# Patient Record
Sex: MALE | Race: WHITE | NOT HISPANIC OR LATINO | Employment: FULL TIME | URBAN - METROPOLITAN AREA
[De-identification: names, ages, dates, MRNs, and addresses within clinical notes are randomized per-mention and may not be internally consistent; named-entity substitution may affect disease eponyms.]

---

## 2017-01-04 ENCOUNTER — APPOINTMENT (OUTPATIENT)
Dept: CARDIAC REHAB | Facility: HOSPITAL | Age: 52
End: 2017-01-04

## 2017-01-06 ENCOUNTER — LAB (OUTPATIENT)
Dept: LAB | Facility: HOSPITAL | Age: 52
End: 2017-01-06

## 2017-01-06 ENCOUNTER — APPOINTMENT (OUTPATIENT)
Dept: CARDIAC REHAB | Facility: HOSPITAL | Age: 52
End: 2017-01-06

## 2017-01-06 DIAGNOSIS — Z95.2 MITRAL VALVE REPLACED: ICD-10-CM

## 2017-01-06 DIAGNOSIS — Z79.01 ANTICOAGULATED BY ANTICOAGULATION TREATMENT: ICD-10-CM

## 2017-01-06 LAB
INR PPP: 3.06 (ref 0.9–1.1)
PROTHROMBIN TIME: 30.7 SECONDS (ref 11.7–14.2)

## 2017-01-06 PROCEDURE — 36415 COLL VENOUS BLD VENIPUNCTURE: CPT

## 2017-01-06 PROCEDURE — 85610 PROTHROMBIN TIME: CPT

## 2017-01-09 ENCOUNTER — APPOINTMENT (OUTPATIENT)
Dept: CARDIAC REHAB | Facility: HOSPITAL | Age: 52
End: 2017-01-09

## 2017-01-11 ENCOUNTER — APPOINTMENT (OUTPATIENT)
Dept: CARDIAC REHAB | Facility: HOSPITAL | Age: 52
End: 2017-01-11

## 2017-01-13 ENCOUNTER — APPOINTMENT (OUTPATIENT)
Dept: CARDIAC REHAB | Facility: HOSPITAL | Age: 52
End: 2017-01-13

## 2017-01-16 ENCOUNTER — APPOINTMENT (OUTPATIENT)
Dept: CARDIAC REHAB | Facility: HOSPITAL | Age: 52
End: 2017-01-16

## 2017-01-18 ENCOUNTER — APPOINTMENT (OUTPATIENT)
Dept: CARDIAC REHAB | Facility: HOSPITAL | Age: 52
End: 2017-01-18

## 2017-01-20 ENCOUNTER — LAB (OUTPATIENT)
Dept: LAB | Facility: HOSPITAL | Age: 52
End: 2017-01-20

## 2017-01-20 ENCOUNTER — APPOINTMENT (OUTPATIENT)
Dept: CARDIAC REHAB | Facility: HOSPITAL | Age: 52
End: 2017-01-20

## 2017-01-20 DIAGNOSIS — Z79.01 ANTICOAGULATED BY ANTICOAGULATION TREATMENT: ICD-10-CM

## 2017-01-20 DIAGNOSIS — Z95.2 MITRAL VALVE REPLACED: ICD-10-CM

## 2017-01-20 LAB
INR PPP: 2.82 (ref 0.9–1.1)
PROTHROMBIN TIME: 28.8 SECONDS (ref 11.7–14.2)

## 2017-01-20 PROCEDURE — 36415 COLL VENOUS BLD VENIPUNCTURE: CPT

## 2017-01-20 PROCEDURE — 85610 PROTHROMBIN TIME: CPT

## 2017-01-23 ENCOUNTER — APPOINTMENT (OUTPATIENT)
Dept: CARDIAC REHAB | Facility: HOSPITAL | Age: 52
End: 2017-01-23

## 2017-01-25 ENCOUNTER — APPOINTMENT (OUTPATIENT)
Dept: CARDIAC REHAB | Facility: HOSPITAL | Age: 52
End: 2017-01-25

## 2017-01-27 ENCOUNTER — APPOINTMENT (OUTPATIENT)
Dept: CARDIAC REHAB | Facility: HOSPITAL | Age: 52
End: 2017-01-27

## 2017-01-30 ENCOUNTER — APPOINTMENT (OUTPATIENT)
Dept: CARDIAC REHAB | Facility: HOSPITAL | Age: 52
End: 2017-01-30

## 2017-02-03 ENCOUNTER — LAB (OUTPATIENT)
Dept: LAB | Facility: HOSPITAL | Age: 52
End: 2017-02-03
Attending: INTERNAL MEDICINE

## 2017-02-03 DIAGNOSIS — Z95.2 MITRAL VALVE REPLACED: ICD-10-CM

## 2017-02-03 DIAGNOSIS — Z79.01 ANTICOAGULATED BY ANTICOAGULATION TREATMENT: ICD-10-CM

## 2017-02-03 LAB
INR PPP: 3.66 (ref 0.9–1.1)
PROTHROMBIN TIME: 35.3 SECONDS (ref 11.7–14.2)

## 2017-02-03 PROCEDURE — 36415 COLL VENOUS BLD VENIPUNCTURE: CPT

## 2017-02-03 PROCEDURE — 85610 PROTHROMBIN TIME: CPT

## 2017-02-10 ENCOUNTER — LAB (OUTPATIENT)
Dept: LAB | Facility: HOSPITAL | Age: 52
End: 2017-02-10
Attending: INTERNAL MEDICINE

## 2017-02-10 DIAGNOSIS — Z95.2 MITRAL VALVE REPLACED: ICD-10-CM

## 2017-02-10 DIAGNOSIS — Z79.01 ANTICOAGULATED BY ANTICOAGULATION TREATMENT: ICD-10-CM

## 2017-02-10 LAB
INR PPP: 2.6 (ref 0.9–1.1)
PROTHROMBIN TIME: 27 SECONDS (ref 11.7–14.2)

## 2017-02-10 PROCEDURE — 85610 PROTHROMBIN TIME: CPT

## 2017-02-10 PROCEDURE — 36415 COLL VENOUS BLD VENIPUNCTURE: CPT

## 2017-02-17 ENCOUNTER — LAB (OUTPATIENT)
Dept: LAB | Facility: HOSPITAL | Age: 52
End: 2017-02-17
Attending: INTERNAL MEDICINE

## 2017-02-17 DIAGNOSIS — Z95.2 MITRAL VALVE REPLACED: ICD-10-CM

## 2017-02-17 DIAGNOSIS — Z79.01 ANTICOAGULATED BY ANTICOAGULATION TREATMENT: ICD-10-CM

## 2017-02-17 LAB
INR PPP: 2.73 (ref 0.9–1.1)
PROTHROMBIN TIME: 28 SECONDS (ref 11.7–14.2)

## 2017-02-17 PROCEDURE — 85610 PROTHROMBIN TIME: CPT

## 2017-02-17 PROCEDURE — 36415 COLL VENOUS BLD VENIPUNCTURE: CPT

## 2017-02-20 ENCOUNTER — OFFICE VISIT (OUTPATIENT)
Dept: INTERNAL MEDICINE | Facility: CLINIC | Age: 52
End: 2017-02-20

## 2017-02-20 VITALS
TEMPERATURE: 98.5 F | HEIGHT: 60 IN | HEART RATE: 84 BPM | WEIGHT: 218 LBS | SYSTOLIC BLOOD PRESSURE: 130 MMHG | DIASTOLIC BLOOD PRESSURE: 80 MMHG | BODY MASS INDEX: 42.8 KG/M2 | OXYGEN SATURATION: 98 %

## 2017-02-20 DIAGNOSIS — I77.810 AORTIC ROOT DILATATION (HCC): ICD-10-CM

## 2017-02-20 DIAGNOSIS — J30.1 NON-SEASONAL ALLERGIC RHINITIS DUE TO POLLEN: ICD-10-CM

## 2017-02-20 DIAGNOSIS — E78.49 OTHER HYPERLIPIDEMIA: Primary | ICD-10-CM

## 2017-02-20 DIAGNOSIS — Z95.2 S/P AVR (AORTIC VALVE REPLACEMENT): ICD-10-CM

## 2017-02-20 DIAGNOSIS — K21.9 GASTROESOPHAGEAL REFLUX DISEASE WITHOUT ESOPHAGITIS: ICD-10-CM

## 2017-02-20 DIAGNOSIS — K58.2 IRRITABLE BOWEL SYNDROME WITH BOTH CONSTIPATION AND DIARRHEA: ICD-10-CM

## 2017-02-20 DIAGNOSIS — Z95.2 MECHANICAL HEART VALVE PRESENT: ICD-10-CM

## 2017-02-20 PROCEDURE — 99214 OFFICE O/P EST MOD 30 MIN: CPT | Performed by: INTERNAL MEDICINE

## 2017-02-21 LAB
ALBUMIN SERPL-MCNC: 4.7 G/DL (ref 3.5–5.2)
ALBUMIN/GLOB SERPL: 1.8 G/DL
ALP SERPL-CCNC: 51 U/L (ref 39–117)
ALT SERPL-CCNC: 16 U/L (ref 1–41)
APPEARANCE UR: CLEAR
AST SERPL-CCNC: 24 U/L (ref 1–40)
BACTERIA #/AREA URNS HPF: ABNORMAL /HPF
BASOPHILS # BLD AUTO: 0.02 10*3/MM3 (ref 0–0.2)
BASOPHILS NFR BLD AUTO: 0.3 % (ref 0–1.5)
BILIRUB SERPL-MCNC: 0.3 MG/DL (ref 0.1–1.2)
BILIRUB UR QL STRIP: NEGATIVE
BUN SERPL-MCNC: 17 MG/DL (ref 6–20)
BUN/CREAT SERPL: 12.4 (ref 7–25)
CALCIUM SERPL-MCNC: 9.6 MG/DL (ref 8.6–10.5)
CASTS URNS MICRO: ABNORMAL
CHLORIDE SERPL-SCNC: 105 MMOL/L (ref 98–107)
CHOLEST SERPL-MCNC: 171 MG/DL (ref 0–200)
CO2 SERPL-SCNC: 26.3 MMOL/L (ref 22–29)
COLOR UR: YELLOW
CREAT SERPL-MCNC: 1.37 MG/DL (ref 0.76–1.27)
EOSINOPHIL # BLD AUTO: 0.26 10*3/MM3 (ref 0–0.7)
EOSINOPHIL NFR BLD AUTO: 4.1 % (ref 0.3–6.2)
EPI CELLS #/AREA URNS HPF: ABNORMAL /HPF
ERYTHROCYTE [DISTWIDTH] IN BLOOD BY AUTOMATED COUNT: 14.6 % (ref 11.5–14.5)
GLOBULIN SER CALC-MCNC: 2.6 GM/DL
GLUCOSE SERPL-MCNC: 104 MG/DL (ref 65–99)
GLUCOSE UR QL: NEGATIVE
HCT VFR BLD AUTO: 39.5 % (ref 40.4–52.2)
HDLC SERPL-MCNC: 24 MG/DL (ref 40–60)
HGB BLD-MCNC: 13.2 G/DL (ref 13.7–17.6)
HGB UR QL STRIP: NEGATIVE
IMM GRANULOCYTES # BLD: 0.03 10*3/MM3 (ref 0–0.03)
IMM GRANULOCYTES NFR BLD: 0.5 % (ref 0–0.5)
KETONES UR QL STRIP: NEGATIVE
LDLC SERPL CALC-MCNC: 86 MG/DL (ref 0–100)
LDLC/HDLC SERPL: 3.57 {RATIO}
LEUKOCYTE ESTERASE UR QL STRIP: NEGATIVE
LYMPHOCYTES # BLD AUTO: 1.32 10*3/MM3 (ref 0.9–4.8)
LYMPHOCYTES NFR BLD AUTO: 21.1 % (ref 19.6–45.3)
MCH RBC QN AUTO: 27.2 PG (ref 27–32.7)
MCHC RBC AUTO-ENTMCNC: 33.4 G/DL (ref 32.6–36.4)
MCV RBC AUTO: 81.3 FL (ref 79.8–96.2)
MONOCYTES # BLD AUTO: 0.29 10*3/MM3 (ref 0.2–1.2)
MONOCYTES NFR BLD AUTO: 4.6 % (ref 5–12)
NEUTROPHILS # BLD AUTO: 4.35 10*3/MM3 (ref 1.9–8.1)
NEUTROPHILS NFR BLD AUTO: 69.4 % (ref 42.7–76)
NITRITE UR QL STRIP: NEGATIVE
PH UR STRIP: 6 [PH] (ref 5–8)
PLATELET # BLD AUTO: 179 10*3/MM3 (ref 140–500)
POTASSIUM SERPL-SCNC: 4.5 MMOL/L (ref 3.5–5.2)
PROT SERPL-MCNC: 7.3 G/DL (ref 6–8.5)
PROT UR QL STRIP: NEGATIVE
RBC # BLD AUTO: 4.86 10*6/MM3 (ref 4.6–6)
RBC #/AREA URNS HPF: ABNORMAL /HPF
SODIUM SERPL-SCNC: 145 MMOL/L (ref 136–145)
SP GR UR: 1.02 (ref 1–1.03)
T4 FREE SERPL-MCNC: 1.01 NG/DL (ref 0.93–1.7)
TRIGL SERPL-MCNC: 307 MG/DL (ref 0–150)
TSH SERPL DL<=0.005 MIU/L-ACNC: 2.02 MIU/ML (ref 0.27–4.2)
UROBILINOGEN UR STRIP-MCNC: (no result) MG/DL
VLDLC SERPL CALC-MCNC: 61.4 MG/DL (ref 5–40)
WBC # BLD AUTO: 6.27 10*3/MM3 (ref 4.5–10.7)
WBC #/AREA URNS HPF: ABNORMAL /HPF

## 2017-02-23 ENCOUNTER — TELEPHONE (OUTPATIENT)
Dept: INTERNAL MEDICINE | Facility: CLINIC | Age: 52
End: 2017-02-23

## 2017-02-23 RX ORDER — DOXYCYCLINE HYCLATE 100 MG/1
100 CAPSULE ORAL 2 TIMES DAILY
Qty: 20 CAPSULE | Refills: 0 | Status: SHIPPED | OUTPATIENT
Start: 2017-02-23 | End: 2017-09-20

## 2017-02-23 NOTE — TELEPHONE ENCOUNTER
PT HAS A SEVERE COUGH AND THE DULERA IS THAT WAS GIVEN TO HIM IS NOT HELPING. ANY THING ELSE YOU SUGGEST

## 2017-02-28 NOTE — PROGRESS NOTES
Subjective   Hussein Nino is a 51 y.o. male.   He is here today for hyperlipidemia aortic root dilatation status post aortic valve replacement along with mechanical heart valve present now along with allergic rhinitis GERD irritable bowel syndrome and he has no new complaints  History of Present Illness   He is here today for hyperlipidemia aortic root dilatation status post aortic valve replacement with mechanical valve along with allergic rhinitis GERD irritable bowel syndrome and he has no new complaints  The following portions of the patient's history were reviewed and updated as appropriate: allergies, current medications, past family history, past medical history, past social history, past surgical history and problem list.    Review of Systems   All other systems reviewed and are negative.      Objective   Physical Exam   Constitutional: He is oriented to person, place, and time. He appears well-developed and well-nourished. He is cooperative.   HENT:   Head: Normocephalic and atraumatic.   Right Ear: Hearing, tympanic membrane, external ear and ear canal normal.   Left Ear: Hearing, tympanic membrane, external ear and ear canal normal.   Nose: Nose normal.   Mouth/Throat: Uvula is midline, oropharynx is clear and moist and mucous membranes are normal.   Eyes: Conjunctivae, EOM and lids are normal. Pupils are equal, round, and reactive to light.   Neck: Phonation normal. Neck supple. Carotid bruit is not present.   Cardiovascular: Normal rate and regular rhythm.  Exam reveals no gallop and no friction rub.    Murmur (ball valve sounds) heard.  Pulmonary/Chest: Effort normal and breath sounds normal. No respiratory distress.   Abdominal: Soft. Bowel sounds are normal. He exhibits no distension and no mass. There is no hepatosplenomegaly. There is no tenderness. There is no rebound and no guarding. No hernia.   Musculoskeletal: He exhibits no edema.   Neurological: He is alert and oriented to person, place,  and time. Coordination and gait normal.   Skin: Skin is warm and dry.   Psychiatric: He has a normal mood and affect. His speech is normal and behavior is normal. Judgment and thought content normal.   Nursing note and vitals reviewed.      Assessment/Plan   Diagnoses and all orders for this visit:    Other hyperlipidemia  -     Lipid Panel With LDL / HDL Ratio  -     CBC & Differential  -     Comprehensive Metabolic Panel  -     TSH  -     T4, Free  -     Urinalysis With Microscopic    Aortic root dilatation  -     Lipid Panel With LDL / HDL Ratio  -     CBC & Differential  -     Comprehensive Metabolic Panel  -     TSH  -     T4, Free  -     Urinalysis With Microscopic    S/P AVR (aortic valve replacement)  -     Lipid Panel With LDL / HDL Ratio  -     CBC & Differential  -     Comprehensive Metabolic Panel  -     TSH  -     T4, Free  -     Urinalysis With Microscopic    Mechanical heart valve present  -     Lipid Panel With LDL / HDL Ratio  -     CBC & Differential  -     Comprehensive Metabolic Panel  -     TSH  -     T4, Free  -     Urinalysis With Microscopic    Non-seasonal allergic rhinitis due to pollen  -     Lipid Panel With LDL / HDL Ratio  -     CBC & Differential  -     Comprehensive Metabolic Panel  -     TSH  -     T4, Free  -     Urinalysis With Microscopic    Gastroesophageal reflux disease without esophagitis  -     Lipid Panel With LDL / HDL Ratio  -     CBC & Differential  -     Comprehensive Metabolic Panel  -     TSH  -     T4, Free  -     Urinalysis With Microscopic    Irritable bowel syndrome with both constipation and diarrhea  -     Lipid Panel With LDL / HDL Ratio  -     CBC & Differential  -     Comprehensive Metabolic Panel  -     TSH  -     T4, Free  -     Urinalysis With Microscopic    Other orders  -     Microscopic Examination      Hyperlipidemia keep LDL less than 70 with proper diet exercise medication  Allergic rhinitis supportive meds but no decongestions  Irritable bowel  syndrome stable  GERD stable on current medication  Status post valve replacement with mechanical valve doing well  Aortic root dilatation as per cardiology  Hyperlipidemia C above

## 2017-03-03 ENCOUNTER — LAB (OUTPATIENT)
Dept: LAB | Facility: HOSPITAL | Age: 52
End: 2017-03-03

## 2017-03-03 DIAGNOSIS — Z95.2 MITRAL VALVE REPLACED: ICD-10-CM

## 2017-03-03 DIAGNOSIS — Z79.01 ANTICOAGULATED BY ANTICOAGULATION TREATMENT: ICD-10-CM

## 2017-03-03 LAB
INR PPP: 3.21 (ref 0.9–1.1)
PROTHROMBIN TIME: 31.8 SECONDS (ref 11.7–14.2)

## 2017-03-03 PROCEDURE — 85610 PROTHROMBIN TIME: CPT

## 2017-03-03 PROCEDURE — 36415 COLL VENOUS BLD VENIPUNCTURE: CPT

## 2017-03-17 ENCOUNTER — LAB (OUTPATIENT)
Dept: LAB | Facility: HOSPITAL | Age: 52
End: 2017-03-17
Attending: INTERNAL MEDICINE

## 2017-03-17 DIAGNOSIS — Z79.01 ANTICOAGULATED BY ANTICOAGULATION TREATMENT: ICD-10-CM

## 2017-03-17 DIAGNOSIS — Z95.2 MITRAL VALVE REPLACED: ICD-10-CM

## 2017-03-17 LAB
INR PPP: 3.77 (ref 0.9–1.1)
PROTHROMBIN TIME: 36.1 SECONDS (ref 11.7–14.2)

## 2017-03-17 PROCEDURE — 85610 PROTHROMBIN TIME: CPT

## 2017-03-17 PROCEDURE — 36415 COLL VENOUS BLD VENIPUNCTURE: CPT

## 2017-03-31 ENCOUNTER — LAB (OUTPATIENT)
Dept: LAB | Facility: HOSPITAL | Age: 52
End: 2017-03-31

## 2017-03-31 DIAGNOSIS — Z79.01 ANTICOAGULATED BY ANTICOAGULATION TREATMENT: ICD-10-CM

## 2017-03-31 DIAGNOSIS — Z95.2 MITRAL VALVE REPLACED: ICD-10-CM

## 2017-03-31 LAB
INR PPP: 2.87 (ref 0.9–1.1)
PROTHROMBIN TIME: 29.2 SECONDS (ref 11.7–14.2)

## 2017-03-31 PROCEDURE — 36415 COLL VENOUS BLD VENIPUNCTURE: CPT

## 2017-03-31 PROCEDURE — 85610 PROTHROMBIN TIME: CPT

## 2017-04-10 ENCOUNTER — OFFICE VISIT (OUTPATIENT)
Dept: INTERNAL MEDICINE | Facility: CLINIC | Age: 52
End: 2017-04-10

## 2017-04-10 VITALS
TEMPERATURE: 97.8 F | HEART RATE: 81 BPM | DIASTOLIC BLOOD PRESSURE: 88 MMHG | RESPIRATION RATE: 16 BRPM | BODY MASS INDEX: 43.98 KG/M2 | WEIGHT: 224 LBS | HEIGHT: 60 IN | OXYGEN SATURATION: 96 % | SYSTOLIC BLOOD PRESSURE: 140 MMHG

## 2017-04-10 DIAGNOSIS — J45.20 MILD INTERMITTENT ASTHMA WITHOUT COMPLICATION: ICD-10-CM

## 2017-04-10 DIAGNOSIS — R09.89 THROAT FULLNESS: Primary | ICD-10-CM

## 2017-04-10 DIAGNOSIS — R13.10 DYSPHAGIA, UNSPECIFIED TYPE: ICD-10-CM

## 2017-04-10 DIAGNOSIS — J30.1 NON-SEASONAL ALLERGIC RHINITIS DUE TO POLLEN: ICD-10-CM

## 2017-04-10 PROCEDURE — 99213 OFFICE O/P EST LOW 20 MIN: CPT | Performed by: NURSE PRACTITIONER

## 2017-04-10 RX ORDER — MONTELUKAST SODIUM 10 MG/1
10 TABLET ORAL DAILY PRN
Qty: 30 TABLET | Refills: 3 | Status: SHIPPED | OUTPATIENT
Start: 2017-04-10 | End: 2017-11-01 | Stop reason: ALTCHOICE

## 2017-04-10 RX ORDER — FLUTICASONE PROPIONATE 50 MCG
2 SPRAY, SUSPENSION (ML) NASAL DAILY
Qty: 1 EACH | Refills: 3 | Status: SHIPPED | OUTPATIENT
Start: 2017-04-10

## 2017-04-10 RX ORDER — CETIRIZINE HYDROCHLORIDE 10 MG/1
10 TABLET ORAL DAILY
Qty: 30 TABLET | Refills: 3 | Status: SHIPPED | OUTPATIENT
Start: 2017-04-10 | End: 2020-12-18

## 2017-04-10 NOTE — PROGRESS NOTES
Vitals:    04/10/17 1553   BP: 140/88   Pulse: 81   Resp: 16   Temp: 97.8 °F (36.6 °C)   SpO2: 96%     Last 2 weights    04/10/17  1553   Weight: 224 lb (102 kg)     Social History   Substance Use Topics   • Smoking status: Former Smoker     Packs/day: 0.50     Years: 14.00     Types: Cigarettes   • Smokeless tobacco: Never Used      Comment: QUIT AGE 23   • Alcohol use No       Subjective     HPI  Pt presents to office today with a coughing that has been going on for the past two months. He states it was more productive around super bowl time however it has now progressive to just a dry cough that cause some SOA, chest tightness at time especially when laughing or talking for prolonged periods of time. He doesn't notice any wheezing, and sometime describes it as a fullness in throat or feeling something is stuck in throat. Most recent lab work past February showed normal thyroid function    Also pt right ear feel full, sometime numb, and past couple days slightly painful/tender. Pt has hx of allergies, and asthma. Has not been taking zyrtec, singulair, or flonase. Is no on maintenance steroid for asthma. Denies fever, discharge, dizziness, vision changes, cp, palpitations.    The following portions of the patient's history were reviewed and updated as appropriate: allergies, current medications, past medical history, past social history and problem list.    Review of Systems   Constitutional: Negative.    HENT: Positive for ear pain (right).    Respiratory: Positive for cough, chest tightness and shortness of breath.         Throat fullness/feeling of something stuck in there   Cardiovascular: Negative.        Objective     Physical Exam   Constitutional: He is oriented to person, place, and time. He appears well-developed and well-nourished.   HENT:   Head: Normocephalic and atraumatic.   Right Ear: Tympanic membrane is erythematous.   Left Ear: Tympanic membrane is erythematous.   Mouth/Throat: Posterior  oropharyngeal erythema present.   Neck: Normal range of motion.   Cardiovascular: Normal rate, regular rhythm and normal heart sounds.    Pulmonary/Chest: Effort normal and breath sounds normal.   CTA   Musculoskeletal: Normal range of motion.   Neurological: He is alert and oriented to person, place, and time.   Nursing note and vitals reviewed.      Assessment/Plan   Hussein was seen today for cough, ear problem, tinnitus and earache.    Diagnoses and all orders for this visit:    Throat fullness  -     FL Video Swallow With Speech    Dysphagia, unspecified type  -     FL Video Swallow With Speech    Non-seasonal allergic rhinitis due to pollen    Mild intermittent asthma without complication    Other orders  -     fluticasone (FLONASE) 50 MCG/ACT nasal spray; 2 sprays into each nostril Daily.  -     montelukast (SINGULAIR) 10 MG tablet; Take 1 tablet by mouth Daily As Needed (allergies).  -     cetirizine (ZYRTEC ALLERGY) 10 MG tablet; Take 1 tablet by mouth Daily.           -swallow study  -no need for antibiotics at this time for ears. Look seasonal allergy related. Advised pt to do zytrec, singulair, and dulera  -cont home meds  -FU prn or if symptoms persist/worsen

## 2017-04-17 ENCOUNTER — HOSPITAL ENCOUNTER (OUTPATIENT)
Dept: GENERAL RADIOLOGY | Facility: HOSPITAL | Age: 52
Discharge: HOME | End: 2017-04-17
Admitting: NURSE PRACTITIONER

## 2017-04-17 ENCOUNTER — LAB (OUTPATIENT)
Dept: LAB | Facility: HOSPITAL | Age: 52
End: 2017-04-17
Attending: INTERNAL MEDICINE

## 2017-04-17 DIAGNOSIS — Z95.2 MITRAL VALVE REPLACED: ICD-10-CM

## 2017-04-17 DIAGNOSIS — R13.19 OTHER DYSPHAGIA: Primary | ICD-10-CM

## 2017-04-17 DIAGNOSIS — Z79.01 ANTICOAGULATED BY ANTICOAGULATION TREATMENT: ICD-10-CM

## 2017-04-17 LAB
INR PPP: 2.58 (ref 0.9–1.1)
PROTHROMBIN TIME: 26.9 SECONDS (ref 11.7–14.2)

## 2017-04-17 PROCEDURE — G8996 SWALLOW CURRENT STATUS: HCPCS

## 2017-04-17 PROCEDURE — G8998 SWALLOW D/C STATUS: HCPCS

## 2017-04-17 PROCEDURE — 36415 COLL VENOUS BLD VENIPUNCTURE: CPT

## 2017-04-17 PROCEDURE — 74230 X-RAY XM SWLNG FUNCJ C+: CPT

## 2017-04-17 PROCEDURE — 85610 PROTHROMBIN TIME: CPT

## 2017-04-17 PROCEDURE — G8997 SWALLOW GOAL STATUS: HCPCS

## 2017-04-17 PROCEDURE — 92611 MOTION FLUOROSCOPY/SWALLOW: CPT

## 2017-04-17 NOTE — PROGRESS NOTES
Outpatient Speech Language Pathology   Adult Swallow Initial Evaluation  [facility]     Patient Name: Hussein Nino  : 1965  MRN: [id number]  Today's Date: 2017         Visit Date: 2017   Patient Active Problem List   Diagnosis   • Acute gouty arthritis   • Allergic rhinitis   • Aortic root dilatation   • Asthma   • Radiating chest pain   • Chest tightness   • Gout   • Cough   • Gastroesophageal reflux disease   • Dyspnea on exertion   • Heart murmur   • Hyperlipidemia   • Irritable bowel syndrome   • Pericardial effusion   • Raynaud's phenomenon   • Pain of toe   • Heart valve disease   • Essential hypertriglyceridemia   • Facial flushing   • Epigastric abdominal pain   • Health care maintenance   • Aortic stenosis due to bicuspid aortic valve   • S/P AVR (aortic valve replacement)   • Tachycardia   • Mechanical heart valve present   • Pleural effusion, left        Past Medical History:   Diagnosis Date   • Aortic stenosis     SEVERE   • Asthma    • Dyspnea on exertion    • GERD (gastroesophageal reflux disease)    • Gout    • Heart murmur    • Hyperlipidemia    • Hypertension    • Irritable bowel syndrome         Past Surgical History:   Procedure Laterality Date   • ADENOIDECTOMY     • ASCENDING ARCH/HEMIARCH REPLACEMENT N/A 10/5/2016    Procedure: MIDLINE STERNOTOMY, AORTIC VALVE REPLACEMENT, ASCENDING AND HEMIARCH REPLACEMENT, REPAIR OF PERIVALVULAR LEAK, WITH NEURO MONITOIRING, INTRAOPERATIVE WARREN;  Surgeon: Sukhi Wilkinson MD;  Location: [facility] OR;  Service:    • CARDIAC CATHETERIZATION N/A 2016    Procedure: Coronary angiography;  Surgeon: Lio Roman MD;  Location: [facility] CATH INVASIVE LOCATION;  Service:    • CARDIAC CATHETERIZATION N/A 2016    Procedure: Left Heart Cath;  Surgeon: Lio Roman MD;  Location: [facility] INVASIVE LOCATION;  Service:    • CHOLECYSTECTOMY     • CORONARY ARTERY BYPASS GRAFT           Visit Dx:     ICD-10-CM ICD-9-CM    1. Other dysphagia R13.19 787.29        SPEECH-LANGUAGE PATHOLOGY EVALUTION - VFSS  Subjective: The patient was seen on this date for a VFSS(Videofluoroscopic Swallowing Study).  Patient was alert and cooperative.    Objective: Risks/benefits were reviewed with the patient, and consent was obtained. The study was completed with SLP present and Radiologist review. The patient was seen in lateral view(s). Textures given included thin liquid, puree consistency, mechanical soft consistency and regular consistency.  Assessment: Difficulties were noted with none of the above consistencies, esophageal scan WFL. Trace transient penetration thin X1.   SLP Findings: Patient presents with functional swallow.   Comments: Pt with complaint of coughing/Short of breath while talking or laughing.   Recommendations: Diet Textures: thin liquid, regular consistency food. Medications should be taken whole with thin liquids.   Recommended Strategies: Upright for PO. Oral care before breakfast, after all meals and PRN.  Other Recommended Evaluations: Referral to Speech Therapy for Vocal cord dysfunction evaluation.     Dysphagia therapy is not recommended. Rationale: Not warranted at this time.                Adult Dysphagia - 04/17/17 1015     Adult Dysphagia Background    Patient Description of Complaint Coughing  -OC    Current Diet (Solids) Regular  -OC    Diet Level (Liquids) Thin Liquid  -OC    SLP Communication to Radiology    Severity Level of Dysphagia other (see comments)   Functional Swallow  -OC    Summary Statement Pt presents with functional swallow, no aspiration observed. Trace transient penetration of thin X1.   -OC      User Key  (r) = Recorded By, (t) = Taken By, (c) = Cosigned By    Initials Name Provider Type    OC Jana Lincoln MA,[facility]-SLP Speech and Language Pathologist                            OP SLP Education       04/17/17 1015    Education    Barriers to Learning No barriers identified  -OC    Education  Provided Described results of evaluation;Patient expressed understanding of evaluation  -OC    Assessed Learning needs;Learning motivation;Learning preferences;Learning readiness  -OC    Learning Motivation Strong  -OC    Learning Method Explanation  -OC    Teaching Response Verbalized understanding  -OC      User Key  (r) = Recorded By, (t) = Taken By, (c) = Cosigned By    Initials Name Effective Dates    OC Jana Lincoln MA, CCC-SLP 04/05/17 -                     OP SLP Assessment/Plan - 04/17/17 1137     SLP Assessment    Functional Problems Swallowing  -OC    Clinical Impression: Swallowing WNL  -OC    Please refer to paper survey for additional self-reported information No  -OC    Please refer to items scanned into chart for additional diagnostic informaiton and handouts as provided by clinician No  -OC    Prognosis Excellent (comment)  -OC    Patient/caregiver participated in establishment of treatment plan and goals Yes  -OC    Patient would benefit from skilled therapy intervention No  -OC      User Key  (r) = Recorded By, (t) = Taken By, (c) = Cosigned By    Initials Name Provider Type    OC Jana Lincoln MA, CCC-SLP Speech and Language Pathologist              SLP Outcome Measures (last 72 hours)      SLP Outcome Measures       04/17/17 1015          SLP Outcome Measures    Outcome Measure Used? Adult NOMS  -OC      FCM Scores    FCM Chosen Swallowing  -OC      Swallowing FCM Score 7  -OC        User Key  (r) = Recorded By, (t) = Taken By, (c) = Cosigned By    Initials Name Effective Dates    CATHIE Lincoln MA, CCC-SLP 04/05/17 -                Time Calculation:   SLP Start Time: 0930  SLP Stop Time: 1000  SLP Time Calculation (min): 30 min    Therapy Charges for Today     Code Description Service Date Service Provider Modifiers Qty    06951426942 HC ST SWALLOWING CURRENT STATUS 4/17/2017 Jana Lincoln MA,CCC-SLP GN, CH 1    17416652814 HC ST SWALLOWING PROJECTED 4/17/2017 Jana Lincoln MA,BOY-SLP GN, CH 1     28584365636 HC ST SWALLOWING DISCHARGE 4/17/2017 Jana Lincoln MA,CCC-SLP GN,  1    75857418887 HC ST MOTION FLUORO EVAL SWALLOW 2 4/17/2017 Jana Lincoln MA,CCC-SLP GN 1          SLP G-Codes  SLP NOMS Used?: Yes  Functional Limitations: Swallowing  Swallow Current Status (): 0 percent impaired, limited or restricted  Swallow Goal Status (): 0 percent impaired, limited or restricted  Swallow Discharge Status (): 0 percent impaired, limited or restricted        Jana Lincoln MA,BOY-SLP  4/17/2017

## 2017-04-21 ENCOUNTER — TRANSCRIBE ORDERS (OUTPATIENT)
Dept: SPEECH THERAPY | Facility: HOSPITAL | Age: 52
End: 2017-04-21

## 2017-04-21 DIAGNOSIS — R13.10 DYSPHAGIA, UNSPECIFIED: Primary | ICD-10-CM

## 2017-04-27 ENCOUNTER — HOSPITAL ENCOUNTER (OUTPATIENT)
Dept: SPEECH THERAPY | Facility: HOSPITAL | Age: 52
Setting detail: THERAPIES SERIES
Discharge: HOME | End: 2017-04-27

## 2017-04-27 DIAGNOSIS — R49.0 DYSPHONIA: Primary | ICD-10-CM

## 2017-04-27 PROCEDURE — 31579 LARYNGOSCOPY TELESCOPIC: CPT | Performed by: SPEECH-LANGUAGE PATHOLOGIST

## 2017-04-27 NOTE — THERAPY DISCHARGE NOTE
Outpatient Speech Language Pathology   Adult Voice Eval/Discharge  [facility]     Patient Name: Hussein Nino  : 1965  MRN: [id number]  Today's Date: 2017        Visit Date: 2017   Patient Active Problem List   Diagnosis   • Acute gouty arthritis   • Allergic rhinitis   • Aortic root dilatation   • Asthma   • Radiating chest pain   • Chest tightness   • Gout   • Cough   • Gastroesophageal reflux disease   • Dyspnea on exertion   • Heart murmur   • Hyperlipidemia   • Irritable bowel syndrome   • Pericardial effusion   • Raynaud's phenomenon   • Pain of toe   • Heart valve disease   • Essential hypertriglyceridemia   • Facial flushing   • Epigastric abdominal pain   • Health care maintenance   • Aortic stenosis due to bicuspid aortic valve   • S/P AVR (aortic valve replacement)   • Tachycardia   • Mechanical heart valve present   • Pleural effusion, left        Past Medical History:   Diagnosis Date   • Aortic stenosis     SEVERE   • Asthma    • Dyspnea on exertion    • GERD (gastroesophageal reflux disease)    • Gout    • Heart murmur    • Hyperlipidemia    • Hypertension    • Irritable bowel syndrome         Past Surgical History:   Procedure Laterality Date   • ADENOIDECTOMY     • ASCENDING ARCH/HEMIARCH REPLACEMENT N/A 10/5/2016    Procedure: MIDLINE STERNOTOMY, AORTIC VALVE REPLACEMENT, ASCENDING AND HEMIARCH REPLACEMENT, REPAIR OF PERIVALVULAR LEAK, WITH NEURO MONITOIRING, INTRAOPERATIVE WARREN;  Surgeon: Sukhi Wilkinson MD;  Location: [facility] OR;  Service:    • CARDIAC CATHETERIZATION N/A 2016    Procedure: Coronary angiography;  Surgeon: Lio Roman MD;  Location: [facility] CATH INVASIVE LOCATION;  Service:    • CARDIAC CATHETERIZATION N/A 2016    Procedure: Left Heart Cath;  Surgeon: Lio Roman MD;  Location: [facility] INVASIVE LOCATION;  Service:    • CHOLECYSTECTOMY     • CORONARY ARTERY BYPASS GRAFT           Visit Dx:    ICD-10-CM ICD-9-CM   1.  "Dysphonia R49.0 784.42                 Voice - 04/27/17 0900     General Voice History    Reflux History Patient reports occasional reflux;Other (comment);Reflux is reported to be not fully managed   reports takes reflux medication when experiencing symptoms  -KA    Daily Water Intake Drinks 1-2 glasses a day  -KA    Daily Caffeine Intake soda and Ice tea   -KA    Tobacco History Reported previous smoker but quit 30 years ago   -KA    Pulmonary Status Other (comment)   reports no pulmonary history  -KA    Environmental Factors None reported  -KA    Typical Voice Use Uses voice for ADL's  -KA    Symptoms Improved/Worsened By Patient referred for videostroboscopy to r/o possible VCD. Patient has history of recent cough starting on super bowl. He stated his cough has progressed to just a dry cough. Mr Nino also reports recent symptom is when he is talking or laughing sometimes he \"cant breath, my airway shuts off.\" He reports this episode results in coughing sometimes and only lasts a few seconds. He denies stridor or wheezing and reports these episodes occur about once a day. He denies any recent changes with his vocal quality, and reported sometimes he wakes up with a more hoarse voice however attributed this to allergies.    -KA    Vocal Abuse/Misuse Coughing  -KA    Allergies Reports history of seasonal allergies   -KA    Voice Assessment    S/Z Ratio and Interpretation 12/20=.6, suggest possible vibratory dysfunction  -KA    Maximum Phonation Time and Interpretation 25 seconds WNL  -KA    Voice Features Observed Hoarseness   mild hoarseness   -KA    PVFM History    Medical History Related to Referral Allergies;Other (comment)   cough   -KA    Reported Symptoms Characteristc of PVFM Cannot get enough air;Other (comment)   reports relief after several seconds  -KA    Other Reported Symptoms Chronic coughing;Sudden onset of attacks  -KA    Description of Coughing Episodes see history above  -KA    Swallowing Issues " During Episodes VFSS 4/17 WNL  -KA    Triggers Other (comment)   denies any triggers  -KA    Attacks are helped by: Denies any triggers and reports suddent onset  -KA    PVFM Assessment    Breathing Episode Was not observed  -KA    Coughing Episode Was not observed  -KA    Measures and Scales for Voice    Measures and Scales for Voice RSI (score 0-5)  -    RSI (Within the last month, how did the following problems affect you?)    Hoarsness or problem with patient's voice 0: No Problem  -KA    Clearing throat 0: No Problem  -KA    Excess throat mucous or post-nasal drip 0: No Problem  -KA    Difficulty swallowing food, liquid or pills 0: No Problem  -KA    Coughing after eating or lying down 0: No Problem  -KA    Breathing difficulties or choking episodes 4  -KA    Troublesome or annoying cough 4  -KA    Sensations of something sticking in patient's throat/lump in throat 5: Severe Problem  -KA    Heart burn, chest pain, indigestion or stomach acid coming back up 0: No Problem  -KA    Total 13  -KA    Videostroboscopic Assessment    Risks/Benefits Reviewed risks/benefits explained;agreed to eval   passed rigid and flexible endoscope   -KA    Symmetry Right normal  -KA    Symmetry Left normal  -KA    Amplitude of Lateral Excursion small lateral excursion  -KA    Periodicity intermittent aperiodicity  -KA    Mucosal Wave- Right adynamic segment  -KA    Mucosal Wave- Left adynamic segment  -KA    Glottic Closure incomplete   secondary to lesions  -KA    Phase Symmetry irregular  -KA    Irregular Phase Symmetry during beginning or end of tasks  -KA    Supraglottic Compression- Ant/Post significant  -KA    Supraglottic Compression- Medial significant  -KA    Impressions right TVF;left TVF  -KA    Right TVF lesion location: posterior 1/3;edema;erythema   unilateral lesion on the vocal process  -KA    Left TVF lesion location: medial 1/3;edema;erythema   edematous and pliable lesion   -    Recommendations --   Refer to  ENT  -KA    Findings/Education/Recommendations    Clinical Impression- Voice Mild voice disorder  -KA    Clinical Impression- PVFM Does not appear to present with PVFM  -KA    Activity Limits and Participation Restrictions Daily activities  -KA    Impact on Function- Voice Difficulty communicating  -KA    Education Described results of evaluation;Patient/Family expressed understanding or results  -KA    Barriers to Learning No barriers identified  -KA    Learning Motivation Strong  -KA    Learning Assessment Learning needs;Learning motivation  -KA    Recommendations Otolaryngology referral;Discuss reflux management with MD  PATRICK      User Key  (r) = Recorded By, (t) = Taken By, (c) = Cosigned By    Initials Name Provider Type    JOHN Barth MA,CCC-SLP Speech and Language Pathologist             Lesion right posterior 1/3 of vocal process and left medial 1/3 of vocal process        adduction with muscle tension               OP SLP Education       04/27/17 1132    Education    Barriers to Learning No barriers identified  -KA    Education Provided Described results of evaluation;Patient expressed understanding of evaluation  -KA    Assessed Learning needs;Learning motivation  -KA    Learning Motivation Strong  -KA    Learning Method Explanation  -KA    Teaching Response Verbalized understanding  -KA      User Key  (r) = Recorded By, (t) = Taken By, (c) = Cosigned By    Initials Name Effective Dates    JOHN Barth MA,CCC-SLP 04/13/15 -                     OP SLP Assessment/Plan - 04/27/17 1131     SLP Assessment    Functional Problems Voice  -KA    Impact on Function- Voice Restrictions in personal and social life  -KA    Clinical Impression- Voice Mild voice disorder  -KA    Clinical Impression- PVFM Does not appear to present with PVFM  -KA    Please refer to paper survey for additional self-reported information Yes  -KA    Please refer to items scanned into chart for additional diagnostic informaiton and  handouts as provided by clinician Yes  -KA    Prognosis Good (comment)  -JOHN    Patient/caregiver participated in establishment of treatment plan and goals Yes  -JOHN    Patient would benefit from skilled therapy intervention Yes  -JOHN    SLP Plan    Plan Comments --   no therapy recommended at this time  -JOHN      User Key  (r) = Recorded By, (t) = Taken By, (c) = Cosigned By    Initials Name Provider Type    JOHN Barth MA,CCC-SLP Speech and Language Pathologist             SLP Outcome Measures (last 72 hours)      SLP Outcome Measures       04/27/17 1100          SLP Outcome Measures    Outcome Measure Used? Adult NOMS  -JOHN      FCM Scores    FCM Chosen Voice  -JOHN      Voice FCM Score 6  -KA        User Key  (r) = Recorded By, (t) = Taken By, (c) = Cosigned By    Initials Name Effective Dates    JOHN Barth MA,CCC-SLP 04/13/15 -              Time Calculation:   SLP Start Time: 0820  SLP Stop Time: 0920  SLP Time Calculation (min): 60 min    Therapy Charges for Today     Code Description Service Date Service Provider Modifiers Qty    46076109353 HC ST LARYNGSCPY FLEX RIGID W STROBSCPY 4/27/2017 Jori Barth MA,CCC-SLP  1                        Jori Barth MA,CCC-SLP  4/27/2017

## 2017-05-01 ENCOUNTER — LAB (OUTPATIENT)
Dept: LAB | Facility: HOSPITAL | Age: 52
End: 2017-05-01
Attending: INTERNAL MEDICINE

## 2017-05-01 DIAGNOSIS — Z95.2 MITRAL VALVE REPLACED: ICD-10-CM

## 2017-05-01 DIAGNOSIS — Z79.01 ANTICOAGULATED BY ANTICOAGULATION TREATMENT: ICD-10-CM

## 2017-05-01 LAB
INR PPP: 3.1 (ref 0.9–1.1)
PROTHROMBIN TIME: 31 SECONDS (ref 11.7–14.2)

## 2017-05-01 PROCEDURE — 36415 COLL VENOUS BLD VENIPUNCTURE: CPT

## 2017-05-01 PROCEDURE — 85610 PROTHROMBIN TIME: CPT

## 2017-05-04 ENCOUNTER — APPOINTMENT (OUTPATIENT)
Dept: SPEECH THERAPY | Facility: HOSPITAL | Age: 52
End: 2017-05-04

## 2017-05-09 DIAGNOSIS — R09.89 THROAT FULLNESS: Primary | ICD-10-CM

## 2017-05-11 ENCOUNTER — APPOINTMENT (OUTPATIENT)
Dept: SPEECH THERAPY | Facility: HOSPITAL | Age: 52
End: 2017-05-11

## 2017-05-18 ENCOUNTER — APPOINTMENT (OUTPATIENT)
Dept: SPEECH THERAPY | Facility: HOSPITAL | Age: 52
End: 2017-05-18

## 2017-05-19 ENCOUNTER — LAB (OUTPATIENT)
Dept: LAB | Facility: HOSPITAL | Age: 52
End: 2017-05-19
Attending: INTERNAL MEDICINE

## 2017-05-19 DIAGNOSIS — Z79.01 ANTICOAGULATED BY ANTICOAGULATION TREATMENT: ICD-10-CM

## 2017-05-19 DIAGNOSIS — Z95.2 MITRAL VALVE REPLACED: ICD-10-CM

## 2017-05-19 LAB
INR PPP: 3.51 (ref 0.9–1.1)
PROTHROMBIN TIME: 34.1 SECONDS (ref 11.7–14.2)

## 2017-05-19 PROCEDURE — 85610 PROTHROMBIN TIME: CPT

## 2017-05-19 PROCEDURE — 36415 COLL VENOUS BLD VENIPUNCTURE: CPT

## 2017-05-25 ENCOUNTER — APPOINTMENT (OUTPATIENT)
Dept: SPEECH THERAPY | Facility: HOSPITAL | Age: 52
End: 2017-05-25

## 2017-06-02 ENCOUNTER — LAB (OUTPATIENT)
Dept: LAB | Facility: HOSPITAL | Age: 52
End: 2017-06-02

## 2017-06-02 DIAGNOSIS — Z95.2 MITRAL VALVE REPLACED: ICD-10-CM

## 2017-06-02 DIAGNOSIS — Z79.01 ANTICOAGULATED BY ANTICOAGULATION TREATMENT: ICD-10-CM

## 2017-06-02 LAB
INR PPP: 2.14 (ref 0.9–1.1)
PROTHROMBIN TIME: 23.2 SECONDS (ref 11.7–14.2)

## 2017-06-02 PROCEDURE — 36415 COLL VENOUS BLD VENIPUNCTURE: CPT

## 2017-06-02 PROCEDURE — 85610 PROTHROMBIN TIME: CPT

## 2017-06-09 ENCOUNTER — LAB (OUTPATIENT)
Dept: LAB | Facility: HOSPITAL | Age: 52
End: 2017-06-09

## 2017-06-09 DIAGNOSIS — Z95.2 MITRAL VALVE REPLACED: ICD-10-CM

## 2017-06-09 DIAGNOSIS — Z79.01 ANTICOAGULATED BY ANTICOAGULATION TREATMENT: ICD-10-CM

## 2017-06-09 LAB
INR PPP: 2.41 (ref 0.9–1.1)
PROTHROMBIN TIME: 25.4 SECONDS (ref 11.7–14.2)

## 2017-06-09 PROCEDURE — 85610 PROTHROMBIN TIME: CPT

## 2017-06-09 PROCEDURE — 36415 COLL VENOUS BLD VENIPUNCTURE: CPT

## 2017-06-16 ENCOUNTER — LAB (OUTPATIENT)
Dept: LAB | Facility: HOSPITAL | Age: 52
End: 2017-06-16

## 2017-06-16 DIAGNOSIS — Z79.01 ANTICOAGULATED BY ANTICOAGULATION TREATMENT: ICD-10-CM

## 2017-06-16 DIAGNOSIS — Z95.2 MITRAL VALVE REPLACED: ICD-10-CM

## 2017-06-16 LAB
INR PPP: 2.38 (ref 0.9–1.1)
PROTHROMBIN TIME: 25.2 SECONDS (ref 11.7–14.2)

## 2017-06-16 PROCEDURE — 85610 PROTHROMBIN TIME: CPT

## 2017-06-16 PROCEDURE — 36415 COLL VENOUS BLD VENIPUNCTURE: CPT

## 2017-06-23 ENCOUNTER — LAB (OUTPATIENT)
Dept: LAB | Facility: HOSPITAL | Age: 52
End: 2017-06-23

## 2017-06-23 DIAGNOSIS — Z95.2 MITRAL VALVE REPLACED: ICD-10-CM

## 2017-06-23 DIAGNOSIS — Z79.01 ANTICOAGULATED BY ANTICOAGULATION TREATMENT: ICD-10-CM

## 2017-06-23 LAB
INR PPP: 2.4 (ref 0.9–1.1)
PROTHROMBIN TIME: 25.3 SECONDS (ref 11.7–14.2)

## 2017-06-23 PROCEDURE — 85610 PROTHROMBIN TIME: CPT

## 2017-06-23 PROCEDURE — 36415 COLL VENOUS BLD VENIPUNCTURE: CPT

## 2017-07-05 ENCOUNTER — LAB (OUTPATIENT)
Dept: LAB | Facility: HOSPITAL | Age: 52
End: 2017-07-05
Attending: INTERNAL MEDICINE

## 2017-07-05 DIAGNOSIS — Z79.01 ANTICOAGULATED BY ANTICOAGULATION TREATMENT: ICD-10-CM

## 2017-07-05 DIAGNOSIS — Z95.2 MITRAL VALVE REPLACED: ICD-10-CM

## 2017-07-05 LAB
INR PPP: 3.6 (ref 0.9–1.1)
PROTHROMBIN TIME: 34.8 SECONDS (ref 11.7–14.2)

## 2017-07-05 PROCEDURE — 85610 PROTHROMBIN TIME: CPT

## 2017-07-05 PROCEDURE — 36415 COLL VENOUS BLD VENIPUNCTURE: CPT

## 2017-07-12 ENCOUNTER — LAB (OUTPATIENT)
Dept: LAB | Facility: HOSPITAL | Age: 52
End: 2017-07-12

## 2017-07-12 DIAGNOSIS — Z79.01 ANTICOAGULATED BY ANTICOAGULATION TREATMENT: ICD-10-CM

## 2017-07-12 DIAGNOSIS — Z95.2 MITRAL VALVE REPLACED: ICD-10-CM

## 2017-07-12 LAB
INR PPP: 4.14 (ref 0.9–1.1)
PROTHROMBIN TIME: 38.9 SECONDS (ref 11.7–14.2)

## 2017-07-12 PROCEDURE — 85610 PROTHROMBIN TIME: CPT

## 2017-07-12 PROCEDURE — 36415 COLL VENOUS BLD VENIPUNCTURE: CPT

## 2017-07-19 ENCOUNTER — LAB (OUTPATIENT)
Dept: LAB | Facility: HOSPITAL | Age: 52
End: 2017-07-19
Attending: INTERNAL MEDICINE

## 2017-07-19 DIAGNOSIS — Z79.01 ANTICOAGULATED BY ANTICOAGULATION TREATMENT: ICD-10-CM

## 2017-07-19 DIAGNOSIS — Z95.2 MITRAL VALVE REPLACED: ICD-10-CM

## 2017-07-19 LAB
INR PPP: 3.75 (ref 0.9–1.1)
PROTHROMBIN TIME: 35.9 SECONDS (ref 11.7–14.2)

## 2017-07-19 PROCEDURE — 36415 COLL VENOUS BLD VENIPUNCTURE: CPT

## 2017-07-19 PROCEDURE — 85610 PROTHROMBIN TIME: CPT

## 2017-07-20 ENCOUNTER — TELEPHONE (OUTPATIENT)
Dept: CARDIOLOGY | Facility: CLINIC | Age: 52
End: 2017-07-20

## 2017-07-31 ENCOUNTER — LAB (OUTPATIENT)
Dept: LAB | Facility: HOSPITAL | Age: 52
End: 2017-07-31
Attending: INTERNAL MEDICINE

## 2017-07-31 DIAGNOSIS — Z95.2 MITRAL VALVE REPLACED: ICD-10-CM

## 2017-07-31 DIAGNOSIS — Z79.01 ANTICOAGULATED BY ANTICOAGULATION TREATMENT: ICD-10-CM

## 2017-07-31 LAB
INR PPP: 2.38 (ref 0.9–1.1)
PROTHROMBIN TIME: 25.2 SECONDS (ref 11.7–14.2)

## 2017-07-31 PROCEDURE — 85610 PROTHROMBIN TIME: CPT

## 2017-07-31 PROCEDURE — 36415 COLL VENOUS BLD VENIPUNCTURE: CPT

## 2017-08-04 RX ORDER — OMEPRAZOLE 40 MG/1
CAPSULE, DELAYED RELEASE ORAL
Qty: 90 CAPSULE | Refills: 3 | Status: SHIPPED | OUTPATIENT
Start: 2017-08-04 | End: 2018-07-30 | Stop reason: SDUPTHER

## 2017-08-08 ENCOUNTER — LAB (OUTPATIENT)
Dept: LAB | Facility: HOSPITAL | Age: 52
End: 2017-08-08

## 2017-08-08 DIAGNOSIS — Z79.01 ANTICOAGULATED BY ANTICOAGULATION TREATMENT: ICD-10-CM

## 2017-08-08 DIAGNOSIS — Z95.2 MITRAL VALVE REPLACED: ICD-10-CM

## 2017-08-08 LAB
INR PPP: 3.36 (ref 0.9–1.1)
PROTHROMBIN TIME: 33 SECONDS (ref 11.7–14.2)

## 2017-08-08 PROCEDURE — 36415 COLL VENOUS BLD VENIPUNCTURE: CPT

## 2017-08-08 PROCEDURE — 85610 PROTHROMBIN TIME: CPT

## 2017-08-15 ENCOUNTER — LAB (OUTPATIENT)
Dept: LAB | Facility: HOSPITAL | Age: 52
End: 2017-08-15

## 2017-08-15 DIAGNOSIS — Z79.01 ANTICOAGULATED BY ANTICOAGULATION TREATMENT: ICD-10-CM

## 2017-08-15 DIAGNOSIS — Z95.2 MITRAL VALVE REPLACED: ICD-10-CM

## 2017-08-15 LAB
INR PPP: 3.31 (ref 0.9–1.1)
PROTHROMBIN TIME: 32.6 SECONDS (ref 11.7–14.2)

## 2017-08-15 PROCEDURE — 36415 COLL VENOUS BLD VENIPUNCTURE: CPT

## 2017-08-15 PROCEDURE — 85610 PROTHROMBIN TIME: CPT

## 2017-08-21 ENCOUNTER — LAB (OUTPATIENT)
Dept: LAB | Facility: HOSPITAL | Age: 52
End: 2017-08-21

## 2017-08-21 DIAGNOSIS — Z79.01 ANTICOAGULATED BY ANTICOAGULATION TREATMENT: ICD-10-CM

## 2017-08-21 DIAGNOSIS — Z95.2 MITRAL VALVE REPLACED: ICD-10-CM

## 2017-08-21 LAB
INR PPP: 2.07 (ref 0.9–1.1)
PROTHROMBIN TIME: 22.6 SECONDS (ref 11.7–14.2)

## 2017-08-21 PROCEDURE — 85610 PROTHROMBIN TIME: CPT

## 2017-08-21 PROCEDURE — 36415 COLL VENOUS BLD VENIPUNCTURE: CPT

## 2017-08-22 DIAGNOSIS — Z79.01 LONG TERM (CURRENT) USE OF ANTICOAGULANTS: Primary | ICD-10-CM

## 2017-08-29 ENCOUNTER — LAB (OUTPATIENT)
Dept: LAB | Facility: HOSPITAL | Age: 52
End: 2017-08-29
Attending: INTERNAL MEDICINE

## 2017-08-29 DIAGNOSIS — Z79.01 LONG TERM (CURRENT) USE OF ANTICOAGULANTS: ICD-10-CM

## 2017-08-29 LAB
INR PPP: 1.92 (ref 0.9–1.1)
PROTHROMBIN TIME: 21.3 SECONDS (ref 11.7–14.2)

## 2017-08-29 PROCEDURE — 85610 PROTHROMBIN TIME: CPT

## 2017-08-29 PROCEDURE — 36415 COLL VENOUS BLD VENIPUNCTURE: CPT

## 2017-08-31 ENCOUNTER — TELEPHONE (OUTPATIENT)
Dept: CARDIOLOGY | Facility: CLINIC | Age: 52
End: 2017-08-31

## 2017-09-05 ENCOUNTER — ANTICOAGULATION VISIT (OUTPATIENT)
Dept: CARDIOLOGY | Facility: CLINIC | Age: 52
End: 2017-09-05

## 2017-09-05 ENCOUNTER — LAB (OUTPATIENT)
Dept: LAB | Facility: HOSPITAL | Age: 52
End: 2017-09-05
Attending: INTERNAL MEDICINE

## 2017-09-05 DIAGNOSIS — Z79.01 LONG TERM (CURRENT) USE OF ANTICOAGULANTS: ICD-10-CM

## 2017-09-05 LAB
INR PPP: 4.29 (ref 0.9–1.1)
PROTHROMBIN TIME: 40 SECONDS (ref 11.7–14.2)

## 2017-09-05 PROCEDURE — 36415 COLL VENOUS BLD VENIPUNCTURE: CPT

## 2017-09-05 PROCEDURE — 85610 PROTHROMBIN TIME: CPT

## 2017-09-12 ENCOUNTER — ANTICOAGULATION VISIT (OUTPATIENT)
Dept: CARDIOLOGY | Facility: CLINIC | Age: 52
End: 2017-09-12

## 2017-09-12 ENCOUNTER — LAB (OUTPATIENT)
Dept: LAB | Facility: HOSPITAL | Age: 52
End: 2017-09-12
Attending: INTERNAL MEDICINE

## 2017-09-12 DIAGNOSIS — Z79.01 LONG TERM (CURRENT) USE OF ANTICOAGULANTS: ICD-10-CM

## 2017-09-12 LAB
INR PPP: 3.93 (ref 0.9–1.1)
PROTHROMBIN TIME: 37.3 SECONDS (ref 11.7–14.2)

## 2017-09-12 PROCEDURE — 85610 PROTHROMBIN TIME: CPT

## 2017-09-12 PROCEDURE — 36415 COLL VENOUS BLD VENIPUNCTURE: CPT

## 2017-09-20 ENCOUNTER — ANTICOAGULATION VISIT (OUTPATIENT)
Dept: CARDIOLOGY | Facility: CLINIC | Age: 52
End: 2017-09-20

## 2017-09-20 ENCOUNTER — LAB (OUTPATIENT)
Dept: LAB | Facility: HOSPITAL | Age: 52
End: 2017-09-20
Attending: INTERNAL MEDICINE

## 2017-09-20 ENCOUNTER — OFFICE VISIT (OUTPATIENT)
Dept: CARDIOLOGY | Age: 52
End: 2017-09-20

## 2017-09-20 VITALS
BODY MASS INDEX: 43.98 KG/M2 | HEART RATE: 74 BPM | HEIGHT: 60 IN | DIASTOLIC BLOOD PRESSURE: 86 MMHG | SYSTOLIC BLOOD PRESSURE: 132 MMHG | WEIGHT: 224 LBS

## 2017-09-20 DIAGNOSIS — Z79.01 ANTICOAGULATED: ICD-10-CM

## 2017-09-20 DIAGNOSIS — I77.810 AORTIC ROOT DILATATION (HCC): ICD-10-CM

## 2017-09-20 DIAGNOSIS — E78.5 HYPERLIPIDEMIA, UNSPECIFIED HYPERLIPIDEMIA TYPE: ICD-10-CM

## 2017-09-20 DIAGNOSIS — Z79.01 LONG TERM (CURRENT) USE OF ANTICOAGULANTS: ICD-10-CM

## 2017-09-20 DIAGNOSIS — Q23.1 AORTIC STENOSIS DUE TO BICUSPID AORTIC VALVE: ICD-10-CM

## 2017-09-20 DIAGNOSIS — Z95.2 S/P AVR (AORTIC VALVE REPLACEMENT): Primary | ICD-10-CM

## 2017-09-20 DIAGNOSIS — Q23.0 AORTIC STENOSIS DUE TO BICUSPID AORTIC VALVE: ICD-10-CM

## 2017-09-20 LAB
INR PPP: 2.71 (ref 0.9–1.1)
PROTHROMBIN TIME: 27.9 SECONDS (ref 11.7–14.2)

## 2017-09-20 PROCEDURE — 99213 OFFICE O/P EST LOW 20 MIN: CPT | Performed by: INTERNAL MEDICINE

## 2017-09-20 PROCEDURE — 85610 PROTHROMBIN TIME: CPT

## 2017-09-20 PROCEDURE — 36415 COLL VENOUS BLD VENIPUNCTURE: CPT

## 2017-09-20 NOTE — PATIENT INSTRUCTIONS
Mr valenzuela has dropped down low historically on 22-25mg/wk. Will adjust dosage and have him recheck in one week  He verbalized understanding

## 2017-09-20 NOTE — PROGRESS NOTES
Subjective:       Hussein Nino is a 52 y.o. male who here for follow up    CC  Follow up for AVR  HPI  52 yr old w/m here for follow up after AVR, on anticoagulation denies cp    Hussein Nino  here for follow up with no complaints of chest pain, sob, palpitation, syncope, near syncope  No side effects with current meds  No pnd, orthopnea       Problem List Items Addressed This Visit        Cardiovascular and Mediastinum    Aortic root dilatation    Relevant Orders    Stress Test With Myocardial Perfusion    Adult Transthoracic Echo Complete W/ Cont if Necessary Per Protocol    Hyperlipidemia    Relevant Orders    Stress Test With Myocardial Perfusion    Adult Transthoracic Echo Complete W/ Cont if Necessary Per Protocol    Aortic stenosis due to bicuspid aortic valve    Relevant Orders    Stress Test With Myocardial Perfusion    Adult Transthoracic Echo Complete W/ Cont if Necessary Per Protocol    S/P AVR (aortic valve replacement) - Primary    Relevant Orders    Stress Test With Myocardial Perfusion    Adult Transthoracic Echo Complete W/ Cont if Necessary Per Protocol       Other    Anticoagulated    Relevant Orders    Stress Test With Myocardial Perfusion    Adult Transthoracic Echo Complete W/ Cont if Necessary Per Protocol        .    The following portions of the patient's history were reviewed and updated as appropriate: allergies, current medications, past family history, past medical history, past social history, past surgical history and problem list.    Past Medical History:   Diagnosis Date   • Aortic stenosis     SEVERE   • Asthma    • Dyspnea on exertion    • GERD (gastroesophageal reflux disease)    • Gout    • Heart murmur    • Hyperlipidemia    • Hypertension    • Irritable bowel syndrome     reports that he has quit smoking. His smoking use included Cigarettes. He has a 7.00 pack-year smoking history. He has never used smokeless tobacco. He reports that he does not drink alcohol or use  "illicit drugs.  Family History   Problem Relation Age of Onset   • Heart failure Mother    • Kidney disease Mother    • Hypertension Mother    • Alzheimer's disease Father    • Thyroid disease Sister        Review of Systems  Constitutional: No wt loss, fever, fatigue  Gastrointestinal: No nausea, abdominal pain  Behavioral/Psych: No insomnia or anxiety   Cardiovascular no cp  Objective:       Physical Exam             Physical Exam  /86 (BP Location: Left arm, Patient Position: Sitting)  Pulse 74  Ht 60\" (152.4 cm)  Wt 224 lb (102 kg)  BMI 43.75 kg/m2    General appearance: NAD, conversant   Eyes: anicteric sclerae, moist conjunctivae; no lid-lag; PERRLA   HENT: Atraumatic; oropharynx clear with moist mucous membranes and no mucosal ulcerations;  normal hard and soft palate   Neck: Trachea midline; FROM, supple, no thyromegaly or lymphadenopathy   Lungs: CTA, with normal respiratory effort and no intercostal retractions   CV: S1-S2 regular, no murmurs, no rub, no gallop   Abdomen: Soft, non-tender; no masses or HSM   Extremities: No peripheral edema or extremity lymphadenopathy  Skin: Normal temperature, turgor and texture; no rash, ulcers or subcutaneous nodules   Psych: Appropriate affect, alert and oriented to person, place and time           Cardiographics  @Procedures    Echocardiogram:        Current Outpatient Prescriptions:   •  albuterol (PROVENTIL HFA;VENTOLIN HFA) 108 (90 BASE) MCG/ACT inhaler, Inhale 2 puffs Every 4 (Four) Hours As Needed for wheezing., Disp: , Rfl:   •  cetirizine (ZYRTEC ALLERGY) 10 MG tablet, Take 1 tablet by mouth Daily., Disp: 30 tablet, Rfl: 3  •  cholestyramine (QUESTRAN) 4 G packet, Take 1 packet by mouth 3 (Three) Times a Day With Meals., Disp: 90 packet, Rfl: 3  •  fenofibrate 160 MG tablet, Take 1 tablet by mouth Daily., Disp: 30 tablet, Rfl: 1  •  fluticasone (FLONASE) 50 MCG/ACT nasal spray, 2 sprays into each nostril Daily., Disp: 1 each, Rfl: 3  •  montelukast " (SINGULAIR) 10 MG tablet, Take 1 tablet by mouth Daily As Needed (allergies)., Disp: 30 tablet, Rfl: 3  •  Multiple Vitamins-Minerals (MULTIVITAMIN ADULT PO), Take  by mouth., Disp: , Rfl:   •  omeprazole (priLOSEC) 40 MG capsule, TAKE 1 CAPSULE DAILY, Disp: 90 capsule, Rfl: 3  •  pravastatin (PRAVACHOL) 40 MG tablet, Take 1 tablet by mouth Daily., Disp: 30 tablet, Rfl: 1  •  Probiotic Product (PROBIOTIC DAILY PO), Take  by mouth., Disp: , Rfl:   •  promethazine (PHENERGAN) 25 MG suppository, Insert 1 suppository into the rectum Every 6 (Six) Hours As Needed for nausea or vomiting., Disp: 15 suppository, Rfl: 0  •  promethazine (PHENERGAN) 50 MG tablet, Take 0.5 tablets by mouth Every 6 (Six) Hours As Needed for nausea or vomiting., Disp: 15 tablet, Rfl: 0  •  warfarin (COUMADIN) 5 MG tablet, Take daily or as directed (Patient taking differently: 5 mg. Take daily or as directed), Disp: 60 tablet, Rfl: 1   Assessment:        Patient Active Problem List   Diagnosis   • Acute gouty arthritis   • Allergic rhinitis   • Aortic root dilatation   • Asthma   • Radiating chest pain   • Chest tightness   • Gout   • Cough   • Gastroesophageal reflux disease   • Dyspnea on exertion   • Heart murmur   • Hyperlipidemia   • Irritable bowel syndrome   • Pericardial effusion   • Raynaud's phenomenon   • Pain of toe   • Heart valve disease   • Essential hypertriglyceridemia   • Facial flushing   • Epigastric abdominal pain   • Health care maintenance   • Aortic stenosis due to bicuspid aortic valve   • S/P AVR (aortic valve replacement)   • Tachycardia   • Mechanical heart valve present   • Pleural effusion, left               Plan:            ICD-10-CM ICD-9-CM   1. S/P AVR (aortic valve replacement) Z95.2 V43.3   2. Aortic stenosis due to bicuspid aortic valve Q23.1 746.4    I35.0 424.1   3. Aortic root dilatation I77.810 447.71   4. Anticoagulated Z79.01 V58.61   5. Hyperlipidemia, unspecified hyperlipidemia type E78.5 272.4     1.  S/P AVR (aortic valve replacement)  Considering patient's medical condition as well as the risk factors, patient will require echocardiogram for further evaluation for the LV function, four-chamber evaluation, including the pressures, valvular function and  pericardial disease and pericardial effusion    - Stress Test With Myocardial Perfusion; Future  - Adult Transthoracic Echo Complete W/ Cont if Necessary Per Protocol; Future    2. Aortic stenosis due to bicuspid aortic valve    - Stress Test With Myocardial Perfusion; Future  - Adult Transthoracic Echo Complete W/ Cont if Necessary Per Protocol; Future    3. Aortic root dilatation    - Stress Test With Myocardial Perfusion; Future  - Adult Transthoracic Echo Complete W/ Cont if Necessary Per Protocol; Future    4. Anticoagulated  Pros and cons as well as indication of the anticoagulation has been explained to the patient in detail    There are no obvious complications at this stage    Risk of  the bleedings has been explained    Need for the regular blood workup and adjust the dose has been explained    Need for proper follow-up on anticoagulation also has been explained    - Stress Test With Myocardial Perfusion; Future  - Adult Transthoracic Echo Complete W/ Cont if Necessary Per Protocol; Future    5. Hyperlipidemia, unspecified hyperlipidemia type  Risk of the hyperlipidemia, importance of the treatment has been explained    Pros and cons of the statins has been explained    Regular blood workup as well as side effects including the liver failure, myelopathy death has been explained        - Stress Test With Myocardial Perfusion; Future  - Adult Transthoracic Echo Complete W/ Cont if Necessary Per Protocol; Future     ETT/ ECHO    SEE 2 WKS  COUNSELING:    Hussein Millerounseling was given to patient for the following topics: diagnostic results, risk factor reductions, impressions, risks and benefits of treatment options and importance of treatment  compliance .       SMOKING COUNSELING:    Counseling given: Not Answered      EMR Dragon/Transcription disclaimer:   Much of this encounter note is an electronic transcription/translation of spoken language to printed text. The electronic translation of spoken language may permit erroneous, or at times, nonsensical words or phrases to be inadvertently transcribed; Although I have reviewed the note for such errors, some may still exist.

## 2017-09-25 RX ORDER — FENOFIBRATE 160 MG/1
TABLET ORAL
Qty: 90 TABLET | Refills: 3 | Status: SHIPPED | OUTPATIENT
Start: 2017-09-25 | End: 2019-09-26

## 2017-09-27 ENCOUNTER — ANTICOAGULATION VISIT (OUTPATIENT)
Dept: CARDIOLOGY | Facility: CLINIC | Age: 52
End: 2017-09-27

## 2017-09-27 ENCOUNTER — LAB (OUTPATIENT)
Dept: LAB | Facility: HOSPITAL | Age: 52
End: 2017-09-27
Attending: INTERNAL MEDICINE

## 2017-09-27 DIAGNOSIS — Z79.01 LONG TERM (CURRENT) USE OF ANTICOAGULANTS: ICD-10-CM

## 2017-09-27 LAB
INR PPP: 2.48 (ref 0.9–1.1)
PROTHROMBIN TIME: 26 SECONDS (ref 11.7–14.2)

## 2017-09-27 PROCEDURE — 36415 COLL VENOUS BLD VENIPUNCTURE: CPT

## 2017-09-27 PROCEDURE — 85610 PROTHROMBIN TIME: CPT

## 2017-10-03 RX ORDER — PRAVASTATIN SODIUM 40 MG
40 TABLET ORAL DAILY
Qty: 90 TABLET | Refills: 1 | Status: SHIPPED | OUTPATIENT
Start: 2017-10-03 | End: 2018-03-29 | Stop reason: SDUPTHER

## 2017-10-04 ENCOUNTER — ANTICOAGULATION VISIT (OUTPATIENT)
Dept: CARDIOLOGY | Facility: CLINIC | Age: 52
End: 2017-10-04

## 2017-10-04 ENCOUNTER — LAB (OUTPATIENT)
Dept: LAB | Facility: HOSPITAL | Age: 52
End: 2017-10-04

## 2017-10-04 DIAGNOSIS — Z79.01 LONG TERM CURRENT USE OF ANTICOAGULANT THERAPY: ICD-10-CM

## 2017-10-04 LAB
INR PPP: 2.53 (ref 0.9–1.1)
PROTHROMBIN TIME: 26.4 SECONDS (ref 11.7–14.2)

## 2017-10-04 PROCEDURE — 85610 PROTHROMBIN TIME: CPT

## 2017-10-04 PROCEDURE — 36415 COLL VENOUS BLD VENIPUNCTURE: CPT

## 2017-10-19 ENCOUNTER — HOSPITAL ENCOUNTER (OUTPATIENT)
Dept: CARDIOLOGY | Facility: HOSPITAL | Age: 52
Discharge: HOME | End: 2017-10-19

## 2017-10-19 ENCOUNTER — HOSPITAL ENCOUNTER (OUTPATIENT)
Dept: CARDIOLOGY | Facility: HOSPITAL | Age: 52
Discharge: HOME | End: 2017-10-19
Attending: INTERNAL MEDICINE | Admitting: INTERNAL MEDICINE

## 2017-10-19 ENCOUNTER — HOSPITAL ENCOUNTER (OUTPATIENT)
Dept: CARDIOLOGY | Facility: HOSPITAL | Age: 52
Discharge: HOME | End: 2017-10-19
Attending: INTERNAL MEDICINE

## 2017-10-19 ENCOUNTER — ANTICOAGULATION VISIT (OUTPATIENT)
Dept: CARDIOLOGY | Facility: CLINIC | Age: 52
End: 2017-10-19

## 2017-10-19 VITALS
HEIGHT: 72 IN | WEIGHT: 224 LBS | BODY MASS INDEX: 30.34 KG/M2 | DIASTOLIC BLOOD PRESSURE: 84 MMHG | SYSTOLIC BLOOD PRESSURE: 139 MMHG | HEART RATE: 73 BPM

## 2017-10-19 VITALS
OXYGEN SATURATION: 97 % | RESPIRATION RATE: 18 BRPM | SYSTOLIC BLOOD PRESSURE: 124 MMHG | DIASTOLIC BLOOD PRESSURE: 80 MMHG | HEART RATE: 76 BPM

## 2017-10-19 DIAGNOSIS — Z95.2 S/P AVR (AORTIC VALVE REPLACEMENT): ICD-10-CM

## 2017-10-19 DIAGNOSIS — I77.810 AORTIC ROOT DILATATION (HCC): ICD-10-CM

## 2017-10-19 DIAGNOSIS — Q23.1 AORTIC STENOSIS DUE TO BICUSPID AORTIC VALVE: ICD-10-CM

## 2017-10-19 DIAGNOSIS — Q23.0 AORTIC STENOSIS DUE TO BICUSPID AORTIC VALVE: ICD-10-CM

## 2017-10-19 DIAGNOSIS — E78.5 HYPERLIPIDEMIA, UNSPECIFIED HYPERLIPIDEMIA TYPE: ICD-10-CM

## 2017-10-19 DIAGNOSIS — Z79.01 ANTICOAGULATED: ICD-10-CM

## 2017-10-19 LAB
BH CV ECHO MEAS - AO MAX PG (FULL): 9.9 MMHG
BH CV ECHO MEAS - AO MAX PG: 12.4 MMHG
BH CV ECHO MEAS - AO MEAN PG (FULL): 6 MMHG
BH CV ECHO MEAS - AO MEAN PG: 7 MMHG
BH CV ECHO MEAS - AO ROOT AREA (BSA CORRECTED): 1.3
BH CV ECHO MEAS - AO ROOT AREA: 7.1 CM^2
BH CV ECHO MEAS - AO ROOT DIAM: 3 CM
BH CV ECHO MEAS - AO V2 MAX: 176 CM/SEC
BH CV ECHO MEAS - AO V2 MEAN: 120 CM/SEC
BH CV ECHO MEAS - AO V2 VTI: 33.3 CM
BH CV ECHO MEAS - AVA(I,A): 2.2 CM^2
BH CV ECHO MEAS - AVA(I,D): 2.2 CM^2
BH CV ECHO MEAS - AVA(V,A): 1.7 CM^2
BH CV ECHO MEAS - AVA(V,D): 1.7 CM^2
BH CV ECHO MEAS - BSA(HAYCOCK): 2.3 M^2
BH CV ECHO MEAS - BSA: 2.2 M^2
BH CV ECHO MEAS - BZI_BMI: 30.4 KILOGRAMS/M^2
BH CV ECHO MEAS - BZI_METRIC_HEIGHT: 182.9 CM
BH CV ECHO MEAS - BZI_METRIC_WEIGHT: 101.6 KG
BH CV ECHO MEAS - CONTRAST EF (2CH): 59.8 ML/M^2
BH CV ECHO MEAS - CONTRAST EF 4CH: 62.6 ML/M^2
BH CV ECHO MEAS - EDV(CUBED): 117.6 ML
BH CV ECHO MEAS - EDV(MOD-SP2): 92 ML
BH CV ECHO MEAS - EDV(MOD-SP4): 139 ML
BH CV ECHO MEAS - EDV(TEICH): 112.8 ML
BH CV ECHO MEAS - EF(CUBED): 74.7 %
BH CV ECHO MEAS - EF(MOD-SP2): 59.8 %
BH CV ECHO MEAS - EF(MOD-SP4): 45 %
BH CV ECHO MEAS - EF(TEICH): 66.4 %
BH CV ECHO MEAS - ESV(CUBED): 29.8 ML
BH CV ECHO MEAS - ESV(MOD-SP2): 37 ML
BH CV ECHO MEAS - ESV(MOD-SP4): 52 ML
BH CV ECHO MEAS - ESV(TEICH): 37.9 ML
BH CV ECHO MEAS - FS: 36.7 %
BH CV ECHO MEAS - IVS/LVPW: 0.85
BH CV ECHO MEAS - IVSD: 1.1 CM
BH CV ECHO MEAS - LAT PEAK E' VEL: 8.9 CM/SEC
BH CV ECHO MEAS - LV DIASTOLIC VOL/BSA (35-75): 62.2 ML/M^2
BH CV ECHO MEAS - LV MASS(C)D: 226.4 GRAMS
BH CV ECHO MEAS - LV MASS(C)DI: 101.3 GRAMS/M^2
BH CV ECHO MEAS - LV MAX PG: 2.5 MMHG
BH CV ECHO MEAS - LV MEAN PG: 1 MMHG
BH CV ECHO MEAS - LV SYSTOLIC VOL/BSA (12-30): 23.3 ML/M^2
BH CV ECHO MEAS - LV V1 MAX: 78.3 CM/SEC
BH CV ECHO MEAS - LV V1 MEAN: 57.4 CM/SEC
BH CV ECHO MEAS - LV V1 VTI: 19.2 CM
BH CV ECHO MEAS - LVIDD: 4.9 CM
BH CV ECHO MEAS - LVIDS: 3.1 CM
BH CV ECHO MEAS - LVLD AP2: 7.5 CM
BH CV ECHO MEAS - LVLD AP4: 7.9 CM
BH CV ECHO MEAS - LVLS AP2: 6 CM
BH CV ECHO MEAS - LVLS AP4: 7 CM
BH CV ECHO MEAS - LVOT AREA (M): 3.8 CM^2
BH CV ECHO MEAS - LVOT AREA: 3.8 CM^2
BH CV ECHO MEAS - LVOT DIAM: 2.2 CM
BH CV ECHO MEAS - LVPWD: 1.3 CM
BH CV ECHO MEAS - MED PEAK E' VEL: 5.2 CM/SEC
BH CV ECHO MEAS - MV A DUR: 0.17 SEC
BH CV ECHO MEAS - MV A MAX VEL: 74.7 CM/SEC
BH CV ECHO MEAS - MV DEC SLOPE: 337 CM/SEC^2
BH CV ECHO MEAS - MV DEC TIME: 0.23 SEC
BH CV ECHO MEAS - MV E MAX VEL: 71.8 CM/SEC
BH CV ECHO MEAS - MV E/A: 0.96
BH CV ECHO MEAS - MV MAX PG: 2.6 MMHG
BH CV ECHO MEAS - MV MEAN PG: 1 MMHG
BH CV ECHO MEAS - MV P1/2T MAX VEL: 84.2 CM/SEC
BH CV ECHO MEAS - MV P1/2T: 73.2 MSEC
BH CV ECHO MEAS - MV V2 MAX: 80.3 CM/SEC
BH CV ECHO MEAS - MV V2 MEAN: 53.6 CM/SEC
BH CV ECHO MEAS - MV V2 VTI: 21.8 CM
BH CV ECHO MEAS - MVA P1/2T LCG: 2.6 CM^2
BH CV ECHO MEAS - MVA(P1/2T): 3 CM^2
BH CV ECHO MEAS - MVA(VTI): 3.3 CM^2
BH CV ECHO MEAS - PA ACC TIME: 0.08 SEC
BH CV ECHO MEAS - PA MAX PG (FULL): 2.7 MMHG
BH CV ECHO MEAS - PA MAX PG: 3.6 MMHG
BH CV ECHO MEAS - PA PR(ACCEL): 42.1 MMHG
BH CV ECHO MEAS - PA V2 MAX: 95 CM/SEC
BH CV ECHO MEAS - PI END-D VEL: 99.1 CM/SEC
BH CV ECHO MEAS - PULM A REVS DUR: 0.14 SEC
BH CV ECHO MEAS - PULM A REVS VEL: 19.1 CM/SEC
BH CV ECHO MEAS - PULM DIAS VEL: 30.6 CM/SEC
BH CV ECHO MEAS - PULM S/D: 1.5
BH CV ECHO MEAS - PULM SYS VEL: 45.2 CM/SEC
BH CV ECHO MEAS - PVA(V,A): 3.6 CM^2
BH CV ECHO MEAS - PVA(V,D): 3.6 CM^2
BH CV ECHO MEAS - QP/QS: 1.1
BH CV ECHO MEAS - RAP SYSTOLE: 3 MMHG
BH CV ECHO MEAS - RV MAX PG: 0.95 MMHG
BH CV ECHO MEAS - RV MEAN PG: 0 MMHG
BH CV ECHO MEAS - RV V1 MAX: 48.8 CM/SEC
BH CV ECHO MEAS - RV V1 MEAN: 32.1 CM/SEC
BH CV ECHO MEAS - RV V1 VTI: 11.6 CM
BH CV ECHO MEAS - RVDD: 3.2 CM
BH CV ECHO MEAS - RVOT AREA: 7.1 CM^2
BH CV ECHO MEAS - RVOT DIAM: 3 CM
BH CV ECHO MEAS - RVSP: 22 MMHG
BH CV ECHO MEAS - SI(AO): 105.3 ML/M^2
BH CV ECHO MEAS - SI(CUBED): 39.3 ML/M^2
BH CV ECHO MEAS - SI(LVOT): 32.6 ML/M^2
BH CV ECHO MEAS - SI(MOD-SP2): 24.6 ML/M^2
BH CV ECHO MEAS - SI(MOD-SP4): 38.9 ML/M^2
BH CV ECHO MEAS - SI(TEICH): 33.5 ML/M^2
BH CV ECHO MEAS - SV(AO): 235.4 ML
BH CV ECHO MEAS - SV(CUBED): 87.9 ML
BH CV ECHO MEAS - SV(LVOT): 73 ML
BH CV ECHO MEAS - SV(MOD-SP2): 55 ML
BH CV ECHO MEAS - SV(MOD-SP4): 87 ML
BH CV ECHO MEAS - SV(RVOT): 82 ML
BH CV ECHO MEAS - SV(TEICH): 74.9 ML
BH CV ECHO MEAS - TAPSE (>1.6): 1.8 CM2
BH CV ECHO MEAS - TR MAX VEL: 217 CM/SEC
BH CV STRESS BP STAGE 1: NORMAL
BH CV STRESS BP STAGE 2: NORMAL
BH CV STRESS BP STAGE 3: NORMAL
BH CV STRESS DURATION MIN STAGE 1: 3
BH CV STRESS DURATION MIN STAGE 2: 3
BH CV STRESS DURATION MIN STAGE 3: 1
BH CV STRESS DURATION SEC STAGE 1: 0
BH CV STRESS DURATION SEC STAGE 2: 0
BH CV STRESS DURATION SEC STAGE 3: 57
BH CV STRESS GRADE STAGE 1: 10
BH CV STRESS GRADE STAGE 2: 12
BH CV STRESS GRADE STAGE 3: 14
BH CV STRESS HR STAGE 1: 105
BH CV STRESS HR STAGE 2: 129
BH CV STRESS HR STAGE 3: 157
BH CV STRESS METS STAGE 1: 4.6
BH CV STRESS METS STAGE 2: 7
BH CV STRESS METS STAGE 3: 9.9
BH CV STRESS PROTOCOL 1: NORMAL
BH CV STRESS RECOVERY BP: NORMAL MMHG
BH CV STRESS RECOVERY HR: 84 BPM
BH CV STRESS RECOVERY O2: 100 %
BH CV STRESS SPEED STAGE 1: 1.7
BH CV STRESS SPEED STAGE 2: 2.5
BH CV STRESS SPEED STAGE 3: 3.4
BH CV STRESS STAGE 1: 1
BH CV STRESS STAGE 2: 2
BH CV STRESS STAGE 3: 3
BH CV XLRA - RV BASE: 4.5 CM
BH CV XLRA - RV LENGTH: 7.2 CM
BH CV XLRA - RV MID: 3.5 CM
BH CV XLRA - TDI S': 8.6 CM/SEC
E/E' RATIO: 11
INR PPP: 4.9
LEFT ATRIUM VOLUME INDEX: 49 ML/M2
LV EF NUC BP: 50 %
MAXIMAL PREDICTED HEART RATE: 167 BPM
PERCENT MAX PREDICTED HR: 94.01 %
STRESS BASELINE BP: NORMAL MMHG
STRESS BASELINE HR: 78 BPM
STRESS O2 SAT REST: 99 %
STRESS PERCENT HR: 111 %
STRESS POST ESTIMATED WORKLOAD: 9.9 METS
STRESS POST EXERCISE DUR MIN: 7 MIN
STRESS POST EXERCISE DUR SEC: 57 SEC
STRESS POST O2 SAT PEAK: 100 %
STRESS POST PEAK BP: NORMAL MMHG
STRESS POST PEAK HR: 157 BPM
STRESS TARGET HR: 142 BPM

## 2017-10-19 PROCEDURE — 0 TECHNETIUM SESTAMIBI: Performed by: INTERNAL MEDICINE

## 2017-10-19 PROCEDURE — A9500 TC99M SESTAMIBI: HCPCS | Performed by: INTERNAL MEDICINE

## 2017-10-19 PROCEDURE — 93018 CV STRESS TEST I&R ONLY: CPT | Performed by: INTERNAL MEDICINE

## 2017-10-19 PROCEDURE — 93017 CV STRESS TEST TRACING ONLY: CPT

## 2017-10-19 PROCEDURE — 85610 PROTHROMBIN TIME: CPT | Performed by: INTERNAL MEDICINE

## 2017-10-19 PROCEDURE — 93306 TTE W/DOPPLER COMPLETE: CPT | Performed by: INTERNAL MEDICINE

## 2017-10-19 PROCEDURE — 78452 HT MUSCLE IMAGE SPECT MULT: CPT

## 2017-10-19 PROCEDURE — 93306 TTE W/DOPPLER COMPLETE: CPT

## 2017-10-19 PROCEDURE — 78452 HT MUSCLE IMAGE SPECT MULT: CPT | Performed by: INTERNAL MEDICINE

## 2017-10-19 RX ADMIN — TECHNETIUM TC-99M SESTAMIBI 1 DOSE: 1 INJECTION INTRAVENOUS at 07:10

## 2017-10-19 RX ADMIN — TECHNETIUM TC-99M SESTAMIBI 1 DOSE: 1 INJECTION INTRAVENOUS at 09:44

## 2017-10-25 ENCOUNTER — LAB (OUTPATIENT)
Dept: LAB | Facility: HOSPITAL | Age: 52
End: 2017-10-25
Attending: INTERNAL MEDICINE

## 2017-10-25 ENCOUNTER — ANTICOAGULATION VISIT (OUTPATIENT)
Dept: CARDIOLOGY | Facility: CLINIC | Age: 52
End: 2017-10-25

## 2017-10-25 DIAGNOSIS — Z79.01 LONG TERM CURRENT USE OF ANTICOAGULANT THERAPY: ICD-10-CM

## 2017-10-25 LAB
INR PPP: 3 (ref 0.9–1.1)
PROTHROMBIN TIME: 30.2 SECONDS (ref 11.7–14.2)

## 2017-10-25 PROCEDURE — 85610 PROTHROMBIN TIME: CPT

## 2017-10-25 PROCEDURE — 36415 COLL VENOUS BLD VENIPUNCTURE: CPT

## 2017-11-01 ENCOUNTER — OFFICE VISIT (OUTPATIENT)
Dept: CARDIOLOGY | Age: 52
End: 2017-11-01

## 2017-11-01 ENCOUNTER — LAB (OUTPATIENT)
Dept: LAB | Facility: HOSPITAL | Age: 52
End: 2017-11-01
Attending: INTERNAL MEDICINE

## 2017-11-01 ENCOUNTER — ANTICOAGULATION VISIT (OUTPATIENT)
Dept: CARDIOLOGY | Facility: CLINIC | Age: 52
End: 2017-11-01

## 2017-11-01 VITALS
HEART RATE: 86 BPM | BODY MASS INDEX: 31.33 KG/M2 | SYSTOLIC BLOOD PRESSURE: 146 MMHG | WEIGHT: 231 LBS | DIASTOLIC BLOOD PRESSURE: 87 MMHG

## 2017-11-01 DIAGNOSIS — Z79.01 LONG TERM CURRENT USE OF ANTICOAGULANT THERAPY: ICD-10-CM

## 2017-11-01 DIAGNOSIS — E78.49 OTHER HYPERLIPIDEMIA: Primary | ICD-10-CM

## 2017-11-01 DIAGNOSIS — Z95.2 S/P AVR (AORTIC VALVE REPLACEMENT): ICD-10-CM

## 2017-11-01 LAB
INR PPP: 3.29 (ref 0.9–1.1)
PROTHROMBIN TIME: 32.4 SECONDS (ref 11.7–14.2)

## 2017-11-01 PROCEDURE — 36415 COLL VENOUS BLD VENIPUNCTURE: CPT

## 2017-11-01 PROCEDURE — 85610 PROTHROMBIN TIME: CPT

## 2017-11-01 PROCEDURE — 99213 OFFICE O/P EST LOW 20 MIN: CPT | Performed by: INTERNAL MEDICINE

## 2017-11-01 NOTE — PROGRESS NOTES
Subjective:       Hussein Nino is a 52 y.o. male who here for follow up    CC  Follow-up after the stress test and echocardiogram  HPI  52-year-old male with a known history of the hyperlipidemia and has been underwent stress test and echocardiogram here for the follow-up denies any chest pains or tightness in chest no heaviness with a pressure sensation     Problem List Items Addressed This Visit        Cardiovascular and Mediastinum    Hyperlipidemia - Primary    S/P AVR (aortic valve replacement)        .    The following portions of the patient's history were reviewed and updated as appropriate: allergies, current medications, past family history, past medical history, past social history, past surgical history and problem list.    Past Medical History:   Diagnosis Date   • Aortic stenosis     SEVERE   • Asthma    • Dyspnea on exertion    • GERD (gastroesophageal reflux disease)    • Gout    • Heart murmur    • Hyperlipidemia    • Hypertension    • Irritable bowel syndrome     reports that he has quit smoking. His smoking use included Cigarettes. He has a 7.00 pack-year smoking history. He has never used smokeless tobacco. He reports that he does not drink alcohol or use illicit drugs.  Family History   Problem Relation Age of Onset   • Heart failure Mother    • Kidney disease Mother    • Hypertension Mother    • Heart disease Mother    • Alzheimer's disease Father    • Thyroid disease Sister    • Hypertension Sister    • Hypertension Brother        Review of Systems  Constitutional: No wt loss, fever, fatigue  Gastrointestinal: No nausea, abdominal pain  Behavioral/Psych: No insomnia or anxiety   Cardiovascular No chest pains and tightness in chest  Objective:       Physical Exam           Physical Exam  /87  Pulse 86  Wt 231 lb (105 kg)  BMI 31.33 kg/m2    General appearance: NAD, conversant   Eyes: anicteric sclerae, moist conjunctivae; no lid-lag; PERRLA   HENT: Atraumatic; oropharynx clear with  moist mucous membranes and no mucosal ulcerations;  normal hard and soft palate   Neck: Trachea midline; FROM, supple, no thyromegaly or lymphadenopathy   Lungs: CTA, with normal respiratory effort and no intercostal retractions   CV: S1-S2 regular, sys murmurs, no rub, no gallop   Abdomen: Soft, non-tender; no masses or HSM   Extremities: No peripheral edema or extremity lymphadenopathy  Skin: Normal temperature, turgor and texture; no rash, ulcers or subcutaneous nodules   Psych: Appropriate affect, alert and oriented to person, place and time           Cardiographics  @Procedures    Echocardiogram:        Current Outpatient Prescriptions:   •  albuterol (PROVENTIL HFA;VENTOLIN HFA) 108 (90 BASE) MCG/ACT inhaler, Inhale 2 puffs Every 4 (Four) Hours As Needed for wheezing., Disp: , Rfl:   •  cetirizine (ZYRTEC ALLERGY) 10 MG tablet, Take 1 tablet by mouth Daily., Disp: 30 tablet, Rfl: 3  •  cholestyramine (QUESTRAN) 4 G packet, Take 1 packet by mouth 3 (Three) Times a Day With Meals., Disp: 90 packet, Rfl: 3  •  fenofibrate 160 MG tablet, TAKE 1 TABLET DAILY, Disp: 90 tablet, Rfl: 3  •  fluticasone (FLONASE) 50 MCG/ACT nasal spray, 2 sprays into each nostril Daily., Disp: 1 each, Rfl: 3  •  Multiple Vitamins-Minerals (MULTIVITAMIN ADULT PO), Take  by mouth., Disp: , Rfl:   •  omeprazole (priLOSEC) 40 MG capsule, TAKE 1 CAPSULE DAILY, Disp: 90 capsule, Rfl: 3  •  pravastatin (PRAVACHOL) 40 MG tablet, Take 1 tablet by mouth Daily., Disp: 90 tablet, Rfl: 1  •  Probiotic Product (PROBIOTIC DAILY PO), Take  by mouth., Disp: , Rfl:   •  promethazine (PHENERGAN) 50 MG tablet, Take 0.5 tablets by mouth Every 6 (Six) Hours As Needed for nausea or vomiting., Disp: 15 tablet, Rfl: 0  •  warfarin (COUMADIN) 5 MG tablet, Take daily or as directed (Patient taking differently: 5 mg. Take daily or as directed), Disp: 60 tablet, Rfl: 1   Assessment:        Patient Active Problem List   Diagnosis   • Acute gouty arthritis   •  Allergic rhinitis   • Aortic root dilatation   • Asthma   • Radiating chest pain   • Chest tightness   • Gout   • Cough   • Gastroesophageal reflux disease   • Dyspnea on exertion   • Heart murmur   • Hyperlipidemia   • Irritable bowel syndrome   • Pericardial effusion   • Raynaud's phenomenon   • Pain of toe   • Heart valve disease   • Essential hypertriglyceridemia   • Facial flushing   • Epigastric abdominal pain   • Health care maintenance   • Aortic stenosis due to bicuspid aortic valve   • S/P AVR (aortic valve replacement)   • Tachycardia   • Mechanical heart valve present   • Pleural effusion, left   • Anticoagulated     Interpretation Summary      · Left ventricular ejection fraction is normal (Calculated EF = 50%).  · Myocardial perfusion imaging indicates a normal myocardial perfusion study with no evidence of ischemia.  · Impressions are consistent with a low risk study.   Interpretation Summary   · Calculated right ventricular systolic pressure from tricuspid regurgitation is 22 mmHg.  · The left ventricular cavity is borderline dilated.  · Left atrial cavity size is mild-to-moderately dilated.  · Right ventricular cavity is mild-to-moderately dilated.  · Left Ventricle: Calculated EF = 45%  · There is no evidence of pericardial effusion.       Specificity and sensitivity of the stress test has been explained. Pt has been explained if  Symptoms continue please go to ER, and further w/p will be required.            Plan:            ICD-10-CM ICD-9-CM   1. Other hyperlipidemia E78.4 272.4   2. S/P AVR (aortic valve replacement) Z95.2 V43.3     1. Other hyperlipidemia  Risk of the hyperlipidemia, importance of the treatment has been explained    Pros and cons of the statins has been explained    Regular blood workup as well as side effects including the liver failure, myelopathy death has been explained          2. S/P AVR (aortic valve replacement)  Aortic valve normally  COUNSELING:    Hussein DE JESUS  TeagueCounseling was given to patient for the following topics: diagnostic results, risk factor reductions, impressions, risks and benefits of treatment options and importance of treatment compliance .       SMOKING COUNSELING:    Counseling given: Not Answered      EMR Dragon/Transcription disclaimer:   Much of this encounter note is an electronic transcription/translation of spoken language to printed text. The electronic translation of spoken language may permit erroneous, or at times, nonsensical words or phrases to be inadvertently transcribed; Although I have reviewed the note for such errors, some may still exist.

## 2017-11-15 ENCOUNTER — LAB (OUTPATIENT)
Dept: LAB | Facility: HOSPITAL | Age: 52
End: 2017-11-15

## 2017-11-15 ENCOUNTER — ANTICOAGULATION VISIT (OUTPATIENT)
Dept: CARDIOLOGY | Facility: CLINIC | Age: 52
End: 2017-11-15

## 2017-11-15 DIAGNOSIS — Z79.01 LONG TERM CURRENT USE OF ANTICOAGULANT THERAPY: ICD-10-CM

## 2017-11-15 LAB
INR PPP: 3.37 (ref 0.9–1.1)
PROTHROMBIN TIME: 33.1 SECONDS (ref 11.7–14.2)

## 2017-11-15 PROCEDURE — 36415 COLL VENOUS BLD VENIPUNCTURE: CPT

## 2017-11-15 PROCEDURE — 85610 PROTHROMBIN TIME: CPT

## 2017-11-29 ENCOUNTER — ANTICOAGULATION VISIT (OUTPATIENT)
Dept: CARDIOLOGY | Facility: CLINIC | Age: 52
End: 2017-11-29

## 2017-11-29 ENCOUNTER — LAB (OUTPATIENT)
Dept: LAB | Facility: HOSPITAL | Age: 52
End: 2017-11-29

## 2017-11-29 DIAGNOSIS — Z79.01 LONG TERM CURRENT USE OF ANTICOAGULANT THERAPY: ICD-10-CM

## 2017-11-29 LAB
INR PPP: 3.61 (ref 0.9–1.1)
PROTHROMBIN TIME: 34.9 SECONDS (ref 11.7–14.2)

## 2017-11-29 PROCEDURE — 85610 PROTHROMBIN TIME: CPT

## 2017-11-29 PROCEDURE — 36415 COLL VENOUS BLD VENIPUNCTURE: CPT

## 2017-12-13 ENCOUNTER — LAB (OUTPATIENT)
Dept: LAB | Facility: HOSPITAL | Age: 52
End: 2017-12-13
Attending: INTERNAL MEDICINE

## 2017-12-13 ENCOUNTER — ANTICOAGULATION VISIT (OUTPATIENT)
Dept: CARDIOLOGY | Facility: CLINIC | Age: 52
End: 2017-12-13

## 2017-12-13 DIAGNOSIS — Z79.01 LONG TERM CURRENT USE OF ANTICOAGULANT THERAPY: ICD-10-CM

## 2017-12-13 LAB
INR PPP: 3.3 (ref 0.9–1.1)
PROTHROMBIN TIME: 32.5 SECONDS (ref 11.7–14.2)

## 2017-12-13 PROCEDURE — 85610 PROTHROMBIN TIME: CPT

## 2017-12-13 PROCEDURE — 36415 COLL VENOUS BLD VENIPUNCTURE: CPT

## 2017-12-15 ENCOUNTER — HOSPITAL ENCOUNTER (OUTPATIENT)
Dept: FAMILY MEDICINE CLINIC | Facility: CLINIC | Age: 52
Discharge: HOME | End: 2017-12-15
Attending: NURSE PRACTITIONER | Admitting: NURSE PRACTITIONER

## 2017-12-18 ENCOUNTER — TELEPHONE (OUTPATIENT)
Dept: CARDIOLOGY | Facility: CLINIC | Age: 52
End: 2017-12-18

## 2017-12-18 NOTE — TELEPHONE ENCOUNTER
Kwaku was placed on tramadol which can increase INR  Instructed to have INR check on Wednesday 12/20/17-ap

## 2017-12-18 NOTE — TELEPHONE ENCOUNTER
----- Message from Edith Farrell MA sent at 12/18/2017 10:11 AM EST -----  WENT TO [facility] -RECEIVED 2 MEDS FOR PAIN AND MUSCLE RELAXER -+CONCERNED ABOUT EFFECTING WARFARIN . I TOLD HIM TO CHECK WITH HIS PHARMACY.    CALL [phone]

## 2017-12-20 ENCOUNTER — ANTICOAGULATION VISIT (OUTPATIENT)
Dept: CARDIOLOGY | Facility: CLINIC | Age: 52
End: 2017-12-20

## 2017-12-20 ENCOUNTER — LAB (OUTPATIENT)
Dept: LAB | Facility: HOSPITAL | Age: 52
End: 2017-12-20

## 2017-12-20 DIAGNOSIS — Z79.01 LONG TERM CURRENT USE OF ANTICOAGULANT THERAPY: ICD-10-CM

## 2017-12-20 LAB
INR PPP: 3.21 (ref 0.9–1.1)
PROTHROMBIN TIME: 31.9 SECONDS (ref 11.7–14.2)

## 2017-12-20 PROCEDURE — 36415 COLL VENOUS BLD VENIPUNCTURE: CPT

## 2017-12-20 PROCEDURE — 85610 PROTHROMBIN TIME: CPT

## 2018-01-04 ENCOUNTER — ANTICOAGULATION VISIT (OUTPATIENT)
Dept: CARDIOLOGY | Facility: CLINIC | Age: 53
End: 2018-01-04

## 2018-01-04 ENCOUNTER — LAB (OUTPATIENT)
Dept: LAB | Facility: HOSPITAL | Age: 53
End: 2018-01-04

## 2018-01-04 DIAGNOSIS — Z79.01 LONG TERM CURRENT USE OF ANTICOAGULANT THERAPY: ICD-10-CM

## 2018-01-04 LAB
INR PPP: 3.06 (ref 0.9–1.1)
PROTHROMBIN TIME: 30.7 SECONDS (ref 11.7–14.2)

## 2018-01-04 PROCEDURE — 85610 PROTHROMBIN TIME: CPT

## 2018-01-04 PROCEDURE — 36415 COLL VENOUS BLD VENIPUNCTURE: CPT

## 2018-01-17 ENCOUNTER — ANTICOAGULATION VISIT (OUTPATIENT)
Dept: CARDIOLOGY | Facility: CLINIC | Age: 53
End: 2018-01-17

## 2018-01-17 ENCOUNTER — LAB (OUTPATIENT)
Dept: LAB | Facility: HOSPITAL | Age: 53
End: 2018-01-17
Attending: INTERNAL MEDICINE

## 2018-01-17 DIAGNOSIS — Z79.01 LONG TERM CURRENT USE OF ANTICOAGULANT THERAPY: ICD-10-CM

## 2018-01-17 LAB
INR PPP: 2.74 (ref 0.9–1.1)
PROTHROMBIN TIME: 28.2 SECONDS (ref 11.7–14.2)

## 2018-01-17 PROCEDURE — 85610 PROTHROMBIN TIME: CPT

## 2018-01-17 PROCEDURE — 36415 COLL VENOUS BLD VENIPUNCTURE: CPT

## 2018-01-31 ENCOUNTER — LAB (OUTPATIENT)
Dept: LAB | Facility: HOSPITAL | Age: 53
End: 2018-01-31

## 2018-01-31 ENCOUNTER — ANTICOAGULATION VISIT (OUTPATIENT)
Dept: CARDIOLOGY | Facility: CLINIC | Age: 53
End: 2018-01-31

## 2018-01-31 DIAGNOSIS — Z79.01 LONG TERM CURRENT USE OF ANTICOAGULANT THERAPY: ICD-10-CM

## 2018-01-31 LAB
INR PPP: 1.91 (ref 0.9–1.1)
PROTHROMBIN TIME: 21.2 SECONDS (ref 11.7–14.2)

## 2018-01-31 PROCEDURE — 85610 PROTHROMBIN TIME: CPT

## 2018-01-31 PROCEDURE — 36415 COLL VENOUS BLD VENIPUNCTURE: CPT

## 2018-01-31 NOTE — PROGRESS NOTES
States he forgot to take his medication one or two nights recently. Instructed on increasing dosage and rechecking-ap

## 2018-02-14 ENCOUNTER — LAB (OUTPATIENT)
Dept: LAB | Facility: HOSPITAL | Age: 53
End: 2018-02-14

## 2018-02-14 ENCOUNTER — ANTICOAGULATION VISIT (OUTPATIENT)
Dept: CARDIOLOGY | Facility: CLINIC | Age: 53
End: 2018-02-14

## 2018-02-14 DIAGNOSIS — Z79.01 LONG TERM CURRENT USE OF ANTICOAGULANT THERAPY: ICD-10-CM

## 2018-02-14 LAB
INR PPP: 3.82 (ref 0.9–1.1)
PROTHROMBIN TIME: 37.1 SECONDS (ref 11.7–14.2)

## 2018-02-14 PROCEDURE — 85610 PROTHROMBIN TIME: CPT

## 2018-02-14 PROCEDURE — 36415 COLL VENOUS BLD VENIPUNCTURE: CPT

## 2018-02-22 ENCOUNTER — ANTICOAGULATION VISIT (OUTPATIENT)
Dept: CARDIOLOGY | Facility: CLINIC | Age: 53
End: 2018-02-22

## 2018-02-22 ENCOUNTER — LAB (OUTPATIENT)
Dept: LAB | Facility: HOSPITAL | Age: 53
End: 2018-02-22

## 2018-02-22 DIAGNOSIS — Z79.01 LONG TERM CURRENT USE OF ANTICOAGULANT THERAPY: ICD-10-CM

## 2018-02-22 LAB
INR PPP: 3.63 (ref 0.9–1.1)
PROTHROMBIN TIME: 35.6 SECONDS (ref 11.7–14.2)

## 2018-02-22 PROCEDURE — 36415 COLL VENOUS BLD VENIPUNCTURE: CPT

## 2018-02-22 PROCEDURE — 85610 PROTHROMBIN TIME: CPT

## 2018-02-28 ENCOUNTER — LAB (OUTPATIENT)
Dept: LAB | Facility: HOSPITAL | Age: 53
End: 2018-02-28

## 2018-02-28 ENCOUNTER — ANTICOAGULATION VISIT (OUTPATIENT)
Dept: CARDIOLOGY | Facility: CLINIC | Age: 53
End: 2018-02-28

## 2018-02-28 DIAGNOSIS — Z79.01 LONG TERM CURRENT USE OF ANTICOAGULANT THERAPY: ICD-10-CM

## 2018-02-28 LAB
INR PPP: 3.83 (ref 0.9–1.1)
PROTHROMBIN TIME: 37.1 SECONDS (ref 11.7–14.2)

## 2018-02-28 PROCEDURE — 85610 PROTHROMBIN TIME: CPT

## 2018-02-28 PROCEDURE — 36415 COLL VENOUS BLD VENIPUNCTURE: CPT

## 2018-02-28 RX ORDER — WARFARIN SODIUM 5 MG/1
TABLET ORAL
Qty: 90 TABLET | Refills: 3 | Status: SHIPPED | OUTPATIENT
Start: 2018-02-28 | End: 2019-02-12 | Stop reason: SDUPTHER

## 2018-03-08 ENCOUNTER — ANTICOAGULATION VISIT (OUTPATIENT)
Dept: CARDIOLOGY | Facility: CLINIC | Age: 53
End: 2018-03-08

## 2018-03-08 ENCOUNTER — LAB (OUTPATIENT)
Dept: LAB | Facility: HOSPITAL | Age: 53
End: 2018-03-08

## 2018-03-08 DIAGNOSIS — Z79.01 LONG TERM CURRENT USE OF ANTICOAGULANT THERAPY: ICD-10-CM

## 2018-03-08 LAB
INR PPP: 2.44 (ref 0.9–1.1)
PROTHROMBIN TIME: 26.1 SECONDS (ref 11.7–14.2)

## 2018-03-08 PROCEDURE — 36415 COLL VENOUS BLD VENIPUNCTURE: CPT

## 2018-03-08 PROCEDURE — 85610 PROTHROMBIN TIME: CPT

## 2018-03-08 NOTE — PROGRESS NOTES
Mr Nino states that he missed a dose last week. Instructed to continue usual dosage and recheck in 2 weeks

## 2018-03-19 RX ORDER — PRAVASTATIN SODIUM 40 MG
TABLET ORAL
Qty: 90 TABLET | Refills: 1 | OUTPATIENT
Start: 2018-03-19

## 2018-03-22 ENCOUNTER — LAB (OUTPATIENT)
Dept: LAB | Facility: HOSPITAL | Age: 53
End: 2018-03-22
Attending: INTERNAL MEDICINE

## 2018-03-22 ENCOUNTER — ANTICOAGULATION VISIT (OUTPATIENT)
Dept: CARDIOLOGY | Facility: CLINIC | Age: 53
End: 2018-03-22

## 2018-03-22 DIAGNOSIS — Z79.01 LONG TERM CURRENT USE OF ANTICOAGULANT THERAPY: ICD-10-CM

## 2018-03-22 LAB
INR PPP: 2.68 (ref 0.9–1.1)
PROTHROMBIN TIME: 28.1 SECONDS (ref 11.7–14.2)

## 2018-03-22 PROCEDURE — 85610 PROTHROMBIN TIME: CPT

## 2018-03-22 PROCEDURE — 36415 COLL VENOUS BLD VENIPUNCTURE: CPT

## 2018-03-29 RX ORDER — PRAVASTATIN SODIUM 40 MG
40 TABLET ORAL DAILY
Qty: 60 TABLET | Refills: 0 | Status: SHIPPED | OUTPATIENT
Start: 2018-03-29 | End: 2018-05-31 | Stop reason: SDUPTHER

## 2018-04-04 ENCOUNTER — ANTICOAGULATION VISIT (OUTPATIENT)
Dept: CARDIOLOGY | Facility: CLINIC | Age: 53
End: 2018-04-04

## 2018-04-04 ENCOUNTER — LAB (OUTPATIENT)
Dept: LAB | Facility: HOSPITAL | Age: 53
End: 2018-04-04

## 2018-04-04 DIAGNOSIS — Z79.01 LONG TERM CURRENT USE OF ANTICOAGULANT THERAPY: ICD-10-CM

## 2018-04-04 LAB
INR PPP: 2.4 (ref 0.9–1.1)
PROTHROMBIN TIME: 25.8 SECONDS (ref 11.7–14.2)

## 2018-04-04 PROCEDURE — 85610 PROTHROMBIN TIME: CPT

## 2018-04-04 PROCEDURE — 36415 COLL VENOUS BLD VENIPUNCTURE: CPT

## 2018-04-17 ENCOUNTER — LAB (OUTPATIENT)
Dept: LAB | Facility: HOSPITAL | Age: 53
End: 2018-04-17
Attending: INTERNAL MEDICINE

## 2018-04-17 DIAGNOSIS — Z79.01 LONG TERM CURRENT USE OF ANTICOAGULANT THERAPY: ICD-10-CM

## 2018-04-17 LAB
INR PPP: 3.23 (ref 0.9–1.1)
PROTHROMBIN TIME: 32.5 SECONDS (ref 11.7–14.2)

## 2018-04-17 PROCEDURE — 36415 COLL VENOUS BLD VENIPUNCTURE: CPT

## 2018-04-17 PROCEDURE — 85610 PROTHROMBIN TIME: CPT

## 2018-04-18 ENCOUNTER — ANTICOAGULATION VISIT (OUTPATIENT)
Dept: CARDIOLOGY | Facility: CLINIC | Age: 53
End: 2018-04-18

## 2018-05-02 ENCOUNTER — ANTICOAGULATION VISIT (OUTPATIENT)
Dept: CARDIOLOGY | Facility: CLINIC | Age: 53
End: 2018-05-02

## 2018-05-02 ENCOUNTER — LAB (OUTPATIENT)
Dept: LAB | Facility: HOSPITAL | Age: 53
End: 2018-05-02

## 2018-05-02 DIAGNOSIS — Z79.01 LONG TERM CURRENT USE OF ANTICOAGULANT THERAPY: ICD-10-CM

## 2018-05-02 LAB
INR PPP: 3.13 (ref 0.9–1.1)
PROTHROMBIN TIME: 31.7 SECONDS (ref 11.7–14.2)

## 2018-05-02 PROCEDURE — 85610 PROTHROMBIN TIME: CPT

## 2018-05-02 PROCEDURE — 36415 COLL VENOUS BLD VENIPUNCTURE: CPT

## 2018-05-15 ENCOUNTER — ANTICOAGULATION VISIT (OUTPATIENT)
Dept: CARDIOLOGY | Facility: CLINIC | Age: 53
End: 2018-05-15

## 2018-05-15 ENCOUNTER — LAB (OUTPATIENT)
Dept: LAB | Facility: HOSPITAL | Age: 53
End: 2018-05-15
Attending: INTERNAL MEDICINE

## 2018-05-15 DIAGNOSIS — Z79.01 LONG TERM CURRENT USE OF ANTICOAGULANT THERAPY: ICD-10-CM

## 2018-05-15 LAB
INR PPP: 3.34 (ref 0.9–1.1)
PROTHROMBIN TIME: 33.4 SECONDS (ref 11.7–14.2)

## 2018-05-15 PROCEDURE — 36415 COLL VENOUS BLD VENIPUNCTURE: CPT

## 2018-05-15 PROCEDURE — 85610 PROTHROMBIN TIME: CPT

## 2018-05-25 ENCOUNTER — RESULTS ENCOUNTER (OUTPATIENT)
Dept: INTERNAL MEDICINE | Facility: CLINIC | Age: 53
End: 2018-05-25

## 2018-05-25 ENCOUNTER — OFFICE VISIT (OUTPATIENT)
Dept: INTERNAL MEDICINE | Facility: CLINIC | Age: 53
End: 2018-05-25

## 2018-05-25 VITALS
HEIGHT: 72 IN | SYSTOLIC BLOOD PRESSURE: 141 MMHG | WEIGHT: 230 LBS | RESPIRATION RATE: 16 BRPM | HEART RATE: 79 BPM | DIASTOLIC BLOOD PRESSURE: 73 MMHG | OXYGEN SATURATION: 98 % | TEMPERATURE: 98.2 F | BODY MASS INDEX: 31.15 KG/M2

## 2018-05-25 DIAGNOSIS — Z79.01 ANTICOAGULATED: ICD-10-CM

## 2018-05-25 DIAGNOSIS — Z00.00 HEALTH CARE MAINTENANCE: ICD-10-CM

## 2018-05-25 DIAGNOSIS — M1A.0720 CHRONIC IDIOPATHIC GOUT INVOLVING TOE OF LEFT FOOT WITHOUT TOPHUS: ICD-10-CM

## 2018-05-25 DIAGNOSIS — J45.20 MILD INTERMITTENT ASTHMA WITHOUT COMPLICATION: ICD-10-CM

## 2018-05-25 DIAGNOSIS — Z00.00 HEALTH CARE MAINTENANCE: Primary | ICD-10-CM

## 2018-05-25 DIAGNOSIS — E78.2 MIXED HYPERLIPIDEMIA: ICD-10-CM

## 2018-05-25 DIAGNOSIS — Z95.2 MECHANICAL HEART VALVE PRESENT: ICD-10-CM

## 2018-05-25 PROCEDURE — 99396 PREV VISIT EST AGE 40-64: CPT | Performed by: INTERNAL MEDICINE

## 2018-05-25 PROCEDURE — 99214 OFFICE O/P EST MOD 30 MIN: CPT | Performed by: INTERNAL MEDICINE

## 2018-05-30 ENCOUNTER — RESULTS ENCOUNTER (OUTPATIENT)
Dept: INTERNAL MEDICINE | Facility: CLINIC | Age: 53
End: 2018-05-30

## 2018-05-30 DIAGNOSIS — M1A.0720 CHRONIC IDIOPATHIC GOUT INVOLVING TOE OF LEFT FOOT WITHOUT TOPHUS: ICD-10-CM

## 2018-05-30 DIAGNOSIS — Z00.00 HEALTH CARE MAINTENANCE: ICD-10-CM

## 2018-05-31 ENCOUNTER — ANTICOAGULATION VISIT (OUTPATIENT)
Dept: CARDIOLOGY | Facility: CLINIC | Age: 53
End: 2018-05-31

## 2018-05-31 ENCOUNTER — LAB (OUTPATIENT)
Dept: LAB | Facility: HOSPITAL | Age: 53
End: 2018-05-31
Attending: INTERNAL MEDICINE

## 2018-05-31 DIAGNOSIS — Z79.01 LONG TERM CURRENT USE OF ANTICOAGULANT THERAPY: ICD-10-CM

## 2018-05-31 LAB
ALBUMIN SERPL-MCNC: 4.2 G/DL (ref 3.5–5.2)
ALBUMIN/GLOB SERPL: 1.4 G/DL
ALP SERPL-CCNC: 41 U/L (ref 39–117)
ALT SERPL W P-5'-P-CCNC: 25 U/L (ref 1–41)
ANION GAP SERPL CALCULATED.3IONS-SCNC: 10.8 MMOL/L
AST SERPL-CCNC: 28 U/L (ref 1–40)
BACTERIA UR QL AUTO: NORMAL /HPF
BASOPHILS # BLD AUTO: 0.03 10*3/MM3 (ref 0–0.2)
BASOPHILS NFR BLD AUTO: 0.6 % (ref 0–1.5)
BILIRUB SERPL-MCNC: 0.4 MG/DL (ref 0.1–1.2)
BILIRUB UR QL STRIP: NEGATIVE
BUN BLD-MCNC: 16 MG/DL (ref 6–20)
BUN/CREAT SERPL: 11.2 (ref 7–25)
CALCIUM SPEC-SCNC: 9.4 MG/DL (ref 8.6–10.5)
CHLORIDE SERPL-SCNC: 104 MMOL/L (ref 98–107)
CHOLEST SERPL-MCNC: 143 MG/DL (ref 0–200)
CLARITY UR: CLEAR
CO2 SERPL-SCNC: 28.2 MMOL/L (ref 22–29)
COLOR UR: YELLOW
CREAT BLD-MCNC: 1.43 MG/DL (ref 0.76–1.27)
DEPRECATED RDW RBC AUTO: 40.8 FL (ref 37–54)
EOSINOPHIL # BLD AUTO: 0.2 10*3/MM3 (ref 0–0.7)
EOSINOPHIL NFR BLD AUTO: 4 % (ref 0.3–6.2)
ERYTHROCYTE [DISTWIDTH] IN BLOOD BY AUTOMATED COUNT: 13.7 % (ref 11.5–14.5)
GFR SERPL CREATININE-BSD FRML MDRD: 52 ML/MIN/1.73
GLOBULIN UR ELPH-MCNC: 3.1 GM/DL
GLUCOSE BLD-MCNC: 105 MG/DL (ref 65–99)
GLUCOSE UR STRIP-MCNC: NEGATIVE MG/DL
HCT VFR BLD AUTO: 38.3 % (ref 40.4–52.2)
HDLC SERPL-MCNC: 26 MG/DL (ref 40–60)
HGB BLD-MCNC: 13.4 G/DL (ref 13.7–17.6)
HGB UR QL STRIP.AUTO: NEGATIVE
HYALINE CASTS UR QL AUTO: NORMAL /LPF
IMM GRANULOCYTES # BLD: 0.02 10*3/MM3 (ref 0–0.03)
IMM GRANULOCYTES NFR BLD: 0.4 % (ref 0–0.5)
INR PPP: 3.79 (ref 0.9–1.1)
KETONES UR QL STRIP: NEGATIVE
LDLC SERPL CALC-MCNC: 57 MG/DL (ref 0–100)
LDLC/HDLC SERPL: 2.18 {RATIO}
LEUKOCYTE ESTERASE UR QL STRIP.AUTO: NEGATIVE
LYMPHOCYTES # BLD AUTO: 1.2 10*3/MM3 (ref 0.9–4.8)
LYMPHOCYTES NFR BLD AUTO: 24 % (ref 19.6–45.3)
MCH RBC QN AUTO: 28.8 PG (ref 27–32.7)
MCHC RBC AUTO-ENTMCNC: 35 G/DL (ref 32.6–36.4)
MCV RBC AUTO: 82.2 FL (ref 79.8–96.2)
MONOCYTES # BLD AUTO: 0.41 10*3/MM3 (ref 0.2–1.2)
MONOCYTES NFR BLD AUTO: 8.2 % (ref 5–12)
NEUTROPHILS # BLD AUTO: 3.17 10*3/MM3 (ref 1.9–8.1)
NEUTROPHILS NFR BLD AUTO: 63.2 % (ref 42.7–76)
NITRITE UR QL STRIP: NEGATIVE
PH UR STRIP.AUTO: 6 [PH] (ref 5–8)
PLATELET # BLD AUTO: 150 10*3/MM3 (ref 140–500)
PMV BLD AUTO: 8.9 FL (ref 6–12)
POTASSIUM BLD-SCNC: 4.1 MMOL/L (ref 3.5–5.2)
PROT SERPL-MCNC: 7.3 G/DL (ref 6–8.5)
PROT UR QL STRIP: NEGATIVE
PROTHROMBIN TIME: 36.8 SECONDS (ref 11.7–14.2)
PSA SERPL-MCNC: 0.28 NG/ML (ref 0–4)
RBC # BLD AUTO: 4.66 10*6/MM3 (ref 4.6–6)
RBC # UR: NORMAL /HPF
REF LAB TEST METHOD: NORMAL
SODIUM BLD-SCNC: 143 MMOL/L (ref 136–145)
SP GR UR STRIP: 1.02 (ref 1–1.03)
SQUAMOUS #/AREA URNS HPF: NORMAL /HPF
T4 FREE SERPL-MCNC: 1.13 NG/DL (ref 0.93–1.7)
TRIGL SERPL-MCNC: 301 MG/DL (ref 0–150)
TSH SERPL DL<=0.05 MIU/L-ACNC: 4.97 MIU/ML (ref 0.27–4.2)
URATE SERPL-MCNC: 6.7 MG/DL (ref 3.4–7)
UROBILINOGEN UR QL STRIP: NORMAL
VLDLC SERPL-MCNC: 60.2 MG/DL (ref 5–40)
WBC NRBC COR # BLD: 5.01 10*3/MM3 (ref 4.5–10.7)
WBC UR QL AUTO: NORMAL /HPF

## 2018-05-31 PROCEDURE — 84550 ASSAY OF BLOOD/URIC ACID: CPT | Performed by: INTERNAL MEDICINE

## 2018-05-31 PROCEDURE — 85025 COMPLETE CBC W/AUTO DIFF WBC: CPT | Performed by: INTERNAL MEDICINE

## 2018-05-31 PROCEDURE — 36415 COLL VENOUS BLD VENIPUNCTURE: CPT | Performed by: INTERNAL MEDICINE

## 2018-05-31 PROCEDURE — 84443 ASSAY THYROID STIM HORMONE: CPT | Performed by: INTERNAL MEDICINE

## 2018-05-31 PROCEDURE — 81001 URINALYSIS AUTO W/SCOPE: CPT | Performed by: INTERNAL MEDICINE

## 2018-05-31 PROCEDURE — 80061 LIPID PANEL: CPT | Performed by: INTERNAL MEDICINE

## 2018-05-31 PROCEDURE — 84439 ASSAY OF FREE THYROXINE: CPT | Performed by: INTERNAL MEDICINE

## 2018-05-31 PROCEDURE — 84153 ASSAY OF PSA TOTAL: CPT | Performed by: INTERNAL MEDICINE

## 2018-05-31 PROCEDURE — 85610 PROTHROMBIN TIME: CPT

## 2018-05-31 PROCEDURE — 80053 COMPREHEN METABOLIC PANEL: CPT | Performed by: INTERNAL MEDICINE

## 2018-06-01 RX ORDER — PRAVASTATIN SODIUM 40 MG
40 TABLET ORAL DAILY
Qty: 60 TABLET | Refills: 5 | Status: SHIPPED | OUTPATIENT
Start: 2018-06-01 | End: 2019-07-10 | Stop reason: SDUPTHER

## 2018-06-11 NOTE — PROGRESS NOTES
Subjective   Hussein Nino is a 52 y.o. male.   He is here today for complete physical exam along with follow-up for mechanical heart valve present mixed hyperlipidemia asthma anticoagulated chronic idiopathic gout of left toe  History of Present Illness   He is here today for complete physical exam along with follow-up for mechanical heart valve present with no problems along with mixed hyperlipidemia which has been stable and asthma stable on current inhalers anticoagulated with no evidence of bruising or bleeding and chronic idiopathic gout of left toe which is more stable now  The following portions of the patient's history were reviewed and updated as appropriate: allergies, current medications, past family history, past medical history, past social history, past surgical history and problem list.    Review of Systems   Musculoskeletal:        Gout of left toe   All other systems reviewed and are negative.      Objective   Physical Exam   Constitutional: He is oriented to person, place, and time. Vital signs are normal. He appears well-developed and well-nourished. He is active.   HENT:   Head: Normocephalic and atraumatic.   Right Ear: Hearing, tympanic membrane, external ear and ear canal normal.   Left Ear: Hearing, tympanic membrane, external ear and ear canal normal.   Nose: Nose normal.   Mouth/Throat: Uvula is midline, oropharynx is clear and moist and mucous membranes are normal.   Eyes: Conjunctivae, EOM and lids are normal. Pupils are equal, round, and reactive to light. Right eye exhibits no discharge. Left eye exhibits no discharge.   Neck: Trachea normal, normal range of motion, full passive range of motion without pain and phonation normal. Neck supple. Carotid bruit is not present. No edema present. No thyroid mass and no thyromegaly present.   Cardiovascular: Normal rate, regular rhythm, normal heart sounds, intact distal pulses and normal pulses.  Exam reveals no gallop and no friction rub.     No murmur heard.  Pulmonary/Chest: Effort normal and breath sounds normal. No respiratory distress. He has no wheezes. He has no rales.   Abdominal: Soft. Normal appearance, normal aorta and bowel sounds are normal. He exhibits no distension, no abdominal bruit and no mass. There is no hepatosplenomegaly. There is no tenderness. There is no rebound, no guarding and no CVA tenderness. No hernia. Hernia confirmed negative in the right inguinal area and confirmed negative in the left inguinal area.   Musculoskeletal: Normal range of motion. He exhibits no edema or tenderness.     Vascular Status -  His right foot exhibits normal foot vasculature  and no edema. His left foot exhibits normal foot vasculature  and no edema.  Skin Integrity  -  His right foot skin is intact.His left foot skin is intact..     Lymphadenopathy:     He has no cervical adenopathy.     He has no axillary adenopathy.        Right: No inguinal and no supraclavicular adenopathy present.        Left: No inguinal and no supraclavicular adenopathy present.   Neurological: He is alert and oriented to person, place, and time. He has normal strength. No cranial nerve deficit or sensory deficit. He exhibits normal muscle tone. He displays a negative Romberg sign. Coordination normal.   Skin: Skin is warm, dry and intact. No cyanosis. Nails show no clubbing.   Psychiatric: He has a normal mood and affect. His speech is normal and behavior is normal. Judgment and thought content normal. Cognition and memory are normal.   Nursing note and vitals reviewed.      Assessment/Plan   Diagnoses and all orders for this visit:    Health care maintenance  -     Lipid Panel With LDL / HDL Ratio; Future  -     CBC & Differential; Future  -     Comprehensive Metabolic Panel; Future  -     T4, Free; Future  -     TSH; Future  -     Urinalysis With Microscopic - Urine, Clean Catch; Future  -     PSA DIAGNOSTIC; Future    Mechanical heart valve present    Mixed  hyperlipidemia    Mild intermittent asthma without complication    Anticoagulated    Chronic idiopathic gout involving toe of left foot without tophus  -     Uric Acid; Future      Healthcare maintenance fasting labs vaccines colonoscopies on a regular basis  Mechanical heart valve present stable  Anticoagulated stable with no evidence of bruising or bleeding  Mixed hyper lipidemia stable on current medication with no changes needed and we will check lipid panel  Asthma stable on current inhalers with no changes needed  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

## 2018-06-21 ENCOUNTER — ANTICOAGULATION VISIT (OUTPATIENT)
Dept: CARDIOLOGY | Facility: CLINIC | Age: 53
End: 2018-06-21

## 2018-06-21 ENCOUNTER — LAB (OUTPATIENT)
Dept: LAB | Facility: HOSPITAL | Age: 53
End: 2018-06-21

## 2018-06-21 DIAGNOSIS — Z79.01 LONG TERM CURRENT USE OF ANTICOAGULANT THERAPY: ICD-10-CM

## 2018-06-21 LAB
INR PPP: 3.83 (ref 0.9–1.1)
PROTHROMBIN TIME: 37.1 SECONDS (ref 11.7–14.2)

## 2018-06-21 PROCEDURE — 36415 COLL VENOUS BLD VENIPUNCTURE: CPT

## 2018-06-21 PROCEDURE — 85610 PROTHROMBIN TIME: CPT

## 2018-07-09 ENCOUNTER — LAB (OUTPATIENT)
Dept: LAB | Facility: HOSPITAL | Age: 53
End: 2018-07-09

## 2018-07-09 ENCOUNTER — ANTICOAGULATION VISIT (OUTPATIENT)
Dept: CARDIOLOGY | Facility: CLINIC | Age: 53
End: 2018-07-09

## 2018-07-09 DIAGNOSIS — Z79.01 LONG TERM CURRENT USE OF ANTICOAGULANT THERAPY: ICD-10-CM

## 2018-07-09 LAB
INR PPP: 2.26 (ref 0.9–1.1)
PROTHROMBIN TIME: 24.6 SECONDS (ref 11.7–14.2)

## 2018-07-09 PROCEDURE — 85610 PROTHROMBIN TIME: CPT

## 2018-07-09 PROCEDURE — 36415 COLL VENOUS BLD VENIPUNCTURE: CPT

## 2018-07-25 ENCOUNTER — ANTICOAGULATION VISIT (OUTPATIENT)
Dept: CARDIOLOGY | Facility: CLINIC | Age: 53
End: 2018-07-25

## 2018-07-25 ENCOUNTER — LAB (OUTPATIENT)
Dept: LAB | Facility: HOSPITAL | Age: 53
End: 2018-07-25

## 2018-07-25 DIAGNOSIS — Z79.01 LONG TERM CURRENT USE OF ANTICOAGULANT THERAPY: ICD-10-CM

## 2018-07-25 LAB
INR PPP: 2.68 (ref 0.9–1.1)
PROTHROMBIN TIME: 28.1 SECONDS (ref 11.7–14.2)

## 2018-07-25 PROCEDURE — 36415 COLL VENOUS BLD VENIPUNCTURE: CPT

## 2018-07-25 PROCEDURE — 85610 PROTHROMBIN TIME: CPT

## 2018-07-30 RX ORDER — OMEPRAZOLE 40 MG/1
CAPSULE, DELAYED RELEASE ORAL
Qty: 90 CAPSULE | Refills: 3 | Status: SHIPPED | OUTPATIENT
Start: 2018-07-30 | End: 2019-09-16 | Stop reason: SDUPTHER

## 2018-08-13 ENCOUNTER — ANTICOAGULATION VISIT (OUTPATIENT)
Dept: CARDIOLOGY | Facility: CLINIC | Age: 53
End: 2018-08-13

## 2018-08-13 ENCOUNTER — LAB (OUTPATIENT)
Dept: LAB | Facility: HOSPITAL | Age: 53
End: 2018-08-13

## 2018-08-13 DIAGNOSIS — Z79.01 LONG TERM CURRENT USE OF ANTICOAGULANT THERAPY: ICD-10-CM

## 2018-08-13 LAB
INR PPP: 2.52 (ref 0.9–1.1)
PROTHROMBIN TIME: 26.8 SECONDS (ref 11.7–14.2)

## 2018-08-13 PROCEDURE — 85610 PROTHROMBIN TIME: CPT

## 2018-08-13 PROCEDURE — 36415 COLL VENOUS BLD VENIPUNCTURE: CPT

## 2018-09-05 ENCOUNTER — ANTICOAGULATION VISIT (OUTPATIENT)
Dept: CARDIOLOGY | Facility: CLINIC | Age: 53
End: 2018-09-05

## 2018-09-05 ENCOUNTER — LAB (OUTPATIENT)
Dept: LAB | Facility: HOSPITAL | Age: 53
End: 2018-09-05

## 2018-09-05 DIAGNOSIS — M1A.0720 IDIOPATHIC CHRONIC GOUT, LEFT ANKLE AND FOOT, WITHOUT TOPHUS (TOPHI): Primary | ICD-10-CM

## 2018-09-05 LAB
INR PPP: 1.94 (ref 0.9–1.1)
PROTHROMBIN TIME: 21.8 SECONDS (ref 11.7–14.2)

## 2018-09-05 PROCEDURE — 85610 PROTHROMBIN TIME: CPT

## 2018-09-05 PROCEDURE — 36415 COLL VENOUS BLD VENIPUNCTURE: CPT

## 2018-09-19 ENCOUNTER — ANTICOAGULATION VISIT (OUTPATIENT)
Dept: CARDIOLOGY | Facility: CLINIC | Age: 53
End: 2018-09-19

## 2018-09-19 ENCOUNTER — LAB (OUTPATIENT)
Dept: LAB | Facility: HOSPITAL | Age: 53
End: 2018-09-19

## 2018-09-19 DIAGNOSIS — M1A.0720 IDIOPATHIC CHRONIC GOUT, LEFT ANKLE AND FOOT, WITHOUT TOPHUS (TOPHI): Primary | ICD-10-CM

## 2018-09-19 LAB
INR PPP: 1.72 (ref 0.9–1.1)
PROTHROMBIN TIME: 19.9 SECONDS (ref 11.7–14.2)

## 2018-09-19 PROCEDURE — 85610 PROTHROMBIN TIME: CPT

## 2018-09-19 PROCEDURE — 36415 COLL VENOUS BLD VENIPUNCTURE: CPT

## 2018-09-26 ENCOUNTER — ANTICOAGULATION VISIT (OUTPATIENT)
Dept: CARDIOLOGY | Facility: CLINIC | Age: 53
End: 2018-09-26

## 2018-09-26 ENCOUNTER — TRANSCRIBE ORDERS (OUTPATIENT)
Dept: ADMINISTRATIVE | Facility: HOSPITAL | Age: 53
End: 2018-09-26

## 2018-09-26 ENCOUNTER — LAB (OUTPATIENT)
Dept: LAB | Facility: HOSPITAL | Age: 53
End: 2018-09-26
Attending: INTERNAL MEDICINE

## 2018-09-26 DIAGNOSIS — M1A.0720 IDIOPATHIC CHRONIC GOUT, LEFT ANKLE AND FOOT, WITHOUT TOPHUS (TOPHI): ICD-10-CM

## 2018-09-26 DIAGNOSIS — M1A.0720 IDIOPATHIC CHRONIC GOUT, LEFT ANKLE AND FOOT, WITHOUT TOPHUS (TOPHI): Primary | ICD-10-CM

## 2018-09-26 LAB
INR PPP: 2.23 (ref 0.9–1.1)
PROTHROMBIN TIME: 24.4 SECONDS (ref 11.7–14.2)

## 2018-09-26 PROCEDURE — 36415 COLL VENOUS BLD VENIPUNCTURE: CPT

## 2018-09-26 PROCEDURE — 85610 PROTHROMBIN TIME: CPT

## 2018-10-10 ENCOUNTER — LAB (OUTPATIENT)
Dept: LAB | Facility: HOSPITAL | Age: 53
End: 2018-10-10
Attending: INTERNAL MEDICINE

## 2018-10-10 ENCOUNTER — TRANSCRIBE ORDERS (OUTPATIENT)
Dept: ADMINISTRATIVE | Facility: HOSPITAL | Age: 53
End: 2018-10-10

## 2018-10-10 DIAGNOSIS — M1A.0720 IDIOPATHIC CHRONIC GOUT, LEFT ANKLE AND FOOT, WITHOUT TOPHUS (TOPHI): Primary | ICD-10-CM

## 2018-10-10 DIAGNOSIS — M1A.0720 IDIOPATHIC CHRONIC GOUT, LEFT ANKLE AND FOOT, WITHOUT TOPHUS (TOPHI): ICD-10-CM

## 2018-10-10 LAB
INR PPP: 1.95 (ref 0.9–1.1)
PROTHROMBIN TIME: 21.9 SECONDS (ref 11.7–14.2)

## 2018-10-10 PROCEDURE — 36415 COLL VENOUS BLD VENIPUNCTURE: CPT

## 2018-10-10 PROCEDURE — 85610 PROTHROMBIN TIME: CPT

## 2018-10-12 ENCOUNTER — ANTICOAGULATION VISIT (OUTPATIENT)
Dept: CARDIOLOGY | Facility: CLINIC | Age: 53
End: 2018-10-12

## 2018-10-12 LAB — INR PPP: 1.9

## 2018-10-17 ENCOUNTER — TRANSCRIBE ORDERS (OUTPATIENT)
Dept: ADMINISTRATIVE | Facility: HOSPITAL | Age: 53
End: 2018-10-17

## 2018-10-17 ENCOUNTER — ANTICOAGULATION VISIT (OUTPATIENT)
Dept: CARDIOLOGY | Facility: CLINIC | Age: 53
End: 2018-10-17

## 2018-10-17 ENCOUNTER — LAB (OUTPATIENT)
Dept: LAB | Facility: HOSPITAL | Age: 53
End: 2018-10-17
Attending: INTERNAL MEDICINE

## 2018-10-17 DIAGNOSIS — M1A.0720 IDIOPATHIC CHRONIC GOUT, LEFT ANKLE AND FOOT, WITHOUT TOPHUS (TOPHI): ICD-10-CM

## 2018-10-17 DIAGNOSIS — M1A.0720 IDIOPATHIC CHRONIC GOUT, LEFT ANKLE AND FOOT, WITHOUT TOPHUS (TOPHI): Primary | ICD-10-CM

## 2018-10-17 LAB
INR PPP: 2.42 (ref 0.9–1.1)
PROTHROMBIN TIME: 25.9 SECONDS (ref 11.7–14.2)

## 2018-10-17 PROCEDURE — 36415 COLL VENOUS BLD VENIPUNCTURE: CPT

## 2018-10-17 PROCEDURE — 85610 PROTHROMBIN TIME: CPT

## 2018-10-30 ENCOUNTER — TRANSCRIBE ORDERS (OUTPATIENT)
Dept: ADMINISTRATIVE | Facility: HOSPITAL | Age: 53
End: 2018-10-30

## 2018-10-30 ENCOUNTER — LAB (OUTPATIENT)
Dept: LAB | Facility: HOSPITAL | Age: 53
End: 2018-10-30
Attending: INTERNAL MEDICINE

## 2018-10-30 DIAGNOSIS — M1A.0720 IDIOPATHIC CHRONIC GOUT, LEFT ANKLE AND FOOT, WITHOUT TOPHUS (TOPHI): Primary | ICD-10-CM

## 2018-10-30 DIAGNOSIS — M1A.0720 IDIOPATHIC CHRONIC GOUT, LEFT ANKLE AND FOOT, WITHOUT TOPHUS (TOPHI): ICD-10-CM

## 2018-10-30 LAB
INR PPP: 1.64 (ref 0.9–1.1)
PROTHROMBIN TIME: 19.1 SECONDS (ref 11.7–14.2)

## 2018-10-30 PROCEDURE — 85610 PROTHROMBIN TIME: CPT

## 2018-10-30 PROCEDURE — 36415 COLL VENOUS BLD VENIPUNCTURE: CPT

## 2018-10-31 ENCOUNTER — ANTICOAGULATION VISIT (OUTPATIENT)
Dept: CARDIOLOGY | Facility: CLINIC | Age: 53
End: 2018-10-31

## 2018-11-07 ENCOUNTER — LAB (OUTPATIENT)
Dept: LAB | Facility: HOSPITAL | Age: 53
End: 2018-11-07
Attending: INTERNAL MEDICINE

## 2018-11-07 ENCOUNTER — TRANSCRIBE ORDERS (OUTPATIENT)
Dept: ADMINISTRATIVE | Facility: HOSPITAL | Age: 53
End: 2018-11-07

## 2018-11-07 ENCOUNTER — ANTICOAGULATION VISIT (OUTPATIENT)
Dept: CARDIOLOGY | Facility: CLINIC | Age: 53
End: 2018-11-07

## 2018-11-07 DIAGNOSIS — M1A.0720 IDIOPATHIC CHRONIC GOUT, LEFT ANKLE AND FOOT, WITHOUT TOPHUS (TOPHI): ICD-10-CM

## 2018-11-07 DIAGNOSIS — M1A.0720 IDIOPATHIC CHRONIC GOUT, LEFT ANKLE AND FOOT, WITHOUT TOPHUS (TOPHI): Primary | ICD-10-CM

## 2018-11-07 LAB
INR PPP: 2.1 (ref 0.9–1.1)
PROTHROMBIN TIME: 23.2 SECONDS (ref 11.7–14.2)

## 2018-11-07 PROCEDURE — 36415 COLL VENOUS BLD VENIPUNCTURE: CPT

## 2018-11-07 PROCEDURE — 85610 PROTHROMBIN TIME: CPT

## 2018-11-20 ENCOUNTER — TRANSCRIBE ORDERS (OUTPATIENT)
Dept: ADMINISTRATIVE | Facility: HOSPITAL | Age: 53
End: 2018-11-20

## 2018-11-20 ENCOUNTER — LAB (OUTPATIENT)
Dept: LAB | Facility: HOSPITAL | Age: 53
End: 2018-11-20
Attending: INTERNAL MEDICINE

## 2018-11-20 ENCOUNTER — ANTICOAGULATION VISIT (OUTPATIENT)
Dept: CARDIOLOGY | Facility: CLINIC | Age: 53
End: 2018-11-20

## 2018-11-20 DIAGNOSIS — M1A.0720 IDIOPATHIC CHRONIC GOUT, LEFT ANKLE AND FOOT, WITHOUT TOPHUS (TOPHI): Primary | ICD-10-CM

## 2018-11-20 DIAGNOSIS — M1A.0720 IDIOPATHIC CHRONIC GOUT, LEFT ANKLE AND FOOT, WITHOUT TOPHUS (TOPHI): ICD-10-CM

## 2018-11-20 LAB
INR PPP: 1.95 (ref 0.9–1.1)
PROTHROMBIN TIME: 21.9 SECONDS (ref 11.7–14.2)

## 2018-11-20 PROCEDURE — 36415 COLL VENOUS BLD VENIPUNCTURE: CPT

## 2018-11-20 PROCEDURE — 85610 PROTHROMBIN TIME: CPT

## 2018-11-27 ENCOUNTER — ANTICOAGULATION VISIT (OUTPATIENT)
Dept: CARDIOLOGY | Facility: CLINIC | Age: 53
End: 2018-11-27

## 2018-11-27 ENCOUNTER — LAB (OUTPATIENT)
Dept: LAB | Facility: HOSPITAL | Age: 53
End: 2018-11-27
Attending: INTERNAL MEDICINE

## 2018-11-27 ENCOUNTER — TRANSCRIBE ORDERS (OUTPATIENT)
Dept: ADMINISTRATIVE | Facility: HOSPITAL | Age: 53
End: 2018-11-27

## 2018-11-27 DIAGNOSIS — M1A.0720 IDIOPATHIC CHRONIC GOUT, LEFT ANKLE AND FOOT, WITHOUT TOPHUS (TOPHI): ICD-10-CM

## 2018-11-27 DIAGNOSIS — M1A.0720 IDIOPATHIC CHRONIC GOUT, LEFT ANKLE AND FOOT, WITHOUT TOPHUS (TOPHI): Primary | ICD-10-CM

## 2018-11-27 LAB
INR PPP: 2.24 (ref 0.9–1.1)
PROTHROMBIN TIME: 24.4 SECONDS (ref 11.7–14.2)

## 2018-11-27 PROCEDURE — 85610 PROTHROMBIN TIME: CPT

## 2018-11-27 PROCEDURE — 36415 COLL VENOUS BLD VENIPUNCTURE: CPT

## 2018-12-04 ENCOUNTER — TRANSCRIBE ORDERS (OUTPATIENT)
Dept: ADMINISTRATIVE | Facility: HOSPITAL | Age: 53
End: 2018-12-04

## 2018-12-04 ENCOUNTER — ANTICOAGULATION VISIT (OUTPATIENT)
Dept: CARDIOLOGY | Facility: CLINIC | Age: 53
End: 2018-12-04

## 2018-12-04 ENCOUNTER — LAB (OUTPATIENT)
Dept: LAB | Facility: HOSPITAL | Age: 53
End: 2018-12-04
Attending: INTERNAL MEDICINE

## 2018-12-04 DIAGNOSIS — M1A.0720 IDIOPATHIC CHRONIC GOUT, LEFT ANKLE AND FOOT, WITHOUT TOPHUS (TOPHI): ICD-10-CM

## 2018-12-04 DIAGNOSIS — M1A.0720 IDIOPATHIC CHRONIC GOUT, LEFT ANKLE AND FOOT, WITHOUT TOPHUS (TOPHI): Primary | ICD-10-CM

## 2018-12-04 LAB
INR PPP: 2.47 (ref 0.9–1.1)
PROTHROMBIN TIME: 26.3 SECONDS (ref 11.7–14.2)

## 2018-12-04 PROCEDURE — 85610 PROTHROMBIN TIME: CPT

## 2018-12-04 PROCEDURE — 36415 COLL VENOUS BLD VENIPUNCTURE: CPT

## 2018-12-18 ENCOUNTER — ANTICOAGULATION VISIT (OUTPATIENT)
Dept: CARDIOLOGY | Facility: CLINIC | Age: 53
End: 2018-12-18

## 2018-12-18 ENCOUNTER — TRANSCRIBE ORDERS (OUTPATIENT)
Dept: ADMINISTRATIVE | Facility: HOSPITAL | Age: 53
End: 2018-12-18

## 2018-12-18 ENCOUNTER — LAB (OUTPATIENT)
Dept: LAB | Facility: HOSPITAL | Age: 53
End: 2018-12-18
Attending: INTERNAL MEDICINE

## 2018-12-18 DIAGNOSIS — M1A.0720 IDIOPATHIC CHRONIC GOUT, LEFT ANKLE AND FOOT, WITHOUT TOPHUS (TOPHI): Primary | ICD-10-CM

## 2018-12-18 DIAGNOSIS — M1A.0720 IDIOPATHIC CHRONIC GOUT, LEFT ANKLE AND FOOT, WITHOUT TOPHUS (TOPHI): ICD-10-CM

## 2018-12-18 LAB
INR PPP: 3.42 (ref 0.9–1.1)
PROTHROMBIN TIME: 34 SECONDS (ref 11.7–14.2)

## 2018-12-18 PROCEDURE — 36415 COLL VENOUS BLD VENIPUNCTURE: CPT

## 2018-12-18 PROCEDURE — 85610 PROTHROMBIN TIME: CPT

## 2018-12-31 ENCOUNTER — ANTICOAGULATION VISIT (OUTPATIENT)
Dept: CARDIOLOGY | Facility: CLINIC | Age: 53
End: 2018-12-31

## 2018-12-31 ENCOUNTER — TRANSCRIBE ORDERS (OUTPATIENT)
Dept: ADMINISTRATIVE | Facility: HOSPITAL | Age: 53
End: 2018-12-31

## 2018-12-31 ENCOUNTER — LAB (OUTPATIENT)
Dept: LAB | Facility: HOSPITAL | Age: 53
End: 2018-12-31
Attending: INTERNAL MEDICINE

## 2018-12-31 DIAGNOSIS — M1A.0720 IDIOPATHIC CHRONIC GOUT, LEFT ANKLE AND FOOT, WITHOUT TOPHUS (TOPHI): Primary | ICD-10-CM

## 2018-12-31 DIAGNOSIS — M1A.0720 IDIOPATHIC CHRONIC GOUT, LEFT ANKLE AND FOOT, WITHOUT TOPHUS (TOPHI): ICD-10-CM

## 2018-12-31 LAB
INR PPP: 3.97 (ref 0.9–1.1)
PROTHROMBIN TIME: 38.1 SECONDS (ref 11.7–14.2)

## 2018-12-31 PROCEDURE — 85610 PROTHROMBIN TIME: CPT

## 2018-12-31 PROCEDURE — 36415 COLL VENOUS BLD VENIPUNCTURE: CPT

## 2019-01-08 ENCOUNTER — ANTICOAGULATION VISIT (OUTPATIENT)
Dept: CARDIOLOGY | Facility: CLINIC | Age: 54
End: 2019-01-08

## 2019-01-08 ENCOUNTER — LAB (OUTPATIENT)
Dept: LAB | Facility: HOSPITAL | Age: 54
End: 2019-01-08
Attending: INTERNAL MEDICINE

## 2019-01-08 ENCOUNTER — TRANSCRIBE ORDERS (OUTPATIENT)
Dept: ADMINISTRATIVE | Facility: HOSPITAL | Age: 54
End: 2019-01-08

## 2019-01-08 DIAGNOSIS — M1A.0720 IDIOPATHIC CHRONIC GOUT, LEFT ANKLE AND FOOT, WITHOUT TOPHUS (TOPHI): Primary | ICD-10-CM

## 2019-01-08 DIAGNOSIS — M1A.0720 IDIOPATHIC CHRONIC GOUT, LEFT ANKLE AND FOOT, WITHOUT TOPHUS (TOPHI): ICD-10-CM

## 2019-01-08 LAB
INR PPP: 3.54 (ref 0.9–1.1)
PROTHROMBIN TIME: 34.9 SECONDS (ref 11.7–14.2)

## 2019-01-08 PROCEDURE — 36415 COLL VENOUS BLD VENIPUNCTURE: CPT

## 2019-01-08 PROCEDURE — 85610 PROTHROMBIN TIME: CPT

## 2019-01-23 ENCOUNTER — ANTICOAGULATION VISIT (OUTPATIENT)
Dept: CARDIOLOGY | Facility: CLINIC | Age: 54
End: 2019-01-23

## 2019-01-23 ENCOUNTER — LAB (OUTPATIENT)
Dept: LAB | Facility: HOSPITAL | Age: 54
End: 2019-01-23
Attending: INTERNAL MEDICINE

## 2019-01-23 ENCOUNTER — TRANSCRIBE ORDERS (OUTPATIENT)
Dept: ADMINISTRATIVE | Facility: HOSPITAL | Age: 54
End: 2019-01-23

## 2019-01-23 DIAGNOSIS — M1A.0720 CHRONIC GOUT OF LEFT ANKLE, UNSPECIFIED CAUSE: ICD-10-CM

## 2019-01-23 DIAGNOSIS — M1A.0720 CHRONIC GOUT OF LEFT ANKLE, UNSPECIFIED CAUSE: Primary | ICD-10-CM

## 2019-01-23 LAB
INR PPP: 3.63 (ref 0.9–1.1)
PROTHROMBIN TIME: 35.6 SECONDS (ref 11.7–14.2)

## 2019-01-23 PROCEDURE — 36415 COLL VENOUS BLD VENIPUNCTURE: CPT

## 2019-01-23 PROCEDURE — 85610 PROTHROMBIN TIME: CPT

## 2019-02-05 ENCOUNTER — LAB (OUTPATIENT)
Dept: LAB | Facility: HOSPITAL | Age: 54
End: 2019-02-05
Attending: INTERNAL MEDICINE

## 2019-02-05 ENCOUNTER — ANTICOAGULATION VISIT (OUTPATIENT)
Dept: CARDIOLOGY | Facility: CLINIC | Age: 54
End: 2019-02-05

## 2019-02-05 ENCOUNTER — TRANSCRIBE ORDERS (OUTPATIENT)
Dept: ADMINISTRATIVE | Facility: HOSPITAL | Age: 54
End: 2019-02-05

## 2019-02-05 DIAGNOSIS — M1A.0720 IDIOPATHIC CHRONIC GOUT, LEFT ANKLE AND FOOT, WITHOUT TOPHUS (TOPHI): ICD-10-CM

## 2019-02-05 DIAGNOSIS — M1A.0720 IDIOPATHIC CHRONIC GOUT, LEFT ANKLE AND FOOT, WITHOUT TOPHUS (TOPHI): Primary | ICD-10-CM

## 2019-02-05 LAB
INR PPP: 3 (ref 0.9–1.1)
PROTHROMBIN TIME: 30.6 SECONDS (ref 11.7–14.2)

## 2019-02-05 PROCEDURE — 36415 COLL VENOUS BLD VENIPUNCTURE: CPT

## 2019-02-05 PROCEDURE — 85610 PROTHROMBIN TIME: CPT

## 2019-02-12 RX ORDER — WARFARIN SODIUM 5 MG/1
TABLET ORAL
Qty: 90 TABLET | Refills: 3 | Status: SHIPPED | OUTPATIENT
Start: 2019-02-12 | End: 2019-09-16 | Stop reason: SDUPTHER

## 2019-02-19 ENCOUNTER — TRANSCRIBE ORDERS (OUTPATIENT)
Dept: ADMINISTRATIVE | Facility: HOSPITAL | Age: 54
End: 2019-02-19

## 2019-02-19 ENCOUNTER — LAB (OUTPATIENT)
Dept: LAB | Facility: HOSPITAL | Age: 54
End: 2019-02-19

## 2019-02-19 DIAGNOSIS — M1A.0720 IDIOPATHIC CHRONIC GOUT, LEFT ANKLE AND FOOT, WITHOUT TOPHUS (TOPHI): Primary | ICD-10-CM

## 2019-02-19 DIAGNOSIS — M1A.0720 IDIOPATHIC CHRONIC GOUT, LEFT ANKLE AND FOOT, WITHOUT TOPHUS (TOPHI): ICD-10-CM

## 2019-02-19 LAB
INR PPP: 3.38 (ref 0.9–1.1)
PROTHROMBIN TIME: 33.6 SECONDS (ref 11.7–14.2)

## 2019-02-19 PROCEDURE — 36415 COLL VENOUS BLD VENIPUNCTURE: CPT

## 2019-02-19 PROCEDURE — 85610 PROTHROMBIN TIME: CPT

## 2019-02-20 ENCOUNTER — ANTICOAGULATION VISIT (OUTPATIENT)
Dept: CARDIOLOGY | Facility: CLINIC | Age: 54
End: 2019-02-20

## 2019-03-20 ENCOUNTER — TRANSCRIBE ORDERS (OUTPATIENT)
Dept: ADMINISTRATIVE | Facility: HOSPITAL | Age: 54
End: 2019-03-20

## 2019-03-20 ENCOUNTER — LAB (OUTPATIENT)
Dept: LAB | Facility: HOSPITAL | Age: 54
End: 2019-03-20

## 2019-03-20 ENCOUNTER — ANTICOAGULATION VISIT (OUTPATIENT)
Dept: CARDIOLOGY | Facility: CLINIC | Age: 54
End: 2019-03-20

## 2019-03-20 DIAGNOSIS — M1A.0720 IDIOPATHIC CHRONIC GOUT, LEFT ANKLE AND FOOT, WITHOUT TOPHUS (TOPHI): ICD-10-CM

## 2019-03-20 DIAGNOSIS — M1A.0720 IDIOPATHIC CHRONIC GOUT, LEFT ANKLE AND FOOT, WITHOUT TOPHUS (TOPHI): Primary | ICD-10-CM

## 2019-03-20 LAB
INR PPP: 2.94 (ref 0.9–1.1)
PROTHROMBIN TIME: 30.2 SECONDS (ref 11.7–14.2)

## 2019-03-20 PROCEDURE — 36415 COLL VENOUS BLD VENIPUNCTURE: CPT

## 2019-03-20 PROCEDURE — 85610 PROTHROMBIN TIME: CPT

## 2019-04-18 ENCOUNTER — ANTICOAGULATION VISIT (OUTPATIENT)
Dept: CARDIOLOGY | Facility: CLINIC | Age: 54
End: 2019-04-18

## 2019-04-18 ENCOUNTER — TRANSCRIBE ORDERS (OUTPATIENT)
Dept: ADMINISTRATIVE | Facility: HOSPITAL | Age: 54
End: 2019-04-18

## 2019-04-18 ENCOUNTER — LAB (OUTPATIENT)
Dept: LAB | Facility: HOSPITAL | Age: 54
End: 2019-04-18

## 2019-04-18 DIAGNOSIS — M1A.0720 IDIOPATHIC CHRONIC GOUT, LEFT ANKLE AND FOOT, WITHOUT TOPHUS (TOPHI): ICD-10-CM

## 2019-04-18 DIAGNOSIS — M1A.0720 IDIOPATHIC CHRONIC GOUT, LEFT ANKLE AND FOOT, WITHOUT TOPHUS (TOPHI): Primary | ICD-10-CM

## 2019-04-18 LAB
INR PPP: 3.04 (ref 0.9–1.1)
PROTHROMBIN TIME: 31.2 SECONDS (ref 11.7–14.2)

## 2019-04-18 PROCEDURE — 36415 COLL VENOUS BLD VENIPUNCTURE: CPT

## 2019-04-18 PROCEDURE — 85610 PROTHROMBIN TIME: CPT

## 2019-05-23 ENCOUNTER — ANTICOAGULATION VISIT (OUTPATIENT)
Dept: CARDIOLOGY | Facility: CLINIC | Age: 54
End: 2019-05-23

## 2019-05-23 ENCOUNTER — TRANSCRIBE ORDERS (OUTPATIENT)
Dept: ADMINISTRATIVE | Facility: HOSPITAL | Age: 54
End: 2019-05-23

## 2019-05-23 ENCOUNTER — LAB (OUTPATIENT)
Dept: LAB | Facility: HOSPITAL | Age: 54
End: 2019-05-23

## 2019-05-23 DIAGNOSIS — M1A.0720 IDIOPATHIC CHRONIC GOUT, LEFT ANKLE AND FOOT, WITHOUT TOPHUS (TOPHI): Primary | ICD-10-CM

## 2019-05-23 DIAGNOSIS — M1A.0720 IDIOPATHIC CHRONIC GOUT, LEFT ANKLE AND FOOT, WITHOUT TOPHUS (TOPHI): ICD-10-CM

## 2019-05-23 LAB
INR PPP: 3.37 (ref 0.9–1.1)
PROTHROMBIN TIME: 33.8 SECONDS (ref 11.7–14.2)

## 2019-05-23 PROCEDURE — 85610 PROTHROMBIN TIME: CPT

## 2019-05-23 PROCEDURE — 36415 COLL VENOUS BLD VENIPUNCTURE: CPT

## 2019-06-05 ENCOUNTER — HOSPITAL ENCOUNTER (OUTPATIENT)
Dept: FAMILY MEDICINE CLINIC | Facility: CLINIC | Age: 54
Discharge: HOME | End: 2019-06-05
Attending: PHYSICIAN ASSISTANT | Admitting: PHYSICIAN ASSISTANT

## 2019-06-20 ENCOUNTER — TELEPHONE (OUTPATIENT)
Dept: CARDIOLOGY | Facility: CLINIC | Age: 54
End: 2019-06-20

## 2019-06-20 NOTE — TELEPHONE ENCOUNTER
Pt called stating his nose started bleeding around 4:30.    I have advised him to go to the ER. He verbalized understanding and said he will go.

## 2019-06-21 ENCOUNTER — TRANSCRIBE ORDERS (OUTPATIENT)
Dept: ADMINISTRATIVE | Facility: HOSPITAL | Age: 54
End: 2019-06-21

## 2019-06-21 ENCOUNTER — LAB (OUTPATIENT)
Dept: LAB | Facility: HOSPITAL | Age: 54
End: 2019-06-21

## 2019-06-21 ENCOUNTER — ANTICOAGULATION VISIT (OUTPATIENT)
Dept: CARDIOLOGY | Facility: CLINIC | Age: 54
End: 2019-06-21

## 2019-06-21 DIAGNOSIS — M1A.0720 IDIOPATHIC CHRONIC GOUT, LEFT ANKLE AND FOOT, WITHOUT TOPHUS (TOPHI): Primary | ICD-10-CM

## 2019-06-21 DIAGNOSIS — M1A.0720 IDIOPATHIC CHRONIC GOUT, LEFT ANKLE AND FOOT, WITHOUT TOPHUS (TOPHI): ICD-10-CM

## 2019-06-21 LAB
INR PPP: 3.38 (ref 0.9–1.1)
PROTHROMBIN TIME: 33.9 SECONDS (ref 11.7–14.2)

## 2019-06-21 PROCEDURE — 99283 EMERGENCY DEPT VISIT LOW MDM: CPT

## 2019-06-21 PROCEDURE — 36415 COLL VENOUS BLD VENIPUNCTURE: CPT

## 2019-06-21 PROCEDURE — 85610 PROTHROMBIN TIME: CPT

## 2019-06-22 ENCOUNTER — HOSPITAL ENCOUNTER (EMERGENCY)
Facility: HOSPITAL | Age: 54
Discharge: HOME | End: 2019-06-22
Attending: EMERGENCY MEDICINE | Admitting: EMERGENCY MEDICINE

## 2019-06-22 VITALS
HEIGHT: 72 IN | OXYGEN SATURATION: 94 % | SYSTOLIC BLOOD PRESSURE: 153 MMHG | WEIGHT: 230 LBS | BODY MASS INDEX: 31.15 KG/M2 | HEART RATE: 81 BPM | RESPIRATION RATE: 16 BRPM | DIASTOLIC BLOOD PRESSURE: 90 MMHG | TEMPERATURE: 97.8 F

## 2019-06-22 DIAGNOSIS — R04.0 EPISTAXIS: Primary | ICD-10-CM

## 2019-06-22 LAB
BASOPHILS # BLD AUTO: 0.04 10*3/MM3 (ref 0–0.2)
BASOPHILS NFR BLD AUTO: 0.6 % (ref 0–1.5)
DEPRECATED RDW RBC AUTO: 40 FL (ref 37–54)
EOSINOPHIL # BLD AUTO: 0.18 10*3/MM3 (ref 0–0.4)
EOSINOPHIL NFR BLD AUTO: 2.6 % (ref 0.3–6.2)
ERYTHROCYTE [DISTWIDTH] IN BLOOD BY AUTOMATED COUNT: 13.2 % (ref 12.3–15.4)
HCT VFR BLD AUTO: 40.2 % (ref 37.5–51)
HGB BLD-MCNC: 13.6 G/DL (ref 13–17.7)
IMM GRANULOCYTES # BLD AUTO: 0.02 10*3/MM3 (ref 0–0.05)
IMM GRANULOCYTES NFR BLD AUTO: 0.3 % (ref 0–0.5)
INR PPP: 3.15 (ref 0.9–1.1)
LYMPHOCYTES # BLD AUTO: 1.66 10*3/MM3 (ref 0.7–3.1)
LYMPHOCYTES NFR BLD AUTO: 23.7 % (ref 19.6–45.3)
MCH RBC QN AUTO: 28.3 PG (ref 26.6–33)
MCHC RBC AUTO-ENTMCNC: 33.8 G/DL (ref 31.5–35.7)
MCV RBC AUTO: 83.8 FL (ref 79–97)
MONOCYTES # BLD AUTO: 0.46 10*3/MM3 (ref 0.1–0.9)
MONOCYTES NFR BLD AUTO: 6.6 % (ref 5–12)
NEUTROPHILS # BLD AUTO: 4.65 10*3/MM3 (ref 1.7–7)
NEUTROPHILS NFR BLD AUTO: 66.2 % (ref 42.7–76)
NRBC BLD AUTO-RTO: 0 /100 WBC (ref 0–0.2)
PLATELET # BLD AUTO: 155 10*3/MM3 (ref 140–450)
PMV BLD AUTO: 9.4 FL (ref 6–12)
PROTHROMBIN TIME: 32.1 SECONDS (ref 11.7–14.2)
RBC # BLD AUTO: 4.8 10*6/MM3 (ref 4.14–5.8)
WBC NRBC COR # BLD: 7.01 10*3/MM3 (ref 3.4–10.8)

## 2019-06-22 PROCEDURE — 85025 COMPLETE CBC W/AUTO DIFF WBC: CPT | Performed by: PHYSICIAN ASSISTANT

## 2019-06-22 PROCEDURE — 85610 PROTHROMBIN TIME: CPT | Performed by: PHYSICIAN ASSISTANT

## 2019-06-22 RX ADMIN — SILVER NITRATE APPLICATORS 1 APPLICATION: 25; 75 STICK TOPICAL at 01:45

## 2019-06-22 RX ADMIN — PHENYLEPHRINE HYDROCHLORIDE 2 SPRAY: 0.5 SPRAY NASAL at 01:46

## 2019-07-10 RX ORDER — PRAVASTATIN SODIUM 40 MG
TABLET ORAL
Qty: 60 TABLET | Refills: 0 | Status: SHIPPED | OUTPATIENT
Start: 2019-07-10 | End: 2019-09-16 | Stop reason: SDUPTHER

## 2019-07-15 RX ORDER — OMEPRAZOLE 40 MG/1
CAPSULE, DELAYED RELEASE ORAL
Qty: 90 CAPSULE | Refills: 3 | OUTPATIENT
Start: 2019-07-15

## 2019-07-29 ENCOUNTER — TRANSCRIBE ORDERS (OUTPATIENT)
Dept: LAB | Facility: HOSPITAL | Age: 54
End: 2019-07-29

## 2019-07-29 ENCOUNTER — TELEPHONE (OUTPATIENT)
Dept: CARDIOLOGY | Facility: CLINIC | Age: 54
End: 2019-07-29

## 2019-07-29 ENCOUNTER — LAB (OUTPATIENT)
Dept: LAB | Facility: HOSPITAL | Age: 54
End: 2019-07-29

## 2019-07-29 DIAGNOSIS — Z79.01 LONG TERM CURRENT USE OF ANTICOAGULANT THERAPY: ICD-10-CM

## 2019-07-29 DIAGNOSIS — M1A.0720 IDIOPATHIC CHRONIC GOUT, LEFT ANKLE AND FOOT, WITHOUT TOPHUS (TOPHI): ICD-10-CM

## 2019-07-29 DIAGNOSIS — M1A.0720 IDIOPATHIC CHRONIC GOUT, LEFT ANKLE AND FOOT, WITHOUT TOPHUS (TOPHI): Primary | ICD-10-CM

## 2019-07-29 LAB
INR PPP: 3.55 (ref 0.9–1.1)
PROTHROMBIN TIME: 35.3 SECONDS (ref 11.7–14.2)

## 2019-07-29 PROCEDURE — 85610 PROTHROMBIN TIME: CPT

## 2019-07-29 PROCEDURE — 36415 COLL VENOUS BLD VENIPUNCTURE: CPT

## 2019-07-29 NOTE — TELEPHONE ENCOUNTER
----- Message from Felicia Cardenas sent at 7/25/2019 12:47 PM EDT -----  Regarding: PREMEDICATE PRIOR TO DENTAL APPT ON MONDAY  [facility] [phone]    S/w Luis [facility]IN premedication

## 2019-07-30 ENCOUNTER — ANTICOAGULATION VISIT (OUTPATIENT)
Dept: CARDIOLOGY | Facility: CLINIC | Age: 54
End: 2019-07-30

## 2019-08-28 ENCOUNTER — LAB (OUTPATIENT)
Dept: LAB | Facility: HOSPITAL | Age: 54
End: 2019-08-28

## 2019-08-28 ENCOUNTER — ANTICOAGULATION VISIT (OUTPATIENT)
Dept: CARDIOLOGY | Facility: CLINIC | Age: 54
End: 2019-08-28

## 2019-08-28 ENCOUNTER — TRANSCRIBE ORDERS (OUTPATIENT)
Dept: ADMINISTRATIVE | Facility: HOSPITAL | Age: 54
End: 2019-08-28

## 2019-08-28 DIAGNOSIS — M1A.0720 IDIOPATHIC CHRONIC GOUT, LEFT ANKLE AND FOOT, WITHOUT TOPHUS (TOPHI): Primary | ICD-10-CM

## 2019-08-28 DIAGNOSIS — M1A.0720 IDIOPATHIC CHRONIC GOUT, LEFT ANKLE AND FOOT, WITHOUT TOPHUS (TOPHI): ICD-10-CM

## 2019-08-28 LAB
INR PPP: 2.46 (ref 0.9–1.1)
PROTHROMBIN TIME: 26.3 SECONDS (ref 11.7–14.2)

## 2019-08-28 PROCEDURE — 36415 COLL VENOUS BLD VENIPUNCTURE: CPT

## 2019-08-28 PROCEDURE — 85610 PROTHROMBIN TIME: CPT

## 2019-09-16 ENCOUNTER — OFFICE VISIT (OUTPATIENT)
Dept: INTERNAL MEDICINE | Facility: CLINIC | Age: 54
End: 2019-09-16

## 2019-09-16 VITALS
BODY MASS INDEX: 30.88 KG/M2 | WEIGHT: 228 LBS | SYSTOLIC BLOOD PRESSURE: 160 MMHG | DIASTOLIC BLOOD PRESSURE: 100 MMHG | HEIGHT: 72 IN

## 2019-09-16 DIAGNOSIS — I38 HEART VALVE DISEASE: ICD-10-CM

## 2019-09-16 DIAGNOSIS — Z12.5 SCREENING PSA (PROSTATE SPECIFIC ANTIGEN): ICD-10-CM

## 2019-09-16 DIAGNOSIS — K21.9 GASTROESOPHAGEAL REFLUX DISEASE WITHOUT ESOPHAGITIS: ICD-10-CM

## 2019-09-16 DIAGNOSIS — Z11.59 ENCOUNTER FOR HEPATITIS C SCREENING TEST FOR LOW RISK PATIENT: ICD-10-CM

## 2019-09-16 DIAGNOSIS — E78.2 MIXED HYPERLIPIDEMIA: Primary | ICD-10-CM

## 2019-09-16 DIAGNOSIS — Z00.00 HEALTH CARE MAINTENANCE: Primary | ICD-10-CM

## 2019-09-16 DIAGNOSIS — I10 ESSENTIAL HYPERTENSION: ICD-10-CM

## 2019-09-16 PROCEDURE — 90471 IMMUNIZATION ADMIN: CPT | Performed by: PHYSICIAN ASSISTANT

## 2019-09-16 PROCEDURE — 99214 OFFICE O/P EST MOD 30 MIN: CPT | Performed by: PHYSICIAN ASSISTANT

## 2019-09-16 PROCEDURE — 90632 HEPA VACCINE ADULT IM: CPT | Performed by: PHYSICIAN ASSISTANT

## 2019-09-16 RX ORDER — METOPROLOL SUCCINATE 50 MG/1
50 TABLET, EXTENDED RELEASE ORAL DAILY
Qty: 30 TABLET | Refills: 1 | Status: SHIPPED | OUTPATIENT
Start: 2019-09-16 | End: 2020-12-18

## 2019-09-16 RX ORDER — WARFARIN SODIUM 5 MG/1
TABLET ORAL
Qty: 90 TABLET | Refills: 0 | Status: SHIPPED | OUTPATIENT
Start: 2019-09-16 | End: 2020-01-30

## 2019-09-16 RX ORDER — PRAVASTATIN SODIUM 40 MG
40 TABLET ORAL DAILY
Qty: 90 TABLET | Refills: 1 | Status: SHIPPED | OUTPATIENT
Start: 2019-09-16 | End: 2020-01-07 | Stop reason: SDUPTHER

## 2019-09-16 RX ORDER — OMEPRAZOLE 40 MG/1
40 CAPSULE, DELAYED RELEASE ORAL DAILY
Qty: 90 CAPSULE | Refills: 1 | Status: SHIPPED | OUTPATIENT
Start: 2019-09-16 | End: 2020-03-16 | Stop reason: SDUPTHER

## 2019-09-16 NOTE — PROGRESS NOTES
"Subjective   Chief Complaint   Patient presents with   • Establish Care     Pain in abdominal area, med refill       History of Present Illness      Pt is a 54 yr old white male, hx of asthma, mechanical heart valve, IBS-D, HLD, GERD and hypertriglyceridemia who presents today to establish care as a new patient. He is transferring care from Dr. Wallace.     Dr. Britton is his cardiologist, he is currently anticoagulated with warfarin 5 mg on M,W,F and 7.5 mg all other days. He has his INR checked monthly right now.     He was diagnosed with IBS-D and takes cholestyramine prn for his sx. He mostly controls his sx with diet.     He has a hx of HLD and takes Pravastatin 40 mg daily. He was on fenofibrate in addition at one point for hypertriglyceridemia but is unsure why it was stopped or when.      He has not had labs in about a year.     Has been havinbg intermittent epigastric pain for the 1- 2 years., not brought on with eating. No specific foods trigger it. He does not take nsaids or drink large amount of alcohol. Will be present for about an hour when it occurs, happens very randomly. Not even once a week. Does not radiate when it occurs. He has never taken anything for it, it just resolves on it's own. Describes the sensation as \"feeling like he has been punched in the gut\".       No blood in his stools. No melena.     Hx of gout, no flares in several months.     No CP or BHATIA.     Patient Active Problem List   Diagnosis   • Chronic idiopathic gout involving toe of left foot without tophus   • Allergic rhinitis   • Aortic root dilatation (CMS/HCC)   • Asthma   • Radiating chest pain   • Chest tightness   • Gout   • Cough   • Gastroesophageal reflux disease   • Dyspnea on exertion   • Heart murmur   • Hyperlipidemia   • Irritable bowel syndrome   • Pericardial effusion   • Raynaud's phenomenon   • Pain of toe   • Heart valve disease   • Essential hypertriglyceridemia   • Facial flushing   • Epigastric " abdominal pain   • Health care maintenance   • Aortic stenosis due to bicuspid aortic valve   • S/P AVR (aortic valve replacement)   • Tachycardia   • Mechanical heart valve present   • Pleural effusion, left   • Anticoagulated       Allergies   Allergen Reactions   • Fish-Derived Products Anaphylaxis   • Ondansetron Rash   • Penicillins Rash       Current Outpatient Medications on File Prior to Visit   Medication Sig Dispense Refill   • albuterol (PROVENTIL HFA;VENTOLIN HFA) 108 (90 BASE) MCG/ACT inhaler Inhale 2 puffs Every 4 (Four) Hours As Needed for wheezing.     • cetirizine (ZYRTEC ALLERGY) 10 MG tablet Take 1 tablet by mouth Daily. 30 tablet 3   • cholestyramine (QUESTRAN) 4 G packet Take 1 packet by mouth 3 (Three) Times a Day With Meals. 90 packet 3   • fluticasone (FLONASE) 50 MCG/ACT nasal spray 2 sprays into each nostril Daily. 1 each 3   • Multiple Vitamins-Minerals (MULTIVITAMIN ADULT PO) Take  by mouth.     • Probiotic Product (PROBIOTIC DAILY PO) Take  by mouth.     • [DISCONTINUED] omeprazole (priLOSEC) 40 MG capsule TAKE 1 CAPSULE DAILY 90 capsule 3   • [DISCONTINUED] pravastatin (PRAVACHOL) 40 MG tablet TAKE 1 TABLET BY MOUTH ONCE DAILY 60 tablet 0   • [DISCONTINUED] warfarin (JANTOVEN) 5 MG tablet TAKE 1 TABLET DAILY OR AS  DIRECTED TO MAINTAIN       INTERNATIONAL NORMALIZED   RATIO 2.5-3.5 90 tablet 3   • fenofibrate 160 MG tablet TAKE 1 TABLET DAILY 90 tablet 3   • [DISCONTINUED] predniSONE (DELTASONE) 10 MG (21) tablet pack      • [DISCONTINUED] promethazine (PHENERGAN) 50 MG tablet Take 0.5 tablets by mouth Every 6 (Six) Hours As Needed for nausea or vomiting. 15 tablet 0     No current facility-administered medications on file prior to visit.        Past Medical History:   Diagnosis Date   • Aortic stenosis     SEVERE   • Asthma    • Dyspnea on exertion    • Epistaxis    • GERD (gastroesophageal reflux disease)    • Gout    • Heart murmur    • Hyperlipidemia    • Hypertension    • Irritable  bowel syndrome        Family History   Problem Relation Age of Onset   • Heart failure Mother    • Kidney disease Mother    • Hypertension Mother    • Heart disease Mother    • Alzheimer's disease Father    • Thyroid disease Sister    • Hypertension Sister    • Hypertension Brother        Social History     Socioeconomic History   • Marital status:      Spouse name: Not on file   • Number of children: Not on file   • Years of education: Not on file   • Highest education level: Not on file   Tobacco Use   • Smoking status: Former Smoker     Packs/day: 0.50     Years: 14.00     Pack years: 7.00     Types: Cigarettes   • Smokeless tobacco: Never Used   • Tobacco comment: QUIT AGE 23   Substance and Sexual Activity   • Alcohol use: Yes     Frequency: 2-3 times a week   • Drug use: No   • Sexual activity: Defer       Past Surgical History:   Procedure Laterality Date   • ADENOIDECTOMY     • AORTIC VALVE REPAIR/REPLACEMENT  10/05/2016    mechanical valve   • ASCENDING ARCH/HEMIARCH REPLACEMENT N/A 10/5/2016    Procedure: MIDLINE STERNOTOMY, AORTIC VALVE REPLACEMENT, ASCENDING AND HEMIARCH REPLACEMENT, REPAIR OF PERIVALVULAR LEAK, WITH NEURO MONITOIRING, INTRAOPERATIVE WARREN;  Surgeon: Sukhi Wilkinson MD;  Location: [facility] OR;  Service:    • CARDIAC CATHETERIZATION N/A 9/27/2016    Procedure: Coronary angiography;  Surgeon: Lio Roman MD;  Location: [facility] CATH INVASIVE LOCATION;  Service:    • CARDIAC CATHETERIZATION N/A 9/27/2016    Procedure: Left Heart Cath;  Surgeon: Lio Roman MD;  Location: [facility] CATH INVASIVE LOCATION;  Service:    • CHOLECYSTECTOMY     • CORONARY ARTERY BYPASS GRAFT         The following portions of the patient's history were reviewed and updated as appropriate: problem list, allergies, current medications, past medical history, past family history, past social history and past surgical history.    Review of Systems   Constitution: Negative for weight gain.  "  Eyes: Negative for blurred vision and double vision.   Cardiovascular: Negative for chest pain.   Respiratory: Negative for cough and wheezing.    Musculoskeletal: Positive for arthritis.   Gastrointestinal: Positive for abdominal pain, diarrhea and heartburn.       Immunization History   Administered Date(s) Administered   • Hepatitis A 09/16/2019   • Tdap 09/16/2019       Objective   Vitals:    09/16/19 1338 09/16/19 1410 09/16/19 1411   BP:  160/100 160/100   BP Location:   Right arm   Weight: 103 kg (228 lb)     Height: 182.9 cm (72.01\")       Physical Exam   Constitutional: He is oriented to person, place, and time. He appears well-developed and well-nourished.   HENT:   Head: Normocephalic and atraumatic.   Eyes: EOM are normal. Pupils are equal, round, and reactive to light.   Neck: Neck supple. Carotid bruit is not present. No thyromegaly present.   Cardiovascular: Normal rate and regular rhythm.   Click from mechanical valve.    Pulmonary/Chest: Effort normal and breath sounds normal.   Abdominal: Soft. Bowel sounds are normal. There is no hepatosplenomegaly. There is no tenderness.   Neurological: He is alert and oriented to person, place, and time.   Skin: Skin is warm and dry.   Psychiatric: He has a normal mood and affect. His behavior is normal. Judgment normal.   Vitals reviewed.      Assessment/Plan   Hussein was seen today for establish care.    Diagnoses and all orders for this visit:    Mixed hyperlipidemia  -     Lipid Panel With / Chol / HDL Ratio  -     Comprehensive Metabolic Panel  -     Cancel: LDL Cholesterol, Direct  -     pravastatin (PRAVACHOL) 40 MG tablet; Take 1 tablet by mouth Daily.  -     LDL Cholesterol, Direct    Screening PSA (prostate specific antigen)  -     PSA Screen    Encounter for hepatitis C screening test for low risk patient  -     HCV Antibody Rfx To Qnt PCR    Essential hypertension  -     metoprolol succinate XL (TOPROL XL) 50 MG 24 hr tablet; Take 1 tablet by " mouth Daily.    Gastroesophageal reflux disease without esophagitis  -     omeprazole (priLOSEC) 40 MG capsule; Take 1 capsule by mouth Daily.    Heart valve disease  -     warfarin (JANTOVEN) 5 MG tablet; TAKE 1 TABLET DAILY OR AS  DIRECTED TO MAINTAIN       INTERNATIONAL NORMALIZED   RATIO 2.5-3.5      Will get labs next week when he is fasting.     HTN: New problem, start Metoprolol 50 mg daily and fup in 1 week.     Health maintenance: PSA next week, Will get flu shot at work. 1st Hep A today. Hep C screening today.     GERD: asymptomatic with omprazole, refilled today.     Epigastric pain that is intermittent over the last 1-2 years. He had CTA of the chest in 2016 no hiatal hernia was mentioned. He has not had EGD in at least 3 years which showed gastritis despite PPI. If not continue to improve will refer back to GI.     Return in about 1 week (around 9/23/2019).

## 2019-09-25 ENCOUNTER — OFFICE VISIT (OUTPATIENT)
Dept: INTERNAL MEDICINE | Facility: CLINIC | Age: 54
End: 2019-09-25

## 2019-09-25 VITALS
WEIGHT: 228 LBS | BODY MASS INDEX: 30.88 KG/M2 | SYSTOLIC BLOOD PRESSURE: 146 MMHG | DIASTOLIC BLOOD PRESSURE: 80 MMHG | HEIGHT: 72 IN

## 2019-09-25 DIAGNOSIS — E78.2 MIXED HYPERLIPIDEMIA: Primary | ICD-10-CM

## 2019-09-25 DIAGNOSIS — I10 BENIGN ESSENTIAL HTN: ICD-10-CM

## 2019-09-25 LAB — LDLC SERPL DIRECT ASSAY-MCNC: 52 MG/DL (ref 0–100)

## 2019-09-25 PROCEDURE — 99213 OFFICE O/P EST LOW 20 MIN: CPT | Performed by: PHYSICIAN ASSISTANT

## 2019-09-25 RX ORDER — AMLODIPINE BESYLATE 5 MG/1
5 TABLET ORAL DAILY
Qty: 30 TABLET | Refills: 1 | Status: SHIPPED | OUTPATIENT
Start: 2019-09-25 | End: 2019-10-25 | Stop reason: SDUPTHER

## 2019-09-25 NOTE — PROGRESS NOTES
Subjective   Chief Complaint   Patient presents with   • Hypertension     follow up on B/P medication         History of Present Illness     Pt is here for fup on his htn. Started on metoprolol 50 mg daily a week ago. Tolerating well. He has been checking his BP and it is coming down slowly. Averaging upper 140's low 150's systolic and upper 80's low 90s diastolic.      Patient Active Problem List   Diagnosis   • Chronic idiopathic gout involving toe of left foot without tophus   • Allergic rhinitis   • Aortic root dilatation (CMS/HCC)   • Asthma   • Radiating chest pain   • Chest tightness   • Gout   • Cough   • Gastroesophageal reflux disease   • Dyspnea on exertion   • Heart murmur   • Hyperlipidemia   • Irritable bowel syndrome   • Pericardial effusion   • Raynaud's phenomenon   • Pain of toe   • Heart valve disease   • Essential hypertriglyceridemia   • Facial flushing   • Epigastric abdominal pain   • Health care maintenance   • Aortic stenosis due to bicuspid aortic valve   • S/P AVR (aortic valve replacement)   • Tachycardia   • Mechanical heart valve present   • Pleural effusion, left   • Anticoagulated   • Benign essential HTN       Allergies   Allergen Reactions   • Fish-Derived Products Anaphylaxis   • Ondansetron Rash   • Penicillins Rash       Current Outpatient Medications on File Prior to Visit   Medication Sig Dispense Refill   • albuterol (PROVENTIL HFA;VENTOLIN HFA) 108 (90 BASE) MCG/ACT inhaler Inhale 2 puffs Every 4 (Four) Hours As Needed for wheezing.     • cetirizine (ZYRTEC ALLERGY) 10 MG tablet Take 1 tablet by mouth Daily. 30 tablet 3   • cholestyramine (QUESTRAN) 4 G packet Take 1 packet by mouth 3 (Three) Times a Day With Meals. 90 packet 3   • fluticasone (FLONASE) 50 MCG/ACT nasal spray 2 sprays into each nostril Daily. 1 each 3   • metoprolol succinate XL (TOPROL XL) 50 MG 24 hr tablet Take 1 tablet by mouth Daily. 30 tablet 1   • Multiple Vitamins-Minerals (MULTIVITAMIN ADULT PO) Take   by mouth.     • omeprazole (priLOSEC) 40 MG capsule Take 1 capsule by mouth Daily. 90 capsule 1   • pravastatin (PRAVACHOL) 40 MG tablet Take 1 tablet by mouth Daily. 90 tablet 1   • Probiotic Product (PROBIOTIC DAILY PO) Take  by mouth.     • warfarin (JANTOVEN) 5 MG tablet TAKE 1 TABLET DAILY OR AS  DIRECTED TO MAINTAIN       INTERNATIONAL NORMALIZED   RATIO 2.5-3.5 90 tablet 0   • fenofibrate 160 MG tablet TAKE 1 TABLET DAILY 90 tablet 3     No current facility-administered medications on file prior to visit.        Past Medical History:   Diagnosis Date   • Aortic stenosis     SEVERE   • Asthma    • Dyspnea on exertion    • Epistaxis    • GERD (gastroesophageal reflux disease)    • Gout    • Heart murmur    • Hyperlipidemia    • Hypertension    • Irritable bowel syndrome        Family History   Problem Relation Age of Onset   • Heart failure Mother    • Kidney disease Mother    • Hypertension Mother    • Heart disease Mother    • Alzheimer's disease Father    • Thyroid disease Sister    • Hypertension Sister    • Hypertension Brother        Social History     Socioeconomic History   • Marital status:      Spouse name: Not on file   • Number of children: Not on file   • Years of education: Not on file   • Highest education level: Not on file   Tobacco Use   • Smoking status: Former Smoker     Packs/day: 0.50     Years: 14.00     Pack years: 7.00     Types: Cigarettes   • Smokeless tobacco: Never Used   • Tobacco comment: QUIT AGE 23   Substance and Sexual Activity   • Alcohol use: Yes     Frequency: 2-3 times a week   • Drug use: No   • Sexual activity: Defer       Past Surgical History:   Procedure Laterality Date   • ADENOIDECTOMY     • AORTIC VALVE REPAIR/REPLACEMENT  10/05/2016    mechanical valve   • ASCENDING ARCH/HEMIARCH REPLACEMENT N/A 10/5/2016    Procedure: MIDLINE STERNOTOMY, AORTIC VALVE REPLACEMENT, ASCENDING AND HEMIARCH REPLACEMENT, REPAIR OF PERIVALVULAR LEAK, WITH NEURO MONITOIRING,  "INTRAOPERATIVE WARREN;  Surgeon: Sukhi Wilkinson MD;  Location: [facility] MAIN OR;  Service:    • CARDIAC CATHETERIZATION N/A 9/27/2016    Procedure: Coronary angiography;  Surgeon: Lio Roman MD;  Location:  [facility] CATH INVASIVE LOCATION;  Service:    • CARDIAC CATHETERIZATION N/A 9/27/2016    Procedure: Left Heart Cath;  Surgeon: Lio Roman MD;  Location:  [facility] CATH INVASIVE LOCATION;  Service:    • CHOLECYSTECTOMY     • COLONOSCOPY  12/212/2016    Dr. Palomares   • CORONARY ARTERY BYPASS GRAFT             The following portions of the patient's history were reviewed and updated as appropriate: problem list, allergies, current medications, past medical history, past family history, past social history and past surgical history.    Review of Systems   Cardiovascular: Negative for chest pain and dyspnea on exertion.   Neurological: Negative for dizziness.       Immunization History   Administered Date(s) Administered   • Hepatitis A 09/16/2019   • Tdap 09/16/2019       Objective   Vitals:    09/25/19 0802 09/25/19 0816   BP:  146/80   Weight: 103 kg (228 lb)    Height: 182.9 cm (72.01\")      Physical Exam   Constitutional: He appears well-developed and well-nourished.   Cardiovascular: Normal rate and regular rhythm.   Click from mechanical valve.    Pulmonary/Chest: Effort normal and breath sounds normal.   Vitals reviewed.        Assessment/Plan   Hussein was seen today for hypertension.    Diagnoses and all orders for this visit:    Mixed hyperlipidemia    Benign essential HTN  -     amLODIPine (NORVASC) 5 MG tablet; Take 1 tablet by mouth Daily.      HTN: Continue metoprolol 50 mg daily and will add amlodipine 5 mg. Improved from last week, still uncontrolled.     Pt is to have fasting lab work today. He is going to get flu shot at work. They are also going to give him Hep B series. I have recommended Shingrix vaccine as well at his pharmacy.     FUP 1 mo for BP check. Will address labs as they are " resulted.     Return in about 1 month (around 10/25/2019).

## 2019-09-26 ENCOUNTER — TELEPHONE (OUTPATIENT)
Dept: INTERNAL MEDICINE | Facility: CLINIC | Age: 54
End: 2019-09-26

## 2019-09-26 DIAGNOSIS — E78.2 MIXED HYPERLIPIDEMIA: Primary | ICD-10-CM

## 2019-09-26 DIAGNOSIS — R79.89 ELEVATED LFTS: ICD-10-CM

## 2019-09-26 LAB
ALBUMIN SERPL-MCNC: 4.5 G/DL (ref 3.5–5.2)
ALBUMIN/GLOB SERPL: 1.8 G/DL
ALP SERPL-CCNC: 95 U/L (ref 39–117)
ALT SERPL-CCNC: 44 U/L (ref 1–41)
AST SERPL-CCNC: 38 U/L (ref 1–40)
BILIRUB SERPL-MCNC: 0.7 MG/DL (ref 0.2–1.2)
BUN SERPL-MCNC: 15 MG/DL (ref 6–20)
BUN/CREAT SERPL: 14.4 (ref 7–25)
CALCIUM SERPL-MCNC: 9.4 MG/DL (ref 8.6–10.5)
CHLORIDE SERPL-SCNC: 100 MMOL/L (ref 98–107)
CHOLEST SERPL-MCNC: 171 MG/DL (ref 0–200)
CHOLEST/HDLC SERPL: 7.13 {RATIO}
CO2 SERPL-SCNC: 26.8 MMOL/L (ref 22–29)
CREAT SERPL-MCNC: 1.04 MG/DL (ref 0.76–1.27)
GLOBULIN SER CALC-MCNC: 2.5 GM/DL
GLUCOSE SERPL-MCNC: 90 MG/DL (ref 65–99)
HCV AB S/CO SERPL IA: 0.1 S/CO RATIO (ref 0–0.9)
HCV AB SERPL QL IA: NORMAL
HDLC SERPL-MCNC: 24 MG/DL (ref 40–60)
LDLC SERPL CALC-MCNC: ABNORMAL MG/DL
POTASSIUM SERPL-SCNC: 4.4 MMOL/L (ref 3.5–5.2)
PROT SERPL-MCNC: 7 G/DL (ref 6–8.5)
PSA SERPL-MCNC: 0.34 NG/ML (ref 0–4)
SODIUM SERPL-SCNC: 141 MMOL/L (ref 136–145)
TRIGL SERPL-MCNC: 725 MG/DL (ref 0–150)
VLDLC SERPL CALC-MCNC: ABNORMAL MG/DL

## 2019-09-26 RX ORDER — FENOFIBRATE 145 MG/1
145 TABLET, COATED ORAL DAILY
Qty: 90 TABLET | Refills: 1 | Status: SHIPPED | OUTPATIENT
Start: 2019-09-26 | End: 2019-12-31 | Stop reason: SDUPTHER

## 2019-09-26 NOTE — TELEPHONE ENCOUNTER
----- Message from Margarita Dickinson PA-C sent at 9/26/2019  9:26 AM EDT -----  Triglycerides are significantly elevated start back on fenofibrate. He cannot take fish oil due to anaphylaxis  To fish correct?  His LDL is to goal. One of his liver enzymes are mildly elevated. Will repeat in 1 month prior to his fup. He needs cut back alcohol use. His Hep C screening is negative.

## 2019-10-10 ENCOUNTER — RESULTS ENCOUNTER (OUTPATIENT)
Dept: INTERNAL MEDICINE | Facility: CLINIC | Age: 54
End: 2019-10-10

## 2019-10-10 DIAGNOSIS — R79.89 ELEVATED LFTS: ICD-10-CM

## 2019-10-15 ENCOUNTER — ANTICOAGULATION VISIT (OUTPATIENT)
Dept: CARDIOLOGY | Facility: CLINIC | Age: 54
End: 2019-10-15

## 2019-10-15 ENCOUNTER — TRANSCRIBE ORDERS (OUTPATIENT)
Dept: ADMINISTRATIVE | Facility: HOSPITAL | Age: 54
End: 2019-10-15

## 2019-10-15 ENCOUNTER — LAB (OUTPATIENT)
Dept: LAB | Facility: HOSPITAL | Age: 54
End: 2019-10-15

## 2019-10-15 DIAGNOSIS — Z79.01 ANTICOAGULATED: Primary | ICD-10-CM

## 2019-10-15 DIAGNOSIS — M10.9 GOUT, UNSPECIFIED CAUSE, UNSPECIFIED CHRONICITY, UNSPECIFIED SITE: Primary | ICD-10-CM

## 2019-10-15 DIAGNOSIS — M10.9 GOUT, UNSPECIFIED CAUSE, UNSPECIFIED CHRONICITY, UNSPECIFIED SITE: ICD-10-CM

## 2019-10-15 LAB
INR PPP: 5.7
INR PPP: 5.7 (ref 0.9–1.1)
PROTHROMBIN TIME: 51.3 SECONDS (ref 11.7–14.2)

## 2019-10-15 PROCEDURE — 36415 COLL VENOUS BLD VENIPUNCTURE: CPT

## 2019-10-15 PROCEDURE — 85610 PROTHROMBIN TIME: CPT

## 2019-10-18 ENCOUNTER — LAB (OUTPATIENT)
Dept: LAB | Facility: HOSPITAL | Age: 54
End: 2019-10-18

## 2019-10-18 ENCOUNTER — ANTICOAGULATION VISIT (OUTPATIENT)
Dept: CARDIOLOGY | Facility: CLINIC | Age: 54
End: 2019-10-18

## 2019-10-18 DIAGNOSIS — Z79.01 ANTICOAGULATED: ICD-10-CM

## 2019-10-18 LAB
INR PPP: 3.03 (ref 0.9–1.1)
PROTHROMBIN TIME: 31.1 SECONDS (ref 11.7–14.2)

## 2019-10-18 PROCEDURE — 85610 PROTHROMBIN TIME: CPT

## 2019-10-18 PROCEDURE — 36415 COLL VENOUS BLD VENIPUNCTURE: CPT

## 2019-10-25 ENCOUNTER — OFFICE VISIT (OUTPATIENT)
Dept: INTERNAL MEDICINE | Facility: CLINIC | Age: 54
End: 2019-10-25

## 2019-10-25 VITALS
SYSTOLIC BLOOD PRESSURE: 120 MMHG | DIASTOLIC BLOOD PRESSURE: 68 MMHG | HEIGHT: 72 IN | BODY MASS INDEX: 31.15 KG/M2 | WEIGHT: 230 LBS

## 2019-10-25 DIAGNOSIS — R35.1 BENIGN PROSTATIC HYPERPLASIA WITH NOCTURIA: ICD-10-CM

## 2019-10-25 DIAGNOSIS — N40.1 BENIGN PROSTATIC HYPERPLASIA WITH NOCTURIA: ICD-10-CM

## 2019-10-25 DIAGNOSIS — E78.1 ESSENTIAL HYPERTRIGLYCERIDEMIA: ICD-10-CM

## 2019-10-25 DIAGNOSIS — I10 BENIGN ESSENTIAL HTN: Primary | ICD-10-CM

## 2019-10-25 DIAGNOSIS — I10 BENIGN ESSENTIAL HTN: ICD-10-CM

## 2019-10-25 PROCEDURE — 99213 OFFICE O/P EST LOW 20 MIN: CPT | Performed by: PHYSICIAN ASSISTANT

## 2019-10-25 RX ORDER — AMLODIPINE BESYLATE 5 MG/1
5 TABLET ORAL DAILY
Qty: 90 TABLET | Refills: 3 | Status: SHIPPED | OUTPATIENT
Start: 2019-10-25 | End: 2021-06-21 | Stop reason: ALTCHOICE

## 2019-10-25 RX ORDER — TAMSULOSIN HYDROCHLORIDE 0.4 MG/1
1 CAPSULE ORAL DAILY
Qty: 90 CAPSULE
Start: 2019-10-25 | End: 2020-04-28 | Stop reason: SDUPTHER

## 2019-10-25 NOTE — PROGRESS NOTES
Subjective   Chief Complaint   Patient presents with   • Hypertension     follow up/        History of Present Illness     Here for fup on his htn. He had been checking his BP at [facility] and has been coming down nicely. No side effects from the amlodipine or metoprolol. He denies CP. His BP has been in the 120's/70s for the last 1-2 weeks.     Pt was started back on his fenofibrate 1 month ago and tolerating fine. His triglycerides were in the 700's. He denies abdominal pain. He is unable to take fish oil due to anaphylaxis to fish.     He does have nocturia 3 times per night for the last few months. Disrupting his sleep greatly.     Pt reports he had his flu shot at work.     He has had sneezing, rhinorrhea, congestion for the last 5 days. No cough, fever or chills.     Patient Active Problem List   Diagnosis   • Chronic idiopathic gout involving toe of left foot without tophus   • Allergic rhinitis   • Aortic root dilatation (CMS/HCC)   • Asthma   • Radiating chest pain   • Chest tightness   • Gout   • Cough   • Gastroesophageal reflux disease   • Dyspnea on exertion   • Heart murmur   • Hyperlipidemia   • Irritable bowel syndrome   • Pericardial effusion   • Raynaud's phenomenon   • Pain of toe   • Heart valve disease   • Essential hypertriglyceridemia   • Facial flushing   • Epigastric abdominal pain   • Health care maintenance   • Aortic stenosis due to bicuspid aortic valve   • S/P AVR (aortic valve replacement)   • Tachycardia   • Mechanical heart valve present   • Pleural effusion, left   • Anticoagulated   • Benign essential HTN       Allergies   Allergen Reactions   • Fish-Derived Products Anaphylaxis   • Ondansetron Rash   • Penicillins Rash       Current Outpatient Medications on File Prior to Visit   Medication Sig Dispense Refill   • albuterol (PROVENTIL HFA;VENTOLIN HFA) 108 (90 BASE) MCG/ACT inhaler Inhale 2 puffs Every 4 (Four) Hours As Needed for wheezing.     • cetirizine (ZYRTEC ALLERGY) 10 MG  tablet Take 1 tablet by mouth Daily. 30 tablet 3   • cholestyramine (QUESTRAN) 4 G packet Take 1 packet by mouth 3 (Three) Times a Day With Meals. 90 packet 3   • fenofibrate (TRICOR) 145 MG tablet Take 1 tablet by mouth Daily. 90 tablet 1   • fluticasone (FLONASE) 50 MCG/ACT nasal spray 2 sprays into each nostril Daily. 1 each 3   • metoprolol succinate XL (TOPROL XL) 50 MG 24 hr tablet Take 1 tablet by mouth Daily. 30 tablet 1   • Multiple Vitamins-Minerals (MULTIVITAMIN ADULT PO) Take  by mouth.     • omeprazole (priLOSEC) 40 MG capsule Take 1 capsule by mouth Daily. 90 capsule 1   • pravastatin (PRAVACHOL) 40 MG tablet Take 1 tablet by mouth Daily. 90 tablet 1   • Probiotic Product (PROBIOTIC DAILY PO) Take  by mouth.     • warfarin (JANTOVEN) 5 MG tablet TAKE 1 TABLET DAILY OR AS  DIRECTED TO MAINTAIN       INTERNATIONAL NORMALIZED   RATIO 2.5-3.5 90 tablet 0   • [DISCONTINUED] amLODIPine (NORVASC) 5 MG tablet Take 1 tablet by mouth Daily. 30 tablet 1     No current facility-administered medications on file prior to visit.        Past Medical History:   Diagnosis Date   • Aortic stenosis     SEVERE   • Asthma    • Dyspnea on exertion    • Epistaxis    • GERD (gastroesophageal reflux disease)    • Gout    • Heart murmur    • Hyperlipidemia    • Hypertension    • Irritable bowel syndrome        Family History   Problem Relation Age of Onset   • Heart failure Mother    • Kidney disease Mother    • Hypertension Mother    • Heart disease Mother    • Alzheimer's disease Father    • Thyroid disease Sister    • Hypertension Sister    • Hypertension Brother        Social History     Socioeconomic History   • Marital status:      Spouse name: Not on file   • Number of children: Not on file   • Years of education: Not on file   • Highest education level: Not on file   Tobacco Use   • Smoking status: Former Smoker     Packs/day: 0.50     Years: 14.00     Pack years: 7.00     Types: Cigarettes   • Smokeless tobacco:  "Never Used   • Tobacco comment: QUIT AGE 23   Substance and Sexual Activity   • Alcohol use: Yes     Frequency: 2-3 times a week   • Drug use: No   • Sexual activity: Defer       Past Surgical History:   Procedure Laterality Date   • ADENOIDECTOMY     • AORTIC VALVE REPAIR/REPLACEMENT  10/05/2016    mechanical valve   • ASCENDING ARCH/HEMIARCH REPLACEMENT N/A 10/5/2016    Procedure: MIDLINE STERNOTOMY, AORTIC VALVE REPLACEMENT, ASCENDING AND HEMIARCH REPLACEMENT, REPAIR OF PERIVALVULAR LEAK, WITH NEURO MONITOIRING, INTRAOPERATIVE WARREN;  Surgeon: Sukhi Wilkinson MD;  Location: [facility] MAIN OR;  Service:    • CARDIAC CATHETERIZATION N/A 9/27/2016    Procedure: Coronary angiography;  Surgeon: Lio Roman MD;  Location:  [facility] CATH INVASIVE LOCATION;  Service:    • CARDIAC CATHETERIZATION N/A 9/27/2016    Procedure: Left Heart Cath;  Surgeon: Lio Roman MD;  Location:  [facility] CATH INVASIVE LOCATION;  Service:    • CHOLECYSTECTOMY     • COLONOSCOPY  12/212/2016    Dr. Palomares   • CORONARY ARTERY BYPASS GRAFT             The following portions of the patient's history were reviewed and updated as appropriate: problem list, allergies, current medications, past medical history, past family history, past social history and past surgical history.    Review of Systems   Constitution: Negative for chills and fever.   HENT: Positive for congestion.         Rhinorrhea, sneezing     Cardiovascular: Negative for chest pain.   Respiratory: Negative for cough.    Genitourinary: Positive for nocturia.       Immunization History   Administered Date(s) Administered   • Hepatitis A 09/16/2019   • Tdap 09/16/2019       Objective   Vitals:    10/25/19 0824 10/25/19 0845   BP:  120/68   Weight: 104 kg (230 lb)    Height: 182.9 cm (72.01\")      Physical Exam   Constitutional: He appears well-developed and well-nourished.   HENT:   Head: Normocephalic and atraumatic.   Neck: Carotid bruit is not present.   Cardiovascular: " Normal rate and regular rhythm.   Click from mechanical valve   Pulmonary/Chest: Effort normal and breath sounds normal.   Vitals reviewed.        Assessment/Plan   Hussein was seen today for hypertension.    Diagnoses and all orders for this visit:    Benign essential HTN    Essential hypertriglyceridemia  -     Lipid Panel With / Chol / HDL Ratio; Future  -     Comprehensive Metabolic Panel; Future    Benign prostatic hyperplasia with nocturia  -     tamsulosin (FLOMAX) 0.4 MG capsule 24 hr capsule; Take 1 capsule by mouth Daily.    start flomax for BPH fup in 2 months with cmp and lipid prior    Hepatic today. bp is excellent he will continue to monitor and call if she should start having any dizziness or lightheadedness with start flomax.     URI sx, if not improve in a week will call and I will send in abx. Tolerating his fenofibrate well with no side effects. He is unable to take fish oil due to anaphylaxis to fish.     Return in about 2 months (around 12/25/2019).

## 2019-11-01 ENCOUNTER — LAB (OUTPATIENT)
Dept: LAB | Facility: HOSPITAL | Age: 54
End: 2019-11-01

## 2019-11-01 ENCOUNTER — ANTICOAGULATION VISIT (OUTPATIENT)
Dept: CARDIOLOGY | Facility: CLINIC | Age: 54
End: 2019-11-01

## 2019-11-01 DIAGNOSIS — Z79.01 ANTICOAGULATED: ICD-10-CM

## 2019-11-01 LAB
INR PPP: 4.53 (ref 0.9–1.1)
PROTHROMBIN TIME: 42.8 SECONDS (ref 11.7–14.2)

## 2019-11-01 PROCEDURE — 85610 PROTHROMBIN TIME: CPT

## 2019-11-01 PROCEDURE — 36415 COLL VENOUS BLD VENIPUNCTURE: CPT

## 2019-11-14 ENCOUNTER — ANTICOAGULATION VISIT (OUTPATIENT)
Dept: CARDIOLOGY | Facility: CLINIC | Age: 54
End: 2019-11-14

## 2019-11-14 ENCOUNTER — LAB (OUTPATIENT)
Dept: LAB | Facility: HOSPITAL | Age: 54
End: 2019-11-14

## 2019-11-14 DIAGNOSIS — Z79.01 ANTICOAGULATED: ICD-10-CM

## 2019-11-14 LAB
INR PPP: 7.22 (ref 0.9–1.1)
PROTHROMBIN TIME: 61.9 SECONDS (ref 11.7–14.2)

## 2019-11-14 PROCEDURE — 85610 PROTHROMBIN TIME: CPT

## 2019-11-14 PROCEDURE — 36415 COLL VENOUS BLD VENIPUNCTURE: CPT

## 2019-11-14 NOTE — PROGRESS NOTES
Denies any s/s bleeding. States that he has been taking melatonin for the past couple weeks. Instructed to hold warfarin . Eat Vit K rich food and recheck INR on Monday. Verbalized understanding. Strict ER instructions for any fall, bleeding, or s/s bleeding. Verbalized understanding  ALP

## 2019-11-18 ENCOUNTER — ANTICOAGULATION VISIT (OUTPATIENT)
Dept: CARDIOLOGY | Facility: CLINIC | Age: 54
End: 2019-11-18

## 2019-11-18 ENCOUNTER — LAB (OUTPATIENT)
Dept: LAB | Facility: HOSPITAL | Age: 54
End: 2019-11-18

## 2019-11-18 DIAGNOSIS — Z79.01 ANTICOAGULATED: ICD-10-CM

## 2019-11-18 LAB
INR PPP: 2.1 (ref 0.9–1.1)
PROTHROMBIN TIME: 23.2 SECONDS (ref 11.7–14.2)

## 2019-11-18 PROCEDURE — 36415 COLL VENOUS BLD VENIPUNCTURE: CPT

## 2019-11-18 PROCEDURE — 85610 PROTHROMBIN TIME: CPT

## 2019-11-22 ENCOUNTER — LAB (OUTPATIENT)
Dept: LAB | Facility: HOSPITAL | Age: 54
End: 2019-11-22

## 2019-11-22 ENCOUNTER — ANTICOAGULATION VISIT (OUTPATIENT)
Dept: CARDIOLOGY | Facility: CLINIC | Age: 54
End: 2019-11-22

## 2019-11-22 DIAGNOSIS — Z79.01 ANTICOAGULATED: ICD-10-CM

## 2019-11-22 LAB
INR PPP: 3.06 (ref 0.9–1.1)
PROTHROMBIN TIME: 31.4 SECONDS (ref 11.7–14.2)

## 2019-11-22 PROCEDURE — 85610 PROTHROMBIN TIME: CPT

## 2019-11-22 PROCEDURE — 36415 COLL VENOUS BLD VENIPUNCTURE: CPT

## 2019-12-03 ENCOUNTER — ANTICOAGULATION VISIT (OUTPATIENT)
Dept: CARDIOLOGY | Facility: CLINIC | Age: 54
End: 2019-12-03

## 2019-12-03 ENCOUNTER — LAB (OUTPATIENT)
Dept: LAB | Facility: HOSPITAL | Age: 54
End: 2019-12-03

## 2019-12-03 DIAGNOSIS — Z79.01 ANTICOAGULATED: ICD-10-CM

## 2019-12-03 LAB
INR PPP: 4.09
INR PPP: 4.09 (ref 0.9–1.1)
PROTHROMBIN TIME: 39.4 SECONDS (ref 11.7–14.2)

## 2019-12-03 PROCEDURE — 36415 COLL VENOUS BLD VENIPUNCTURE: CPT

## 2019-12-03 PROCEDURE — 85610 PROTHROMBIN TIME: CPT

## 2019-12-03 NOTE — PROGRESS NOTES
Mr Nino requested that we wait 2 wks for INR recheck, after instructing to be checked in one week  ALP

## 2019-12-10 ENCOUNTER — LAB (OUTPATIENT)
Dept: LAB | Facility: HOSPITAL | Age: 54
End: 2019-12-10

## 2019-12-10 DIAGNOSIS — Z79.01 ANTICOAGULATED: ICD-10-CM

## 2019-12-10 LAB
INR PPP: 3.32 (ref 0.9–1.1)
PROTHROMBIN TIME: 33.4 SECONDS (ref 11.7–14.2)

## 2019-12-10 PROCEDURE — 36415 COLL VENOUS BLD VENIPUNCTURE: CPT

## 2019-12-10 PROCEDURE — 85610 PROTHROMBIN TIME: CPT

## 2019-12-12 ENCOUNTER — ANTICOAGULATION VISIT (OUTPATIENT)
Dept: CARDIOLOGY | Facility: CLINIC | Age: 54
End: 2019-12-12

## 2019-12-18 ENCOUNTER — OFFICE VISIT (OUTPATIENT)
Dept: INTERNAL MEDICINE | Facility: CLINIC | Age: 54
End: 2019-12-18

## 2019-12-18 VITALS
SYSTOLIC BLOOD PRESSURE: 112 MMHG | WEIGHT: 235 LBS | BODY MASS INDEX: 31.83 KG/M2 | HEIGHT: 72 IN | DIASTOLIC BLOOD PRESSURE: 64 MMHG

## 2019-12-18 DIAGNOSIS — R35.1 BENIGN PROSTATIC HYPERPLASIA WITH NOCTURIA: ICD-10-CM

## 2019-12-18 DIAGNOSIS — G47.30 SLEEP APNEA, UNSPECIFIED TYPE: ICD-10-CM

## 2019-12-18 DIAGNOSIS — E78.2 MIXED HYPERLIPIDEMIA: ICD-10-CM

## 2019-12-18 DIAGNOSIS — M62.838 MUSCLE SPASM: Primary | ICD-10-CM

## 2019-12-18 DIAGNOSIS — N40.1 BENIGN PROSTATIC HYPERPLASIA WITH NOCTURIA: ICD-10-CM

## 2019-12-18 LAB
ALBUMIN SERPL-MCNC: 4.5 G/DL (ref 3.5–5.2)
ALP SERPL-CCNC: 37 U/L (ref 39–117)
ALT SERPL-CCNC: 30 U/L (ref 1–41)
AST SERPL-CCNC: 35 U/L (ref 1–40)
BILIRUB DIRECT SERPL-MCNC: 0.2 MG/DL (ref 0.2–0.3)
BILIRUB SERPL-MCNC: 0.5 MG/DL (ref 0.2–1.2)
PROT SERPL-MCNC: 7.2 G/DL (ref 6–8.5)

## 2019-12-18 PROCEDURE — 99213 OFFICE O/P EST LOW 20 MIN: CPT | Performed by: PHYSICIAN ASSISTANT

## 2019-12-18 RX ORDER — CYCLOBENZAPRINE HCL 10 MG
10 TABLET ORAL 3 TIMES DAILY PRN
Qty: 60 TABLET | Refills: 1 | Status: SHIPPED | OUTPATIENT
Start: 2019-12-18 | End: 2020-06-18

## 2019-12-18 NOTE — PROGRESS NOTES
Subjective   Chief Complaint   Patient presents with   • Hyperlipidemia     2 month follow up   • Sleep Apnea     snoring   • Back Pain     brought up aurea tree       History of Present Illness     elli 15 yrs ago had a diagnosis of sleep apena, was on cpap and stopped using it. Has not used in many years. States he and his wife are sleeping on separate floors due to both of their snoring. Family can hear him snoring on level below. Has had witnessed apneic events. Started flomax 2 months ago and has decreased his nocturia from 3 times night to once nightly. No dizziness or lightheadedness.  No chest pain or     Has had low back discomfort since bringing his aurea tree and moving some tables at home and work. No radicular pain.       Patient Active Problem List   Diagnosis   • Allergic rhinitis   • Aortic root dilatation (CMS/HCC)   • Asthma   • Gastroesophageal reflux disease   • Hyperlipidemia   • Irritable bowel syndrome   • Pericardial effusion   • Raynaud's phenomenon   • Essential hypertriglyceridemia   • S/P AVR (aortic valve replacement)   • Tachycardia   • Mechanical heart valve present   • Anticoagulated   • Benign essential HTN   • Benign prostatic hyperplasia with nocturia       Allergies   Allergen Reactions   • Fish-Derived Products Anaphylaxis   • Ondansetron Rash   • Penicillins Rash       Current Outpatient Medications on File Prior to Visit   Medication Sig Dispense Refill   • albuterol (PROVENTIL HFA;VENTOLIN HFA) 108 (90 BASE) MCG/ACT inhaler Inhale 2 puffs Every 4 (Four) Hours As Needed for wheezing.     • amLODIPine (NORVASC) 5 MG tablet Take 1 tablet by mouth Daily. 90 tablet 3   • cetirizine (ZYRTEC ALLERGY) 10 MG tablet Take 1 tablet by mouth Daily. 30 tablet 3   • cholestyramine (QUESTRAN) 4 G packet Take 1 packet by mouth 3 (Three) Times a Day With Meals. 90 packet 3   • fenofibrate (TRICOR) 145 MG tablet Take 1 tablet by mouth Daily. 90 tablet 1   • fluticasone (FLONASE) 50  MCG/ACT nasal spray 2 sprays into each nostril Daily. 1 each 3   • metoprolol succinate XL (TOPROL XL) 50 MG 24 hr tablet Take 1 tablet by mouth Daily. 30 tablet 1   • Multiple Vitamins-Minerals (MULTIVITAMIN ADULT PO) Take  by mouth.     • omeprazole (priLOSEC) 40 MG capsule Take 1 capsule by mouth Daily. 90 capsule 1   • pravastatin (PRAVACHOL) 40 MG tablet Take 1 tablet by mouth Daily. 90 tablet 1   • Probiotic Product (PROBIOTIC DAILY PO) Take  by mouth.     • tamsulosin (FLOMAX) 0.4 MG capsule 24 hr capsule Take 1 capsule by mouth Daily. 90 capsule    • warfarin (JANTOVEN) 5 MG tablet TAKE 1 TABLET DAILY OR AS  DIRECTED TO MAINTAIN       INTERNATIONAL NORMALIZED   RATIO 2.5-3.5 90 tablet 0     No current facility-administered medications on file prior to visit.        Past Medical History:   Diagnosis Date   • Aortic stenosis     SEVERE   • Asthma    • Dyspnea on exertion    • Epistaxis    • GERD (gastroesophageal reflux disease)    • Gout    • Heart murmur    • Hyperlipidemia    • Hypertension    • Irritable bowel syndrome        Family History   Problem Relation Age of Onset   • Heart failure Mother    • Kidney disease Mother    • Hypertension Mother    • Heart disease Mother    • Alzheimer's disease Father    • Thyroid disease Sister    • Hypertension Sister    • Hypertension Brother        Social History     Socioeconomic History   • Marital status:      Spouse name: Not on file   • Number of children: Not on file   • Years of education: Not on file   • Highest education level: Not on file   Tobacco Use   • Smoking status: Former Smoker     Packs/day: 0.50     Years: 14.00     Pack years: 7.00     Types: Cigarettes   • Smokeless tobacco: Never Used   • Tobacco comment: QUIT AGE 23   Substance and Sexual Activity   • Alcohol use: Yes     Frequency: 2-3 times a week   • Drug use: No   • Sexual activity: Defer       Past Surgical History:   Procedure Laterality Date   • ADENOIDECTOMY     • AORTIC VALVE  "REPAIR/REPLACEMENT  10/05/2016    mechanical valve   • ASCENDING ARCH/HEMIARCH REPLACEMENT N/A 10/5/2016    Procedure: MIDLINE STERNOTOMY, AORTIC VALVE REPLACEMENT, ASCENDING AND HEMIARCH REPLACEMENT, REPAIR OF PERIVALVULAR LEAK, WITH NEURO MONITOIRING, INTRAOPERATIVE WARREN;  Surgeon: Sukhi Wilkinson MD;  Location: [facility] OR;  Service:    • CARDIAC CATHETERIZATION N/A 9/27/2016    Procedure: Coronary angiography;  Surgeon: Lio Roman MD;  Location:  [facility];  Service:    • CARDIAC CATHETERIZATION N/A 9/27/2016    Procedure: Left Heart Cath;  Surgeon: Lio Roman MD;  Location:  [facility];  Service:    • CHOLECYSTECTOMY     • COLONOSCOPY  12/212/2016    Dr. Palomares   • CORONARY ARTERY BYPASS GRAFT         The following portions of the patient's history were reviewed and updated as appropriate: problem list, allergies, current medications, past medical history, past family history, past social history and past surgical history.    Review of Systems   Cardiovascular: Negative for chest pain and dyspnea on exertion.   Respiratory: Positive for snoring.    Musculoskeletal: Positive for back pain.   Neurological: Negative for numbness and paresthesias.       Immunization History   Administered Date(s) Administered   • Hepatitis A 09/16/2019   • Tdap 09/16/2019       Objective   Vitals:    12/18/19 0753 12/18/19 0812   BP:  112/64   Weight: 107 kg (235 lb)    Height: 182.9 cm (72.01\")      Body mass index is 31.86 kg/m².  Physical Exam   Constitutional: He appears well-developed and well-nourished.   HENT:   Head: Normocephalic and atraumatic.   Cardiovascular: Normal rate and regular rhythm.   Click from mechanical valve.    Pulmonary/Chest: Effort normal and breath sounds normal.   Musculoskeletal:   ttp over left lower trapezius muscles.    Vitals reviewed.        Assessment/Plan   Hussein was seen today for hyperlipidemia, sleep apnea and back " pain.    Diagnoses and all orders for this visit:    Muscle spasm  -     cyclobenzaprine (FLEXERIL) 10 MG tablet; Take 1 tablet by mouth 3 (Three) Times a Day As Needed for Muscle Spasms.    Sleep apnea, unspecified type  -     Ambulatory Referral to Sleep Medicine    Mixed hyperlipidemia  -     Comprehensive Metabolic Panel; Future  -     Lipid Panel With / Chol / HDL Ratio; Future    Benign prostatic hyperplasia with nocturia    Continue flomax for BPH. Will refer to pulmonary for sleep study. Lipid and cmp today. fup in 6 months.     Return in about 6 months (around 6/18/2020).

## 2019-12-19 ENCOUNTER — TELEPHONE (OUTPATIENT)
Dept: INTERNAL MEDICINE | Facility: CLINIC | Age: 54
End: 2019-12-19

## 2019-12-19 NOTE — TELEPHONE ENCOUNTER
Called and left patient a message of results it does state in his verbal, did also stated call if  Have any questions

## 2019-12-25 ENCOUNTER — RESULTS ENCOUNTER (OUTPATIENT)
Dept: INTERNAL MEDICINE | Facility: CLINIC | Age: 54
End: 2019-12-25

## 2019-12-25 DIAGNOSIS — E78.1 ESSENTIAL HYPERTRIGLYCERIDEMIA: ICD-10-CM

## 2019-12-30 ENCOUNTER — ANTICOAGULATION VISIT (OUTPATIENT)
Dept: CARDIOLOGY | Facility: CLINIC | Age: 54
End: 2019-12-30

## 2019-12-30 ENCOUNTER — LAB (OUTPATIENT)
Dept: LAB | Facility: HOSPITAL | Age: 54
End: 2019-12-30

## 2019-12-30 DIAGNOSIS — Z79.01 ANTICOAGULATED: ICD-10-CM

## 2019-12-30 LAB
INR PPP: 2.27 (ref 0.9–1.1)
PROTHROMBIN TIME: 24.8 SECONDS (ref 11.7–14.2)

## 2019-12-30 PROCEDURE — 36415 COLL VENOUS BLD VENIPUNCTURE: CPT

## 2019-12-30 PROCEDURE — 85610 PROTHROMBIN TIME: CPT

## 2019-12-31 DIAGNOSIS — E78.2 MIXED HYPERLIPIDEMIA: ICD-10-CM

## 2019-12-31 RX ORDER — FENOFIBRATE 145 MG/1
145 TABLET, COATED ORAL DAILY
Qty: 14 TABLET | Refills: 0 | Status: SHIPPED | OUTPATIENT
Start: 2019-12-31 | End: 2020-04-30 | Stop reason: SDUPTHER

## 2020-01-07 DIAGNOSIS — E78.2 MIXED HYPERLIPIDEMIA: ICD-10-CM

## 2020-01-07 RX ORDER — PRAVASTATIN SODIUM 40 MG
40 TABLET ORAL DAILY
Qty: 90 TABLET | Refills: 1 | Status: SHIPPED | OUTPATIENT
Start: 2020-01-07 | End: 2020-09-02

## 2020-01-13 ENCOUNTER — ANTICOAGULATION VISIT (OUTPATIENT)
Dept: CARDIOLOGY | Facility: CLINIC | Age: 55
End: 2020-01-13

## 2020-01-13 ENCOUNTER — LAB (OUTPATIENT)
Dept: LAB | Facility: HOSPITAL | Age: 55
End: 2020-01-13

## 2020-01-13 DIAGNOSIS — Z79.01 ANTICOAGULATED: ICD-10-CM

## 2020-01-13 LAB
INR PPP: 2.48 (ref 0.9–1.1)
PROTHROMBIN TIME: 26.6 SECONDS (ref 11.7–14.2)

## 2020-01-13 PROCEDURE — 85610 PROTHROMBIN TIME: CPT

## 2020-01-13 PROCEDURE — 36415 COLL VENOUS BLD VENIPUNCTURE: CPT

## 2020-01-20 ENCOUNTER — OFFICE VISIT (OUTPATIENT)
Dept: SLEEP MEDICINE | Facility: HOSPITAL | Age: 55
End: 2020-01-20

## 2020-01-20 VITALS
OXYGEN SATURATION: 98 % | SYSTOLIC BLOOD PRESSURE: 138 MMHG | DIASTOLIC BLOOD PRESSURE: 87 MMHG | BODY MASS INDEX: 31.64 KG/M2 | HEIGHT: 72 IN | HEART RATE: 81 BPM | WEIGHT: 233.6 LBS

## 2020-01-20 DIAGNOSIS — Z95.2 S/P AVR (AORTIC VALVE REPLACEMENT): ICD-10-CM

## 2020-01-20 DIAGNOSIS — G47.10 HYPERSOMNIA WITH SLEEP APNEA: ICD-10-CM

## 2020-01-20 DIAGNOSIS — R00.0 TACHYCARDIA: ICD-10-CM

## 2020-01-20 DIAGNOSIS — I10 BENIGN ESSENTIAL HTN: ICD-10-CM

## 2020-01-20 DIAGNOSIS — G47.33 OSA (OBSTRUCTIVE SLEEP APNEA): Primary | ICD-10-CM

## 2020-01-20 DIAGNOSIS — G47.30 HYPERSOMNIA WITH SLEEP APNEA: ICD-10-CM

## 2020-01-20 DIAGNOSIS — Z95.2 MECHANICAL HEART VALVE PRESENT: ICD-10-CM

## 2020-01-20 PROCEDURE — G0463 HOSPITAL OUTPT CLINIC VISIT: HCPCS

## 2020-01-20 NOTE — PROGRESS NOTES
Sleep Consultation    Patient Name: Hussein Nino  Age/Sex: 54 y.o. male  : 1965  MRN: [id number]    Date of Encounter Visit: 2020  Encounter Provider: Wayne Jefferson MD  Referring Provider: Margarita Dickinson PA-C  Place of Service: [facility]  Patient Care Team:  Margarita Dickinson PA-C as PCP - General (Physician Assistant)  Collette Roman MD (Inactive) as Consulting Physician (Cardiology)    Subjective:     Reason for Consult: KVNG    History of Present Illness:  Hussein Nino is a 54 y.o. male is here for evaluation of KVNG due to prior diagnosis. Was tested in  and was positive for KVNG, did tryu the CPAP for few months and did not like it and has been on no treatment since. Recently he did gain 15 pounds. His symptoms are getting worse and his wife noted more frequent apneas and he is waking up gasping for breath at night. He is on chronic AC after his aortic valve surgery, he does have HTN that is controlled on medication. He does have asthma but it is mild and well controlled.    Please see sleep questionnaire on page 2 for more details.   Patient complains of daytime fatigue and sleepiness with an Van Sleepiness Scale (ESS) of 14.  Patient complains of snoring, witnessed apnea, restless sleep, and waking up gasping  Denies any symptoms of restless leg syndrome.   Patient denies any cataplexy, sleep paralysis or other symptoms to suggest narcolepsy.  Patient denies any parasomnias.  Denies any history of seizure disorder or recent head trauma.  Patient spends inadequate amount of time in bed with  evidence of sleep restriction /deprivation.    Comorbidities include: aortic stenosis/HTN/HLD/asthma    Review of Systems:   A twelve-system review was conducted and was negative .   Please refer to page 4 of on the sleep questionnaire for more details on system review findings.    Past Medical History:  Past Medical History:   Diagnosis Date   • Aortic stenosis      SEVERE   • Asthma    • Dyspnea on exertion    • Epistaxis    • GERD (gastroesophageal reflux disease)    • Gout    • Heart murmur    • Hyperlipidemia    • Hypertension    • Irritable bowel syndrome        Past Surgical History:   Procedure Laterality Date   • ADENOIDECTOMY     • AORTIC VALVE REPAIR/REPLACEMENT  10/05/2016    mechanical valve   • ASCENDING ARCH/HEMIARCH REPLACEMENT N/A 10/5/2016    Procedure: MIDLINE STERNOTOMY, AORTIC VALVE REPLACEMENT, ASCENDING AND HEMIARCH REPLACEMENT, REPAIR OF PERIVALVULAR LEAK, WITH NEURO MONITOIRING, INTRAOPERATIVE WARREN;  Surgeon: Sukhi Wilkinson MD;  Location: [facility] MAIN OR;  Service:    • CARDIAC CATHETERIZATION N/A 9/27/2016    Procedure: Coronary angiography;  Surgeon: Lio Roman MD;  Location:  [facility];  Service:    • CARDIAC CATHETERIZATION N/A 9/27/2016    Procedure: Left Heart Cath;  Surgeon: Lio Roman MD;  Location:  [facility];  Service:    • CHOLECYSTECTOMY     • COLONOSCOPY  12/212/2016    Dr. Palomares   • CORONARY ARTERY BYPASS GRAFT         Home Medications:     Current Outpatient Medications:   •  albuterol (PROVENTIL HFA;VENTOLIN HFA) 108 (90 BASE) MCG/ACT inhaler, Inhale 2 puffs Every 4 (Four) Hours As Needed for wheezing., Disp: , Rfl:   •  amLODIPine (NORVASC) 5 MG tablet, Take 1 tablet by mouth Daily., Disp: 90 tablet, Rfl: 3  •  cetirizine (ZYRTEC ALLERGY) 10 MG tablet, Take 1 tablet by mouth Daily., Disp: 30 tablet, Rfl: 3  •  cholestyramine (QUESTRAN) 4 G packet, Take 1 packet by mouth 3 (Three) Times a Day With Meals., Disp: 90 packet, Rfl: 3  •  cyclobenzaprine (FLEXERIL) 10 MG tablet, Take 1 tablet by mouth 3 (Three) Times a Day As Needed for Muscle Spasms., Disp: 60 tablet, Rfl: 1  •  fenofibrate (TRICOR) 145 MG tablet, Take 1 tablet by mouth Daily., Disp: 14 tablet, Rfl: 0  •  fluticasone (FLONASE) 50 MCG/ACT nasal spray, 2 sprays into each nostril Daily., Disp: 1 each, Rfl: 3  •   metoprolol succinate XL (TOPROL XL) 50 MG 24 hr tablet, Take 1 tablet by mouth Daily., Disp: 30 tablet, Rfl: 1  •  Multiple Vitamins-Minerals (MULTIVITAMIN ADULT PO), Take  by mouth., Disp: , Rfl:   •  omeprazole (priLOSEC) 40 MG capsule, Take 1 capsule by mouth Daily., Disp: 90 capsule, Rfl: 1  •  pravastatin (PRAVACHOL) 40 MG tablet, Take 1 tablet by mouth Daily., Disp: 90 tablet, Rfl: 1  •  Probiotic Product (PROBIOTIC DAILY PO), Take  by mouth., Disp: , Rfl:   •  tamsulosin (FLOMAX) 0.4 MG capsule 24 hr capsule, Take 1 capsule by mouth Daily., Disp: 90 capsule, Rfl:   •  warfarin (JANTOVEN) 5 MG tablet, TAKE 1 TABLET DAILY OR AS  DIRECTED TO MAINTAIN       INTERNATIONAL NORMALIZED   RATIO 2.5-3.5, Disp: 90 tablet, Rfl: 0    Allergies:  Allergies   Allergen Reactions   • Fish-Derived Products Anaphylaxis   • Ondansetron Rash   • Penicillins Rash       Past Social History:  Social History     Socioeconomic History   • Marital status:      Spouse name: Not on file   • Number of children: Not on file   • Years of education: Not on file   • Highest education level: Not on file   Tobacco Use   • Smoking status: Former Smoker     Packs/day: 0.50     Years: 14.00     Pack years: 7.00     Types: Cigarettes   • Smokeless tobacco: Never Used   • Tobacco comment: QUIT AGE 23   Substance and Sexual Activity   • Alcohol use: Yes     Frequency: 2-3 times a week   • Drug use: No   • Sexual activity: Defer       Past Family History:  Family History   Problem Relation Age of Onset   • Heart failure Mother    • Kidney disease Mother    • Hypertension Mother    • Heart disease Mother    • Alzheimer's disease Father    • Thyroid disease Sister    • Hypertension Sister    • Hypertension Brother      Brother has KVNG  Objective:   Done and documented on sleep disorders center physical examination sheet, please refer to the hand written note on records for details about the pertinent negative findings.    Vital Signs:   Visit  "Vitals  /87 (BP Location: Left arm, Patient Position: Sitting)   Pulse 81   Ht 182.9 cm (72\")   Wt 106 kg (233 lb 9.6 oz)   SpO2 98%   BMI 31.68 kg/m²     Wt Readings from Last 3 Encounters:   01/20/20 106 kg (233 lb 9.6 oz)   12/18/19 107 kg (235 lb)   10/25/19 104 kg (230 lb)     Neck Circumference: 17.75 inches    Physical Exam:   General: AAOx3. Normal mood and affect.   HEENT:  Conjunctiva normal.  PERRLA.  Moist mucous membranes.  Septum midline. Mallampati  IV airway. enlarged tongue and Uvula, buccal mucosal hypertrophy  Neck: Neck supple. Trachea midline.  Normal thyroid.    LUNGS: Non-labored breathing. CTAB.  No wheezing.  HEART: regular rate and rhythm. No murmur. Normal s1/s2.  EXTREMITIES: 1+ pitting edema. No cyanosis or clubbing. Normal gait.    Diagnostic Data:  No records of his NPSG from 2003, but it was positive for KVNG (likely severe)    Assessment and Plan:       ICD-10-CM ICD-9-CM   1. KVNG (obstructive sleep apnea) G47.33 327.23   2. Hypersomnia with sleep apnea G47.10 780.53    G47.30    3. Benign essential HTN I10 401.1   4. Mechanical heart valve present Z95.2 V43.3   5. Tachycardia R00.0 785.0   6. S/P AVR (aortic valve replacement) Z95.2 V43.3       Recommendations:     Patient was educated in depth about KVNG and cardiovascular consequences if left untreated, including but not limited to CHF, CAD, arrhythmias, CVA, and/or HTN. Education also provided about the diagnostic tools for KVNG, including the polysomnography and the treatment modalities, including the CPAP.     If patient has a mild obstructive sleep apnea will schedule a follow up to discuss treatment options including mandibular advancement device versus CPAP.      If patient has moderate to severe sleep apnea the recommend treatment is CPAP and will start CPAP and patient will follow up within 31-90 days after starting CPAP for compliance review.     Adherence to the CPAP is a key factor in successful treatment of KVNG and " the patient was encouraged to contact us in case of problem with the CPAP or the mask that can limit the tolerance of the compliance with the therapy.    No orders of the defined types were placed in this encounter.    No orders of the defined types were placed in this encounter.     Return for 31 to 90 days after PAP setup.    Wayne Jefferson MD   [facility]   01/20/20  4:01 PM    Dictated utilizing Dragon dictation

## 2020-01-23 ENCOUNTER — LAB (OUTPATIENT)
Dept: LAB | Facility: HOSPITAL | Age: 55
End: 2020-01-23

## 2020-01-23 ENCOUNTER — ANTICOAGULATION VISIT (OUTPATIENT)
Dept: CARDIOLOGY | Facility: CLINIC | Age: 55
End: 2020-01-23

## 2020-01-23 DIAGNOSIS — Z79.01 ANTICOAGULATED: ICD-10-CM

## 2020-01-23 LAB
INR PPP: 2.63 (ref 0.9–1.1)
PROTHROMBIN TIME: 27.8 SECONDS (ref 11.7–14.2)

## 2020-01-23 PROCEDURE — 36415 COLL VENOUS BLD VENIPUNCTURE: CPT

## 2020-01-23 PROCEDURE — 85610 PROTHROMBIN TIME: CPT

## 2020-01-28 DIAGNOSIS — I38 HEART VALVE DISEASE: ICD-10-CM

## 2020-01-30 RX ORDER — WARFARIN SODIUM 5 MG/1
TABLET ORAL
Qty: 90 TABLET | Refills: 3 | Status: SHIPPED | OUTPATIENT
Start: 2020-01-30 | End: 2021-01-11

## 2020-02-03 ENCOUNTER — OFFICE VISIT (OUTPATIENT)
Dept: CARDIOLOGY | Facility: CLINIC | Age: 55
End: 2020-02-03

## 2020-02-03 VITALS
SYSTOLIC BLOOD PRESSURE: 152 MMHG | WEIGHT: 238 LBS | BODY MASS INDEX: 32.23 KG/M2 | HEIGHT: 72 IN | HEART RATE: 80 BPM | DIASTOLIC BLOOD PRESSURE: 84 MMHG

## 2020-02-03 DIAGNOSIS — E78.2 MIXED HYPERLIPIDEMIA: ICD-10-CM

## 2020-02-03 DIAGNOSIS — Z95.2 MECHANICAL HEART VALVE PRESENT: Primary | ICD-10-CM

## 2020-02-03 DIAGNOSIS — R94.31 ECG ABNORMAL: ICD-10-CM

## 2020-02-03 DIAGNOSIS — I10 BENIGN ESSENTIAL HTN: ICD-10-CM

## 2020-02-03 DIAGNOSIS — R42 DIZZINESS: ICD-10-CM

## 2020-02-03 DIAGNOSIS — Z79.01 ANTICOAGULATED: ICD-10-CM

## 2020-02-03 DIAGNOSIS — Z95.2 S/P AVR (AORTIC VALVE REPLACEMENT): ICD-10-CM

## 2020-02-03 PROCEDURE — 99214 OFFICE O/P EST MOD 30 MIN: CPT | Performed by: INTERNAL MEDICINE

## 2020-02-03 PROCEDURE — 93000 ELECTROCARDIOGRAM COMPLETE: CPT | Performed by: INTERNAL MEDICINE

## 2020-02-03 RX ORDER — CHLORHEXIDINE GLUCONATE 0.12 MG/ML
RINSE ORAL
COMMUNITY
Start: 2020-01-16 | End: 2020-06-18

## 2020-02-03 RX ORDER — CLINDAMYCIN HYDROCHLORIDE 150 MG/1
CAPSULE ORAL
COMMUNITY
Start: 2020-01-24 | End: 2020-06-18

## 2020-02-03 NOTE — PROGRESS NOTES
Subjective:        Hussein Nino is a 54 y.o. male who here for follow up    CC  Occasional dizziness, lightheaded, getting worse  HPI  54-year-old male with known history of mechanical aortic valve, benign essential arterial hypertension abnormal EKG is hyperlipidemia here for the follow-up complains of occasional dizziness lightheadedness gradually getting worse over the several weeks with no aggravating or relieving factors     Problem List Items Addressed This Visit        Cardiovascular and Mediastinum    Hyperlipidemia    Relevant Orders    Duplex Carotid Ultrasound CAR    Adult Transthoracic Echo Complete W/ Cont if Necessary Per Protocol    MRI Angiogram Head Without Contrast    Mechanical heart valve present - Primary    Relevant Orders    Duplex Carotid Ultrasound CAR    Adult Transthoracic Echo Complete W/ Cont if Necessary Per Protocol    MRI Angiogram Head Without Contrast    Benign essential HTN    Relevant Orders    Duplex Carotid Ultrasound CAR    Adult Transthoracic Echo Complete W/ Cont if Necessary Per Protocol    MRI Angiogram Head Without Contrast    ECG abnormal    Relevant Orders    Duplex Carotid Ultrasound CAR    Adult Transthoracic Echo Complete W/ Cont if Necessary Per Protocol    MRI Angiogram Head Without Contrast       Other    S/P AVR (aortic valve replacement)    Relevant Orders    Duplex Carotid Ultrasound CAR    Adult Transthoracic Echo Complete W/ Cont if Necessary Per Protocol    MRI Angiogram Head Without Contrast    Anticoagulated    Relevant Orders    Duplex Carotid Ultrasound CAR    Adult Transthoracic Echo Complete W/ Cont if Necessary Per Protocol    MRI Angiogram Head Without Contrast    Dizziness    Relevant Orders    Duplex Carotid Ultrasound CAR    Adult Transthoracic Echo Complete W/ Cont if Necessary Per Protocol    MRI Angiogram Head Without Contrast        .    The following portions of the patient's history were reviewed and updated as appropriate: allergies,  "current medications, past family history, past medical history, past social history, past surgical history and problem list.    Past Medical History:   Diagnosis Date   • Aortic stenosis     SEVERE   • Asthma    • Dyspnea on exertion    • Epistaxis    • GERD (gastroesophageal reflux disease)    • Gout    • Heart murmur    • Hyperlipidemia    • Hypertension    • Irritable bowel syndrome      reports that he has quit smoking. His smoking use included cigarettes. He has a 7.00 pack-year smoking history. He has never used smokeless tobacco. He reports that he drinks alcohol. He reports that he does not use drugs.   Family History   Problem Relation Age of Onset   • Heart failure Mother    • Kidney disease Mother    • Hypertension Mother    • Heart disease Mother    • Alzheimer's disease Father    • Thyroid disease Sister    • Hypertension Sister    • Hypertension Brother        Review of Systems  Constitutional: No wt loss, fever, fatigue  Gastrointestinal: No nausea, abdominal pain  Behavioral/Psych: No insomnia or anxiety   Cardiovascular dizziness  Objective:       Physical Exam  /84   Pulse 80   Ht 182.9 cm (72\")   Wt 108 kg (238 lb)   BMI 32.28 kg/m²   General appearance: No acute changes   Neck: Trachea midline; NECK, supple, no thyromegaly or lymphadenopathy   Lungs: Normal size and shape, normal breath sounds, equal distribution of air, no rales and rhonchi   CV: S1-S2 regular, no murmurs, no rub, no gallop   Abdomen: Soft, non-tender; no masses , no abnormal abdominal sounds   Extremities: No deformity , normal color , no peripheral edema   Skin: Normal temperature, turgor and texture; no rash, ulcers            ECG 12 Lead  Date/Time: 2/3/2020 9:52 AM  Performed by: Sofya Eddy MD  Authorized by: Sofya Eddy MD   Comparison: compared with previous ECG   Similar to previous ECG  Rhythm: sinus rhythm  ST Flattening: all    Clinical impression: non-specific " ECG              Echocardiogram:        Current Outpatient Medications:   •  albuterol (PROVENTIL HFA;VENTOLIN HFA) 108 (90 BASE) MCG/ACT inhaler, Inhale 2 puffs Every 4 (Four) Hours As Needed for wheezing., Disp: , Rfl:   •  amLODIPine (NORVASC) 5 MG tablet, Take 1 tablet by mouth Daily., Disp: 90 tablet, Rfl: 3  •  cetirizine (ZYRTEC ALLERGY) 10 MG tablet, Take 1 tablet by mouth Daily., Disp: 30 tablet, Rfl: 3  •  chlorhexidine (PERIDEX) 0.12 % solution, , Disp: , Rfl:   •  clindamycin (CLEOCIN) 150 MG capsule, TAKE 4 CAPSULES BY MOUTH 1 HOUR PRIOR TO APPOINTMENT., Disp: , Rfl:   •  cyclobenzaprine (FLEXERIL) 10 MG tablet, Take 1 tablet by mouth 3 (Three) Times a Day As Needed for Muscle Spasms., Disp: 60 tablet, Rfl: 1  •  fenofibrate (TRICOR) 145 MG tablet, Take 1 tablet by mouth Daily., Disp: 14 tablet, Rfl: 0  •  fluticasone (FLONASE) 50 MCG/ACT nasal spray, 2 sprays into each nostril Daily., Disp: 1 each, Rfl: 3  •  Multiple Vitamins-Minerals (MULTIVITAMIN ADULT PO), Take  by mouth., Disp: , Rfl:   •  omeprazole (priLOSEC) 40 MG capsule, Take 1 capsule by mouth Daily., Disp: 90 capsule, Rfl: 1  •  pravastatin (PRAVACHOL) 40 MG tablet, Take 1 tablet by mouth Daily., Disp: 90 tablet, Rfl: 1  •  tamsulosin (FLOMAX) 0.4 MG capsule 24 hr capsule, Take 1 capsule by mouth Daily., Disp: 90 capsule, Rfl:   •  warfarin (JANTOVEN) 5 MG tablet, TAKE 1 TABLET DAILY OR AS  DIRECTED TO MAINTAIN       INTERNATIONAL NORMALIZED   RATIO 2.5-3.5, Disp: 90 tablet, Rfl: 3  •  cholestyramine (QUESTRAN) 4 G packet, Take 1 packet by mouth 3 (Three) Times a Day With Meals., Disp: 90 packet, Rfl: 3  •  metoprolol succinate XL (TOPROL XL) 50 MG 24 hr tablet, Take 1 tablet by mouth Daily., Disp: 30 tablet, Rfl: 1  •  Probiotic Product (PROBIOTIC DAILY PO), Take  by mouth., Disp: , Rfl:    Assessment:        Patient Active Problem List   Diagnosis   • Allergic rhinitis   • Aortic root dilatation (CMS/HCC)   • Asthma   • Gastroesophageal  reflux disease   • Hyperlipidemia   • Irritable bowel syndrome   • Pericardial effusion   • Raynaud's phenomenon   • Essential hypertriglyceridemia   • S/P AVR (aortic valve replacement)   • Tachycardia   • Mechanical heart valve present   • Anticoagulated   • Benign essential HTN   • Benign prostatic hyperplasia with nocturia   • KVNG (obstructive sleep apnea)   • Hypersomnia with sleep apnea               Plan:            ICD-10-CM ICD-9-CM   1. Mechanical heart valve present Z95.2 V43.3   2. Dizziness R42 780.4   3. Benign essential HTN I10 401.1   4. Mixed hyperlipidemia E78.2 272.2   5. S/P AVR (aortic valve replacement) Z95.2 V43.3   6. Anticoagulated Z79.01 V58.61   7. ECG abnormal R94.31 794.31     1. Dizziness  New onset of dizziness needs further work-up  - Duplex Carotid Ultrasound CAR  - Adult Transthoracic Echo Complete W/ Cont if Necessary Per Protocol  - MRI Angiogram Head Without Contrast    2. Mechanical heart valve present    - Duplex Carotid Ultrasound CAR  - Adult Transthoracic Echo Complete W/ Cont if Necessary Per Protocol  - MRI Angiogram Head Without Contrast    3. Benign essential HTN  Blood pressure is controlled  - Duplex Carotid Ultrasound CAR  - Adult Transthoracic Echo Complete W/ Cont if Necessary Per Protocol  - MRI Angiogram Head Without Contrast    4. Mixed hyperlipidemia  Continue current treatment  - Duplex Carotid Ultrasound CAR  - Adult Transthoracic Echo Complete W/ Cont if Necessary Per Protocol  - MRI Angiogram Head Without Contrast    5. S/P AVR (aortic valve replacement)  Considering patient's medical condition as well as the risk factors, patient will require echocardiogram for further evaluation for the LV function, four-chamber evaluation, including the pressures, valvular function and  pericardial disease and pericardial effusion    - Duplex Carotid Ultrasound CAR  - Adult Transthoracic Echo Complete W/ Cont if Necessary Per Protocol  - MRI Angiogram Head Without  Contrast    6. Anticoagulated  Pros and cons as well as indication of the anticoagulation has been explained to the patient in detail    There are no obvious complications at this stage    Risk of  the bleedings has been explained    Need for the regular blood workup and adjust the dose has been explained    Need for proper follow-up on anticoagulation also has been explained    - Duplex Carotid Ultrasound CAR  - Adult Transthoracic Echo Complete W/ Cont if Necessary Per Protocol  - MRI Angiogram Head Without Contrast    7. ECG abnormal    - Duplex Carotid Ultrasound CAR  - Adult Transthoracic Echo Complete W/ Cont if Necessary Per Protocol  - MRI Angiogram Head Without Contrast       bp better at home  Dizziness, MRI OF HEAD, US CAROTIDS AND ECHO    MAY NEED NEURO CONSULT  COUNSELING:    Hussein Garzon was given to patient for the following topics: diagnostic results, risk factor reductions, impressions, risks and benefits of treatment options and importance of treatment compliance .       SMOKING COUNSELING:    [unfilled]    Dictated using Dragon dictation

## 2020-02-06 ENCOUNTER — TELEPHONE (OUTPATIENT)
Dept: CARDIOLOGY | Facility: CLINIC | Age: 55
End: 2020-02-06

## 2020-02-06 NOTE — TELEPHONE ENCOUNTER
I CALLED PATIENT TO LET HIM KNOW THAT HIS INSURANCE HAS DENIED COVERING HIS TEST (MRI HEAD/BRAIN) I ALSO ADVISED HIM (PER DR ERNST) TO TALK WITH HIS FAMILY DOCTOR AND SEE IF HE WANTED TO REFER HIM TO A NEUROLOGIST. PATIENT UNDERSTOOD .

## 2020-02-12 ENCOUNTER — HOSPITAL ENCOUNTER (OUTPATIENT)
Dept: SLEEP MEDICINE | Facility: HOSPITAL | Age: 55
Discharge: HOME | End: 2020-02-12
Admitting: INTERNAL MEDICINE

## 2020-02-12 DIAGNOSIS — R00.0 TACHYCARDIA: ICD-10-CM

## 2020-02-12 DIAGNOSIS — G47.33 OSA (OBSTRUCTIVE SLEEP APNEA): ICD-10-CM

## 2020-02-12 DIAGNOSIS — Z95.2 MECHANICAL HEART VALVE PRESENT: ICD-10-CM

## 2020-02-12 DIAGNOSIS — G47.30 HYPERSOMNIA WITH SLEEP APNEA: ICD-10-CM

## 2020-02-12 DIAGNOSIS — G47.10 HYPERSOMNIA WITH SLEEP APNEA: ICD-10-CM

## 2020-02-12 DIAGNOSIS — I10 BENIGN ESSENTIAL HTN: ICD-10-CM

## 2020-02-12 DIAGNOSIS — Z95.2 S/P AVR (AORTIC VALVE REPLACEMENT): ICD-10-CM

## 2020-02-12 PROCEDURE — 95806 SLEEP STUDY UNATT&RESP EFFT: CPT

## 2020-02-13 ENCOUNTER — ANTICOAGULATION VISIT (OUTPATIENT)
Dept: CARDIOLOGY | Facility: CLINIC | Age: 55
End: 2020-02-13

## 2020-02-13 ENCOUNTER — LAB (OUTPATIENT)
Dept: LAB | Facility: HOSPITAL | Age: 55
End: 2020-02-13

## 2020-02-13 DIAGNOSIS — Z79.01 ANTICOAGULATED: ICD-10-CM

## 2020-02-13 PROBLEM — R94.31 ECG ABNORMAL: Status: ACTIVE | Noted: 2020-02-13

## 2020-02-13 LAB
INR PPP: 2.29 (ref 0.9–1.1)
PROTHROMBIN TIME: 24.9 SECONDS (ref 11.7–14.2)

## 2020-02-13 PROCEDURE — 85610 PROTHROMBIN TIME: CPT

## 2020-02-13 PROCEDURE — 36415 COLL VENOUS BLD VENIPUNCTURE: CPT

## 2020-02-14 ENCOUNTER — HOSPITAL ENCOUNTER (OUTPATIENT)
Dept: CARDIOLOGY | Facility: HOSPITAL | Age: 55
Discharge: HOME | End: 2020-02-14

## 2020-02-14 ENCOUNTER — APPOINTMENT (OUTPATIENT)
Dept: MRI IMAGING | Facility: HOSPITAL | Age: 55
End: 2020-02-14

## 2020-02-14 ENCOUNTER — HOSPITAL ENCOUNTER (OUTPATIENT)
Dept: CARDIOLOGY | Facility: HOSPITAL | Age: 55
Discharge: HOME | End: 2020-02-14
Admitting: INTERNAL MEDICINE

## 2020-02-14 VITALS
SYSTOLIC BLOOD PRESSURE: 129 MMHG | DIASTOLIC BLOOD PRESSURE: 77 MMHG | HEIGHT: 72 IN | BODY MASS INDEX: 31.69 KG/M2 | WEIGHT: 234 LBS | HEART RATE: 73 BPM | OXYGEN SATURATION: 100 %

## 2020-02-14 LAB
AORTIC DIMENSIONLESS INDEX: 0.5 (DI)
BH CV ECHO MEAS - AO ARCH DIAM (PROXIMAL TRANS.): 2.4 CM
BH CV ECHO MEAS - AO MAX PG (FULL): 6.6 MMHG
BH CV ECHO MEAS - AO MAX PG: 10.1 MMHG
BH CV ECHO MEAS - AO MEAN PG (FULL): 3 MMHG
BH CV ECHO MEAS - AO MEAN PG: 5 MMHG
BH CV ECHO MEAS - AO ROOT DIAM: 3.5 CM
BH CV ECHO MEAS - AO V2 MAX: 159 CM/SEC
BH CV ECHO MEAS - AO V2 MEAN: 107 CM/SEC
BH CV ECHO MEAS - AO V2 VTI: 33.4 CM
BH CV ECHO MEAS - AVA(I,A): 2.2 CM^2
BH CV ECHO MEAS - AVA(I,D): 2.2 CM^2
BH CV ECHO MEAS - AVA(V,A): 2.5 CM^2
BH CV ECHO MEAS - AVA(V,D): 2.5 CM^2
BH CV ECHO MEAS - BSA(HAYCOCK): 2.4 M^2
BH CV ECHO MEAS - BSA: 2.3 M^2
BH CV ECHO MEAS - BZI_BMI: 31.7 KILOGRAMS/M^2
BH CV ECHO MEAS - BZI_METRIC_HEIGHT: 182.9 CM
BH CV ECHO MEAS - BZI_METRIC_WEIGHT: 106.1 KG
BH CV ECHO MEAS - DESC AORTA: 1.8 CM
BH CV ECHO MEAS - EDV(MOD-SP2): 106 ML
BH CV ECHO MEAS - EDV(MOD-SP4): 99 ML
BH CV ECHO MEAS - EF(MOD-BP): 60 %
BH CV ECHO MEAS - EF(MOD-SP2): 57.5 %
BH CV ECHO MEAS - EF(MOD-SP4): 60.6 %
BH CV ECHO MEAS - ESV(MOD-SP2): 45 ML
BH CV ECHO MEAS - ESV(MOD-SP4): 39 ML
BH CV ECHO MEAS - IVSD: 1.2 CM
BH CV ECHO MEAS - LAT PEAK E' VEL: 9 CM/SEC
BH CV ECHO MEAS - LV DIASTOLIC VOL/BSA (35-75): 43.5 ML/M^2
BH CV ECHO MEAS - LV MAX PG: 3.5 MMHG
BH CV ECHO MEAS - LV MEAN PG: 2 MMHG
BH CV ECHO MEAS - LV SYSTOLIC VOL/BSA (12-30): 17.1 ML/M^2
BH CV ECHO MEAS - LV V1 MAX: 94.2 CM/SEC
BH CV ECHO MEAS - LV V1 MEAN: 57.3 CM/SEC
BH CV ECHO MEAS - LV V1 VTI: 18 CM
BH CV ECHO MEAS - LVIDD: 5.1 CM
BH CV ECHO MEAS - LVIDS: 4.1 CM
BH CV ECHO MEAS - LVLD AP2: 8.6 CM
BH CV ECHO MEAS - LVLD AP4: 9.2 CM
BH CV ECHO MEAS - LVLS AP2: 7.5 CM
BH CV ECHO MEAS - LVLS AP4: 7.8 CM
BH CV ECHO MEAS - LVOT AREA (M): 4.2 CM^2
BH CV ECHO MEAS - LVOT AREA: 4.2 CM^2
BH CV ECHO MEAS - LVOT DIAM: 2.3 CM
BH CV ECHO MEAS - LVPWD: 1.2 CM
BH CV ECHO MEAS - MED PEAK E' VEL: 8 CM/SEC
BH CV ECHO MEAS - MV A DUR: 0.2 SEC
BH CV ECHO MEAS - MV A MAX VEL: 69.9 CM/SEC
BH CV ECHO MEAS - MV DEC SLOPE: 560 CM/SEC^2
BH CV ECHO MEAS - MV DEC TIME: 200 SEC
BH CV ECHO MEAS - MV E MAX VEL: 76.4 CM/SEC
BH CV ECHO MEAS - MV E/A: 1.1
BH CV ECHO MEAS - MV MAX PG: 4.2 MMHG
BH CV ECHO MEAS - MV MEAN PG: 1 MMHG
BH CV ECHO MEAS - MV P1/2T MAX VEL: 104 CM/SEC
BH CV ECHO MEAS - MV P1/2T: 54.4 MSEC
BH CV ECHO MEAS - MV V2 MAX: 103 CM/SEC
BH CV ECHO MEAS - MV V2 MEAN: 48.6 CM/SEC
BH CV ECHO MEAS - MV V2 VTI: 25.2 CM
BH CV ECHO MEAS - MVA P1/2T LCG: 2.1 CM^2
BH CV ECHO MEAS - MVA(P1/2T): 4 CM^2
BH CV ECHO MEAS - MVA(VTI): 3 CM^2
BH CV ECHO MEAS - PA ACC TIME: 0.1 SEC
BH CV ECHO MEAS - PA MAX PG (FULL): 2.1 MMHG
BH CV ECHO MEAS - PA MAX PG: 3.8 MMHG
BH CV ECHO MEAS - PA PR(ACCEL): 36.3 MMHG
BH CV ECHO MEAS - PA V2 MAX: 97 CM/SEC
BH CV ECHO MEAS - PI END-D VEL: 124 CM/SEC
BH CV ECHO MEAS - PULM A REVS DUR: 0.19 SEC
BH CV ECHO MEAS - PULM A REVS VEL: 31.8 CM/SEC
BH CV ECHO MEAS - PULM DIAS VEL: 47.7 CM/SEC
BH CV ECHO MEAS - PULM S/D: 1.2
BH CV ECHO MEAS - PULM SYS VEL: 56.6 CM/SEC
BH CV ECHO MEAS - PVA(V,A): 3.5 CM^2
BH CV ECHO MEAS - PVA(V,D): 3.5 CM^2
BH CV ECHO MEAS - QP/QS: 1
BH CV ECHO MEAS - RAP SYSTOLE: 3 MMHG
BH CV ECHO MEAS - RV MAX PG: 1.7 MMHG
BH CV ECHO MEAS - RV MEAN PG: 1 MMHG
BH CV ECHO MEAS - RV V1 MAX: 64.3 CM/SEC
BH CV ECHO MEAS - RV V1 MEAN: 41.7 CM/SEC
BH CV ECHO MEAS - RV V1 VTI: 14.1 CM
BH CV ECHO MEAS - RVOT AREA: 5.3 CM^2
BH CV ECHO MEAS - RVOT DIAM: 2.6 CM
BH CV ECHO MEAS - RVSP: 22 MMHG
BH CV ECHO MEAS - SI(LVOT): 32.8 ML/M^2
BH CV ECHO MEAS - SI(MOD-SP2): 26.8 ML/M^2
BH CV ECHO MEAS - SI(MOD-SP4): 26.3 ML/M^2
BH CV ECHO MEAS - SV(LVOT): 74.8 ML
BH CV ECHO MEAS - SV(MOD-SP2): 61 ML
BH CV ECHO MEAS - SV(MOD-SP4): 60 ML
BH CV ECHO MEAS - SV(RVOT): 74.9 ML
BH CV ECHO MEAS - TAPSE (>1.6): 1.6 CM
BH CV ECHO MEAS - TR MAX VEL: 218 CM/SEC
BH CV ECHO MEASUREMENTS AVERAGE E/E' RATIO: 8.99
BH CV VAS BP LEFT ARM: NORMAL MMHG
BH CV XLRA - RV BASE: 3.3 CM
BH CV XLRA - RV LENGTH: 7.4 CM
BH CV XLRA - RV MID: 2.4 CM
BH CV XLRA - TDI S': 8 CM/SEC
BH CV XLRA MEAS LEFT CCA RATIO VEL: 63.4 CM/SEC
BH CV XLRA MEAS LEFT DIST CCA EDV: 17.2 CM/SEC
BH CV XLRA MEAS LEFT DIST CCA PSV: 63.4 CM/SEC
BH CV XLRA MEAS LEFT DIST ICA EDV: -31.5 CM/SEC
BH CV XLRA MEAS LEFT DIST ICA PSV: -67.8 CM/SEC
BH CV XLRA MEAS LEFT ICA RATIO VEL: -71.3 CM/SEC
BH CV XLRA MEAS LEFT ICA/CCA RATIO: -1.1
BH CV XLRA MEAS LEFT MID ICA EDV: -29 CM/SEC
BH CV XLRA MEAS LEFT MID ICA PSV: -71.3 CM/SEC
BH CV XLRA MEAS LEFT PROX CCA EDV: 16.8 CM/SEC
BH CV XLRA MEAS LEFT PROX CCA PSV: 74.6 CM/SEC
BH CV XLRA MEAS LEFT PROX ECA EDV: -16.2 CM/SEC
BH CV XLRA MEAS LEFT PROX ECA PSV: -81.4 CM/SEC
BH CV XLRA MEAS LEFT PROX ICA EDV: -16.1 CM/SEC
BH CV XLRA MEAS LEFT PROX ICA PSV: -36.8 CM/SEC
BH CV XLRA MEAS LEFT PROX SCLA PSV: 98.8 CM/SEC
BH CV XLRA MEAS LEFT VERTEBRAL A EDV: 11 CM/SEC
BH CV XLRA MEAS LEFT VERTEBRAL A PSV: 40.5 CM/SEC
BH CV XLRA MEAS RIGHT CCA RATIO VEL: -62.4 CM/SEC
BH CV XLRA MEAS RIGHT DIST CCA EDV: -17.2 CM/SEC
BH CV XLRA MEAS RIGHT DIST CCA PSV: -62.4 CM/SEC
BH CV XLRA MEAS RIGHT DIST ICA EDV: -25.6 CM/SEC
BH CV XLRA MEAS RIGHT DIST ICA PSV: -66.3 CM/SEC
BH CV XLRA MEAS RIGHT ICA RATIO VEL: -67.8 CM/SEC
BH CV XLRA MEAS RIGHT ICA/CCA RATIO: 1.1
BH CV XLRA MEAS RIGHT MID ICA EDV: -27 CM/SEC
BH CV XLRA MEAS RIGHT MID ICA PSV: -67.8 CM/SEC
BH CV XLRA MEAS RIGHT PROX CCA EDV: 15.5 CM/SEC
BH CV XLRA MEAS RIGHT PROX CCA PSV: 80.8 CM/SEC
BH CV XLRA MEAS RIGHT PROX ECA EDV: -11.8 CM/SEC
BH CV XLRA MEAS RIGHT PROX ECA PSV: -81.4 CM/SEC
BH CV XLRA MEAS RIGHT PROX ICA EDV: -15.7 CM/SEC
BH CV XLRA MEAS RIGHT PROX ICA PSV: -62.9 CM/SEC
BH CV XLRA MEAS RIGHT PROX SCLA EDV: 14.7 CM/SEC
BH CV XLRA MEAS RIGHT PROX SCLA PSV: 119.6 CM/SEC
BH CV XLRA MEAS RIGHT VERTEBRAL A EDV: 17.2 CM/SEC
BH CV XLRA MEAS RIGHT VERTEBRAL A PSV: 48.2 CM/SEC
LEFT ARM BP: NORMAL MMHG
LEFT ATRIUM VOLUME INDEX: 21 ML/M2
LEFT ATRIUM VOLUME: 48 CM3
MAXIMAL PREDICTED HEART RATE: 165 BPM
RIGHT ARM BP: NORMAL MMHG
STRESS TARGET HR: 140 BPM

## 2020-02-14 PROCEDURE — 93306 TTE W/DOPPLER COMPLETE: CPT

## 2020-02-14 PROCEDURE — 93880 EXTRACRANIAL BILAT STUDY: CPT

## 2020-02-14 PROCEDURE — 93306 TTE W/DOPPLER COMPLETE: CPT | Performed by: INTERNAL MEDICINE

## 2020-02-18 ENCOUNTER — TELEPHONE (OUTPATIENT)
Dept: SLEEP MEDICINE | Facility: HOSPITAL | Age: 55
End: 2020-02-18

## 2020-02-18 NOTE — TELEPHONE ENCOUNTER
Patient is to return call if he has questions about sleep study results, has a preferred DME ( sending orders to [facility] unless patient requests otherwise) or to schedule follow up appointment

## 2020-02-20 ENCOUNTER — TELEPHONE (OUTPATIENT)
Dept: SLEEP MEDICINE | Facility: HOSPITAL | Age: 55
End: 2020-02-20

## 2020-02-20 NOTE — TELEPHONE ENCOUNTER
Patient LV , he needs orders sent to [facility], orders faxed  . I LV for patient to return call to Schedule follow up

## 2020-03-04 ENCOUNTER — ANTICOAGULATION VISIT (OUTPATIENT)
Dept: CARDIOLOGY | Facility: CLINIC | Age: 55
End: 2020-03-04

## 2020-03-04 ENCOUNTER — LAB (OUTPATIENT)
Dept: LAB | Facility: HOSPITAL | Age: 55
End: 2020-03-04

## 2020-03-04 DIAGNOSIS — Z79.01 ANTICOAGULATED: ICD-10-CM

## 2020-03-04 LAB
INR PPP: 3.35 (ref 0.9–1.1)
PROTHROMBIN TIME: 33.7 SECONDS (ref 11.7–14.2)

## 2020-03-04 PROCEDURE — 36415 COLL VENOUS BLD VENIPUNCTURE: CPT

## 2020-03-04 PROCEDURE — 85610 PROTHROMBIN TIME: CPT

## 2020-03-16 DIAGNOSIS — K21.9 GASTROESOPHAGEAL REFLUX DISEASE WITHOUT ESOPHAGITIS: ICD-10-CM

## 2020-03-16 RX ORDER — OMEPRAZOLE 40 MG/1
40 CAPSULE, DELAYED RELEASE ORAL DAILY
Qty: 90 CAPSULE | Refills: 1 | Status: SHIPPED | OUTPATIENT
Start: 2020-03-16 | End: 2020-09-02

## 2020-03-24 ENCOUNTER — LAB (OUTPATIENT)
Dept: LAB | Facility: HOSPITAL | Age: 55
End: 2020-03-24

## 2020-03-24 DIAGNOSIS — Z79.01 ANTICOAGULATED: ICD-10-CM

## 2020-03-24 LAB
INR PPP: 3.9
INR PPP: 3.96 (ref 0.9–1.1)
PROTHROMBIN TIME: 38.4 SECONDS (ref 11.7–14.2)

## 2020-03-24 PROCEDURE — 36415 COLL VENOUS BLD VENIPUNCTURE: CPT

## 2020-03-24 PROCEDURE — 85610 PROTHROMBIN TIME: CPT

## 2020-03-25 ENCOUNTER — TELEPHONE (OUTPATIENT)
Dept: CARDIOLOGY | Age: 55
End: 2020-03-25

## 2020-03-25 ENCOUNTER — ANTICOAGULATION VISIT (OUTPATIENT)
Dept: CARDIOLOGY | Age: 55
End: 2020-03-25

## 2020-03-25 DIAGNOSIS — M54.50 LOW BACK PAIN, UNSPECIFIED BACK PAIN LATERALITY, UNSPECIFIED CHRONICITY, UNSPECIFIED WHETHER SCIATICA PRESENT: Primary | ICD-10-CM

## 2020-03-25 NOTE — TELEPHONE ENCOUNTER
----- Message from Letha Willams sent at 3/25/2020  3:08 PM EDT -----  Regarding: INR RESUTS  PLEASE CALL PT RE HIS INR RESULTS FROM Tuesday 11/24/20  THANKS

## 2020-03-31 ENCOUNTER — TREATMENT (OUTPATIENT)
Dept: PHYSICAL THERAPY | Facility: CLINIC | Age: 55
End: 2020-03-31

## 2020-03-31 DIAGNOSIS — M54.41 ACUTE BILATERAL LOW BACK PAIN WITH RIGHT-SIDED SCIATICA: Primary | ICD-10-CM

## 2020-03-31 PROCEDURE — 97161 PT EVAL LOW COMPLEX 20 MIN: CPT | Performed by: PHYSICAL THERAPIST

## 2020-03-31 PROCEDURE — 97140 MANUAL THERAPY 1/> REGIONS: CPT | Performed by: PHYSICAL THERAPIST

## 2020-03-31 PROCEDURE — 97110 THERAPEUTIC EXERCISES: CPT | Performed by: PHYSICAL THERAPIST

## 2020-03-31 NOTE — PROGRESS NOTES
"Physical Therapy Initial Evaluation and Plan of Care    Patient: Hussein Nino   : 1965  Diagnosis/ICD-10 Code:  Acute bilateral low back pain with right-sided sciatica [M54.41]  Referring practitioner: Margarita Dickinson PA-C  Date of Initial Visit: 3/31/2020  Today's Date: 3/31/2020  Patient seen for 1 sessions           Subjective Questionnaire: MIAN 50       Subjective Evaluation    History of Present Illness  Mechanism of injury: CURRENT:  Patient reports that he has had \"terrible\" lumbar pain starting approx < 2 months ago.  He reports the pain frequency  progressively getting worse over the last several weeks.  The pain is depilating more so than excruciating jane when tries to stand/walk. Denies any change with his sleep.The pain starts a few mins after he has been standing.  No pain upon getting out of bed, however by the time he walks to bathroom and brushes his teeth the lumbar pain presents.  Kwaku reports that he has anterior R thigh numbness jane upon walking.   He states lately he has experienced a short burst of a knife-like pain.  PAST MEDICAL HISTORY:  Open heart surgery 3 1/2 yrs ago for a valve replacement.   Left fibula fracture approx 10 yrs ago during tae do,  R tib/fib fracture as a child,  R quad strain yrs ago,  Lumbar injury post a fall out of work truck.     NEGATIVE:  Latex allergy, metal implants, seizures, pacemaker.   EXERCISE:   No ex or equipmentat home;  He has recently joined [employer]..       Patient Occupation: [employer]  Quality of life: good    Pain  Current pain rating: 3  At best pain ratin  At worst pain ratin  Location: lumbar- bilatera and anterior R thigh  Quality: pressure, discomfort and knife-like  Relieving factors: rest and change in position (sitting relieves the pain. )  Aggravating factors: standing and ambulation  Progression: worsening    Social Support  Lives with: spouse    Hand dominance: right    Diagnostic Tests  No diagnostic tests " performed    Treatments  Current treatment: medication  Patient Goals  Patient goals for therapy: return to sport/leisure activities and decreased pain  Patient goal: woodworking.           Objective       Postural Observations  Seated posture: poor    Additional Postural Observation Details  Flexed posture when seated at rest  Equal bony alignments of PSIS, ASIS, popliteal space and medial malleolus   Reduced lumbar lordosis.    Palpation     Additional Palpation Details  No tenderness, thickening or spasm to soft tissue noted    Neurological Testing     Sensation     Lumbar   Left   Intact: light touch and pin prick    Right   Intact: light touch and pin prick    Reflexes   Left   Patellar (L4): normal (2+)  Achilles (S1): normal (2+)    Right   Patellar (L4): trace (1+)  Achilles (S1): normal (2+)    Active Range of Motion     Lumbar   Flexion: 42 degrees   Extension: 6 degrees   Left lateral flexion: 7 degrees   Right lateral flexion: 5 degrees     Additional Active Range of Motion Details  Flexion:  No pain 1x or reps.  Tightness in hams  Extension:  1x = uncomfortable,  Reps = pain increased in lumbar and slight RLE  LSB and left rotation = no change of pain,    RSB and right rotation = repeated with slight increase on the righrt      Strength/Myotome Testing     Lumbar   Left   Normal strength    Right   Normal strength    Tests       Thoracic   Negative slump.     Lumbar   Positive repeated extension.     Additional Tests Details  FLEXIBILITY:   Moderate to severe limitations of hamstrings, quads, hip ER         Assessment & Plan     Assessment  Impairments: abnormal or restricted ROM, activity intolerance and pain with function  Assessment details: Mr. Nino is a 54 yr old male referred to PT due to a recent onset of lumbar pain that has significantly worsened over the last several weeks.  He was seen in the clinic today for the initial PT evaluation and treatment.  The evidence is consistent with DDD  lumbar with possible L4 radicular component.  PT is indicated in order to ahcieve the stated goals and achieve maximal functional capacity.       Prognosis: good  Functional Limitations: walking and standing  Goals  Plan Goals: ST visits     1)  Reduce radicular c/os =/> 30%     2)  Pain levels 0-5/10     3)  Patient to report the ability to stand/walk 25% more of the time.     4)  Patient to demonstrate understanding of neutral spine posture.     LTG:   DC     1)  =/> 75% reduction in radicular s/s     2)  Pain levels 0-3/10     3)  Patient to report the ability to stand/walk with a 75% improvement from the eval date.     4)  IHEP    Plan  Therapy options: will be seen for skilled physical therapy services  Planned modality interventions: electrical stimulation/Russian stimulation, traction, thermotherapy (hydrocollator packs) and cryotherapy  Planned therapy interventions: abdominal trunk stabilization, body mechanics training, flexibility, home exercise program, manual therapy, neuromuscular re-education, soft tissue mobilization, spinal/joint mobilization, strengthening, stretching and therapeutic activities  Frequency: 2x week  Duration in visits: 12  Treatment plan discussed with: patient        Timed:         Manual Therapy:    12     mins  48193;     Therapeutic Exercise:    13     mins  61762;     Neuromuscular Leana:        mins  78924;    Therapeutic Activity:          mins  88755;     Gait Training:           mins  06589;     Ultrasound:          mins  71731;    Ionto                                   mins   65715  Self Care                       6     mins   23257  Canalith Repos         mins 28369      Un-Timed:  Electrical Stimulation:         mins  54511 ( );  Dry Needling          mins self-pay  Traction          mins 64288  Low Eval    24      Mins  05198  Mod Eval          Mins  46318  High Eval                            Mins  54985  Re-Eval                               mins   42947        Timed Treatment:   31   mins   Total Treatment:     55   mins    PT SIGNATURE: Evon Moffett, PT   DATE TREATMENT INITIATED: 3/31/2020    Initial Certification  Certification Period: 6/29/2020  I certify that the therapy services are furnished while this patient is under my care.  The services outlined above are required by this patient, and will be reviewed every 90 days.     PHYSICIAN: Margarita Dickinson PA-C      DATE:     Please sign and return via fax to  .. Thank you, [facility] Physical Therapy.

## 2020-04-02 ENCOUNTER — TREATMENT (OUTPATIENT)
Dept: PHYSICAL THERAPY | Facility: CLINIC | Age: 55
End: 2020-04-02

## 2020-04-02 DIAGNOSIS — M54.41 ACUTE BILATERAL LOW BACK PAIN WITH RIGHT-SIDED SCIATICA: Primary | ICD-10-CM

## 2020-04-02 PROCEDURE — 97530 THERAPEUTIC ACTIVITIES: CPT | Performed by: PHYSICAL THERAPIST

## 2020-04-02 PROCEDURE — 97140 MANUAL THERAPY 1/> REGIONS: CPT | Performed by: PHYSICAL THERAPIST

## 2020-04-02 PROCEDURE — 97110 THERAPEUTIC EXERCISES: CPT | Performed by: PHYSICAL THERAPIST

## 2020-04-02 NOTE — PROGRESS NOTES
"Physical Therapy Daily Progress Note    VISIT#: 2    Subjective   Hussein Nino reports that he did feel a little better post the session and has carried over throughout the following days.     Objective   Corrections required for the prior ex due to technique; also some modification due to anterior right thigh \"cramp\" sensation during PPT and LTR to the left.     See Exercise, Manual, and Modality Logs for complete treatment.     Patient Education: handouts provided for the core ex; not the new LE stretches.    Assessment/Plan  Less pain at end of the session.     Progress per Plan of Care            Timed:         Manual Therapy:    12     mins  37511;     Therapeutic Exercise:    22     mins  60141;     Neuromuscular Leana:        mins  84262;    Therapeutic Activity:     10     mins  73684;     Gait Training:           mins  02796;     Ultrasound:          mins  03190;    Ionto                                   mins   12376  Self Care                       6     mins   56780  Canalith Repos                   mins  47286    Un-Timed:  Electrical Stimulation:         mins  39202 ( );  Dry Needling          mins self-pay  Traction          mins 59873  Low Eval          Mins  89626  Mod Eval          Mins  97369  High Eval                            Mins  16313  Re-Eval                               mins  09633    Timed Treatment:   50   mins   Total Treatment:     50   mins    Evon Moffett, PT    Physical Therapist  "

## 2020-04-07 ENCOUNTER — ANTICOAGULATION VISIT (OUTPATIENT)
Dept: CARDIOLOGY | Facility: CLINIC | Age: 55
End: 2020-04-07

## 2020-04-07 ENCOUNTER — TREATMENT (OUTPATIENT)
Dept: PHYSICAL THERAPY | Facility: CLINIC | Age: 55
End: 2020-04-07

## 2020-04-07 ENCOUNTER — LAB (OUTPATIENT)
Dept: LAB | Facility: HOSPITAL | Age: 55
End: 2020-04-07

## 2020-04-07 DIAGNOSIS — Z79.01 ANTICOAGULATED: ICD-10-CM

## 2020-04-07 DIAGNOSIS — M54.41 ACUTE BILATERAL LOW BACK PAIN WITH RIGHT-SIDED SCIATICA: Primary | ICD-10-CM

## 2020-04-07 LAB
INR PPP: 3.27 (ref 0.9–1.1)
PROTHROMBIN TIME: 33.1 SECONDS (ref 11.7–14.2)

## 2020-04-07 PROCEDURE — 97012 MECHANICAL TRACTION THERAPY: CPT | Performed by: PHYSICAL THERAPIST

## 2020-04-07 PROCEDURE — 36415 COLL VENOUS BLD VENIPUNCTURE: CPT

## 2020-04-07 PROCEDURE — 97140 MANUAL THERAPY 1/> REGIONS: CPT | Performed by: PHYSICAL THERAPIST

## 2020-04-07 PROCEDURE — 97110 THERAPEUTIC EXERCISES: CPT | Performed by: PHYSICAL THERAPIST

## 2020-04-07 PROCEDURE — 85610 PROTHROMBIN TIME: CPT

## 2020-04-07 NOTE — PROGRESS NOTES
Physical Therapy Daily Progress Note    VISIT#: 3    Subjective   Hussein Nino reports that he has only had 1 ocassion of leg pain.  This occurred post a walk in his neighborhood, including hills.  That pain was an 8 of 10.  She states he has been able to walk a lot more in the faciliity wo leg pain.   lumbar pain 0-/5      Objective   Review and complete of core ex with increase of table top and hip rolls to a level II.  Addition of plank and reverse crunch ball lifts were also completed.   Relief with long axis distraction; thus added mechanical traction today.   See Exercise, Manual, and Modality Logs for complete treatment.     Patient Education: review of the anatomy, goals of therapy, neutral posture and long term home ex planning.     Assessment/Plan  Excellent tolerance to the current ex and program.  Pnt reporting less pain levels and less frequency of LE s/s.    Other  prob decrease to 1x/week             Timed:         Manual Therapy:    11     mins  20202;     Therapeutic Exercise:    24     mins  99878;     Neuromuscular Leana:        mins  90073;    Therapeutic Activity:          mins  84553;     Gait Training:           mins  02382;     Ultrasound:          mins  98137;    Ionto                                   mins   40775  Self Care                       6     mins   83063  Canalith Repos                   mins  27064    Un-Timed:  Electrical Stimulation:         mins  87616 ( );  Dry Needling          mins self-pay  Traction     10     mins 12901  Low Eval          Mins  33872  Mod Eval          Mins  29854  High Eval                            Mins  26028  Re-Eval                               mins  84101    Timed Treatment:   41   mins   Total Treatment:     55   mins    Evon Moffett PT    Physical Therapist

## 2020-04-09 ENCOUNTER — TREATMENT (OUTPATIENT)
Dept: PHYSICAL THERAPY | Facility: CLINIC | Age: 55
End: 2020-04-09

## 2020-04-09 DIAGNOSIS — M54.41 ACUTE BILATERAL LOW BACK PAIN WITH RIGHT-SIDED SCIATICA: Primary | ICD-10-CM

## 2020-04-09 PROCEDURE — 97110 THERAPEUTIC EXERCISES: CPT | Performed by: PHYSICAL THERAPIST

## 2020-04-09 PROCEDURE — 97012 MECHANICAL TRACTION THERAPY: CPT | Performed by: PHYSICAL THERAPIST

## 2020-04-09 PROCEDURE — 97140 MANUAL THERAPY 1/> REGIONS: CPT | Performed by: PHYSICAL THERAPIST

## 2020-04-09 NOTE — PROGRESS NOTES
"Physical Therapy Daily Progress Note    VISIT#: 4    Subjective   Hussein Nino reports that yesterday was a bad day.  States the LLE did go somewhat numb while he was sitting.  He did try to walk to alleviate the pain.  That pain lasted approx 10 mins.  No strengthening at home due to such.  Today, is \"ok\";  However, much better than I was at the beginning.      Objective   Instructions of lateral shifts and positional ex to complete at home/work or when he is unable to do the ex supine.     See Exercise, Manual, and Modality Logs for complete treatment.     Patient Education:  No change of current HEP    Assessment/Plan  Patient did experience more pain yesterday; however he does report the pain is less than he had been experiencing.  Adjustments were made to his program.     Other  1x/week            Timed:         Manual Therapy:    13     mins  71286;     Therapeutic Exercise:    12     mins  39933;     Neuromuscular Leana:        mins  67057;    Therapeutic Activity:          mins  10422;     Gait Training:           mins  29302;     Ultrasound:          mins  51098;    Ionto                                   mins   52930  Self Care                        5    mins   36726  Canalith Repos                   mins  53131    Un-Timed:  Electrical Stimulation:         mins  01883 ( );  Dry Needling          mins self-pay  Traction     15     mins 34883  Low Eval          Mins  53490  Mod Eval          Mins  23870  High Eval                            Mins  17482  Re-Eval                               mins  48956    Timed Treatment:   30   mins   Total Treatment:     48   mins    Evon Moffett, PT    Physical Therapist  "

## 2020-04-15 ENCOUNTER — TREATMENT (OUTPATIENT)
Dept: PHYSICAL THERAPY | Facility: CLINIC | Age: 55
End: 2020-04-15

## 2020-04-15 DIAGNOSIS — M54.41 ACUTE BILATERAL LOW BACK PAIN WITH RIGHT-SIDED SCIATICA: Primary | ICD-10-CM

## 2020-04-15 PROCEDURE — 97140 MANUAL THERAPY 1/> REGIONS: CPT | Performed by: PHYSICAL THERAPIST

## 2020-04-15 PROCEDURE — 97110 THERAPEUTIC EXERCISES: CPT | Performed by: PHYSICAL THERAPIST

## 2020-04-15 PROCEDURE — 97012 MECHANICAL TRACTION THERAPY: CPT | Performed by: PHYSICAL THERAPIST

## 2020-04-15 NOTE — PROGRESS NOTES
Physical Therapy Daily Progress Note    VISIT#: 5    Subjective   Hussein Nino reports that he was able to walk over the weekend up to a mile; he wanted to walk longer, but the lower back started to hurt and limit.  When he was woodworking he had a burning sensation (hot poker) on the left waist area.   Worse time is at night. Pain levels 0-7      Objective   Flex = 50,   Ext = 10  BSB = 10   Right hip pain with ext and RSB  MMT:  Quad 4-   No tenderness to palpation  Instructions for completion of nerve glides.   See Exercise, Manual, and Modality Logs for complete treatment.     Patient Education:  Cont with ROM/stretches/nerve glides at home, add strengthening as tolerated.     Assessment & Plan     Assessment  Assessment details: Kwaku has achieved 3 of the 4 STGs and is demonstrating progress.  It is noted that he continues to experience anterior R thigh pain with standing and walking, even though the frequency/intensity  have reduced.      Goals  Plan Goals: ST visits     1)  Reduce radicular c/os =/> 30%(met)     2)  Pain levels 0-5/10     3)  Patient to report the ability to stand/walk 25% more of the time. (met)     4)  Patient to demonstrate understanding of neutral spine posture. (met)     LTG:   DC     1)  =/> 75% reduction in radicular s/s     2)  Pain levels 0-3/10     3)  Patient to report the ability to stand/walk with a 75% improvement from the eval date.     4)  IHEP          Other Pnt to cont with 1x/wk unless he feels he is good or if the pain is worsening.              Timed:         Manual Therapy:    13     mins  67408;     Therapeutic Exercise:    15     mins  77801;     Neuromuscular Leana:        mins  51372;    Therapeutic Activity:          mins  46751;     Gait Training:           mins  29069;     Ultrasound:          mins  42187;    Ionto                                   mins   07986  Self Care                     6       mins   31401  [facility]                   mins   90011    Un-Timed:  Electrical Stimulation:         mins  26217 ( );  Dry Needling          mins self-pay  Traction     15     mins 61184  Low Eval          Mins  84502  Mod Eval          Mins  22395  High Eval                            Mins  35039  Re-Eval                               mins  91291    Timed Treatment:   33   mins   Total Treatment:     55   mins    Evon Moffett PT    Physical Therapist

## 2020-04-28 DIAGNOSIS — R35.1 BENIGN PROSTATIC HYPERPLASIA WITH NOCTURIA: ICD-10-CM

## 2020-04-28 DIAGNOSIS — N40.1 BENIGN PROSTATIC HYPERPLASIA WITH NOCTURIA: ICD-10-CM

## 2020-04-28 RX ORDER — TAMSULOSIN HYDROCHLORIDE 0.4 MG/1
1 CAPSULE ORAL DAILY
Qty: 90 CAPSULE
Start: 2020-04-28 | End: 2020-09-04 | Stop reason: SDUPTHER

## 2020-04-30 DIAGNOSIS — E78.2 MIXED HYPERLIPIDEMIA: ICD-10-CM

## 2020-04-30 RX ORDER — FENOFIBRATE 145 MG/1
145 TABLET, COATED ORAL DAILY
Qty: 90 TABLET | Refills: 0 | Status: SHIPPED | OUTPATIENT
Start: 2020-04-30 | End: 2020-07-21

## 2020-05-06 ENCOUNTER — LAB (OUTPATIENT)
Dept: LAB | Facility: HOSPITAL | Age: 55
End: 2020-05-06

## 2020-05-06 ENCOUNTER — ANTICOAGULATION VISIT (OUTPATIENT)
Dept: CARDIOLOGY | Facility: CLINIC | Age: 55
End: 2020-05-06

## 2020-05-06 DIAGNOSIS — Z79.01 ANTICOAGULATED: ICD-10-CM

## 2020-05-06 LAB
INR PPP: 2.14 (ref 0.9–1.1)
PROTHROMBIN TIME: 23.6 SECONDS (ref 11.7–14.2)

## 2020-05-06 PROCEDURE — 85610 PROTHROMBIN TIME: CPT

## 2020-05-06 PROCEDURE — 36415 COLL VENOUS BLD VENIPUNCTURE: CPT

## 2020-05-27 ENCOUNTER — ANTICOAGULATION VISIT (OUTPATIENT)
Dept: CARDIOLOGY | Facility: CLINIC | Age: 55
End: 2020-05-27

## 2020-05-27 ENCOUNTER — LAB (OUTPATIENT)
Dept: LAB | Facility: HOSPITAL | Age: 55
End: 2020-05-27

## 2020-05-27 DIAGNOSIS — Z79.01 ANTICOAGULATED: ICD-10-CM

## 2020-05-27 LAB
INR PPP: 3.3
INR PPP: 3.33 (ref 0.9–1.1)
PROTHROMBIN TIME: 33.5 SECONDS (ref 11.7–14.2)

## 2020-05-27 PROCEDURE — 36415 COLL VENOUS BLD VENIPUNCTURE: CPT

## 2020-05-27 PROCEDURE — 85610 PROTHROMBIN TIME: CPT

## 2020-06-08 ENCOUNTER — APPOINTMENT (OUTPATIENT)
Dept: SLEEP MEDICINE | Facility: HOSPITAL | Age: 55
End: 2020-06-08

## 2020-06-18 ENCOUNTER — OFFICE VISIT (OUTPATIENT)
Dept: INTERNAL MEDICINE | Facility: CLINIC | Age: 55
End: 2020-06-18

## 2020-06-18 ENCOUNTER — RESULTS ENCOUNTER (OUTPATIENT)
Dept: INTERNAL MEDICINE | Facility: CLINIC | Age: 55
End: 2020-06-18

## 2020-06-18 VITALS
SYSTOLIC BLOOD PRESSURE: 108 MMHG | DIASTOLIC BLOOD PRESSURE: 60 MMHG | BODY MASS INDEX: 31.97 KG/M2 | TEMPERATURE: 97.7 F | WEIGHT: 236 LBS | HEIGHT: 72 IN

## 2020-06-18 DIAGNOSIS — E78.1 ESSENTIAL HYPERTRIGLYCERIDEMIA: ICD-10-CM

## 2020-06-18 DIAGNOSIS — G47.33 OSA (OBSTRUCTIVE SLEEP APNEA): Primary | ICD-10-CM

## 2020-06-18 DIAGNOSIS — E78.2 MIXED HYPERLIPIDEMIA: ICD-10-CM

## 2020-06-18 DIAGNOSIS — Z12.5 SCREENING PSA (PROSTATE SPECIFIC ANTIGEN): ICD-10-CM

## 2020-06-18 DIAGNOSIS — I10 BENIGN ESSENTIAL HTN: ICD-10-CM

## 2020-06-18 DIAGNOSIS — I87.2 VENOUS INSUFFICIENCY: ICD-10-CM

## 2020-06-18 PROBLEM — G47.30 HYPERSOMNIA WITH SLEEP APNEA: Status: RESOLVED | Noted: 2020-01-20 | Resolved: 2020-06-18

## 2020-06-18 PROBLEM — G47.10 HYPERSOMNIA WITH SLEEP APNEA: Status: RESOLVED | Noted: 2020-01-20 | Resolved: 2020-06-18

## 2020-06-18 PROBLEM — I71.21 THORACIC ASCENDING AORTIC ANEURYSM: Status: ACTIVE | Noted: 2020-06-18

## 2020-06-18 PROBLEM — R42 DIZZINESS: Status: RESOLVED | Noted: 2020-02-03 | Resolved: 2020-06-18

## 2020-06-18 PROBLEM — R94.31 ECG ABNORMAL: Status: RESOLVED | Noted: 2020-02-13 | Resolved: 2020-06-18

## 2020-06-18 PROBLEM — I71.21 THORACIC ASCENDING AORTIC ANEURYSM: Status: RESOLVED | Noted: 2020-06-18 | Resolved: 2020-06-18

## 2020-06-18 LAB
ALBUMIN SERPL-MCNC: 4.8 G/DL (ref 3.5–5.2)
ALBUMIN/GLOB SERPL: 1.8 G/DL
ALP SERPL-CCNC: 38 U/L (ref 39–117)
ALT SERPL-CCNC: 29 U/L (ref 1–41)
AST SERPL-CCNC: 37 U/L (ref 1–40)
BILIRUB SERPL-MCNC: 0.4 MG/DL (ref 0.2–1.2)
BUN SERPL-MCNC: 20 MG/DL (ref 6–20)
BUN/CREAT SERPL: 12.7 (ref 7–25)
CALCIUM SERPL-MCNC: 9.6 MG/DL (ref 8.6–10.5)
CHLORIDE SERPL-SCNC: 105 MMOL/L (ref 98–107)
CHOLEST SERPL-MCNC: 130 MG/DL (ref 0–200)
CHOLEST/HDLC SERPL: 4.81 {RATIO}
CO2 SERPL-SCNC: 26.6 MMOL/L (ref 22–29)
CREAT SERPL-MCNC: 1.58 MG/DL (ref 0.76–1.27)
GLOBULIN SER CALC-MCNC: 2.6 GM/DL
GLUCOSE SERPL-MCNC: 103 MG/DL (ref 65–99)
HDLC SERPL-MCNC: 27 MG/DL (ref 40–60)
LDLC SERPL CALC-MCNC: 69 MG/DL (ref 0–100)
POTASSIUM SERPL-SCNC: 4.2 MMOL/L (ref 3.5–5.2)
PROT SERPL-MCNC: 7.4 G/DL (ref 6–8.5)
SODIUM SERPL-SCNC: 140 MMOL/L (ref 136–145)
TRIGL SERPL-MCNC: 168 MG/DL (ref 0–150)
VLDLC SERPL CALC-MCNC: 33.6 MG/DL

## 2020-06-18 PROCEDURE — 99214 OFFICE O/P EST MOD 30 MIN: CPT | Performed by: PHYSICIAN ASSISTANT

## 2020-06-18 PROCEDURE — 90632 HEPA VACCINE ADULT IM: CPT | Performed by: PHYSICIAN ASSISTANT

## 2020-06-18 PROCEDURE — 90471 IMMUNIZATION ADMIN: CPT | Performed by: PHYSICIAN ASSISTANT

## 2020-06-18 NOTE — PROGRESS NOTES
"Subjective   Chief Complaint   Patient presents with   • Hypertension   • Hyperlipidemia       History of Present Illness   Pt is here today for fup on his htn and HLD. He has had a sleep study since his last visit and diagnosed with KVNG. He is wearing his CPAP nightly and doing much better.     He saw Dr. Britton in February and at the time was experiencing a few episodes of dizziness. He had an echo that was stable as well as carotid US that showed no stenosis. He was scheduled for MRA of the head however insurance would not approve. He has had no further episodes of dizziness. He will call should they return.      He has had 4 episodes of dull abdominal pain lasting 1 week each time over the last year. He describes it as a dull throbbing pain, \"feels like he has been punched in the gut\".It does not radiate, no changes in his bowel habits when it occurs, no associated N/V/D, and no heartburn related symptoms. Nothing makes it better or worse. After about a week it resolves and will go several months before another episode. He did have a cholecystectomy 6 yrs ago.    Pt states he has had some intermittent lower extremity swelling for the last 2-3 weeks. Worsens as the day goes on, better in the AM. Only has swelling to mid ankle. He denies CP, SOA, cough and orthopnea.     Patient Active Problem List   Diagnosis   • Allergic rhinitis   • Asthma   • Gastroesophageal reflux disease   • Hyperlipidemia   • Irritable bowel syndrome   • Raynaud's phenomenon   • Essential hypertriglyceridemia   • S/P AVR (aortic valve replacement)   • Mechanical heart valve present   • Anticoagulated   • Benign essential HTN   • Benign prostatic hyperplasia with nocturia   • KVNG (obstructive sleep apnea)   • Venous insufficiency       Allergies   Allergen Reactions   • Fish-Derived Products Anaphylaxis   • Ondansetron Rash   • Penicillins Rash       Current Outpatient Medications on File Prior to Visit   Medication Sig Dispense Refill "   • albuterol (PROVENTIL HFA;VENTOLIN HFA) 108 (90 BASE) MCG/ACT inhaler Inhale 2 puffs Every 4 (Four) Hours As Needed for wheezing.     • amLODIPine (NORVASC) 5 MG tablet Take 1 tablet by mouth Daily. 90 tablet 3   • cetirizine (ZYRTEC ALLERGY) 10 MG tablet Take 1 tablet by mouth Daily. 30 tablet 3   • cholestyramine (QUESTRAN) 4 G packet Take 1 packet by mouth 3 (Three) Times a Day With Meals. 90 packet 3   • fenofibrate (TRICOR) 145 MG tablet Take 1 tablet by mouth Daily. 90 tablet 0   • fluticasone (FLONASE) 50 MCG/ACT nasal spray 2 sprays into each nostril Daily. 1 each 3   • metoprolol succinate XL (TOPROL XL) 50 MG 24 hr tablet Take 1 tablet by mouth Daily. 30 tablet 1   • Multiple Vitamins-Minerals (MULTIVITAMIN ADULT PO) Take  by mouth.     • omeprazole (priLOSEC) 40 MG capsule Take 1 capsule by mouth Daily. 90 capsule 1   • pravastatin (PRAVACHOL) 40 MG tablet Take 1 tablet by mouth Daily. 90 tablet 1   • Probiotic Product (PROBIOTIC DAILY PO) Take  by mouth.     • tamsulosin (FLOMAX) 0.4 MG capsule 24 hr capsule Take 1 capsule by mouth Daily. 90 capsule    • warfarin (JANTOVEN) 5 MG tablet TAKE 1 TABLET DAILY OR AS  DIRECTED TO MAINTAIN       INTERNATIONAL NORMALIZED   RATIO 2.5-3.5 90 tablet 3   • [DISCONTINUED] chlorhexidine (PERIDEX) 0.12 % solution      • [DISCONTINUED] clindamycin (CLEOCIN) 150 MG capsule TAKE 4 CAPSULES BY MOUTH 1 HOUR PRIOR TO APPOINTMENT.     • [DISCONTINUED] cyclobenzaprine (FLEXERIL) 10 MG tablet Take 1 tablet by mouth 3 (Three) Times a Day As Needed for Muscle Spasms. 60 tablet 1     No current facility-administered medications on file prior to visit.        Past Medical History:   Diagnosis Date   • Aortic stenosis     SEVERE   • Epistaxis    • Gout        Family History   Problem Relation Age of Onset   • Heart failure Mother    • Kidney disease Mother    • Hypertension Mother    • Heart disease Mother    • Alzheimer's disease Father    • Thyroid disease Sister    •  Hypertension Sister    • Hypertension Brother        Social History     Socioeconomic History   • Marital status:      Spouse name: Not on file   • Number of children: Not on file   • Years of education: Not on file   • Highest education level: Not on file   Tobacco Use   • Smoking status: Former Smoker     Packs/day: 0.50     Years: 14.00     Pack years: 7.00     Types: Cigarettes   • Smokeless tobacco: Never Used   • Tobacco comment: QUIT AGE 23   Substance and Sexual Activity   • Alcohol use: Yes     Frequency: 2-3 times a week   • Drug use: No   • Sexual activity: Defer       Past Surgical History:   Procedure Laterality Date   • ADENOIDECTOMY     • AORTIC VALVE REPAIR/REPLACEMENT  10/05/2016    mechanical valve   • ASCENDING ARCH/HEMIARCH REPLACEMENT N/A 10/5/2016    Procedure: MIDLINE STERNOTOMY, AORTIC VALVE REPLACEMENT, ASCENDING AND HEMIARCH REPLACEMENT, REPAIR OF PERIVALVULAR LEAK, WITH NEURO MONITOIRING, INTRAOPERATIVE WARREN;  Surgeon: Sukhi Wilkinson MD;  Location: [facility] OR;  Service:    • CARDIAC CATHETERIZATION N/A 9/27/2016    Procedure: Coronary angiography;  Surgeon: Lio Roman MD;  Location: [facility] CATH INVASIVE LOCATION;  Service:    • CARDIAC CATHETERIZATION N/A 9/27/2016    Procedure: Left Heart Cath;  Surgeon: Lio Roman MD;  [facility];  Service:    • CHOLECYSTECTOMY     • COLONOSCOPY  12/212/2016    Dr. Palomares   • CORONARY ARTERY BYPASS GRAFT           The following portions of the patient's history were reviewed and updated as appropriate: problem list, allergies, current medications, past medical history, past family history, past social history and past surgical history.    ROS    Immunization History   Administered Date(s) Administered   • FLUARIX/FLUZONE/AFLURIA/FLULAVAL QUAD 10/19/2019   • Hepatitis A 09/16/2019, 06/18/2020   • Tdap 09/16/2019       Objective   Vitals:    06/18/20 0800 06/18/20 0829   BP:  108/60   Temp: 97.7  "°F (36.5 °C)    Weight: 107 kg (236 lb)    Height: 182.9 cm (72\")      Body mass index is 32.01 kg/m².  Physical Exam   Constitutional: He appears well-developed and well-nourished.   HENT:   Head: Normocephalic and atraumatic.   Neck: Normal carotid pulses present.   Cardiovascular: Normal rate and regular rhythm.   Audible click from mechanical valve. No peripheral edema.    Pulmonary/Chest: Effort normal and breath sounds normal.   Abdominal: Soft. Normal aorta and bowel sounds are normal. He exhibits no distension and no mass. There is no hepatosplenomegaly. There is no tenderness. There is no guarding.   No abdominal bruits auscultated.          Assessment/Plan   Hussein was seen today for hypertension and hyperlipidemia.    Diagnoses and all orders for this visit:    KVNG (obstructive sleep apnea)    Venous insufficiency    Benign essential HTN    Essential hypertriglyceridemia    Mixed hyperlipidemia    Other orders  -     Cancel: CT Angiogram Chest; Future  -     Hepatitis A Vaccine Adult IM    1. KVNG: Continue CPAP mask nightly.     2. HTN: BP well controlled.     3. BPH w/ nocturia: Sx well managed with current dose of tamsulosin.     4. Hyperlipidemia with hypertriglyceridemia: Lipid Panel today.     5. Venous Insufficiency: New problem, reviewed echo. Recommended salt restriction, elevation of extremities and compression stockings.     6. Non specific abdominal pain: No abnormalities on exam, pain has resolved at this time. Check CMP today. I would like him to see either myself or his gastroenterologist next time episode occurs for further evaluation.     7. HM: 2nd Hep A today.     Return in about 6 months (around 12/18/2020) for Lab Today, Lab Appt Before FUP.           "

## 2020-06-23 DIAGNOSIS — R79.89 BLOOD CREATININE INCREASED COMPARED WITH PRIOR MEASUREMENT: Primary | ICD-10-CM

## 2020-06-30 ENCOUNTER — RESULTS ENCOUNTER (OUTPATIENT)
Dept: INTERNAL MEDICINE | Facility: CLINIC | Age: 55
End: 2020-06-30

## 2020-06-30 DIAGNOSIS — R79.89 BLOOD CREATININE INCREASED COMPARED WITH PRIOR MEASUREMENT: ICD-10-CM

## 2020-07-07 ENCOUNTER — LAB (OUTPATIENT)
Dept: LAB | Facility: HOSPITAL | Age: 55
End: 2020-07-07

## 2020-07-07 DIAGNOSIS — Z79.01 ANTICOAGULATED: ICD-10-CM

## 2020-07-07 LAB
ANION GAP SERPL CALCULATED.3IONS-SCNC: 6 MMOL/L (ref 5–15)
BUN SERPL-MCNC: 17 MG/DL (ref 6–20)
BUN/CREAT SERPL: 14.5 (ref 7–25)
CALCIUM SPEC-SCNC: 9.2 MG/DL (ref 8.6–10.5)
CHLORIDE SERPL-SCNC: 106 MMOL/L (ref 98–107)
CO2 SERPL-SCNC: 27 MMOL/L (ref 22–29)
CREAT SERPL-MCNC: 1.17 MG/DL (ref 0.76–1.27)
GFR SERPL CREATININE-BSD FRML MDRD: 65 ML/MIN/1.73
GLUCOSE SERPL-MCNC: 136 MG/DL (ref 65–99)
INR PPP: 3.74 (ref 0.9–1.1)
POTASSIUM SERPL-SCNC: 3.9 MMOL/L (ref 3.5–5.2)
PROTHROMBIN TIME: 35.6 SECONDS (ref 11.7–14.2)
SODIUM SERPL-SCNC: 139 MMOL/L (ref 136–145)

## 2020-07-07 PROCEDURE — 36415 COLL VENOUS BLD VENIPUNCTURE: CPT | Performed by: PHYSICIAN ASSISTANT

## 2020-07-07 PROCEDURE — 80048 BASIC METABOLIC PNL TOTAL CA: CPT | Performed by: PHYSICIAN ASSISTANT

## 2020-07-07 PROCEDURE — 85610 PROTHROMBIN TIME: CPT

## 2020-07-08 ENCOUNTER — ANTICOAGULATION VISIT (OUTPATIENT)
Dept: CARDIOLOGY | Facility: CLINIC | Age: 55
End: 2020-07-08

## 2020-07-21 DIAGNOSIS — E78.2 MIXED HYPERLIPIDEMIA: ICD-10-CM

## 2020-07-21 RX ORDER — FENOFIBRATE 145 MG/1
TABLET, COATED ORAL
Qty: 90 TABLET | Refills: 0 | Status: SHIPPED | OUTPATIENT
Start: 2020-07-21 | End: 2020-10-13

## 2020-07-24 ENCOUNTER — OFFICE VISIT (OUTPATIENT)
Dept: INTERNAL MEDICINE | Facility: CLINIC | Age: 55
End: 2020-07-24

## 2020-07-24 ENCOUNTER — LAB (OUTPATIENT)
Dept: LAB | Facility: HOSPITAL | Age: 55
End: 2020-07-24

## 2020-07-24 VITALS
BODY MASS INDEX: 30.75 KG/M2 | DIASTOLIC BLOOD PRESSURE: 60 MMHG | HEART RATE: 100 BPM | HEIGHT: 72 IN | SYSTOLIC BLOOD PRESSURE: 100 MMHG | WEIGHT: 227 LBS | RESPIRATION RATE: 14 BRPM | TEMPERATURE: 97.7 F

## 2020-07-24 DIAGNOSIS — R19.7 DIARRHEA, UNSPECIFIED TYPE: Primary | ICD-10-CM

## 2020-07-24 DIAGNOSIS — K58.2 IRRITABLE BOWEL SYNDROME WITH BOTH CONSTIPATION AND DIARRHEA: ICD-10-CM

## 2020-07-24 DIAGNOSIS — Z20.822 SUSPECTED COVID-19 VIRUS INFECTION: ICD-10-CM

## 2020-07-24 LAB
ALBUMIN SERPL-MCNC: 4.5 G/DL (ref 3.5–5.2)
ALBUMIN/GLOB SERPL: 1.6 G/DL
ALP SERPL-CCNC: 46 U/L (ref 39–117)
ALT SERPL W P-5'-P-CCNC: 22 U/L (ref 1–41)
ANION GAP SERPL CALCULATED.3IONS-SCNC: 8.9 MMOL/L (ref 5–15)
AST SERPL-CCNC: 25 U/L (ref 1–40)
BASOPHILS # BLD AUTO: 0.02 10*3/MM3 (ref 0–0.2)
BASOPHILS NFR BLD AUTO: 0.2 % (ref 0–1.5)
BILIRUB SERPL-MCNC: 0.5 MG/DL (ref 0–1.2)
BUN SERPL-MCNC: 18 MG/DL (ref 6–20)
BUN/CREAT SERPL: 11.8 (ref 7–25)
CALCIUM SPEC-SCNC: 9.2 MG/DL (ref 8.6–10.5)
CHLORIDE SERPL-SCNC: 104 MMOL/L (ref 98–107)
CO2 SERPL-SCNC: 26.1 MMOL/L (ref 22–29)
CREAT SERPL-MCNC: 1.52 MG/DL (ref 0.76–1.27)
DEPRECATED RDW RBC AUTO: 40.8 FL (ref 37–54)
EOSINOPHIL # BLD AUTO: 0.24 10*3/MM3 (ref 0–0.4)
EOSINOPHIL NFR BLD AUTO: 2.7 % (ref 0.3–6.2)
ERYTHROCYTE [DISTWIDTH] IN BLOOD BY AUTOMATED COUNT: 13.3 % (ref 12.3–15.4)
GFR SERPL CREATININE-BSD FRML MDRD: 48 ML/MIN/1.73
GLOBULIN UR ELPH-MCNC: 2.8 GM/DL
GLUCOSE SERPL-MCNC: 103 MG/DL (ref 65–99)
HCT VFR BLD AUTO: 43.8 % (ref 37.5–51)
HGB BLD-MCNC: 14.9 G/DL (ref 13–17.7)
IMM GRANULOCYTES # BLD AUTO: 0.02 10*3/MM3 (ref 0–0.05)
IMM GRANULOCYTES NFR BLD AUTO: 0.2 % (ref 0–0.5)
LYMPHOCYTES # BLD AUTO: 1.21 10*3/MM3 (ref 0.7–3.1)
LYMPHOCYTES NFR BLD AUTO: 13.8 % (ref 19.6–45.3)
MCH RBC QN AUTO: 28.5 PG (ref 26.6–33)
MCHC RBC AUTO-ENTMCNC: 34 G/DL (ref 31.5–35.7)
MCV RBC AUTO: 83.9 FL (ref 79–97)
MONOCYTES # BLD AUTO: 0.67 10*3/MM3 (ref 0.1–0.9)
MONOCYTES NFR BLD AUTO: 7.6 % (ref 5–12)
NEUTROPHILS NFR BLD AUTO: 6.63 10*3/MM3 (ref 1.7–7)
NEUTROPHILS NFR BLD AUTO: 75.5 % (ref 42.7–76)
NRBC BLD AUTO-RTO: 0 /100 WBC (ref 0–0.2)
PLATELET # BLD AUTO: 227 10*3/MM3 (ref 140–450)
PMV BLD AUTO: 9.3 FL (ref 6–12)
POTASSIUM SERPL-SCNC: 4 MMOL/L (ref 3.5–5.2)
PROT SERPL-MCNC: 7.3 G/DL (ref 6–8.5)
RBC # BLD AUTO: 5.22 10*6/MM3 (ref 4.14–5.8)
SODIUM SERPL-SCNC: 139 MMOL/L (ref 136–145)
TSH SERPL DL<=0.05 MIU/L-ACNC: 2.7 UIU/ML (ref 0.27–4.2)
WBC # BLD AUTO: 8.79 10*3/MM3 (ref 3.4–10.8)

## 2020-07-24 PROCEDURE — 85025 COMPLETE CBC W/AUTO DIFF WBC: CPT | Performed by: NURSE PRACTITIONER

## 2020-07-24 PROCEDURE — 99214 OFFICE O/P EST MOD 30 MIN: CPT | Performed by: NURSE PRACTITIONER

## 2020-07-24 PROCEDURE — 84443 ASSAY THYROID STIM HORMONE: CPT | Performed by: NURSE PRACTITIONER

## 2020-07-24 PROCEDURE — 80053 COMPREHEN METABOLIC PANEL: CPT | Performed by: NURSE PRACTITIONER

## 2020-07-24 PROCEDURE — 36415 COLL VENOUS BLD VENIPUNCTURE: CPT | Performed by: NURSE PRACTITIONER

## 2020-07-24 NOTE — PROGRESS NOTES
Subjective   Chief Complaint   Patient presents with   • Irritable Bowel Syndrome     flare up- started last Saturday        History of Present Illness     53 yo wm presents with cc as above ongoing times a week. Reports diarrhea at 2-4am every night for the last week. Having 20 episodes a night. Last C-scope last year with Dr. Palomares. Denies fever but having chills and hives. Denies recent changes to his medications. Hives start on his hands. Takes Benadryl which helps. Denies blood in his stool or mucus. Notes some bleeding after BMs but tells me its because his skin is just raw. Cramping prior to diarrhea but no abdominal pain. Notes nausea but denies vomiting or abdominal. Denies sick contacts. Has not been around a single positive case of COVID where he works. Denies cough, dyspnea, loss of smell or taste.  Takes a probiotic as well as cholestyramine for his IBS. No recent ABX use or travel outside of the country.      Patient Active Problem List   Diagnosis   • Allergic rhinitis   • Asthma   • Gastroesophageal reflux disease   • Hyperlipidemia   • Irritable bowel syndrome   • Raynaud's phenomenon   • Essential hypertriglyceridemia   • S/P AVR (aortic valve replacement)   • Mechanical heart valve present   • Anticoagulated   • Benign essential HTN   • Benign prostatic hyperplasia with nocturia   • KVNG (obstructive sleep apnea)   • Venous insufficiency       Allergies   Allergen Reactions   • Fish-Derived Products Anaphylaxis   • Ondansetron Rash   • Penicillins Rash       Current Outpatient Medications on File Prior to Visit   Medication Sig Dispense Refill   • albuterol (PROVENTIL HFA;VENTOLIN HFA) 108 (90 BASE) MCG/ACT inhaler Inhale 2 puffs Every 4 (Four) Hours As Needed for wheezing.     • amLODIPine (NORVASC) 5 MG tablet Take 1 tablet by mouth Daily. 90 tablet 3   • cetirizine (ZYRTEC ALLERGY) 10 MG tablet Take 1 tablet by mouth Daily. 30 tablet 3   • cholestyramine (QUESTRAN) 4 G packet Take 1 packet by  mouth 3 (Three) Times a Day With Meals. 90 packet 3   • fenofibrate (TRICOR) 145 MG tablet TAKE 1 TABLET BY MOUTH EVERY DAY 90 tablet 0   • fluticasone (FLONASE) 50 MCG/ACT nasal spray 2 sprays into each nostril Daily. 1 each 3   • metoprolol succinate XL (TOPROL XL) 50 MG 24 hr tablet Take 1 tablet by mouth Daily. 30 tablet 1   • Multiple Vitamins-Minerals (MULTIVITAMIN ADULT PO) Take  by mouth.     • omeprazole (priLOSEC) 40 MG capsule Take 1 capsule by mouth Daily. 90 capsule 1   • pravastatin (PRAVACHOL) 40 MG tablet Take 1 tablet by mouth Daily. 90 tablet 1   • Probiotic Product (PROBIOTIC DAILY PO) Take  by mouth.     • tamsulosin (FLOMAX) 0.4 MG capsule 24 hr capsule Take 1 capsule by mouth Daily. 90 capsule    • warfarin (JANTOVEN) 5 MG tablet TAKE 1 TABLET DAILY OR AS  DIRECTED TO MAINTAIN       INTERNATIONAL NORMALIZED   RATIO 2.5-3.5 90 tablet 3     No current facility-administered medications on file prior to visit.        Past Medical History:   Diagnosis Date   • Aortic stenosis     SEVERE   • Epistaxis    • Gout        Family History   Problem Relation Age of Onset   • Heart failure Mother    • Kidney disease Mother    • Hypertension Mother    • Heart disease Mother    • Alzheimer's disease Father    • Thyroid disease Sister    • Hypertension Sister    • Hypertension Brother        Social History     Socioeconomic History   • Marital status:      Spouse name: Not on file   • Number of children: Not on file   • Years of education: Not on file   • Highest education level: Not on file   Tobacco Use   • Smoking status: Former Smoker     Packs/day: 0.50     Years: 14.00     Pack years: 7.00     Types: Cigarettes   • Smokeless tobacco: Never Used   • Tobacco comment: QUIT AGE 23   Substance and Sexual Activity   • Alcohol use: Yes     Frequency: 2-3 times a week   • Drug use: No   • Sexual activity: Defer       Past Surgical History:   Procedure Laterality Date   • ADENOIDECTOMY     • AORTIC VALVE  "REPAIR/REPLACEMENT  10/05/2016    mechanical valve   • ASCENDING ARCH/HEMIARCH REPLACEMENT N/A 10/5/2016    Procedure: MIDLINE STERNOTOMY, AORTIC VALVE REPLACEMENT, ASCENDING AND HEMIARCH REPLACEMENT, REPAIR OF PERIVALVULAR LEAK, WITH NEURO MONITOIRING, INTRAOPERATIVE WARREN;  Surgeon: Sukhi Wilkinson MD;  Location: [facility] OR;  Service:    • CARDIAC CATHETERIZATION N/A 9/27/2016    Procedure: Coronary angiography;  Surgeon: Lio Roman MD;  Location:  [facility];  Service:    • CARDIAC CATHETERIZATION N/A 9/27/2016    Procedure: Left Heart Cath;  Surgeon: Lio Roman MD;  Location:  [facility];  Service:    • CHOLECYSTECTOMY     • COLONOSCOPY  12/212/2016    Dr. Palomares   • CORONARY ARTERY BYPASS GRAFT           The following portions of the patient's history were reviewed and updated as appropriate: problem list, allergies, current medications, past medical history and past social history.    Review of Systems   Constitution: Positive for night sweats. Negative for chills and fever.   Respiratory: Negative for cough and shortness of breath.    Gastrointestinal: Positive for abdominal pain, diarrhea and nausea. Negative for constipation, hematochezia, melena and vomiting.       Immunization History   Administered Date(s) Administered   • FLUARIX/FLUZONE/AFLURIA/FLULAVAL QUAD 10/19/2019   • Hepatitis A 09/16/2019, 06/18/2020   • Tdap 09/16/2019       Objective   Vitals:    07/24/20 0958 07/24/20 1045   BP:  100/60   Pulse:  100   Resp:  14   Temp: 97.7 °F (36.5 °C)    Weight: 103 kg (227 lb)    Height: 182.9 cm (72\")      Body mass index is 30.79 kg/m².  Physical Exam   Constitutional: He is oriented to person, place, and time. He appears well-developed and well-nourished.   HENT:   Head: Normocephalic and atraumatic.   Cardiovascular: Normal rate, regular rhythm, S1 normal, S2 normal and normal heart sounds.   Pulmonary/Chest: Effort normal and breath sounds " normal.   Abdominal: Soft. Bowel sounds are normal. He exhibits no distension. There is no tenderness.   Neurological: He is alert and oriented to person, place, and time.   Skin: Skin is warm and dry.   Psychiatric: He has a normal mood and affect.   Vitals reviewed.        Assessment/Plan   Hussein was seen today for irritable bowel syndrome.    Diagnoses and all orders for this visit:    Diarrhea, unspecified type  Comments:  No pain on exam. Stat labs and stool studies as below. Also needs COVID testing. No Imodium until stool studies back. LHad C-scope 1 year ago with Dr. Palomares.   Orders:  -     CBC & Differential  -     Comprehensive Metabolic Panel  -     Stool Culture (Reference Lab) - Stool, Per Rectum  -     Fecal Leukocytes - Stool, Per Rectum  -     Ova & Parasite Examination - Stool, Per Rectum  -     Clostridium Difficile Toxin, PCR - Stool, Per Rectum  -     TSH  -     COVID-19,[facility] ROUTINE, NP/OP SWAB IN TRANSPORT MEDIA OR ESWAB 72 HR TAT - Swab, Nasopharynx; Future    Irritable bowel syndrome with both constipation and diarrhea  Comments:  Ongoing for 20 years but with 20 episodes of diarrhea and 9# weight loss concerned this is not an IBS flare.     Suspected COVID-19 virus infection  -     COVID-19,[facility] ROUTINE, NP/OP SWAB IN TRANSPORT MEDIA OR ESWAB 72 HR TAT - Swab, Nasopharynx; Future    Records reviewed include previous OV with Margarita Dickinson as well as labs.       Return for Next scheduled follow up.

## 2020-07-25 PROCEDURE — 87427 SHIGA-LIKE TOXIN AG IA: CPT | Performed by: NURSE PRACTITIONER

## 2020-07-25 PROCEDURE — 87177 OVA AND PARASITES SMEARS: CPT | Performed by: NURSE PRACTITIONER

## 2020-07-25 PROCEDURE — 87045 FECES CULTURE AEROBIC BACT: CPT | Performed by: NURSE PRACTITIONER

## 2020-07-25 PROCEDURE — 87209 SMEAR COMPLEX STAIN: CPT | Performed by: NURSE PRACTITIONER

## 2020-07-25 PROCEDURE — 87493 C DIFF AMPLIFIED PROBE: CPT | Performed by: NURSE PRACTITIONER

## 2020-07-25 PROCEDURE — 87046 STOOL CULTR AEROBIC BACT EA: CPT | Performed by: NURSE PRACTITIONER

## 2020-07-25 PROCEDURE — 87205 SMEAR GRAM STAIN: CPT | Performed by: NURSE PRACTITIONER

## 2020-07-28 DIAGNOSIS — A04.72 CLOSTRIDIOIDES DIFFICILE DIARRHEA: ICD-10-CM

## 2020-07-28 DIAGNOSIS — A04.72 CLOSTRIDIOIDES DIFFICILE DIARRHEA: Primary | ICD-10-CM

## 2020-07-28 LAB
C DIFF TOX GENS STL QL NAA+PROBE: POSITIVE
Lab: NORMAL
O+P SPEC MICRO: NORMAL
OVA + PARASITE RESULT 1: NORMAL
WBC STL QL MICRO: NORMAL

## 2020-07-28 RX ORDER — VANCOMYCIN HYDROCHLORIDE 125 MG/1
125 CAPSULE ORAL 4 TIMES DAILY
Qty: 40 CAPSULE | Refills: 0 | Status: SHIPPED | OUTPATIENT
Start: 2020-07-28 | End: 2020-12-18

## 2020-07-28 RX ORDER — VANCOMYCIN HYDROCHLORIDE 125 MG/1
125 CAPSULE ORAL 4 TIMES DAILY
Qty: 40 CAPSULE | Refills: 0 | Status: SHIPPED | OUTPATIENT
Start: 2020-07-28 | End: 2020-07-28 | Stop reason: SDUPTHER

## 2020-07-31 LAB
CAMPYLOBACTER STL CULT: NORMAL
E COLI SXT STL QL IA: NEGATIVE
Lab: NORMAL
Lab: NORMAL
SALM + SHIG STL CULT: NORMAL

## 2020-08-04 ENCOUNTER — TELEMEDICINE (OUTPATIENT)
Dept: INTERNAL MEDICINE | Facility: CLINIC | Age: 55
End: 2020-08-04

## 2020-08-04 DIAGNOSIS — A04.72 C. DIFFICILE DIARRHEA: Primary | ICD-10-CM

## 2020-08-04 PROCEDURE — 99213 OFFICE O/P EST LOW 20 MIN: CPT | Performed by: PHYSICIAN ASSISTANT

## 2020-08-04 NOTE — PROGRESS NOTES
Subjective   Chief Complaint   Patient presents with   • Diarrhea     c diff infection       History of Present Illness via video visit    Pt is here today to fup on c diff. He saw YOLY Elliott, on 7/24 with diarrhea, stool studies were obtained. He was positive for c diff and started on Vancomycin due to warfarin interaction with flagyl. He has about 5 days left of the antibiotic. He is having 2-3 loose stools per day and abdominal pain has subsided since starting the Vanc. He is feeling much better.        Patient Active Problem List   Diagnosis   • Allergic rhinitis   • Asthma   • Gastroesophageal reflux disease   • Hyperlipidemia   • Irritable bowel syndrome   • Raynaud's phenomenon   • Essential hypertriglyceridemia   • S/P AVR (aortic valve replacement)   • Mechanical heart valve present   • Anticoagulated   • Benign essential HTN   • Benign prostatic hyperplasia with nocturia   • KVNG (obstructive sleep apnea)   • Venous insufficiency       Allergies   Allergen Reactions   • Fish-Derived Products Anaphylaxis   • Ondansetron Rash   • Penicillins Rash       Current Outpatient Medications on File Prior to Visit   Medication Sig Dispense Refill   • albuterol (PROVENTIL HFA;VENTOLIN HFA) 108 (90 BASE) MCG/ACT inhaler Inhale 2 puffs Every 4 (Four) Hours As Needed for wheezing.     • amLODIPine (NORVASC) 5 MG tablet Take 1 tablet by mouth Daily. 90 tablet 3   • cetirizine (ZYRTEC ALLERGY) 10 MG tablet Take 1 tablet by mouth Daily. 30 tablet 3   • cholestyramine (QUESTRAN) 4 G packet Take 1 packet by mouth 3 (Three) Times a Day With Meals. 90 packet 3   • fenofibrate (TRICOR) 145 MG tablet TAKE 1 TABLET BY MOUTH EVERY DAY 90 tablet 0   • fluticasone (FLONASE) 50 MCG/ACT nasal spray 2 sprays into each nostril Daily. 1 each 3   • metoprolol succinate XL (TOPROL XL) 50 MG 24 hr tablet Take 1 tablet by mouth Daily. 30 tablet 1   • Multiple Vitamins-Minerals (MULTIVITAMIN ADULT PO) Take  by mouth.     • omeprazole  (priLOSEC) 40 MG capsule Take 1 capsule by mouth Daily. 90 capsule 1   • pravastatin (PRAVACHOL) 40 MG tablet Take 1 tablet by mouth Daily. 90 tablet 1   • Probiotic Product (PROBIOTIC DAILY PO) Take  by mouth.     • tamsulosin (FLOMAX) 0.4 MG capsule 24 hr capsule Take 1 capsule by mouth Daily. 90 capsule    • vancomycin (VANCOCIN) 125 MG capsule Take 1 capsule by mouth 4 (Four) Times a Day. 40 capsule 0   • warfarin (JANTOVEN) 5 MG tablet TAKE 1 TABLET DAILY OR AS  DIRECTED TO MAINTAIN       INTERNATIONAL NORMALIZED   RATIO 2.5-3.5 90 tablet 3     No current facility-administered medications on file prior to visit.        Past Medical History:   Diagnosis Date   • Aortic stenosis     SEVERE   • Epistaxis    • Gout        Family History   Problem Relation Age of Onset   • Heart failure Mother    • Kidney disease Mother    • Hypertension Mother    • Heart disease Mother    • Alzheimer's disease Father    • Thyroid disease Sister    • Hypertension Sister    • Hypertension Brother        Social History     Socioeconomic History   • Marital status:      Spouse name: Not on file   • Number of children: Not on file   • Years of education: Not on file   • Highest education level: Not on file   Tobacco Use   • Smoking status: Former Smoker     Packs/day: 0.50     Years: 14.00     Pack years: 7.00     Types: Cigarettes   • Smokeless tobacco: Never Used   • Tobacco comment: QUIT AGE 23   Substance and Sexual Activity   • Alcohol use: Yes     Frequency: 2-3 times a week   • Drug use: No   • Sexual activity: Defer       Past Surgical History:   Procedure Laterality Date   • ADENOIDECTOMY     • AORTIC VALVE REPAIR/REPLACEMENT  10/05/2016    mechanical valve   • ASCENDING ARCH/HEMIARCH REPLACEMENT N/A 10/5/2016    Procedure: MIDLINE STERNOTOMY, AORTIC VALVE REPLACEMENT, ASCENDING AND HEMIARCH REPLACEMENT, REPAIR OF PERIVALVULAR LEAK, WITH NEURO MONITOIRING, INTRAOPERATIVE WARREN;  Surgeon: Sukhi Wilkinson MD;  Location:   [facility];  Service:    • CARDIAC CATHETERIZATION N/A 9/27/2016    Procedure: Coronary angiography;  Surgeon: Lio Roman MD;  Location:  [facility];  Service:    • CARDIAC CATHETERIZATION N/A 9/27/2016    Procedure: Left Heart Cath;  Surgeon: Lio Roman MD;  Location:  [facility];  Service:    • CHOLECYSTECTOMY     • COLONOSCOPY  12/212/2016    Dr. Palomares   • CORONARY ARTERY BYPASS GRAFT           The following portions of the patient's history were reviewed and updated as appropriate: problem list, allergies, current medications, past medical history, past family history, past social history and past surgical history.    Review of Systems   Constitution: Negative for chills and fever.   Gastrointestinal: Negative for bloating and abdominal pain.        Loose stools       Immunization History   Administered Date(s) Administered   • FLUARIX/FLUZONE/AFLURIA/FLULAVAL QUAD 10/19/2019   • Hepatitis A 09/16/2019, 06/18/2020   • Tdap 09/16/2019       Objective   There were no vitals filed for this visit.  There is no height or weight on file to calculate BMI.  Physical Exam   Constitutional: He appears well-developed and well-nourished.   HENT:   Head: Normocephalic and atraumatic.   Psychiatric: He has a normal mood and affect. His behavior is normal. Judgment and thought content normal.     Assessment/Plan   Hussein was seen today for diarrhea.    Diagnoses and all orders for this visit:    C. difficile diarrhea  -     Ambulatory Referral to Gastroenterology    Continue Vancomycin to completion, he is doing much better. He has no pain at this time. Urged if his symptoms worsen or pain returns to please call. I am going to refer him back to Dr. Palomares, his gastroenterologist for further evaluation.     Total time of encounter 10 minutes.

## 2020-08-12 ENCOUNTER — LAB (OUTPATIENT)
Dept: LAB | Facility: HOSPITAL | Age: 55
End: 2020-08-12

## 2020-08-12 ENCOUNTER — ANTICOAGULATION VISIT (OUTPATIENT)
Dept: CARDIOLOGY | Facility: CLINIC | Age: 55
End: 2020-08-12

## 2020-08-12 DIAGNOSIS — Z79.01 ANTICOAGULATED: ICD-10-CM

## 2020-08-12 LAB
INR PPP: 4.82 (ref 0.9–1.1)
PROTHROMBIN TIME: 43.2 SECONDS (ref 11.7–14.2)

## 2020-08-12 PROCEDURE — 85610 PROTHROMBIN TIME: CPT

## 2020-08-12 PROCEDURE — 36415 COLL VENOUS BLD VENIPUNCTURE: CPT

## 2020-08-19 ENCOUNTER — LAB (OUTPATIENT)
Dept: LAB | Facility: HOSPITAL | Age: 55
End: 2020-08-19

## 2020-08-19 ENCOUNTER — ANTICOAGULATION VISIT (OUTPATIENT)
Dept: CARDIOLOGY | Facility: CLINIC | Age: 55
End: 2020-08-19

## 2020-08-19 DIAGNOSIS — Z79.01 ANTICOAGULATED: ICD-10-CM

## 2020-08-19 LAB
INR PPP: 2.79 (ref 0.9–1.1)
PROTHROMBIN TIME: 28.5 SECONDS (ref 11.7–14.2)

## 2020-08-19 PROCEDURE — 36415 COLL VENOUS BLD VENIPUNCTURE: CPT

## 2020-08-19 PROCEDURE — 85610 PROTHROMBIN TIME: CPT

## 2020-08-26 ENCOUNTER — LAB (OUTPATIENT)
Dept: LAB | Facility: HOSPITAL | Age: 55
End: 2020-08-26

## 2020-08-26 DIAGNOSIS — Z79.01 ANTICOAGULATED: ICD-10-CM

## 2020-08-26 LAB
INR PPP: 3.61 (ref 0.9–1.1)
PROTHROMBIN TIME: 34.6 SECONDS (ref 11.7–14.2)

## 2020-08-26 PROCEDURE — 85610 PROTHROMBIN TIME: CPT

## 2020-08-26 PROCEDURE — 36415 COLL VENOUS BLD VENIPUNCTURE: CPT

## 2020-08-27 ENCOUNTER — ANTICOAGULATION VISIT (OUTPATIENT)
Dept: CARDIOLOGY | Facility: CLINIC | Age: 55
End: 2020-08-27

## 2020-09-01 DIAGNOSIS — E78.2 MIXED HYPERLIPIDEMIA: ICD-10-CM

## 2020-09-01 DIAGNOSIS — K21.9 GASTROESOPHAGEAL REFLUX DISEASE WITHOUT ESOPHAGITIS: ICD-10-CM

## 2020-09-02 RX ORDER — OMEPRAZOLE 40 MG/1
CAPSULE, DELAYED RELEASE ORAL
Qty: 90 CAPSULE | Refills: 1 | Status: SHIPPED | OUTPATIENT
Start: 2020-09-02 | End: 2021-03-22 | Stop reason: SDUPTHER

## 2020-09-02 RX ORDER — PRAVASTATIN SODIUM 40 MG
TABLET ORAL
Qty: 90 TABLET | Refills: 1 | Status: SHIPPED | OUTPATIENT
Start: 2020-09-02 | End: 2021-04-20 | Stop reason: SDUPTHER

## 2020-09-03 ENCOUNTER — ANTICOAGULATION VISIT (OUTPATIENT)
Dept: CARDIOLOGY | Facility: CLINIC | Age: 55
End: 2020-09-03

## 2020-09-03 ENCOUNTER — LAB (OUTPATIENT)
Dept: LAB | Facility: HOSPITAL | Age: 55
End: 2020-09-03

## 2020-09-03 DIAGNOSIS — Z79.01 ANTICOAGULATED: ICD-10-CM

## 2020-09-03 LAB
INR PPP: 3.37 (ref 0.9–1.1)
PROTHROMBIN TIME: 32.9 SECONDS (ref 11.7–14.2)

## 2020-09-03 PROCEDURE — 85610 PROTHROMBIN TIME: CPT

## 2020-09-03 PROCEDURE — 36415 COLL VENOUS BLD VENIPUNCTURE: CPT

## 2020-09-04 DIAGNOSIS — N40.1 BENIGN PROSTATIC HYPERPLASIA WITH NOCTURIA: ICD-10-CM

## 2020-09-04 DIAGNOSIS — R35.1 BENIGN PROSTATIC HYPERPLASIA WITH NOCTURIA: ICD-10-CM

## 2020-09-04 RX ORDER — TAMSULOSIN HYDROCHLORIDE 0.4 MG/1
1 CAPSULE ORAL DAILY
Qty: 90 CAPSULE
Start: 2020-09-04 | End: 2020-11-04 | Stop reason: SDUPTHER

## 2020-09-16 ENCOUNTER — ANTICOAGULATION VISIT (OUTPATIENT)
Dept: CARDIOLOGY | Facility: CLINIC | Age: 55
End: 2020-09-16

## 2020-09-16 ENCOUNTER — LAB (OUTPATIENT)
Dept: LAB | Facility: HOSPITAL | Age: 55
End: 2020-09-16

## 2020-09-16 DIAGNOSIS — Z79.01 ANTICOAGULATED: ICD-10-CM

## 2020-09-16 LAB
INR PPP: 3.3
INR PPP: 3.32 (ref 0.9–1.1)
PROTHROMBIN TIME: 32.5 SECONDS (ref 11.7–14.2)

## 2020-09-16 PROCEDURE — 85610 PROTHROMBIN TIME: CPT

## 2020-09-16 PROCEDURE — 36415 COLL VENOUS BLD VENIPUNCTURE: CPT

## 2020-10-01 ENCOUNTER — LAB (OUTPATIENT)
Dept: LAB | Facility: HOSPITAL | Age: 55
End: 2020-10-01

## 2020-10-01 DIAGNOSIS — Z79.01 ANTICOAGULATED: ICD-10-CM

## 2020-10-01 LAB
INR PPP: 3.75 (ref 0.9–1.1)
PROTHROMBIN TIME: 35.6 SECONDS (ref 11.7–14.2)

## 2020-10-01 PROCEDURE — 85610 PROTHROMBIN TIME: CPT

## 2020-10-01 PROCEDURE — 36415 COLL VENOUS BLD VENIPUNCTURE: CPT

## 2020-10-02 ENCOUNTER — ANTICOAGULATION VISIT (OUTPATIENT)
Dept: CARDIOLOGY | Facility: CLINIC | Age: 55
End: 2020-10-02

## 2020-10-06 ENCOUNTER — HOSPITAL ENCOUNTER (OUTPATIENT)
Dept: CARDIOLOGY | Facility: HOSPITAL | Age: 55
Discharge: HOME | End: 2020-10-06
Admitting: INTERNAL MEDICINE

## 2020-10-06 PROCEDURE — 85610 PROTHROMBIN TIME: CPT | Performed by: INTERNAL MEDICINE

## 2020-10-13 ENCOUNTER — ANTICOAGULATION VISIT (OUTPATIENT)
Dept: CARDIOLOGY | Facility: CLINIC | Age: 55
End: 2020-10-13

## 2020-10-13 ENCOUNTER — LAB (OUTPATIENT)
Dept: LAB | Facility: HOSPITAL | Age: 55
End: 2020-10-13

## 2020-10-13 DIAGNOSIS — Z79.01 ANTICOAGULATED: ICD-10-CM

## 2020-10-13 DIAGNOSIS — E78.2 MIXED HYPERLIPIDEMIA: ICD-10-CM

## 2020-10-13 LAB
INR PPP: 3.5
INR PPP: 3.58 (ref 0.9–1.1)
PROTHROMBIN TIME: 34.4 SECONDS (ref 11.7–14.2)

## 2020-10-13 PROCEDURE — 36415 COLL VENOUS BLD VENIPUNCTURE: CPT

## 2020-10-13 PROCEDURE — 85610 PROTHROMBIN TIME: CPT

## 2020-10-13 RX ORDER — FENOFIBRATE 145 MG/1
TABLET, COATED ORAL
Qty: 90 TABLET | Refills: 0 | Status: SHIPPED | OUTPATIENT
Start: 2020-10-13 | End: 2020-11-04 | Stop reason: SDUPTHER

## 2020-10-20 ENCOUNTER — TELEPHONE (OUTPATIENT)
Dept: INTERNAL MEDICINE | Facility: CLINIC | Age: 55
End: 2020-10-20

## 2020-10-20 NOTE — TELEPHONE ENCOUNTER
Patient called regarding the forms that were dropped off last week, he would like the status of them please.  He can be reached at [phone].  Thank you

## 2020-11-04 DIAGNOSIS — R35.1 BENIGN PROSTATIC HYPERPLASIA WITH NOCTURIA: ICD-10-CM

## 2020-11-04 DIAGNOSIS — N40.1 BENIGN PROSTATIC HYPERPLASIA WITH NOCTURIA: ICD-10-CM

## 2020-11-04 DIAGNOSIS — E78.2 MIXED HYPERLIPIDEMIA: ICD-10-CM

## 2020-11-04 RX ORDER — TAMSULOSIN HYDROCHLORIDE 0.4 MG/1
1 CAPSULE ORAL DAILY
Qty: 90 CAPSULE
Start: 2020-11-04 | End: 2020-12-18 | Stop reason: SDUPTHER

## 2020-11-04 RX ORDER — FENOFIBRATE 145 MG/1
145 TABLET, COATED ORAL DAILY
Qty: 90 TABLET | Refills: 0 | Status: SHIPPED | OUTPATIENT
Start: 2020-11-04 | End: 2020-12-24

## 2020-11-10 ENCOUNTER — TELEPHONE (OUTPATIENT)
Dept: INTERNAL MEDICINE | Facility: CLINIC | Age: 55
End: 2020-11-10

## 2020-11-17 ENCOUNTER — TRANSCRIBE ORDERS (OUTPATIENT)
Dept: ADMINISTRATIVE | Facility: HOSPITAL | Age: 55
End: 2020-11-17

## 2020-11-17 ENCOUNTER — LAB (OUTPATIENT)
Dept: LAB | Facility: HOSPITAL | Age: 55
End: 2020-11-17

## 2020-11-17 ENCOUNTER — ANTICOAGULATION VISIT (OUTPATIENT)
Dept: CARDIOLOGY | Facility: CLINIC | Age: 55
End: 2020-11-17

## 2020-11-17 DIAGNOSIS — Z79.01 ANTICOAGULATED: Primary | ICD-10-CM

## 2020-11-17 DIAGNOSIS — Z79.01 ANTICOAGULATED: ICD-10-CM

## 2020-11-17 LAB
INR PPP: 4.19
INR PPP: 4.19 (ref 0.9–1.1)
PROTHROMBIN TIME: 40.1 SECONDS (ref 11.7–14.2)

## 2020-11-17 PROCEDURE — 85610 PROTHROMBIN TIME: CPT

## 2020-11-17 PROCEDURE — 36415 COLL VENOUS BLD VENIPUNCTURE: CPT

## 2020-11-18 ENCOUNTER — RESULTS ENCOUNTER (OUTPATIENT)
Dept: INTERNAL MEDICINE | Facility: CLINIC | Age: 55
End: 2020-11-18

## 2020-11-18 DIAGNOSIS — Z12.5 SCREENING PSA (PROSTATE SPECIFIC ANTIGEN): ICD-10-CM

## 2020-11-18 DIAGNOSIS — I10 BENIGN ESSENTIAL HTN: ICD-10-CM

## 2020-11-20 ENCOUNTER — LAB (OUTPATIENT)
Dept: LAB | Facility: HOSPITAL | Age: 55
End: 2020-11-20

## 2020-11-20 DIAGNOSIS — Z79.01 ANTICOAGULATED: ICD-10-CM

## 2020-11-20 LAB
INR PPP: 2.5
INR PPP: 2.55 (ref 0.9–1.1)
PROTHROMBIN TIME: 27.2 SECONDS (ref 11.7–14.2)

## 2020-11-20 PROCEDURE — 36415 COLL VENOUS BLD VENIPUNCTURE: CPT

## 2020-11-20 PROCEDURE — 85610 PROTHROMBIN TIME: CPT

## 2020-11-23 ENCOUNTER — ANTICOAGULATION VISIT (OUTPATIENT)
Dept: CARDIOLOGY | Facility: CLINIC | Age: 55
End: 2020-11-23

## 2020-12-01 ENCOUNTER — TELEPHONE (OUTPATIENT)
Dept: INTERNAL MEDICINE | Facility: CLINIC | Age: 55
End: 2020-12-01

## 2020-12-01 DIAGNOSIS — J45.20 MILD INTERMITTENT ASTHMA WITHOUT COMPLICATION: Primary | ICD-10-CM

## 2020-12-01 RX ORDER — BUDESONIDE AND FORMOTEROL FUMARATE DIHYDRATE 80; 4.5 UG/1; UG/1
2 AEROSOL RESPIRATORY (INHALATION)
Qty: 10.2 INHALER | Refills: 12 | Status: SHIPPED | OUTPATIENT
Start: 2020-12-01 | End: 2020-12-18

## 2020-12-01 NOTE — TELEPHONE ENCOUNTER
PATIENT CALLED TO SET UP APPOINTMENT ALREADY WAS SCHEDULED FOR LABS ON DEC 11 AND OFFICE VISIT ON DEC 18.    PATIENT WAS REQUESTING A COUGH MEDICATION TO ALLEVIATE THE RESIDUAL COUGH FROM HAVING COVID AT THE BEGINNING OF NOV.     PATIENT ALSO STATED THAT RECENT MEDICATION FOR FREQUENT URINATION IS NOT WORKING.  PATIENT WOULD LIKE TO SEE IF SOMETHING ELSE COULD BE CALLED IN TO TRY UNTIL HIS NEXT APPOINTMENT.    [facility]/pharmacy #[id number] - [place], [place] - [address] AT [facility] [phone] [facility] [phone] FX    PLEASE ADVISE  [phone]

## 2020-12-01 NOTE — TELEPHONE ENCOUNTER
Patient has apt scheduled for 12/11,  Do you want to send something else in until then or does he need to wait until appointment    We have left many messages informing patient he needs to schedule appointment in regards to this

## 2020-12-01 NOTE — TELEPHONE ENCOUNTER
I am going to start him on Symbicort to use 2 puffs twice a day. Please let him know to make sure to rinse his mouth after using so he does not develop thrush. I want him to do this daily. We will talk about increasing his Tamsulosin dose at his fup as I need to check his BP prior to increasing the dose.

## 2020-12-08 ENCOUNTER — TRANSCRIBE ORDERS (OUTPATIENT)
Dept: ADMINISTRATIVE | Facility: HOSPITAL | Age: 55
End: 2020-12-08

## 2020-12-08 ENCOUNTER — ANTICOAGULATION VISIT (OUTPATIENT)
Dept: CARDIOLOGY | Facility: CLINIC | Age: 55
End: 2020-12-08

## 2020-12-08 ENCOUNTER — LAB (OUTPATIENT)
Dept: LAB | Facility: HOSPITAL | Age: 55
End: 2020-12-08

## 2020-12-08 DIAGNOSIS — Z79.01 LONG TERM (CURRENT) USE OF ANTICOAGULANTS: Primary | ICD-10-CM

## 2020-12-08 DIAGNOSIS — Z79.01 LONG TERM (CURRENT) USE OF ANTICOAGULANTS: ICD-10-CM

## 2020-12-08 LAB
INR PPP: 4.3
INR PPP: 4.34 (ref 0.9–1.1)
PROTHROMBIN TIME: 41.3 SECONDS (ref 11.7–14.2)

## 2020-12-08 PROCEDURE — 85610 PROTHROMBIN TIME: CPT

## 2020-12-08 PROCEDURE — 36415 COLL VENOUS BLD VENIPUNCTURE: CPT

## 2020-12-11 LAB
ALBUMIN SERPL-MCNC: 4.4 G/DL (ref 3.5–5.2)
ALBUMIN/GLOB SERPL: 2 G/DL
ALP SERPL-CCNC: 50 U/L (ref 39–117)
ALT SERPL-CCNC: 45 U/L (ref 1–41)
AST SERPL-CCNC: 51 U/L (ref 1–40)
BILIRUB SERPL-MCNC: 0.6 MG/DL (ref 0–1.2)
BUN SERPL-MCNC: 15 MG/DL (ref 6–20)
BUN/CREAT SERPL: 12.5 (ref 7–25)
CALCIUM SERPL-MCNC: 8.9 MG/DL (ref 8.6–10.5)
CHLORIDE SERPL-SCNC: 98 MMOL/L (ref 98–107)
CHOLEST SERPL-MCNC: 149 MG/DL (ref 0–200)
CHOLEST/HDLC SERPL: 6.77 {RATIO}
CO2 SERPL-SCNC: 28 MMOL/L (ref 22–29)
CREAT SERPL-MCNC: 1.2 MG/DL (ref 0.76–1.27)
GLOBULIN SER CALC-MCNC: 2.2 GM/DL
GLUCOSE SERPL-MCNC: 79 MG/DL (ref 65–99)
HDLC SERPL-MCNC: 22 MG/DL (ref 40–60)
LDLC SERPL CALC-MCNC: 75 MG/DL (ref 0–100)
POTASSIUM SERPL-SCNC: 3.9 MMOL/L (ref 3.5–5.2)
PROT SERPL-MCNC: 6.6 G/DL (ref 6–8.5)
PSA SERPL-MCNC: 0.34 NG/ML (ref 0–4)
SODIUM SERPL-SCNC: 132 MMOL/L (ref 136–145)
TRIGL SERPL-MCNC: 323 MG/DL (ref 0–150)
VLDLC SERPL CALC-MCNC: 52 MG/DL (ref 5–40)

## 2020-12-18 ENCOUNTER — LAB (OUTPATIENT)
Dept: LAB | Facility: HOSPITAL | Age: 55
End: 2020-12-18

## 2020-12-18 ENCOUNTER — TRANSCRIBE ORDERS (OUTPATIENT)
Dept: CARDIOLOGY | Facility: CLINIC | Age: 55
End: 2020-12-18

## 2020-12-18 ENCOUNTER — OFFICE VISIT (OUTPATIENT)
Dept: INTERNAL MEDICINE | Facility: CLINIC | Age: 55
End: 2020-12-18

## 2020-12-18 ENCOUNTER — ANTICOAGULATION VISIT (OUTPATIENT)
Dept: CARDIOLOGY | Facility: CLINIC | Age: 55
End: 2020-12-18

## 2020-12-18 VITALS
BODY MASS INDEX: 32.78 KG/M2 | DIASTOLIC BLOOD PRESSURE: 78 MMHG | SYSTOLIC BLOOD PRESSURE: 119 MMHG | WEIGHT: 242 LBS | HEIGHT: 72 IN | TEMPERATURE: 97.8 F

## 2020-12-18 DIAGNOSIS — I10 BENIGN ESSENTIAL HTN: ICD-10-CM

## 2020-12-18 DIAGNOSIS — E78.2 MIXED HYPERLIPIDEMIA: Primary | ICD-10-CM

## 2020-12-18 DIAGNOSIS — G47.33 OSA (OBSTRUCTIVE SLEEP APNEA): ICD-10-CM

## 2020-12-18 DIAGNOSIS — N40.1 BENIGN PROSTATIC HYPERPLASIA WITH NOCTURIA: ICD-10-CM

## 2020-12-18 DIAGNOSIS — Z23 NEED FOR PNEUMOCOCCAL VACCINATION: ICD-10-CM

## 2020-12-18 DIAGNOSIS — Z79.01 LONG TERM (CURRENT) USE OF ANTICOAGULANTS: ICD-10-CM

## 2020-12-18 DIAGNOSIS — M25.561 ACUTE PAIN OF BOTH KNEES: ICD-10-CM

## 2020-12-18 DIAGNOSIS — E78.1 ESSENTIAL HYPERTRIGLYCERIDEMIA: ICD-10-CM

## 2020-12-18 DIAGNOSIS — M25.562 ACUTE PAIN OF BOTH KNEES: ICD-10-CM

## 2020-12-18 DIAGNOSIS — Z79.01 LONG TERM (CURRENT) USE OF ANTICOAGULANTS: Primary | ICD-10-CM

## 2020-12-18 DIAGNOSIS — R35.1 BENIGN PROSTATIC HYPERPLASIA WITH NOCTURIA: ICD-10-CM

## 2020-12-18 LAB
INR PPP: 2.9
INR PPP: 2.96 (ref 0.9–1.1)
PROTHROMBIN TIME: 30.5 SECONDS (ref 11.7–14.2)

## 2020-12-18 PROCEDURE — 36415 COLL VENOUS BLD VENIPUNCTURE: CPT

## 2020-12-18 PROCEDURE — 99386 PREV VISIT NEW AGE 40-64: CPT | Performed by: PHYSICIAN ASSISTANT

## 2020-12-18 PROCEDURE — 85610 PROTHROMBIN TIME: CPT

## 2020-12-18 PROCEDURE — 90732 PPSV23 VACC 2 YRS+ SUBQ/IM: CPT | Performed by: PHYSICIAN ASSISTANT

## 2020-12-18 PROCEDURE — 90471 IMMUNIZATION ADMIN: CPT | Performed by: PHYSICIAN ASSISTANT

## 2020-12-18 RX ORDER — TAMSULOSIN HYDROCHLORIDE 0.4 MG/1
1 CAPSULE ORAL 2 TIMES DAILY
Qty: 90 CAPSULE
Start: 2020-12-18 | End: 2021-01-28 | Stop reason: SDUPTHER

## 2020-12-18 NOTE — PROGRESS NOTES
Subjective   Chief Complaint   Patient presents with   • Annual Exam       History of Present Illness     Pt is here today for CPE. He was started on tamsulosin for BPH has not noticed any improvement since starting it. No lightheadedness or dizziness. Cannot take fish products due to anaphylaxis to fish.     He has had bilateral knee pain. Right sided knee pain with going up and down stairs, has been worsening.     Left knee pain with some discomfort that comes and goes that is not affected by going up and down stairs. He has one spot that is tender to touch.     GERD sx are well controlled.     His breathing has improved drastically since COVID.      Patient Active Problem List   Diagnosis   • Allergic rhinitis   • Asthma   • Gastroesophageal reflux disease   • Hyperlipidemia   • Irritable bowel syndrome   • Raynaud's phenomenon   • Essential hypertriglyceridemia   • S/P AVR (aortic valve replacement)   • Mechanical heart valve present   • Anticoagulated   • Benign essential HTN   • Benign prostatic hyperplasia with nocturia   • KVNG (obstructive sleep apnea)   • Venous insufficiency       Allergies   Allergen Reactions   • Fish-Derived Products Anaphylaxis   • Ondansetron Rash   • Penicillins Rash       Current Outpatient Medications on File Prior to Visit   Medication Sig Dispense Refill   • albuterol (PROVENTIL HFA;VENTOLIN HFA) 108 (90 BASE) MCG/ACT inhaler Inhale 2 puffs Every 4 (Four) Hours As Needed for wheezing.     • amLODIPine (NORVASC) 5 MG tablet Take 1 tablet by mouth Daily. 90 tablet 3   • fenofibrate (TRICOR) 145 MG tablet Take 1 tablet by mouth Daily. 90 tablet 0   • fluticasone (FLONASE) 50 MCG/ACT nasal spray 2 sprays into each nostril Daily. 1 each 3   • omeprazole (priLOSEC) 40 MG capsule TAKE 1 CAPSULE DAILY 90 capsule 1   • pravastatin (PRAVACHOL) 40 MG tablet TAKE 1 TABLET DAILY.       PATIENT WILL NEED TO FIND  ANOTHER PCP FOR FURTHER    REFILLS 90 tablet 1   • Probiotic Product (PROBIOTIC  DAILY PO) Take  by mouth.     • warfarin (JANTOVEN) 5 MG tablet TAKE 1 TABLET DAILY OR AS  DIRECTED TO MAINTAIN       INTERNATIONAL NORMALIZED   RATIO 2.5-3.5 90 tablet 3   • [DISCONTINUED] tamsulosin (FLOMAX) 0.4 MG capsule 24 hr capsule Take 1 capsule by mouth Daily. 90 capsule    • [DISCONTINUED] budesonide-formoterol (Symbicort) 80-4.5 MCG/ACT inhaler Inhale 2 puffs 2 (Two) Times a Day. 10.2 inhaler 12   • [DISCONTINUED] cetirizine (ZYRTEC ALLERGY) 10 MG tablet Take 1 tablet by mouth Daily. 30 tablet 3   • [DISCONTINUED] cholestyramine (QUESTRAN) 4 G packet Take 1 packet by mouth 3 (Three) Times a Day With Meals. 90 packet 3   • [DISCONTINUED] metoprolol succinate XL (TOPROL XL) 50 MG 24 hr tablet Take 1 tablet by mouth Daily. 30 tablet 1   • [DISCONTINUED] Multiple Vitamins-Minerals (MULTIVITAMIN ADULT PO) Take  by mouth.     • [DISCONTINUED] vancomycin (VANCOCIN) 125 MG capsule Take 1 capsule by mouth 4 (Four) Times a Day. 40 capsule 0     No current facility-administered medications on file prior to visit.        Past Medical History:   Diagnosis Date   • Aortic stenosis     SEVERE   • Epistaxis    • Gout        Family History   Problem Relation Age of Onset   • Heart failure Mother    • Kidney disease Mother    • Hypertension Mother    • Heart disease Mother    • Alzheimer's disease Father    • Thyroid disease Sister    • Hypertension Sister    • Hypertension Brother        Social History     Socioeconomic History   • Marital status:      Spouse name: Not on file   • Number of children: Not on file   • Years of education: Not on file   • Highest education level: Not on file   Tobacco Use   • Smoking status: Former Smoker     Packs/day: 0.50     Years: 14.00     Pack years: 7.00     Types: Cigarettes   • Smokeless tobacco: Never Used   • Tobacco comment: QUIT AGE 23   Substance and Sexual Activity   • Alcohol use: Yes     Frequency: 2-3 times a week   • Drug use: No   • Sexual activity: Defer        Past Surgical History:   Procedure Laterality Date   • ADENOIDECTOMY     • AORTIC VALVE REPAIR/REPLACEMENT  10/05/2016    mechanical valve   • ASCENDING ARCH/HEMIARCH REPLACEMENT N/A 10/5/2016    Procedure: MIDLINE STERNOTOMY, AORTIC VALVE REPLACEMENT, ASCENDING AND HEMIARCH REPLACEMENT, REPAIR OF PERIVALVULAR LEAK, WITH NEURO MONITOIRING, INTRAOPERATIVE WARREN;  Surgeon: Sukhi Wilkinson MD;  Location: [facility] OR;  Service:    • CARDIAC CATHETERIZATION N/A 9/27/2016    Procedure: Coronary angiography;  Surgeon: Lio Roman MD;  Location:  [facility];  Service:    • CARDIAC CATHETERIZATION N/A 9/27/2016    Procedure: Left Heart Cath;  Surgeon: Lio Roman MD;  Location:  [facility];  Service:    • CHOLECYSTECTOMY     • COLONOSCOPY  12/212/2016    Dr. Palomares   • CORONARY ARTERY BYPASS GRAFT           The following portions of the patient's history were reviewed and updated as appropriate: problem list, allergies, current medications, past medical history, past family history, past social history and past surgical history.    Review of Systems   Constitution: Negative for chills, decreased appetite, fever, weight gain and weight loss.   HENT: Negative for congestion, ear pain, hearing loss, hoarse voice and sore throat.    Eyes: Negative for blurred vision and double vision.   Cardiovascular: Negative for chest pain, dyspnea on exertion, irregular heartbeat, leg swelling and palpitations.   Respiratory: Negative for cough, shortness of breath, snoring and wheezing.    Endocrine: Negative for polydipsia and polyuria.   Skin: Negative for rash and suspicious lesions.   Musculoskeletal: Negative for joint pain, joint swelling, muscle cramps and stiffness.   Gastrointestinal: Negative for abdominal pain, change in bowel habit, constipation, diarrhea, heartburn, hematochezia, melena, nausea and vomiting.   Genitourinary: Negative for bladder incontinence,  "decreased libido, hesitancy, incomplete emptying and nocturia.   Neurological: Negative for dizziness, headaches, light-headedness, numbness, paresthesias and tremors.   Psychiatric/Behavioral: Negative for depression, memory loss and suicidal ideas. The patient does not have insomnia and is not nervous/anxious.        Immunization History   Administered Date(s) Administered   • Flulaval/Fluarix/Fluzone Quad 10/19/2019, 10/10/2020   • Hepatitis A 09/16/2019, 06/18/2020   • Pneumococcal Polysaccharide (PPSV23) 12/18/2020   • Tdap 09/16/2019       Objective   Vitals:    12/18/20 0800 12/18/20 0822   BP:  119/78   Temp: 97.8 °F (36.6 °C)    Weight: 110 kg (242 lb)    Height: 182.9 cm (72\")      Body mass index is 32.82 kg/m².  Physical Exam  Vitals signs reviewed.   Constitutional:       Appearance: He is well-developed.   HENT:      Head: Normocephalic and atraumatic.      Mouth/Throat:      Mouth: Mucous membranes are moist.      Pharynx: Oropharynx is clear.   Eyes:      Extraocular Movements: Extraocular movements intact.      Conjunctiva/sclera: Conjunctivae normal.      Pupils: Pupils are equal, round, and reactive to light.   Neck:      Musculoskeletal: Neck supple.      Thyroid: No thyromegaly.      Vascular: No carotid bruit.   Cardiovascular:      Rate and Rhythm: Normal rate and regular rhythm.      Heart sounds: Normal heart sounds. No murmur.   Pulmonary:      Effort: Pulmonary effort is normal.      Breath sounds: Normal breath sounds.   Abdominal:      General: There is no distension.      Palpations: Abdomen is soft. There is no mass.      Tenderness: There is no abdominal tenderness. There is no rebound.   Genitourinary:     Rectum: Normal.      Comments: Mild enlargement of prostate. No nodules and non tender.   Musculoskeletal:      Right knee: Normal. No tenderness found. No medial joint line and no lateral joint line tenderness noted.      Left knee: No tenderness found. No medial joint line and no " lateral joint line tenderness noted.      Comments: Small bone spur on left patella.    Lymphadenopathy:      Cervical: No cervical adenopathy.   Skin:     General: Skin is warm and dry.   Neurological:      General: No focal deficit present.      Mental Status: He is alert and oriented to person, place, and time.   Psychiatric:         Mood and Affect: Mood normal.         Behavior: Behavior normal.         Thought Content: Thought content normal.         Judgment: Judgment normal.           Assessment/Plan   Diagnoses and all orders for this visit:    1. Mixed hyperlipidemia (Primary)  -     Lipid Panel With / Chol / HDL Ratio  -     Comprehensive Metabolic Panel    2. Essential hypertriglyceridemia    3. Benign essential HTN    4. KVNG (obstructive sleep apnea)    5. Benign prostatic hyperplasia with nocturia  -     tamsulosin (FLOMAX) 0.4 MG capsule 24 hr capsule; Take 1 capsule by mouth 2 (two) times a day.  Dispense: 90 capsule    6. Acute pain of both knees  -     XR Knee 3 View Bilateral; Future    7. Need for pneumococcal vaccination  -     Pneumococcal Polysaccharide Vaccine 23-Valent (PPSV23) Greater Than or Equal To 3yo Subcutaneous / IM    1. HTN: Bp well controlled.     2. KVNG: Compliant with CPAP.     3. BPH: Increase Flomax dose and fup in 3 months.     4. Knee pain: Get xrays today. Cannot take nsaids due to chronic anticoagulation with Warfarin.     5. HM: Pneumovax today. PSA normal. Cscope utd. Recommended healthy diet, and 150 min of aerobic exercise per week    6. HLD: Trigs still elevated despite statin and fenofibrate. He is unable to take fish oil due to anaphylaxis to fish.     Return in about 3 months (around 3/18/2021) for Lab Appt Before FUP.

## 2020-12-24 DIAGNOSIS — E78.2 MIXED HYPERLIPIDEMIA: ICD-10-CM

## 2020-12-24 RX ORDER — FENOFIBRATE 145 MG/1
TABLET, COATED ORAL
Qty: 90 TABLET | Refills: 0 | Status: SHIPPED | OUTPATIENT
Start: 2020-12-24 | End: 2021-05-19 | Stop reason: SDUPTHER

## 2020-12-29 ENCOUNTER — LAB (OUTPATIENT)
Dept: LAB | Facility: HOSPITAL | Age: 55
End: 2020-12-29

## 2020-12-29 ENCOUNTER — ANTICOAGULATION VISIT (OUTPATIENT)
Dept: CARDIOLOGY | Facility: CLINIC | Age: 55
End: 2020-12-29

## 2020-12-29 ENCOUNTER — HOSPITAL ENCOUNTER (OUTPATIENT)
Dept: GENERAL RADIOLOGY | Facility: HOSPITAL | Age: 55
Discharge: HOME | End: 2020-12-29

## 2020-12-29 DIAGNOSIS — Z79.01 LONG TERM (CURRENT) USE OF ANTICOAGULANTS: Primary | ICD-10-CM

## 2020-12-29 DIAGNOSIS — Z95.2 MECHANICAL HEART VALVE PRESENT: Primary | ICD-10-CM

## 2020-12-29 DIAGNOSIS — M25.561 ACUTE PAIN OF BOTH KNEES: ICD-10-CM

## 2020-12-29 DIAGNOSIS — Z79.01 ANTICOAGULATED: ICD-10-CM

## 2020-12-29 DIAGNOSIS — Z95.2 S/P AVR (AORTIC VALVE REPLACEMENT): ICD-10-CM

## 2020-12-29 DIAGNOSIS — M25.562 ACUTE PAIN OF BOTH KNEES: ICD-10-CM

## 2020-12-29 LAB
ALBUMIN SERPL-MCNC: 4.3 G/DL (ref 3.5–5.2)
ALBUMIN/GLOB SERPL: 1.5 G/DL
ALP SERPL-CCNC: 49 U/L (ref 39–117)
ALT SERPL W P-5'-P-CCNC: 32 U/L (ref 1–41)
ANION GAP SERPL CALCULATED.3IONS-SCNC: 8.3 MMOL/L (ref 5–15)
AST SERPL-CCNC: 34 U/L (ref 1–40)
BILIRUB SERPL-MCNC: 0.5 MG/DL (ref 0–1.2)
BUN SERPL-MCNC: 17 MG/DL (ref 6–20)
BUN/CREAT SERPL: 13.8 (ref 7–25)
CALCIUM SPEC-SCNC: 9.5 MG/DL (ref 8.6–10.5)
CHLORIDE SERPL-SCNC: 104 MMOL/L (ref 98–107)
CHOLEST SERPL-MCNC: 145 MG/DL (ref 0–200)
CO2 SERPL-SCNC: 26.7 MMOL/L (ref 22–29)
CREAT SERPL-MCNC: 1.23 MG/DL (ref 0.76–1.27)
GFR SERPL CREATININE-BSD FRML MDRD: 61 ML/MIN/1.73
GLOBULIN UR ELPH-MCNC: 2.8 GM/DL
GLUCOSE SERPL-MCNC: 119 MG/DL (ref 65–99)
HDLC SERPL QL: 6.9
HDLC SERPL-MCNC: 21 MG/DL (ref 40–60)
INR PPP: 3.59 (ref 0.9–1.1)
LDLC SERPL CALC-MCNC: 59 MG/DL (ref 0–100)
POTASSIUM SERPL-SCNC: 3.9 MMOL/L (ref 3.5–5.2)
PROT SERPL-MCNC: 7.1 G/DL (ref 6–8.5)
PROTHROMBIN TIME: 35.6 SECONDS (ref 11.7–14.2)
SODIUM SERPL-SCNC: 139 MMOL/L (ref 136–145)
TRIGL SERPL-MCNC: 424 MG/DL (ref 0–150)
VLDLC SERPL-MCNC: 65 MG/DL (ref 5–40)

## 2020-12-29 PROCEDURE — 85610 PROTHROMBIN TIME: CPT

## 2020-12-29 PROCEDURE — 80061 LIPID PANEL: CPT | Performed by: PHYSICIAN ASSISTANT

## 2020-12-29 PROCEDURE — 36415 COLL VENOUS BLD VENIPUNCTURE: CPT | Performed by: PHYSICIAN ASSISTANT

## 2020-12-29 PROCEDURE — 80053 COMPREHEN METABOLIC PANEL: CPT | Performed by: PHYSICIAN ASSISTANT

## 2020-12-29 PROCEDURE — 73560 X-RAY EXAM OF KNEE 1 OR 2: CPT

## 2021-01-04 DIAGNOSIS — M25.561 ACUTE PAIN OF BOTH KNEES: Primary | ICD-10-CM

## 2021-01-04 DIAGNOSIS — M25.562 ACUTE PAIN OF BOTH KNEES: Primary | ICD-10-CM

## 2021-01-10 DIAGNOSIS — I38 HEART VALVE DISEASE: ICD-10-CM

## 2021-01-11 RX ORDER — WARFARIN SODIUM 5 MG/1
TABLET ORAL
Qty: 90 TABLET | Refills: 0 | Status: SHIPPED | OUTPATIENT
Start: 2021-01-11 | End: 2021-07-20 | Stop reason: SDUPTHER

## 2021-01-15 ENCOUNTER — LAB (OUTPATIENT)
Dept: LAB | Facility: HOSPITAL | Age: 56
End: 2021-01-15

## 2021-01-15 DIAGNOSIS — Z79.01 ANTICOAGULATED: ICD-10-CM

## 2021-01-15 DIAGNOSIS — Z95.2 S/P AVR (AORTIC VALVE REPLACEMENT): ICD-10-CM

## 2021-01-15 DIAGNOSIS — Z95.2 MECHANICAL HEART VALVE PRESENT: ICD-10-CM

## 2021-01-15 LAB
INR PPP: 2.7
INR PPP: 2.76 (ref 0.9–1.1)
PROTHROMBIN TIME: 28.9 SECONDS (ref 11.7–14.2)

## 2021-01-15 PROCEDURE — 85610 PROTHROMBIN TIME: CPT

## 2021-01-15 PROCEDURE — 36415 COLL VENOUS BLD VENIPUNCTURE: CPT

## 2021-01-18 ENCOUNTER — ANTICOAGULATION VISIT (OUTPATIENT)
Dept: CARDIOLOGY | Facility: CLINIC | Age: 56
End: 2021-01-18

## 2021-01-20 NOTE — PROGRESS NOTES
New Bilateral Knees      Patient: Hussein Nino        YOB: 1965    Medical Record Number: [id number]        Chief Complaints: Bilateral knee pain      History of Present Illness: This is a 55-year-old male who presents complaining of bilateral knee pain right is greater than left left really just has a small bump anteriorly the right bother him with stairs he has no swelling states he did get a palatine and since he has been on that his knee pain is actually improved his current symptoms are mild previously they were severe constant aching primarily anterior worse with stairs standing stooping somewhat better with rest his past medical history is remarkable for sleep apnea aortic stenosis gout        Allergies:   Allergies   Allergen Reactions   • Fish-Derived Products Anaphylaxis   • Ondansetron Rash   • Penicillins Rash       Medications:   Home Medications:  Current Outpatient Medications on File Prior to Visit   Medication Sig   • albuterol (PROVENTIL HFA;VENTOLIN HFA) 108 (90 BASE) MCG/ACT inhaler Inhale 2 puffs Every 4 (Four) Hours As Needed for wheezing.   • amLODIPine (NORVASC) 5 MG tablet Take 1 tablet by mouth Daily.   • fenofibrate (TRICOR) 145 MG tablet TAKE 1 TABLET DAILY   • fluticasone (FLONASE) 50 MCG/ACT nasal spray 2 sprays into each nostril Daily.   • omeprazole (priLOSEC) 40 MG capsule TAKE 1 CAPSULE DAILY   • pravastatin (PRAVACHOL) 40 MG tablet TAKE 1 TABLET DAILY.       PATIENT WILL NEED TO FIND  ANOTHER PCP FOR FURTHER    REFILLS   • Probiotic Product (PROBIOTIC DAILY PO) Take  by mouth.   • tamsulosin (FLOMAX) 0.4 MG capsule 24 hr capsule Take 1 capsule by mouth 2 (two) times a day.   • warfarin (Jantoven) 5 MG tablet TAKE 1 TABLET DAILY OR AS  DIRECTED TO MAINTAIN       INTERNATIONAL NORMALIZED   RATIO 2.5-3.5     No current facility-administered medications on file prior to visit.      Current Medications:  Scheduled Meds:  Continuous Infusions:No current  facility-administered medications for this visit.     PRN Meds:.    Past Medical History:   Diagnosis Date   • Aortic stenosis     SEVERE   • Epistaxis    • Gout         Past Surgical History:   Procedure Laterality Date   • ADENOIDECTOMY     • AORTIC VALVE REPAIR/REPLACEMENT  10/05/2016    mechanical valve   • ASCENDING ARCH/HEMIARCH REPLACEMENT N/A 10/5/2016    Procedure: MIDLINE STERNOTOMY, AORTIC VALVE REPLACEMENT, ASCENDING AND HEMIARCH REPLACEMENT, REPAIR OF PERIVALVULAR LEAK, WITH NEURO MONITOIRING, INTRAOPERATIVE WARREN;  Surgeon: Sukhi Wilkinson MD;  Location: [facility] MAIN OR;  Service:    • CARDIAC CATHETERIZATION N/A 9/27/2016    Procedure: Coronary angiography;  Surgeon: Lio Roman MD;  Location:  [facility];  Service:    • CARDIAC CATHETERIZATION N/A 9/27/2016    Procedure: Left Heart Cath;  Surgeon: Lio Roman MD;  Location:  [facility];  Service:    • CHOLECYSTECTOMY     • COLONOSCOPY  12/212/2016    Dr. Palomares   • CORONARY ARTERY BYPASS GRAFT          Social History     Occupational History   • Not on file   Tobacco Use   • Smoking status: Former Smoker     Packs/day: 0.50     Years: 14.00     Pack years: 7.00     Types: Cigarettes   • Smokeless tobacco: Never Used   • Tobacco comment: QUIT AGE 23   Substance and Sexual Activity   • Alcohol use: Yes     Frequency: 2-3 times a week   • Drug use: No   • Sexual activity: Defer      Social History     Social History Narrative   • Not on file        Family History   Problem Relation Age of Onset   • Heart failure Mother    • Kidney disease Mother    • Hypertension Mother    • Heart disease Mother    • Alzheimer's disease Father    • Thyroid disease Sister    • Hypertension Sister    • Hypertension Brother              Review of Systems: 14 point review of systems are remarkable for pertinent positives listed in chart by the patient the remainder negative    Review of Systems      Physical Exam: 55 y.o.  male  General Appearance:    Alert, cooperative, in no acute distress                 There were no vitals filed for this visit.   Patient is alert and read ×3 no acute distress appears her above-listed at height weight and age.  Affect is normal respiratory rate is normal unlabored. Heart rate regular rate rhythm, sclera, dentition and hearing are normal for the purpose of this exam.        Ortho Exam Physical exam of the right knee reveals no effusion, no erythema.  The patient has no palpable tenderness along the medial joint line, no tenderness about the lateral joint line.  Patient does have crepitus with patellofemoral range of motion.  They also have subjective symptoms anteriorly during exam.  The patient has a negative bounce home, negative Gal and a stable ligamentous exam.  Quad tone is reasonable and symmetric.  There are no overlying skin changes no lymphedema no lymphadenopathy.  There is good hip range of motion which is full symmetric and asymptomatic and a normal ankle exam.  Hamstrings and IT band are tight bilaterally.    On the left he has a tiny little bump on the anterior aspect of his patella most likely just some small bit of tissue it is mobile nothing bad not tender    Procedures             Radiology:   AP, Lateralviews of the right knee  were/reviewed to evauateknee pain.  These were reviewed on epic these are normal other than some very mild patellofemoral OA no acute pathology  Imaging Results (Most Recent)     None        Assessment/Plan:      Right knee pain I really think this is more patellofemoral which has resolved with use of patella tendon I suspect he is gotten a little stronger which is helped I talked to him about if his symptoms return about possibly an injection versus other means of testing

## 2021-01-21 ENCOUNTER — OFFICE VISIT (OUTPATIENT)
Dept: ORTHOPEDIC SURGERY | Facility: CLINIC | Age: 56
End: 2021-01-21

## 2021-01-21 VITALS — WEIGHT: 232.8 LBS | TEMPERATURE: 98.6 F | HEIGHT: 72 IN | BODY MASS INDEX: 31.53 KG/M2

## 2021-01-21 DIAGNOSIS — M25.861 PATELLOFEMORAL DYSFUNCTION OF RIGHT KNEE: Primary | ICD-10-CM

## 2021-01-21 PROCEDURE — 99202 OFFICE O/P NEW SF 15 MIN: CPT | Performed by: ORTHOPAEDIC SURGERY

## 2021-01-28 DIAGNOSIS — N40.1 BENIGN PROSTATIC HYPERPLASIA WITH NOCTURIA: ICD-10-CM

## 2021-01-28 DIAGNOSIS — R35.1 BENIGN PROSTATIC HYPERPLASIA WITH NOCTURIA: ICD-10-CM

## 2021-01-29 RX ORDER — TAMSULOSIN HYDROCHLORIDE 0.4 MG/1
1 CAPSULE ORAL 2 TIMES DAILY
Qty: 90 CAPSULE | Status: ON HOLD
Start: 2021-01-29 | End: 2022-06-08

## 2021-02-02 ENCOUNTER — LAB (OUTPATIENT)
Dept: LAB | Facility: HOSPITAL | Age: 56
End: 2021-02-02

## 2021-02-02 ENCOUNTER — ANTICOAGULATION VISIT (OUTPATIENT)
Dept: CARDIOLOGY | Facility: CLINIC | Age: 56
End: 2021-02-02

## 2021-02-02 DIAGNOSIS — Z95.2 S/P AVR (AORTIC VALVE REPLACEMENT): ICD-10-CM

## 2021-02-02 DIAGNOSIS — Z95.2 MECHANICAL HEART VALVE PRESENT: ICD-10-CM

## 2021-02-02 DIAGNOSIS — Z79.01 ANTICOAGULATED: ICD-10-CM

## 2021-02-02 LAB
INR PPP: 3
INR PPP: 3.08 (ref 0.9–1.1)
PROTHROMBIN TIME: 31.5 SECONDS (ref 11.7–14.2)

## 2021-02-02 PROCEDURE — 85610 PROTHROMBIN TIME: CPT

## 2021-02-02 PROCEDURE — 36415 COLL VENOUS BLD VENIPUNCTURE: CPT

## 2021-02-24 NOTE — PROGRESS NOTES
Patient: Hussein Nino  YOB: 1965  Date of Service: 2/24/2021    Chief Complaints: Right knee pain    Subjective:    History of Present Illness: Pt is seen in the office today with complaints of right knee pain I saw him in January felt like most of her symptoms were patellofemoral he was doing better at that time so we did not consider an injection we did stretching strengthening he returns today and states his symptoms are still present primarily anteriorly moderate intermittent aching worse with activity stooping squatting somewhat better with rest.          Allergies:   Allergies   Allergen Reactions   • Fish-Derived Products Anaphylaxis   • Ondansetron Rash   • Penicillins Rash       Medications:   Home Medications:  Current Outpatient Medications on File Prior to Visit   Medication Sig   • albuterol (PROVENTIL HFA;VENTOLIN HFA) 108 (90 BASE) MCG/ACT inhaler Inhale 2 puffs Every 4 (Four) Hours As Needed for wheezing.   • amLODIPine (NORVASC) 5 MG tablet Take 1 tablet by mouth Daily.   • fenofibrate (TRICOR) 145 MG tablet TAKE 1 TABLET DAILY   • fluticasone (FLONASE) 50 MCG/ACT nasal spray 2 sprays into each nostril Daily.   • omeprazole (priLOSEC) 40 MG capsule TAKE 1 CAPSULE DAILY   • pravastatin (PRAVACHOL) 40 MG tablet TAKE 1 TABLET DAILY.       PATIENT WILL NEED TO FIND  ANOTHER PCP FOR FURTHER    REFILLS   • Probiotic Product (PROBIOTIC DAILY PO) Take  by mouth.   • tamsulosin (FLOMAX) 0.4 MG capsule 24 hr capsule Take 1 capsule by mouth 2 (two) times a day.   • warfarin (Jantoven) 5 MG tablet TAKE 1 TABLET DAILY OR AS  DIRECTED TO MAINTAIN       INTERNATIONAL NORMALIZED   RATIO 2.5-3.5     No current facility-administered medications on file prior to visit.      Current Medications:  Scheduled Meds:  Continuous Infusions:No current facility-administered medications for this visit.     PRN Meds:.    I have reviewed the patient's medical history in detail and updated the computerized patient  record.  Review and summarization of old records include:    Past Medical History:   Diagnosis Date   • Aortic stenosis     SEVERE   • Epistaxis    • Gout         Past Surgical History:   Procedure Laterality Date   • ADENOIDECTOMY     • AORTIC VALVE REPAIR/REPLACEMENT  10/05/2016    mechanical valve   • ASCENDING ARCH/HEMIARCH REPLACEMENT N/A 10/5/2016    Procedure: MIDLINE STERNOTOMY, AORTIC VALVE REPLACEMENT, ASCENDING AND HEMIARCH REPLACEMENT, REPAIR OF PERIVALVULAR LEAK, WITH NEURO MONITOIRING, INTRAOPERATIVE WARREN;  Surgeon: Sukhi Wilkinson MD;  Location: [facility] MAIN OR;  Service:    • CARDIAC CATHETERIZATION N/A 9/27/2016    Procedure: Coronary angiography;  Surgeon: Lio Roman MD;  Location:  [facility];  Service:    • CARDIAC CATHETERIZATION N/A 9/27/2016    Procedure: Left Heart Cath;  Surgeon: Lio Roman MD;  Location:  [facility];  Service:    • CHOLECYSTECTOMY     • COLONOSCOPY  12/212/2016    Dr. Palomares   • CORONARY ARTERY BYPASS GRAFT          Social History     Occupational History   • Not on file   Tobacco Use   • Smoking status: Former Smoker     Packs/day: 0.50     Years: 14.00     Pack years: 7.00     Types: Cigarettes   • Smokeless tobacco: Never Used   • Tobacco comment: QUIT AGE 23   Substance and Sexual Activity   • Alcohol use: Yes     Frequency: 2-3 times a week   • Drug use: No   • Sexual activity: Defer      Social History     Social History Narrative   • Not on file        Family History   Problem Relation Age of Onset   • Heart failure Mother    • Kidney disease Mother    • Hypertension Mother    • Heart disease Mother    • Alzheimer's disease Father    • Thyroid disease Sister    • Hypertension Sister    • Hypertension Brother        ROS: 14 point review of systems was performed and was negative except for documented findings in HPI and today's encounter.     Allergies:   Allergies   Allergen Reactions   • Fish-Derived Products  Anaphylaxis   • Ondansetron Rash   • Penicillins Rash     Constitutional:  Denies fever, shaking or chills   Eyes:  Denies change in visual acuity   HENT:  Denies nasal congestion or sore throat   Respiratory:  Denies cough or shortness of breath   Cardiovascular:  Denies chest pain or severe LE edema   GI:  Denies abdominal pain, nausea, vomiting, bloody stools or diarrhea   Musculoskeletal:  Numbness, tingling, or loss of motor function only as noted above in history of present illness.  : Denies painful urination or hematuria  Integument:  Denies rash, lesion or ulceration   Neurologic:  Denies headache or focal weakness  Endocrine:  Denies lymphadenopathy  Psych:  Denies confusion or change in mental status   Hem:  Denies active bleeding      Physical Exam: 55 y.o. male  Wt Readings from Last 3 Encounters:   01/21/21 106 kg (232 lb 12.8 oz)   12/18/20 110 kg (242 lb)   07/24/20 103 kg (227 lb)       There is no height or weight on file to calculate BMI.  No height and weight on file for this encounter.  There were no vitals filed for this visit.  Vital signs reviewed.   General Appearance:    Alert, cooperative, in no acute distress                    Ortho exam    Physical exam of the right knee reveals no effusion, no erythema.  The patient has no palpable tenderness along the medial joint line, no tenderness about the lateral joint line.  Patient does have crepitus with patellofemoral range of motion.  They also have subjective symptoms anteriorly during exam.  The patient has a negative bounce home, negative Gal and a stable ligamentous exam.  Quad tone is reasonable and symmetric.  There are no overlying skin changes no lymphedema no lymphadenopathy.  There is good hip range of motion which is full symmetric and asymptomatic and a normal ankle exam.  Hamstrings and IT band are tight bilaterally.    X-rays from last visit that were on epic showed some mild patellofemoral OA otherwise no acute bony  pathology         .time    Assessment: Right knee pain that is been persistent is primarily anterior suspect this is more patellofemoral plan is to proceed with an injection as a diagnostic and therapeutic tool if he fails to improve would consider other means of testing    Plan: Plan is as above  Follow up as indicated.  Ice, elevate, and rest as needed.  Discussed conservative measures of pain control including ice, bracing.  Also talked about the importance of strengthening and maintaining ideal body weight    Large Joint Arthrocentesis: R knee  Date/Time: 2/25/2021 8:30 AM  Consent given by: patient  Site marked: site marked  Timeout: Immediately prior to procedure a time out was called to verify the correct patient, procedure, equipment, support staff and site/side marked as required   Supporting Documentation  Indications: pain   Procedure Details  Location: knee - R knee  Preparation: Patient was prepped and draped in the usual sterile fashion  Needle gauge: 21G.  Approach: anteromedial  Medications administered: 4 mL lidocaine (cardiac); 80 mg methylPREDNISolone acetate 80 MG/ML  Patient tolerance: patient tolerated the procedure well with no immediate complications          Ella Muller M.D.

## 2021-02-25 ENCOUNTER — OFFICE VISIT (OUTPATIENT)
Dept: ORTHOPEDIC SURGERY | Facility: CLINIC | Age: 56
End: 2021-02-25

## 2021-02-25 VITALS — HEIGHT: 72 IN | BODY MASS INDEX: 30.88 KG/M2 | WEIGHT: 228 LBS | TEMPERATURE: 97.2 F

## 2021-02-25 DIAGNOSIS — M22.2X1 PATELLOFEMORAL PAIN SYNDROME OF RIGHT KNEE: Primary | ICD-10-CM

## 2021-02-25 PROCEDURE — 99212 OFFICE O/P EST SF 10 MIN: CPT | Performed by: ORTHOPAEDIC SURGERY

## 2021-02-25 PROCEDURE — 20610 DRAIN/INJ JOINT/BURSA W/O US: CPT | Performed by: ORTHOPAEDIC SURGERY

## 2021-02-25 RX ADMIN — METHYLPREDNISOLONE ACETATE 80 MG: 80 INJECTION, SUSPENSION INTRA-ARTICULAR; INTRALESIONAL; INTRAMUSCULAR; SOFT TISSUE at 08:30

## 2021-02-26 RX ORDER — METHYLPREDNISOLONE ACETATE 80 MG/ML
80 INJECTION, SUSPENSION INTRA-ARTICULAR; INTRALESIONAL; INTRAMUSCULAR; SOFT TISSUE
Status: COMPLETED | OUTPATIENT
Start: 2021-02-25 | End: 2021-02-25

## 2021-03-02 ENCOUNTER — ANTICOAGULATION VISIT (OUTPATIENT)
Dept: CARDIOLOGY | Facility: CLINIC | Age: 56
End: 2021-03-02

## 2021-03-02 ENCOUNTER — LAB (OUTPATIENT)
Dept: LAB | Facility: HOSPITAL | Age: 56
End: 2021-03-02

## 2021-03-02 DIAGNOSIS — Z79.01 ANTICOAGULATED: ICD-10-CM

## 2021-03-02 DIAGNOSIS — Z95.2 MECHANICAL HEART VALVE PRESENT: ICD-10-CM

## 2021-03-02 DIAGNOSIS — Z95.2 S/P AVR (AORTIC VALVE REPLACEMENT): ICD-10-CM

## 2021-03-02 LAB
INR PPP: 4.3
INR PPP: 4.39 (ref 0.9–1.1)
PROTHROMBIN TIME: 41.6 SECONDS (ref 11.7–14.2)

## 2021-03-02 PROCEDURE — 85610 PROTHROMBIN TIME: CPT

## 2021-03-02 PROCEDURE — 36415 COLL VENOUS BLD VENIPUNCTURE: CPT

## 2021-03-09 ENCOUNTER — ANTICOAGULATION VISIT (OUTPATIENT)
Dept: CARDIOLOGY | Facility: CLINIC | Age: 56
End: 2021-03-09

## 2021-03-09 ENCOUNTER — LAB (OUTPATIENT)
Dept: LAB | Facility: HOSPITAL | Age: 56
End: 2021-03-09

## 2021-03-09 DIAGNOSIS — Z95.2 S/P AVR (AORTIC VALVE REPLACEMENT): ICD-10-CM

## 2021-03-09 DIAGNOSIS — Z79.01 ANTICOAGULATED: ICD-10-CM

## 2021-03-09 DIAGNOSIS — Z95.2 MECHANICAL HEART VALVE PRESENT: ICD-10-CM

## 2021-03-09 LAB
INR PPP: 4.97 (ref 0.9–1.1)
PROTHROMBIN TIME: 46 SECONDS (ref 11.7–14.2)

## 2021-03-09 PROCEDURE — 36415 COLL VENOUS BLD VENIPUNCTURE: CPT

## 2021-03-09 PROCEDURE — 85610 PROTHROMBIN TIME: CPT

## 2021-03-12 ENCOUNTER — HOSPITAL ENCOUNTER (OUTPATIENT)
Dept: CARDIOLOGY | Facility: HOSPITAL | Age: 56
Discharge: HOME | End: 2021-03-12
Admitting: INTERNAL MEDICINE

## 2021-03-12 ENCOUNTER — ANTICOAGULATION VISIT (OUTPATIENT)
Dept: CARDIOLOGY | Facility: CLINIC | Age: 56
End: 2021-03-12

## 2021-03-12 LAB
INR PPP: 4.2
INR PPP: 4.2

## 2021-03-12 PROCEDURE — 85610 PROTHROMBIN TIME: CPT | Performed by: INTERNAL MEDICINE

## 2021-03-16 ENCOUNTER — ANTICOAGULATION VISIT (OUTPATIENT)
Dept: CARDIOLOGY | Facility: CLINIC | Age: 56
End: 2021-03-16

## 2021-03-16 ENCOUNTER — HOSPITAL ENCOUNTER (OUTPATIENT)
Dept: CARDIOLOGY | Facility: HOSPITAL | Age: 56
Discharge: HOME | End: 2021-03-16
Admitting: INTERNAL MEDICINE

## 2021-03-16 LAB — INR PPP: 3.2

## 2021-03-16 PROCEDURE — 85610 PROTHROMBIN TIME: CPT | Performed by: INTERNAL MEDICINE

## 2021-03-18 ENCOUNTER — TELEPHONE (OUTPATIENT)
Dept: INTERNAL MEDICINE | Facility: CLINIC | Age: 56
End: 2021-03-18

## 2021-03-18 DIAGNOSIS — E78.1 ESSENTIAL HYPERTRIGLYCERIDEMIA: Primary | ICD-10-CM

## 2021-03-18 DIAGNOSIS — I10 BENIGN ESSENTIAL HTN: ICD-10-CM

## 2021-03-22 DIAGNOSIS — K21.9 GASTROESOPHAGEAL REFLUX DISEASE WITHOUT ESOPHAGITIS: ICD-10-CM

## 2021-03-22 RX ORDER — OMEPRAZOLE 40 MG/1
40 CAPSULE, DELAYED RELEASE ORAL DAILY
Qty: 90 CAPSULE | Refills: 1 | Status: ON HOLD | OUTPATIENT
Start: 2021-03-22 | End: 2021-09-07

## 2021-03-23 ENCOUNTER — HOSPITAL ENCOUNTER (OUTPATIENT)
Dept: CARDIOLOGY | Facility: HOSPITAL | Age: 56
Discharge: HOME | End: 2021-03-23
Admitting: INTERNAL MEDICINE

## 2021-03-23 ENCOUNTER — ANTICOAGULATION VISIT (OUTPATIENT)
Dept: CARDIOLOGY | Facility: CLINIC | Age: 56
End: 2021-03-23

## 2021-03-23 LAB — INR PPP: 4.6

## 2021-03-23 PROCEDURE — 85610 PROTHROMBIN TIME: CPT | Performed by: INTERNAL MEDICINE

## 2021-03-26 ENCOUNTER — BULK ORDERING (OUTPATIENT)
Dept: CASE MANAGEMENT | Facility: OTHER | Age: 56
End: 2021-03-26

## 2021-03-26 DIAGNOSIS — Z23 IMMUNIZATION DUE: ICD-10-CM

## 2021-03-30 ENCOUNTER — HOSPITAL ENCOUNTER (OUTPATIENT)
Dept: CARDIOLOGY | Facility: HOSPITAL | Age: 56
Discharge: HOME | End: 2021-03-30
Admitting: INTERNAL MEDICINE

## 2021-03-30 ENCOUNTER — ANTICOAGULATION VISIT (OUTPATIENT)
Dept: CARDIOLOGY | Facility: CLINIC | Age: 56
End: 2021-03-30

## 2021-03-30 LAB — INR PPP: 3

## 2021-03-30 PROCEDURE — 85610 PROTHROMBIN TIME: CPT | Performed by: INTERNAL MEDICINE

## 2021-04-06 ENCOUNTER — HOSPITAL ENCOUNTER (OUTPATIENT)
Dept: CARDIOLOGY | Facility: HOSPITAL | Age: 56
Discharge: HOME | End: 2021-04-06
Admitting: INTERNAL MEDICINE

## 2021-04-06 ENCOUNTER — ANTICOAGULATION VISIT (OUTPATIENT)
Dept: CARDIOLOGY | Facility: CLINIC | Age: 56
End: 2021-04-06

## 2021-04-06 LAB — INR PPP: 3.8

## 2021-04-06 PROCEDURE — 85610 PROTHROMBIN TIME: CPT | Performed by: INTERNAL MEDICINE

## 2021-04-13 ENCOUNTER — ANTICOAGULATION VISIT (OUTPATIENT)
Dept: CARDIOLOGY | Facility: CLINIC | Age: 56
End: 2021-04-13

## 2021-04-13 ENCOUNTER — HOSPITAL ENCOUNTER (OUTPATIENT)
Dept: CARDIOLOGY | Facility: HOSPITAL | Age: 56
Discharge: HOME | End: 2021-04-13
Admitting: INTERNAL MEDICINE

## 2021-04-13 LAB — INR PPP: 3.8

## 2021-04-13 PROCEDURE — 85610 PROTHROMBIN TIME: CPT | Performed by: INTERNAL MEDICINE

## 2021-04-14 DIAGNOSIS — E78.2 MIXED HYPERLIPIDEMIA: ICD-10-CM

## 2021-04-15 RX ORDER — PRAVASTATIN SODIUM 40 MG
TABLET ORAL
Qty: 90 TABLET | Refills: 1 | OUTPATIENT
Start: 2021-04-15

## 2021-04-20 ENCOUNTER — ANTICOAGULATION VISIT (OUTPATIENT)
Dept: CARDIOLOGY | Facility: CLINIC | Age: 56
End: 2021-04-20

## 2021-04-20 ENCOUNTER — HOSPITAL ENCOUNTER (OUTPATIENT)
Dept: CARDIOLOGY | Facility: HOSPITAL | Age: 56
Discharge: HOME | End: 2021-04-20
Admitting: INTERNAL MEDICINE

## 2021-04-20 DIAGNOSIS — E78.2 MIXED HYPERLIPIDEMIA: ICD-10-CM

## 2021-04-20 LAB — INR PPP: 4.1

## 2021-04-20 PROCEDURE — 85610 PROTHROMBIN TIME: CPT | Performed by: INTERNAL MEDICINE

## 2021-04-20 RX ORDER — PRAVASTATIN SODIUM 40 MG
40 TABLET ORAL DAILY
Qty: 90 TABLET | Refills: 1 | Status: SHIPPED | OUTPATIENT
Start: 2021-04-20 | End: 2021-07-09

## 2021-04-23 ENCOUNTER — ANTICOAGULATION VISIT (OUTPATIENT)
Dept: CARDIOLOGY | Facility: CLINIC | Age: 56
End: 2021-04-23

## 2021-04-23 ENCOUNTER — HOSPITAL ENCOUNTER (OUTPATIENT)
Dept: CARDIOLOGY | Facility: HOSPITAL | Age: 56
Discharge: HOME | End: 2021-04-23
Admitting: INTERNAL MEDICINE

## 2021-04-23 DIAGNOSIS — Z79.01 ANTICOAGULATED: ICD-10-CM

## 2021-04-23 DIAGNOSIS — Z95.2 MECHANICAL HEART VALVE PRESENT: Primary | ICD-10-CM

## 2021-04-23 LAB — INR PPP: 2.1

## 2021-04-23 PROCEDURE — 85610 PROTHROMBIN TIME: CPT | Performed by: INTERNAL MEDICINE

## 2021-04-27 ENCOUNTER — ANTICOAGULATION VISIT (OUTPATIENT)
Dept: CARDIOLOGY | Facility: CLINIC | Age: 56
End: 2021-04-27

## 2021-04-27 ENCOUNTER — HOSPITAL ENCOUNTER (OUTPATIENT)
Dept: CARDIOLOGY | Facility: HOSPITAL | Age: 56
Discharge: HOME | End: 2021-04-27
Admitting: INTERNAL MEDICINE

## 2021-04-27 LAB
INR PPP: 2.6
INR PPP: 2.6

## 2021-04-27 PROCEDURE — 85610 PROTHROMBIN TIME: CPT | Performed by: INTERNAL MEDICINE

## 2021-04-29 ENCOUNTER — ANTICOAGULATION VISIT (OUTPATIENT)
Dept: CARDIOLOGY | Facility: CLINIC | Age: 56
End: 2021-04-29

## 2021-04-29 ENCOUNTER — HOSPITAL ENCOUNTER (OUTPATIENT)
Dept: CARDIOLOGY | Facility: HOSPITAL | Age: 56
Discharge: HOME | End: 2021-04-29
Admitting: INTERNAL MEDICINE

## 2021-04-29 LAB — INR PPP: 3

## 2021-04-29 PROCEDURE — 85610 PROTHROMBIN TIME: CPT | Performed by: INTERNAL MEDICINE

## 2021-05-04 ENCOUNTER — HOSPITAL ENCOUNTER (OUTPATIENT)
Dept: CARDIOLOGY | Facility: HOSPITAL | Age: 56
Discharge: HOME | End: 2021-05-04
Admitting: INTERNAL MEDICINE

## 2021-05-04 ENCOUNTER — ANTICOAGULATION VISIT (OUTPATIENT)
Dept: CARDIOLOGY | Facility: CLINIC | Age: 56
End: 2021-05-04

## 2021-05-04 LAB — INR PPP: 3.9

## 2021-05-04 PROCEDURE — 85610 PROTHROMBIN TIME: CPT | Performed by: INTERNAL MEDICINE

## 2021-05-06 ENCOUNTER — TELEPHONE (OUTPATIENT)
Dept: ORTHOPEDIC SURGERY | Facility: CLINIC | Age: 56
End: 2021-05-06

## 2021-05-06 DIAGNOSIS — M22.2X1 PATELLOFEMORAL PAIN SYNDROME OF RIGHT KNEE: Primary | ICD-10-CM

## 2021-05-06 DIAGNOSIS — M25.861 PATELLOFEMORAL DYSFUNCTION OF RIGHT KNEE: ICD-10-CM

## 2021-05-06 NOTE — TELEPHONE ENCOUNTER
Caller: Hussein Nino    Relationship to patient: Self    Best call back number: [phone]     Patient is needing: PATIENT CALLED TO MAKE A FOLLOW UP APPOINTMENT WITH DR. BRITT BUT WAS ASKING ABOUT MEDICATION TO TAKE BEFORE HIS APPOINTMENT 5/20/21. PATIENT STATED THAT HE IS ON BLOOD THINNERS DUE TO HEART ISSUES. PATIENT HAS SEEN DR. BRITT BEFORE FOR KNEES BUT PATIENT STATES THAT THE PAIN IS GOING FROM KNEE DOWN TO ANKLE ON RIGHT LEG. PLEASE CONTACT PATIENT AT NUMBER LISTED ABOVE.

## 2021-05-07 RX ORDER — HYDROCODONE BITARTRATE AND ACETAMINOPHEN 5; 325 MG/1; MG/1
1 TABLET ORAL EVERY 4 HOURS PRN
Qty: 18 TABLET | Refills: 0 | Status: SHIPPED | OUTPATIENT
Start: 2021-05-07 | End: 2021-05-10

## 2021-05-07 NOTE — TELEPHONE ENCOUNTER
Patient asking what he can take-patient not able to take NSAIDs for pain he takes APAP 3 tablets of  500 mg in the AM and some in the PM patient asking if there is anything else he can take besides tylenol? Please advise

## 2021-05-07 NOTE — TELEPHONE ENCOUNTER
I called and spoke with the patient today he reports that he did really great after his injection on February 25 but had a very return of pain last few days.  He is due to come and see Dr. Muller in another couple of weeks having a difficult time with pain control in the interim.  Unfortunately he is on warfarin and not able to take NSAIDs Tylenol is not controlling his pain effectively.  His son is graduating next week and he will need to be up and actively participating in a lot of events surrounding that.  I reviewed the Marek report which is appropriate, initially was going to prescribe tramadol but there is some mild interaction potential with warfarin as well give him a 3-day supply of Norco which he is to be used sparingly for severe pain when the Tylenol does not relieve his pain.  Did offer to bring him back for an earlier injection however with a graduation next week he is not able to come in any sooner.  Plan to keep follow-up appointment as scheduled with Dr. Muller on 20 May.

## 2021-05-11 ENCOUNTER — HOSPITAL ENCOUNTER (OUTPATIENT)
Dept: CARDIOLOGY | Facility: HOSPITAL | Age: 56
Discharge: HOME | End: 2021-05-11
Admitting: INTERNAL MEDICINE

## 2021-05-11 ENCOUNTER — ANTICOAGULATION VISIT (OUTPATIENT)
Dept: CARDIOLOGY | Facility: CLINIC | Age: 56
End: 2021-05-11

## 2021-05-11 LAB — INR PPP: 5

## 2021-05-11 PROCEDURE — 85610 PROTHROMBIN TIME: CPT | Performed by: INTERNAL MEDICINE

## 2021-05-18 ENCOUNTER — HOSPITAL ENCOUNTER (OUTPATIENT)
Dept: CARDIOLOGY | Facility: HOSPITAL | Age: 56
Discharge: HOME | End: 2021-05-18
Admitting: INTERNAL MEDICINE

## 2021-05-18 ENCOUNTER — ANTICOAGULATION VISIT (OUTPATIENT)
Dept: CARDIOLOGY | Facility: CLINIC | Age: 56
End: 2021-05-18

## 2021-05-18 LAB — INR PPP: 3.4

## 2021-05-18 PROCEDURE — 85610 PROTHROMBIN TIME: CPT | Performed by: INTERNAL MEDICINE

## 2021-05-18 NOTE — PROGRESS NOTES
Patient: Hussein Nino  YOB: 1965  Date of Service: 5/18/2021    Chief Complaints: right knee pain     Subjective:    History of Present Illness: Pt is seen in the office today with complaints of right knee pain he states that is different than when I saw him last time in February he states May 1 he was going up some stairs with something in his knee popped he has had severe pain medially since that time.  He does have some episodes of locking and catching symptoms are moderate intermittent aching worse with activity somewhat better with rest his past medical history is remarkable for aortic stenosiS .          Allergies:   Allergies   Allergen Reactions   • Fish-Derived Products Anaphylaxis   • Ondansetron Rash   • Penicillins Rash       Medications:   Home Medications:  Current Outpatient Medications on File Prior to Visit   Medication Sig   • albuterol (PROVENTIL HFA;VENTOLIN HFA) 108 (90 BASE) MCG/ACT inhaler Inhale 2 puffs Every 4 (Four) Hours As Needed for wheezing.   • amLODIPine (NORVASC) 5 MG tablet Take 1 tablet by mouth Daily.   • fenofibrate (TRICOR) 145 MG tablet TAKE 1 TABLET DAILY   • fluticasone (FLONASE) 50 MCG/ACT nasal spray 2 sprays into each nostril Daily.   • omeprazole (priLOSEC) 40 MG capsule Take 1 capsule by mouth Daily.   • pravastatin (PRAVACHOL) 40 MG tablet Take 1 tablet by mouth Daily.   • Probiotic Product (PROBIOTIC DAILY PO) Take  by mouth.   • tamsulosin (FLOMAX) 0.4 MG capsule 24 hr capsule Take 1 capsule by mouth 2 (two) times a day.   • warfarin (Jantoven) 5 MG tablet TAKE 1 TABLET DAILY OR AS  DIRECTED TO MAINTAIN       INTERNATIONAL NORMALIZED   RATIO 2.5-3.5     No current facility-administered medications on file prior to visit.     Current Medications:  Scheduled Meds:  Continuous Infusions:No current facility-administered medications for this visit.    PRN Meds:.    I have reviewed the patient's medical history in detail and updated the computerized patient  record.  Review and summarization of old records include:    Past Medical History:   Diagnosis Date   • Aortic stenosis     SEVERE   • Epistaxis    • Gout         Past Surgical History:   Procedure Laterality Date   • ADENOIDECTOMY     • AORTIC VALVE REPAIR/REPLACEMENT  10/05/2016    mechanical valve   • ASCENDING ARCH/HEMIARCH REPLACEMENT N/A 10/5/2016    Procedure: MIDLINE STERNOTOMY, AORTIC VALVE REPLACEMENT, ASCENDING AND HEMIARCH REPLACEMENT, REPAIR OF PERIVALVULAR LEAK, WITH NEURO MONITOIRING, INTRAOPERATIVE WARREN;  Surgeon: Sukhi Wilkinson MD;  Location: [facility] MAIN OR;  Service:    • CARDIAC CATHETERIZATION N/A 9/27/2016    Procedure: Coronary angiography;  Surgeon: Lio Roman MD;  Location:  [facility]AS[facility];  Service:    • CARDIAC CATHETERIZATION N/A 9/27/2016    Procedure: Left Heart Cath;  Surgeon: Lio Roman MD;  Location:  [facility] CATH INVASIVE LOCATION;  Service:    • CHOLECYSTECTOMY     • COLONOSCOPY  12/212/2016    Dr. Palomares   • CORONARY ARTERY BYPASS GRAFT          Social History     Occupational History   • Not on file   Tobacco Use   • Smoking status: Former Smoker     Packs/day: 0.50     Years: 14.00     Pack years: 7.00     Types: Cigarettes   • Smokeless tobacco: Never Used   • Tobacco comment: QUIT AGE 23   Vaping Use   • Vaping Use: Never used   Substance and Sexual Activity   • Alcohol use: Yes   • Drug use: No   • Sexual activity: Defer      Social History     Social History Narrative   • Not on file        Family History   Problem Relation Age of Onset   • Heart failure Mother    • Kidney disease Mother    • Hypertension Mother    • Heart disease Mother    • Alzheimer's disease Father    • Thyroid disease Sister    • Hypertension Sister    • Hypertension Brother        ROS: 14 point review of systems was performed and was negative except for documented findings in HPI and today's encounter.     Allergies:   Allergies   Allergen Reactions   • Fish-Derived  Products Anaphylaxis   • Ondansetron Rash   • Penicillins Rash     Constitutional:  Denies fever, shaking or chills   Eyes:  Denies change in visual acuity   HENT:  Denies nasal congestion or sore throat   Respiratory:  Denies cough or shortness of breath   Cardiovascular:  Denies chest pain or severe LE edema   GI:  Denies abdominal pain, nausea, vomiting, bloody stools or diarrhea   Musculoskeletal:  Numbness, tingling, or loss of motor function only as noted above in history of present illness.  : Denies painful urination or hematuria  Integument:  Denies rash, lesion or ulceration   Neurologic:  Denies headache or focal weakness  Endocrine:  Denies lymphadenopathy  Psych:  Denies confusion or change in mental status   Hem:  Denies active bleeding      Physical Exam: 55 y.o. male  Wt Readings from Last 3 Encounters:   02/25/21 103 kg (228 lb)   01/21/21 106 kg (232 lb 12.8 oz)   12/18/20 110 kg (242 lb)       There is no height or weight on file to calculate BMI.  No height and weight on file for this encounter.  There were no vitals filed for this visit.  Vital signs reviewed.   General Appearance:    Alert, cooperative, in no acute distress                    Ortho exam      Physical exam of the right  knee reveals no effusion no redness.  The patient does have tenderness about the medial l joint line.  No tenderness about the lateral l joint line.  A negative bounce home and a positive l medial Gal.    Patient has a stable ligamentous exam.  The patient has a negative Lachman and negative anterior drawer and a negative pivot shift.  Quads are reasonable and symmetric bilaterally.  Calf is soft and nontender.  There is no overlying skin changes no lymphedema lymphadenopathy.  Patient has good hip range of motion full symmetric and asymptomatic and a normal ankle exam.  She has good distal pulses and sensation distally is intact    X-rays AP lateral merchant view of the right knee were taken to evaluate  his symptoms and compared to x-rays done in December that were not standing films he has good maintenance of his joint space x3 he does have some mild patellofemoral OA       .time    Assessment: Right knee pain I am more suspicious now of a meniscus tear plan is to proceed with an MRI and follow-up after that test we did discuss other means such as or other sources of pathology such as degenerative changes talked about the meniscus its anatomy function    Plan:   Follow up as indicated.  Ice, elevate, and rest as needed.  Discussed conservative measures of pain control including ice, bracing.  Also talked about the importance of strengthening and maintaining ideal body weight    Ella Muller M.D.

## 2021-05-19 DIAGNOSIS — E78.2 MIXED HYPERLIPIDEMIA: ICD-10-CM

## 2021-05-20 ENCOUNTER — OFFICE VISIT (OUTPATIENT)
Dept: ORTHOPEDIC SURGERY | Facility: CLINIC | Age: 56
End: 2021-05-20

## 2021-05-20 VITALS — TEMPERATURE: 96.6 F | HEIGHT: 72 IN | BODY MASS INDEX: 30.83 KG/M2 | WEIGHT: 227.6 LBS

## 2021-05-20 DIAGNOSIS — S83.241D TEAR OF MEDIAL MENISCUS OF RIGHT KNEE, CURRENT, UNSPECIFIED TEAR TYPE, SUBSEQUENT ENCOUNTER: Primary | ICD-10-CM

## 2021-05-20 PROCEDURE — 99213 OFFICE O/P EST LOW 20 MIN: CPT | Performed by: ORTHOPAEDIC SURGERY

## 2021-05-20 PROCEDURE — 73562 X-RAY EXAM OF KNEE 3: CPT | Performed by: ORTHOPAEDIC SURGERY

## 2021-05-21 RX ORDER — FENOFIBRATE 145 MG/1
145 TABLET, COATED ORAL DAILY
Qty: 90 TABLET | Refills: 0 | Status: SHIPPED | OUTPATIENT
Start: 2021-05-21 | End: 2021-07-09

## 2021-05-26 ENCOUNTER — ANTICOAGULATION VISIT (OUTPATIENT)
Dept: CARDIOLOGY | Facility: CLINIC | Age: 56
End: 2021-05-26

## 2021-05-26 ENCOUNTER — HOSPITAL ENCOUNTER (OUTPATIENT)
Dept: CARDIOLOGY | Facility: HOSPITAL | Age: 56
Discharge: HOME | End: 2021-05-26
Admitting: INTERNAL MEDICINE

## 2021-05-26 LAB — INR PPP: 2.5

## 2021-05-26 PROCEDURE — 85610 PROTHROMBIN TIME: CPT | Performed by: INTERNAL MEDICINE

## 2021-06-01 ENCOUNTER — HOSPITAL ENCOUNTER (OUTPATIENT)
Dept: CARDIOLOGY | Facility: HOSPITAL | Age: 56
Discharge: HOME | End: 2021-06-01
Admitting: INTERNAL MEDICINE

## 2021-06-01 ENCOUNTER — ANTICOAGULATION VISIT (OUTPATIENT)
Dept: CARDIOLOGY | Facility: CLINIC | Age: 56
End: 2021-06-01

## 2021-06-01 LAB — INR PPP: 2.1

## 2021-06-01 PROCEDURE — 85610 PROTHROMBIN TIME: CPT | Performed by: INTERNAL MEDICINE

## 2021-06-08 ENCOUNTER — HOSPITAL ENCOUNTER (OUTPATIENT)
Dept: CARDIOLOGY | Facility: HOSPITAL | Age: 56
Discharge: HOME | End: 2021-06-08
Admitting: INTERNAL MEDICINE

## 2021-06-08 ENCOUNTER — HOSPITAL ENCOUNTER (OUTPATIENT)
Dept: MRI IMAGING | Facility: HOSPITAL | Age: 56
Discharge: HOME | End: 2021-06-08
Admitting: ORTHOPAEDIC SURGERY

## 2021-06-08 ENCOUNTER — ANTICOAGULATION VISIT (OUTPATIENT)
Dept: CARDIOLOGY | Facility: CLINIC | Age: 56
End: 2021-06-08

## 2021-06-08 DIAGNOSIS — S83.241D TEAR OF MEDIAL MENISCUS OF RIGHT KNEE, CURRENT, UNSPECIFIED TEAR TYPE, SUBSEQUENT ENCOUNTER: ICD-10-CM

## 2021-06-08 LAB — INR PPP: 2.7

## 2021-06-08 PROCEDURE — 85610 PROTHROMBIN TIME: CPT | Performed by: INTERNAL MEDICINE

## 2021-06-08 PROCEDURE — 73721 MRI JNT OF LWR EXTRE W/O DYE: CPT

## 2021-06-10 NOTE — PROGRESS NOTES
"Knee MRI Follow Up      Patient: Hussein Nino        YOB: 1965            Chief Complaints: Knee pain right      History of Present Illness: The patient is here follow-up of an MRI of the knee MRI demonstrates a complex tear of the medial meniscus as well he has a tear of the lateral meniscus very mild chondromalacia noted.  His symptoms are significant enough he wishes to proceed the next step which would be arthroscopy      Physical Exam: 55 y.o. male  General Appearance:    Alert, cooperative, in no acute distress                 There were no vitals filed for this visit.     Patient is alert and read ×3 no acute distress appears her above-listed at height weight and age.  Affect is normal respiratory rate is normal unlabored. Heart rate regular rate rhythm, sclera, dentition and hearing are normal for the purpose of this exam.      Ortho Exam  Physical exam of the right  knee reveals no effusion no redness.  The patient does have tenderness about the medial l joint line.  No tenderness about the lateral l joint line.  A negative bounce home and a positive l medial Gal.    Patient has a stable ligamentous exam.  The patient has a negative Lachman and negative anterior drawer and a negative pivot shift.  Quads are reasonable and symmetric bilaterally.  Calf is soft and nontender.  There is no overlying skin changes no lymphedema lymphadenopathy.  Patient has good hip range of motion full symmetric and asymptomatic and a normal ankle exam.  She has good distal pulses and sensation distally is intact    Physical Exam: 55 y.o. male  General Appearance:    Alert, cooperative, in no acute distress                      Vitals:    06/11/21 0837   Temp: 96.8 °F (36 °C)   TempSrc: Temporal   Weight: 104 kg (229 lb)   Height: 182.9 cm (72\")        Head:    Normocephalic, without obvious abnormality, atraumatic   Eyes:            conjunctivae and sclerae normal, no pallor, corneas clear,    Ears:    Ears " appear intact with no abnormalities noted   Throat:   No oral lesions, no thrush, oral mucosa moist   Neck:   No adenopathy, supple, trachea midline, no thyromegaly,    Back:     No kyphosis present, no scoliosis present, no skin lesions,      erythema or scars, no tenderness to percussion or                   palpation,   range of motion normal   Lungs:     ,respirations regular, even and                  unlabored       Chest Wall:    No abnormalities observed               Pulses:   Pulses palpable and equal bilaterally   Skin:   No bleeding, bruising or rash   Lymph nodes:   No palpable adenopathy   Neurologic:   Appears neurologic intact         MRI Results: MRIs as above have reviewed the films myself and agree with the findings    Procedures      Assessment/Plan:      Right knee medial lateral meniscus tear that is greatly affecting his activity and his comfort level.  Plan is to proceed with arthroscopy.  He does have a mechanical heart valve and is on chronic Coumadin.  I suspect we will need to bridge him with Lovenox and will stop his Coumadin but we will get advice recommendation and clearance from his cardiologist.  I did discuss in detail with him risk benefits and alternatives he understands with this valve and Coumadin that his risk are higher he understand and agrees to proceed

## 2021-06-11 ENCOUNTER — OFFICE VISIT (OUTPATIENT)
Dept: ORTHOPEDIC SURGERY | Facility: CLINIC | Age: 56
End: 2021-06-11

## 2021-06-11 VITALS — BODY MASS INDEX: 31.02 KG/M2 | WEIGHT: 229 LBS | HEIGHT: 72 IN | TEMPERATURE: 96.8 F

## 2021-06-11 DIAGNOSIS — S83.281A TEAR OF LATERAL MENISCUS OF RIGHT KNEE, CURRENT, UNSPECIFIED TEAR TYPE, INITIAL ENCOUNTER: ICD-10-CM

## 2021-06-11 DIAGNOSIS — S83.241A OTHER TEAR OF MEDIAL MENISCUS OF RIGHT KNEE AS CURRENT INJURY, INITIAL ENCOUNTER: Primary | ICD-10-CM

## 2021-06-11 PROCEDURE — 99214 OFFICE O/P EST MOD 30 MIN: CPT | Performed by: ORTHOPAEDIC SURGERY

## 2021-06-14 ENCOUNTER — TELEPHONE (OUTPATIENT)
Dept: ORTHOPEDIC SURGERY | Facility: CLINIC | Age: 56
End: 2021-06-14

## 2021-06-14 NOTE — TELEPHONE ENCOUNTER
Have faxed a note to his cardiologist Dr. Eddy to make him aware that the patient is going to be scheduled for knee arthroscopy.  Have asked him to let us know if the patient is cleared from a cardiac standpoint and when he can discontinue his Coumadin and order his Lovenox bridging

## 2021-06-15 ENCOUNTER — HOSPITAL ENCOUNTER (OUTPATIENT)
Dept: CARDIOLOGY | Facility: HOSPITAL | Age: 56
Discharge: HOME | End: 2021-06-15
Admitting: INTERNAL MEDICINE

## 2021-06-15 ENCOUNTER — ANTICOAGULATION VISIT (OUTPATIENT)
Dept: CARDIOLOGY | Facility: CLINIC | Age: 56
End: 2021-06-15

## 2021-06-15 ENCOUNTER — TELEPHONE (OUTPATIENT)
Dept: ORTHOPEDIC SURGERY | Facility: CLINIC | Age: 56
End: 2021-06-15

## 2021-06-15 DIAGNOSIS — I10 BENIGN ESSENTIAL HTN: ICD-10-CM

## 2021-06-15 DIAGNOSIS — Z95.2 MECHANICAL HEART VALVE PRESENT: ICD-10-CM

## 2021-06-15 DIAGNOSIS — Z01.818 PRE-OP TESTING: ICD-10-CM

## 2021-06-15 DIAGNOSIS — E78.1 ESSENTIAL HYPERTRIGLYCERIDEMIA: Primary | ICD-10-CM

## 2021-06-15 LAB — INR PPP: 3

## 2021-06-15 PROCEDURE — 85610 PROTHROMBIN TIME: CPT | Performed by: INTERNAL MEDICINE

## 2021-06-15 NOTE — TELEPHONE ENCOUNTER
----- Message from Joan Brice MA sent at 6/15/2021 12:07 PM EDT -----  Ms. Isaiah Pt has not been seen in a little over a year. For a cardiac clearance pt will need office visit and testing. Pt is scheduled for cardiolite 6/21/21, Echo on 6/29/21 and follow up with Dr SUJATA Latham I will send clearance once approved. If you have any other questions please let me know.    Thanks,   Joan SHANE  ----- Message -----  From: Joan Brice MA  Sent: 6/15/2021  10:04 AM EDT  To: Joan Brice MA      ----- Message -----  From: Fabiola Colon RN  Sent: 6/14/2021   1:55 PM EDT  To: Sofya Eddy MD    Patient was seen by Dr. Muller and will need knee arthroscopy.  He does have a mechanical heart valve and is on long-term Coumadin therapy.  Dr. Muller is wanting you to address when the patient can discontinue his Coumadin and Lovenox bridging

## 2021-06-21 ENCOUNTER — HOSPITAL ENCOUNTER (OUTPATIENT)
Dept: CARDIOLOGY | Facility: HOSPITAL | Age: 56
Discharge: HOME | End: 2021-06-21

## 2021-06-21 VITALS
HEIGHT: 72 IN | SYSTOLIC BLOOD PRESSURE: 124 MMHG | HEART RATE: 67 BPM | DIASTOLIC BLOOD PRESSURE: 78 MMHG | RESPIRATION RATE: 16 BRPM | BODY MASS INDEX: 30.75 KG/M2 | WEIGHT: 227 LBS

## 2021-06-21 PROCEDURE — A9500 TC99M SESTAMIBI: HCPCS | Performed by: INTERNAL MEDICINE

## 2021-06-21 PROCEDURE — 78452 HT MUSCLE IMAGE SPECT MULT: CPT | Performed by: INTERNAL MEDICINE

## 2021-06-21 PROCEDURE — 93017 CV STRESS TEST TRACING ONLY: CPT

## 2021-06-21 PROCEDURE — 0 TECHNETIUM SESTAMIBI: Performed by: INTERNAL MEDICINE

## 2021-06-21 PROCEDURE — 78452 HT MUSCLE IMAGE SPECT MULT: CPT

## 2021-06-21 PROCEDURE — 93016 CV STRESS TEST SUPVJ ONLY: CPT | Performed by: INTERNAL MEDICINE

## 2021-06-21 PROCEDURE — 93018 CV STRESS TEST I&R ONLY: CPT | Performed by: INTERNAL MEDICINE

## 2021-06-21 RX ORDER — CLINDAMYCIN HYDROCHLORIDE 300 MG/1
CAPSULE ORAL
COMMUNITY
Start: 2021-04-29 | End: 2021-09-20

## 2021-06-21 RX ADMIN — TECHNETIUM TC 99M SESTAMIBI 1 DOSE: 1 INJECTION INTRAVENOUS at 10:27

## 2021-06-21 RX ADMIN — TECHNETIUM TC 99M SESTAMIBI 1 DOSE: 1 INJECTION INTRAVENOUS at 07:40

## 2021-06-22 PROBLEM — S83.281A TEAR OF LATERAL MENISCUS OF RIGHT KNEE, CURRENT: Status: ACTIVE | Noted: 2021-06-22

## 2021-06-22 PROBLEM — S83.241A TEAR OF MEDIAL MENISCUS OF RIGHT KNEE, CURRENT: Status: ACTIVE | Noted: 2021-06-22

## 2021-06-24 ENCOUNTER — APPOINTMENT (OUTPATIENT)
Dept: MRI IMAGING | Facility: HOSPITAL | Age: 56
End: 2021-06-24

## 2021-06-24 LAB
BH CV REST NUCLEAR ISOTOPE DOSE: 10.9 MCI
BH CV STRESS BP STAGE 1: NORMAL
BH CV STRESS BP STAGE 2: NORMAL
BH CV STRESS BP STAGE 3: NORMAL
BH CV STRESS DURATION MIN STAGE 1: 3
BH CV STRESS DURATION MIN STAGE 2: 3
BH CV STRESS DURATION MIN STAGE 3: 1
BH CV STRESS DURATION SEC STAGE 1: 0
BH CV STRESS DURATION SEC STAGE 2: 0
BH CV STRESS DURATION SEC STAGE 3: 30
BH CV STRESS GRADE STAGE 1: 10
BH CV STRESS GRADE STAGE 2: 12
BH CV STRESS GRADE STAGE 3: 14
BH CV STRESS HR STAGE 1: 103
BH CV STRESS HR STAGE 2: 124
BH CV STRESS HR STAGE 3: 152
BH CV STRESS METS STAGE 1: 4.6
BH CV STRESS METS STAGE 2: 7.1
BH CV STRESS METS STAGE 3: 8.6
BH CV STRESS NUCLEAR ISOTOPE DOSE: 31.1 MCI
BH CV STRESS PROTOCOL 1: NORMAL
BH CV STRESS RECOVERY BP: NORMAL MMHG
BH CV STRESS RECOVERY HR: 76 BPM
BH CV STRESS SPEED STAGE 1: 1.7
BH CV STRESS SPEED STAGE 2: 2.5
BH CV STRESS SPEED STAGE 3: 3.4
BH CV STRESS STAGE 1: 1
BH CV STRESS STAGE 2: 2
BH CV STRESS STAGE 3: 3
LV EF NUC BP: 60 %
MAXIMAL PREDICTED HEART RATE: 164 BPM
PERCENT MAX PREDICTED HR: 92.68 %
STRESS BASELINE BP: NORMAL MMHG
STRESS BASELINE HR: 69 BPM
STRESS PERCENT HR: 109 %
STRESS POST ESTIMATED WORKLOAD: 8.6 METS
STRESS POST EXERCISE DUR MIN: 7 MIN
STRESS POST EXERCISE DUR SEC: 30 SEC
STRESS POST PEAK BP: NORMAL MMHG
STRESS POST PEAK HR: 152 BPM
STRESS TARGET HR: 139 BPM

## 2021-06-29 ENCOUNTER — HOSPITAL ENCOUNTER (OUTPATIENT)
Dept: CARDIOLOGY | Facility: HOSPITAL | Age: 56
Discharge: HOME | End: 2021-06-29

## 2021-06-29 ENCOUNTER — ANTICOAGULATION VISIT (OUTPATIENT)
Dept: CARDIOLOGY | Facility: CLINIC | Age: 56
End: 2021-06-29

## 2021-06-29 VITALS
BODY MASS INDEX: 30.76 KG/M2 | HEIGHT: 72 IN | OXYGEN SATURATION: 97 % | WEIGHT: 227.07 LBS | SYSTOLIC BLOOD PRESSURE: 140 MMHG | HEART RATE: 67 BPM | DIASTOLIC BLOOD PRESSURE: 72 MMHG

## 2021-06-29 LAB
AORTIC ARCH: 2.8 CM
AORTIC DIMENSIONLESS INDEX: 0.4 (DI)
ASCENDING AORTA: 3.2 CM
BH CV ECHO MEAS - AO ACC TIME: 0.1 SEC
BH CV ECHO MEAS - AO MAX PG (FULL): 12.6 MMHG
BH CV ECHO MEAS - AO MAX PG: 15.5 MMHG
BH CV ECHO MEAS - AO MEAN PG (FULL): 6.5 MMHG
BH CV ECHO MEAS - AO MEAN PG: 8.1 MMHG
BH CV ECHO MEAS - AO ROOT AREA (BSA CORRECTED): 1.4
BH CV ECHO MEAS - AO ROOT AREA: 7.5 CM^2
BH CV ECHO MEAS - AO ROOT DIAM: 3.1 CM
BH CV ECHO MEAS - AO V2 MAX: 196.8 CM/SEC
BH CV ECHO MEAS - AO V2 MEAN: 132.1 CM/SEC
BH CV ECHO MEAS - AO V2 VTI: 44.7 CM
BH CV ECHO MEAS - ASC AORTA: 3.2 CM
BH CV ECHO MEAS - AVA(I,A): 1.3 CM^2
BH CV ECHO MEAS - AVA(I,D): 1.3 CM^2
BH CV ECHO MEAS - AVA(V,A): 1.3 CM^2
BH CV ECHO MEAS - AVA(V,D): 1.3 CM^2
BH CV ECHO MEAS - BSA(HAYCOCK): 2.3 M^2
BH CV ECHO MEAS - BSA: 2.2 M^2
BH CV ECHO MEAS - BZI_BMI: 31.1 KILOGRAMS/M^2
BH CV ECHO MEAS - BZI_METRIC_HEIGHT: 182 CM
BH CV ECHO MEAS - BZI_METRIC_WEIGHT: 103 KG
BH CV ECHO MEAS - EDV(CUBED): 167.1 ML
BH CV ECHO MEAS - EDV(MOD-SP2): 157 ML
BH CV ECHO MEAS - EDV(MOD-SP4): 128 ML
BH CV ECHO MEAS - EDV(TEICH): 147.9 ML
BH CV ECHO MEAS - EF(CUBED): 66.1 %
BH CV ECHO MEAS - EF(MOD-BP): 58.7 %
BH CV ECHO MEAS - EF(MOD-SP2): 58 %
BH CV ECHO MEAS - EF(MOD-SP4): 59.4 %
BH CV ECHO MEAS - EF(TEICH): 57 %
BH CV ECHO MEAS - ESV(CUBED): 56.7 ML
BH CV ECHO MEAS - ESV(MOD-SP2): 66 ML
BH CV ECHO MEAS - ESV(MOD-SP4): 52 ML
BH CV ECHO MEAS - ESV(TEICH): 63.6 ML
BH CV ECHO MEAS - FS: 30.2 %
BH CV ECHO MEAS - IVS/LVPW: 1.1
BH CV ECHO MEAS - IVSD: 1.3 CM
BH CV ECHO MEAS - LAT PEAK E' VEL: 10 CM/SEC
BH CV ECHO MEAS - LV DIASTOLIC VOL/BSA (35-75): 57.1 ML/M^2
BH CV ECHO MEAS - LV MASS(C)D: 292.4 GRAMS
BH CV ECHO MEAS - LV MASS(C)DI: 130.5 GRAMS/M^2
BH CV ECHO MEAS - LV MAX PG: 2.9 MMHG
BH CV ECHO MEAS - LV MEAN PG: 1.6 MMHG
BH CV ECHO MEAS - LV SYSTOLIC VOL/BSA (12-30): 23.2 ML/M^2
BH CV ECHO MEAS - LV V1 MAX: 84.8 CM/SEC
BH CV ECHO MEAS - LV V1 MEAN: 58.1 CM/SEC
BH CV ECHO MEAS - LV V1 VTI: 19.4 CM
BH CV ECHO MEAS - LVIDD: 5.5 CM
BH CV ECHO MEAS - LVIDS: 3.8 CM
BH CV ECHO MEAS - LVLD AP2: 8.1 CM
BH CV ECHO MEAS - LVLD AP4: 8.2 CM
BH CV ECHO MEAS - LVLS AP2: 6.8 CM
BH CV ECHO MEAS - LVLS AP4: 6.6 CM
BH CV ECHO MEAS - LVOT AREA (M): 2.8 CM^2
BH CV ECHO MEAS - LVOT AREA: 3 CM^2
BH CV ECHO MEAS - LVOT DIAM: 1.9 CM
BH CV ECHO MEAS - LVPWD: 1.2 CM
BH CV ECHO MEAS - MED PEAK E' VEL: 9.1 CM/SEC
BH CV ECHO MEAS - MV A DUR: 0.19 SEC
BH CV ECHO MEAS - MV A MAX VEL: 74.1 CM/SEC
BH CV ECHO MEAS - MV DEC SLOPE: 239.3 CM/SEC^2
BH CV ECHO MEAS - MV DEC TIME: 292 SEC
BH CV ECHO MEAS - MV E MAX VEL: 57.2 CM/SEC
BH CV ECHO MEAS - MV E/A: 0.77
BH CV ECHO MEAS - MV MAX PG: 2.6 MMHG
BH CV ECHO MEAS - MV MEAN PG: 1.1 MMHG
BH CV ECHO MEAS - MV P1/2T MAX VEL: 82.5 CM/SEC
BH CV ECHO MEAS - MV P1/2T: 101 MSEC
BH CV ECHO MEAS - MV V2 MAX: 80.4 CM/SEC
BH CV ECHO MEAS - MV V2 MEAN: 48.7 CM/SEC
BH CV ECHO MEAS - MV V2 VTI: 23.8 CM
BH CV ECHO MEAS - MVA P1/2T LCG: 2.7 CM^2
BH CV ECHO MEAS - MVA(P1/2T): 2.2 CM^2
BH CV ECHO MEAS - MVA(VTI): 2.4 CM^2
BH CV ECHO MEAS - PA ACC TIME: 0.12 SEC
BH CV ECHO MEAS - PA MAX PG (FULL): 3.1 MMHG
BH CV ECHO MEAS - PA MAX PG: 4 MMHG
BH CV ECHO MEAS - PA PR(ACCEL): 25.1 MMHG
BH CV ECHO MEAS - PA V2 MAX: 99.9 CM/SEC
BH CV ECHO MEAS - PI END-D VEL: 99.9 CM/SEC
BH CV ECHO MEAS - PULM A REVS DUR: 0.09 SEC
BH CV ECHO MEAS - PULM A REVS VEL: 19.6 CM/SEC
BH CV ECHO MEAS - PULM DIAS VEL: 45.3 CM/SEC
BH CV ECHO MEAS - PULM S/D: 1.3
BH CV ECHO MEAS - PULM SYS VEL: 57.5 CM/SEC
BH CV ECHO MEAS - PVA(V,A): 3.3 CM^2
BH CV ECHO MEAS - PVA(V,D): 3.3 CM^2
BH CV ECHO MEAS - QP/QS: 1.4
BH CV ECHO MEAS - RAP SYSTOLE: 3 MMHG
BH CV ECHO MEAS - RV MAX PG: 0.85 MMHG
BH CV ECHO MEAS - RV MEAN PG: 0.44 MMHG
BH CV ECHO MEAS - RV V1 MAX: 46.1 CM/SEC
BH CV ECHO MEAS - RV V1 MEAN: 31.1 CM/SEC
BH CV ECHO MEAS - RV V1 VTI: 11.2 CM
BH CV ECHO MEAS - RVOT AREA: 7.2 CM^2
BH CV ECHO MEAS - RVOT DIAM: 3 CM
BH CV ECHO MEAS - RVSP: 12.9 MMHG
BH CV ECHO MEAS - SI(AO): 149.3 ML/M^2
BH CV ECHO MEAS - SI(CUBED): 49.3 ML/M^2
BH CV ECHO MEAS - SI(LVOT): 25.8 ML/M^2
BH CV ECHO MEAS - SI(MOD-SP2): 40.6 ML/M^2
BH CV ECHO MEAS - SI(MOD-SP4): 33.9 ML/M^2
BH CV ECHO MEAS - SI(TEICH): 37.6 ML/M^2
BH CV ECHO MEAS - SUP REN AO DIAM: 2.2 CM
BH CV ECHO MEAS - SV(AO): 334.5 ML
BH CV ECHO MEAS - SV(CUBED): 110.4 ML
BH CV ECHO MEAS - SV(LVOT): 57.9 ML
BH CV ECHO MEAS - SV(MOD-SP2): 91 ML
BH CV ECHO MEAS - SV(MOD-SP4): 76 ML
BH CV ECHO MEAS - SV(RVOT): 80.4 ML
BH CV ECHO MEAS - SV(TEICH): 84.3 ML
BH CV ECHO MEAS - TAPSE (>1.6): 1.7 CM
BH CV ECHO MEAS - TR MAX VEL: 157.5 CM/SEC
BH CV ECHO MEASUREMENTS AVERAGE E/E' RATIO: 5.99
BH CV VAS BP LEFT ARM: NORMAL MMHG
BH CV XLRA - RV BASE: 3.8 CM
BH CV XLRA - RV LENGTH: 8.4 CM
BH CV XLRA - RV MID: 3.1 CM
BH CV XLRA - TDI S': 11 CM/SEC
INR PPP: 4.5
LEFT ATRIUM VOLUME INDEX: 38.3 ML/M2
SINUS: 3.8 CM
STJ: 3.4 CM

## 2021-06-29 PROCEDURE — 93306 TTE W/DOPPLER COMPLETE: CPT | Performed by: INTERNAL MEDICINE

## 2021-06-29 PROCEDURE — 93306 TTE W/DOPPLER COMPLETE: CPT

## 2021-06-29 PROCEDURE — 85610 PROTHROMBIN TIME: CPT | Performed by: INTERNAL MEDICINE

## 2021-06-30 ENCOUNTER — OFFICE VISIT (OUTPATIENT)
Dept: CARDIOLOGY | Age: 56
End: 2021-06-30

## 2021-06-30 VITALS
HEIGHT: 72 IN | WEIGHT: 229 LBS | BODY MASS INDEX: 31.02 KG/M2 | DIASTOLIC BLOOD PRESSURE: 82 MMHG | SYSTOLIC BLOOD PRESSURE: 144 MMHG | HEART RATE: 70 BPM

## 2021-06-30 DIAGNOSIS — I10 BENIGN ESSENTIAL HTN: Primary | ICD-10-CM

## 2021-06-30 DIAGNOSIS — Z79.01 ANTICOAGULATED: ICD-10-CM

## 2021-06-30 DIAGNOSIS — Z95.2 S/P AVR (AORTIC VALVE REPLACEMENT): ICD-10-CM

## 2021-06-30 DIAGNOSIS — E78.2 MIXED HYPERLIPIDEMIA: ICD-10-CM

## 2021-06-30 PROCEDURE — 99214 OFFICE O/P EST MOD 30 MIN: CPT | Performed by: INTERNAL MEDICINE

## 2021-07-06 ENCOUNTER — TELEPHONE (OUTPATIENT)
Dept: ORTHOPEDIC SURGERY | Facility: CLINIC | Age: 56
End: 2021-07-06

## 2021-07-06 ENCOUNTER — ANTICOAGULATION VISIT (OUTPATIENT)
Dept: CARDIOLOGY | Facility: CLINIC | Age: 56
End: 2021-07-06

## 2021-07-06 ENCOUNTER — HOSPITAL ENCOUNTER (OUTPATIENT)
Dept: CARDIOLOGY | Facility: HOSPITAL | Age: 56
Discharge: HOME | End: 2021-07-06
Admitting: INTERNAL MEDICINE

## 2021-07-06 LAB — INR PPP: 3.8

## 2021-07-06 PROCEDURE — 85610 PROTHROMBIN TIME: CPT | Performed by: INTERNAL MEDICINE

## 2021-07-09 DIAGNOSIS — E78.2 MIXED HYPERLIPIDEMIA: ICD-10-CM

## 2021-07-09 RX ORDER — PRAVASTATIN SODIUM 40 MG
TABLET ORAL
Qty: 90 TABLET | Refills: 1 | Status: SHIPPED | OUTPATIENT
Start: 2021-07-09 | End: 2021-10-18 | Stop reason: SDUPTHER

## 2021-07-09 RX ORDER — FENOFIBRATE 145 MG/1
TABLET, COATED ORAL
Qty: 90 TABLET | Refills: 0 | Status: SHIPPED | OUTPATIENT
Start: 2021-07-09 | End: 2021-09-20 | Stop reason: SDUPTHER

## 2021-07-13 ENCOUNTER — ANTICOAGULATION VISIT (OUTPATIENT)
Dept: CARDIOLOGY | Facility: CLINIC | Age: 56
End: 2021-07-13

## 2021-07-13 ENCOUNTER — HOSPITAL ENCOUNTER (OUTPATIENT)
Dept: CARDIOLOGY | Facility: HOSPITAL | Age: 56
Discharge: HOME | End: 2021-07-13
Admitting: INTERNAL MEDICINE

## 2021-07-13 LAB — INR PPP: 2.7

## 2021-07-13 PROCEDURE — 85610 PROTHROMBIN TIME: CPT | Performed by: INTERNAL MEDICINE

## 2021-07-20 ENCOUNTER — HOSPITAL ENCOUNTER (OUTPATIENT)
Dept: CARDIOLOGY | Facility: HOSPITAL | Age: 56
Discharge: HOME | End: 2021-07-20
Admitting: INTERNAL MEDICINE

## 2021-07-20 ENCOUNTER — ANTICOAGULATION VISIT (OUTPATIENT)
Dept: CARDIOLOGY | Facility: CLINIC | Age: 56
End: 2021-07-20

## 2021-07-20 DIAGNOSIS — I38 HEART VALVE DISEASE: ICD-10-CM

## 2021-07-20 LAB — INR PPP: 2.9

## 2021-07-20 PROCEDURE — 85610 PROTHROMBIN TIME: CPT | Performed by: INTERNAL MEDICINE

## 2021-07-21 RX ORDER — WARFARIN SODIUM 5 MG/1
TABLET ORAL
Qty: 90 TABLET | Refills: 1 | Status: SHIPPED | OUTPATIENT
Start: 2021-07-21 | End: 2021-12-07 | Stop reason: SDUPTHER

## 2021-08-03 ENCOUNTER — ANTICOAGULATION VISIT (OUTPATIENT)
Dept: CARDIOLOGY | Facility: CLINIC | Age: 56
End: 2021-08-03

## 2021-08-03 ENCOUNTER — HOSPITAL ENCOUNTER (OUTPATIENT)
Dept: CARDIOLOGY | Facility: HOSPITAL | Age: 56
Discharge: HOME | End: 2021-08-03
Admitting: INTERNAL MEDICINE

## 2021-08-03 LAB — INR PPP: 4.3

## 2021-08-03 PROCEDURE — 85610 PROTHROMBIN TIME: CPT | Performed by: INTERNAL MEDICINE

## 2021-08-17 ENCOUNTER — HOSPITAL ENCOUNTER (OUTPATIENT)
Dept: CARDIOLOGY | Facility: HOSPITAL | Age: 56
Discharge: HOME | End: 2021-08-17
Admitting: INTERNAL MEDICINE

## 2021-08-17 ENCOUNTER — ANTICOAGULATION VISIT (OUTPATIENT)
Dept: CARDIOLOGY | Facility: CLINIC | Age: 56
End: 2021-08-17

## 2021-08-17 LAB — INR PPP: 4.1

## 2021-08-17 PROCEDURE — 85610 PROTHROMBIN TIME: CPT | Performed by: INTERNAL MEDICINE

## 2021-08-23 ENCOUNTER — TRANSCRIBE ORDERS (OUTPATIENT)
Dept: PREADMISSION TESTING | Facility: HOSPITAL | Age: 56
End: 2021-08-23

## 2021-08-23 DIAGNOSIS — Z01.818 OTHER SPECIFIED PRE-OPERATIVE EXAMINATION: Primary | ICD-10-CM

## 2021-08-24 ENCOUNTER — ANTICOAGULATION VISIT (OUTPATIENT)
Dept: CARDIOLOGY | Facility: CLINIC | Age: 56
End: 2021-08-24

## 2021-08-24 ENCOUNTER — HOSPITAL ENCOUNTER (OUTPATIENT)
Dept: CARDIOLOGY | Facility: HOSPITAL | Age: 56
Discharge: HOME | End: 2021-08-24
Admitting: INTERNAL MEDICINE

## 2021-08-24 LAB — INR PPP: 4.1

## 2021-08-24 PROCEDURE — 85610 PROTHROMBIN TIME: CPT | Performed by: INTERNAL MEDICINE

## 2021-08-25 ENCOUNTER — TELEPHONE (OUTPATIENT)
Dept: ORTHOPEDIC SURGERY | Facility: CLINIC | Age: 56
End: 2021-08-25

## 2021-08-25 NOTE — TELEPHONE ENCOUNTER
If the patient does need to be admitted the night before surgery or if we can discontinue his Coumadin prior to surgery and bridging with Lovenox.  I also left a message for the patient that I will contact him once I have heard from his cardiologist

## 2021-08-30 ENCOUNTER — PRE-ADMISSION TESTING (OUTPATIENT)
Dept: PREADMISSION TESTING | Facility: HOSPITAL | Age: 56
End: 2021-08-30

## 2021-08-30 ENCOUNTER — TELEPHONE (OUTPATIENT)
Dept: ORTHOPEDIC SURGERY | Facility: CLINIC | Age: 56
End: 2021-08-30

## 2021-08-30 VITALS
OXYGEN SATURATION: 98 % | HEART RATE: 70 BPM | WEIGHT: 234.1 LBS | BODY MASS INDEX: 31.71 KG/M2 | TEMPERATURE: 97.4 F | HEIGHT: 72 IN | RESPIRATION RATE: 18 BRPM | DIASTOLIC BLOOD PRESSURE: 82 MMHG | SYSTOLIC BLOOD PRESSURE: 143 MMHG

## 2021-08-30 DIAGNOSIS — Z95.2 S/P AVR (AORTIC VALVE REPLACEMENT): ICD-10-CM

## 2021-08-30 DIAGNOSIS — Z95.2 MECHANICAL HEART VALVE PRESENT: ICD-10-CM

## 2021-08-30 DIAGNOSIS — Z79.01 ANTICOAGULATED: ICD-10-CM

## 2021-08-30 LAB
ANION GAP SERPL CALCULATED.3IONS-SCNC: 9.9 MMOL/L (ref 5–15)
BUN SERPL-MCNC: 16 MG/DL (ref 6–20)
BUN/CREAT SERPL: 12.6 (ref 7–25)
CALCIUM SPEC-SCNC: 8.8 MG/DL (ref 8.6–10.5)
CHLORIDE SERPL-SCNC: 106 MMOL/L (ref 98–107)
CO2 SERPL-SCNC: 24.1 MMOL/L (ref 22–29)
CREAT SERPL-MCNC: 1.27 MG/DL (ref 0.76–1.27)
DEPRECATED RDW RBC AUTO: 42.9 FL (ref 37–54)
ERYTHROCYTE [DISTWIDTH] IN BLOOD BY AUTOMATED COUNT: 13.8 % (ref 12.3–15.4)
GFR SERPL CREATININE-BSD FRML MDRD: 59 ML/MIN/1.73
GLUCOSE SERPL-MCNC: 125 MG/DL (ref 65–99)
HCT VFR BLD AUTO: 42 % (ref 37.5–51)
HGB BLD-MCNC: 14.2 G/DL (ref 13–17.7)
INR PPP: 2.15 (ref 0.9–1.1)
MCH RBC QN AUTO: 28.7 PG (ref 26.6–33)
MCHC RBC AUTO-ENTMCNC: 33.8 G/DL (ref 31.5–35.7)
MCV RBC AUTO: 85 FL (ref 79–97)
PLATELET # BLD AUTO: 161 10*3/MM3 (ref 140–450)
PMV BLD AUTO: 8.7 FL (ref 6–12)
POTASSIUM SERPL-SCNC: 4 MMOL/L (ref 3.5–5.2)
PROTHROMBIN TIME: 23.7 SECONDS (ref 11.7–14.2)
QT INTERVAL: 424 MS
RBC # BLD AUTO: 4.94 10*6/MM3 (ref 4.14–5.8)
SODIUM SERPL-SCNC: 140 MMOL/L (ref 136–145)
WBC # BLD AUTO: 4.9 10*3/MM3 (ref 3.4–10.8)

## 2021-08-30 PROCEDURE — 93005 ELECTROCARDIOGRAM TRACING: CPT

## 2021-08-30 PROCEDURE — 85610 PROTHROMBIN TIME: CPT

## 2021-08-30 PROCEDURE — 36415 COLL VENOUS BLD VENIPUNCTURE: CPT

## 2021-08-30 PROCEDURE — 80048 BASIC METABOLIC PNL TOTAL CA: CPT

## 2021-08-30 PROCEDURE — 85027 COMPLETE CBC AUTOMATED: CPT

## 2021-08-30 PROCEDURE — 93010 ELECTROCARDIOGRAM REPORT: CPT | Performed by: INTERNAL MEDICINE

## 2021-08-30 NOTE — TELEPHONE ENCOUNTER
Have again left a message with Dr. Vinod Torres's office about whether or not the patient can discontinue his Coumadin prior to surgery and be bridged with Lovenox or if he has to be admitted the night before.

## 2021-08-31 ENCOUNTER — PREP FOR SURGERY (OUTPATIENT)
Dept: OTHER | Facility: HOSPITAL | Age: 56
End: 2021-08-31

## 2021-08-31 ENCOUNTER — ANTICOAGULATION VISIT (OUTPATIENT)
Dept: CARDIOLOGY | Facility: CLINIC | Age: 56
End: 2021-08-31

## 2021-08-31 ENCOUNTER — TELEPHONE (OUTPATIENT)
Dept: ORTHOPEDIC SURGERY | Facility: CLINIC | Age: 56
End: 2021-08-31

## 2021-08-31 NOTE — TELEPHONE ENCOUNTER
Received a call from cardiology office.  Patient is to report to their office on Friday for an INR.  If his INR is below 2.5 they are to start him on Lovenox 1 mg/kg every 12 hours and needs to discontinue that the day before surgery.  Patient is to resume both the Coumadin and the Lovenox postoperatively when okay with Dr. Muller.  Have advised him that more than likely she will have him start back on his Coumadin the night of his surgery and then start the Lovenox the following day.  Patient is to report to their office then again next week for a repeat pro time.  I have also ordered for the patient to have an INR done the morning of surgery

## 2021-09-02 ENCOUNTER — TELEPHONE (OUTPATIENT)
Dept: CARDIOLOGY | Facility: CLINIC | Age: 56
End: 2021-09-02

## 2021-09-02 ENCOUNTER — TELEPHONE (OUTPATIENT)
Dept: ORTHOPEDIC SURGERY | Facility: CLINIC | Age: 56
End: 2021-09-02

## 2021-09-02 NOTE — TELEPHONE ENCOUNTER
----- Message from Ethel Garduno sent at 8/25/2021  9:53 AM EDT -----  Regarding: CLEAR AND ON COUMADIN  Contact: [phone]  [facility]     SURGERY ON SEPT 7, KNEE ARTH    ON COUMADIN, WHAT TO DO FOR THIS?  IS HE CLEARED FOR SURG?

## 2021-09-02 NOTE — TELEPHONE ENCOUNTER
8/31/21 Called and informed Fabiola that pt is clear for knee surgery and would need to bridge with lovenox.     Hold warfarin 3 days prior, check inr if below 2.5 will start Lovenox 1 mg/kg bid. Last dose night before. Resume once approved by surgeon. Lovenox and coumadin until inr is 2.5 will check thurs. Post op

## 2021-09-02 NOTE — TELEPHONE ENCOUNTER
----- Message from Felicia Cardenas sent at 8/30/2021  2:47 PM EDT -----  Regarding: CALLING AGAIN RE BLOOD THINNER PRIOR TO SX-9/7  [facility] [phone]

## 2021-09-02 NOTE — TELEPHONE ENCOUNTER
Patient is having surgery on 9-7-21 and would like to know if he would be able to drive for his follow up on 9-21-21. Please advise.

## 2021-09-02 NOTE — TELEPHONE ENCOUNTER
I called and spoke to Luis and then notified patient. If he is off his pain medication and crutches and feels safe to drive he may do so. If not then no he can not drive.

## 2021-09-03 ENCOUNTER — HOSPITAL ENCOUNTER (OUTPATIENT)
Dept: CARDIOLOGY | Facility: HOSPITAL | Age: 56
Discharge: HOME | End: 2021-09-03
Admitting: INTERNAL MEDICINE

## 2021-09-03 ENCOUNTER — ANTICOAGULATION VISIT (OUTPATIENT)
Dept: CARDIOLOGY | Facility: CLINIC | Age: 56
End: 2021-09-03

## 2021-09-03 LAB — INR PPP: 1.7

## 2021-09-03 PROCEDURE — 85610 PROTHROMBIN TIME: CPT | Performed by: INTERNAL MEDICINE

## 2021-09-04 ENCOUNTER — LAB (OUTPATIENT)
Dept: LAB | Facility: HOSPITAL | Age: 56
End: 2021-09-04

## 2021-09-04 DIAGNOSIS — Z01.818 OTHER SPECIFIED PRE-OPERATIVE EXAMINATION: ICD-10-CM

## 2021-09-04 LAB — SARS-COV-2 ORF1AB RESP QL NAA+PROBE: NOT DETECTED

## 2021-09-04 PROCEDURE — C9803 HOPD COVID-19 SPEC COLLECT: HCPCS

## 2021-09-04 PROCEDURE — U0004 COV-19 TEST NON-CDC HGH THRU: HCPCS

## 2021-09-07 ENCOUNTER — HOSPITAL ENCOUNTER (OUTPATIENT)
Facility: HOSPITAL | Age: 56
Setting detail: HOSPITAL OUTPATIENT SURGERY
Discharge: HOME | End: 2021-09-07
Attending: ORTHOPAEDIC SURGERY | Admitting: ORTHOPAEDIC SURGERY

## 2021-09-07 ENCOUNTER — ANESTHESIA (OUTPATIENT)
Dept: PERIOP | Facility: HOSPITAL | Age: 56
End: 2021-09-07

## 2021-09-07 ENCOUNTER — ANESTHESIA EVENT (OUTPATIENT)
Dept: PERIOP | Facility: HOSPITAL | Age: 56
End: 2021-09-07

## 2021-09-07 VITALS
OXYGEN SATURATION: 96 % | SYSTOLIC BLOOD PRESSURE: 150 MMHG | HEART RATE: 64 BPM | TEMPERATURE: 97.6 F | DIASTOLIC BLOOD PRESSURE: 92 MMHG | RESPIRATION RATE: 16 BRPM

## 2021-09-07 DIAGNOSIS — S83.241A OTHER TEAR OF MEDIAL MENISCUS OF RIGHT KNEE AS CURRENT INJURY, INITIAL ENCOUNTER: ICD-10-CM

## 2021-09-07 DIAGNOSIS — S83.281A TEAR OF LATERAL MENISCUS OF RIGHT KNEE, CURRENT, UNSPECIFIED TEAR TYPE, INITIAL ENCOUNTER: ICD-10-CM

## 2021-09-07 DIAGNOSIS — K21.9 GASTROESOPHAGEAL REFLUX DISEASE WITHOUT ESOPHAGITIS: ICD-10-CM

## 2021-09-07 LAB
INR PPP: 1.04 (ref 0.9–1.1)
PROTHROMBIN TIME: 13.4 SECONDS (ref 11.7–14.2)

## 2021-09-07 PROCEDURE — 25010000002 FENTANYL CITRATE (PF) 50 MCG/ML SOLUTION: Performed by: NURSE ANESTHETIST, CERTIFIED REGISTERED

## 2021-09-07 PROCEDURE — 25010000002 VANCOMYCIN 10 G RECONSTITUTED SOLUTION: Performed by: ORTHOPAEDIC SURGERY

## 2021-09-07 PROCEDURE — 29880 ARTHRS KNE SRG MNISECTMY M&L: CPT | Performed by: ORTHOPAEDIC SURGERY

## 2021-09-07 PROCEDURE — 25010000002 PROPOFOL 10 MG/ML EMULSION: Performed by: NURSE ANESTHETIST, CERTIFIED REGISTERED

## 2021-09-07 PROCEDURE — 25010000002 DEXAMETHASONE PER 1 MG: Performed by: NURSE ANESTHETIST, CERTIFIED REGISTERED

## 2021-09-07 PROCEDURE — 25010000002 METHYLPREDNISOLONE PER 80 MG: Performed by: ORTHOPAEDIC SURGERY

## 2021-09-07 PROCEDURE — 85610 PROTHROMBIN TIME: CPT | Performed by: ORTHOPAEDIC SURGERY

## 2021-09-07 PROCEDURE — 63710000001 PROMETHAZINE PER 25 MG: Performed by: ANESTHESIOLOGY

## 2021-09-07 RX ORDER — PROPOFOL 10 MG/ML
VIAL (ML) INTRAVENOUS AS NEEDED
Status: DISCONTINUED | OUTPATIENT
Start: 2021-09-07 | End: 2021-09-07 | Stop reason: SURG

## 2021-09-07 RX ORDER — EPHEDRINE SULFATE 50 MG/ML
5 INJECTION, SOLUTION INTRAVENOUS ONCE AS NEEDED
Status: DISCONTINUED | OUTPATIENT
Start: 2021-09-07 | End: 2021-09-07 | Stop reason: HOSPADM

## 2021-09-07 RX ORDER — HYDRALAZINE HYDROCHLORIDE 20 MG/ML
5 INJECTION INTRAMUSCULAR; INTRAVENOUS
Status: DISCONTINUED | OUTPATIENT
Start: 2021-09-07 | End: 2021-09-07 | Stop reason: HOSPADM

## 2021-09-07 RX ORDER — FLUMAZENIL 0.1 MG/ML
0.2 INJECTION INTRAVENOUS AS NEEDED
Status: DISCONTINUED | OUTPATIENT
Start: 2021-09-07 | End: 2021-09-07 | Stop reason: HOSPADM

## 2021-09-07 RX ORDER — HYDROCODONE BITARTRATE AND ACETAMINOPHEN 7.5; 325 MG/1; MG/1
1 TABLET ORAL ONCE AS NEEDED
Status: COMPLETED | OUTPATIENT
Start: 2021-09-07 | End: 2021-09-07

## 2021-09-07 RX ORDER — FENTANYL CITRATE 50 UG/ML
INJECTION, SOLUTION INTRAMUSCULAR; INTRAVENOUS AS NEEDED
Status: DISCONTINUED | OUTPATIENT
Start: 2021-09-07 | End: 2021-09-07 | Stop reason: SURG

## 2021-09-07 RX ORDER — PROMETHAZINE HYDROCHLORIDE 25 MG/1
25 TABLET ORAL ONCE AS NEEDED
Status: DISCONTINUED | OUTPATIENT
Start: 2021-09-07 | End: 2021-09-07 | Stop reason: HOSPADM

## 2021-09-07 RX ORDER — HYDROCODONE BITARTRATE AND ACETAMINOPHEN 5; 325 MG/1; MG/1
1 TABLET ORAL EVERY 4 HOURS PRN
Qty: 30 TABLET | Refills: 0 | Status: SHIPPED | OUTPATIENT
Start: 2021-09-07 | End: 2021-09-20

## 2021-09-07 RX ORDER — SODIUM CHLORIDE 0.9 % (FLUSH) 0.9 %
3-10 SYRINGE (ML) INJECTION AS NEEDED
Status: DISCONTINUED | OUTPATIENT
Start: 2021-09-07 | End: 2021-09-07 | Stop reason: HOSPADM

## 2021-09-07 RX ORDER — SODIUM CHLORIDE 0.9 % (FLUSH) 0.9 %
3 SYRINGE (ML) INJECTION EVERY 12 HOURS SCHEDULED
Status: DISCONTINUED | OUTPATIENT
Start: 2021-09-07 | End: 2021-09-07 | Stop reason: HOSPADM

## 2021-09-07 RX ORDER — IBUPROFEN 600 MG/1
600 TABLET ORAL ONCE AS NEEDED
Status: DISCONTINUED | OUTPATIENT
Start: 2021-09-07 | End: 2021-09-07 | Stop reason: HOSPADM

## 2021-09-07 RX ORDER — MIDAZOLAM HYDROCHLORIDE 1 MG/ML
1 INJECTION INTRAMUSCULAR; INTRAVENOUS
Status: DISCONTINUED | OUTPATIENT
Start: 2021-09-07 | End: 2021-09-07 | Stop reason: HOSPADM

## 2021-09-07 RX ORDER — FENTANYL CITRATE 50 UG/ML
50 INJECTION, SOLUTION INTRAMUSCULAR; INTRAVENOUS
Status: DISCONTINUED | OUTPATIENT
Start: 2021-09-07 | End: 2021-09-07 | Stop reason: HOSPADM

## 2021-09-07 RX ORDER — DIPHENHYDRAMINE HCL 25 MG
25 CAPSULE ORAL
Status: DISCONTINUED | OUTPATIENT
Start: 2021-09-07 | End: 2021-09-07 | Stop reason: HOSPADM

## 2021-09-07 RX ORDER — OXYCODONE AND ACETAMINOPHEN 10; 325 MG/1; MG/1
1 TABLET ORAL EVERY 4 HOURS PRN
Status: DISCONTINUED | OUTPATIENT
Start: 2021-09-07 | End: 2021-09-07 | Stop reason: HOSPADM

## 2021-09-07 RX ORDER — LABETALOL HYDROCHLORIDE 5 MG/ML
5 INJECTION, SOLUTION INTRAVENOUS
Status: DISCONTINUED | OUTPATIENT
Start: 2021-09-07 | End: 2021-09-07 | Stop reason: HOSPADM

## 2021-09-07 RX ORDER — SODIUM CHLORIDE, SODIUM LACTATE, POTASSIUM CHLORIDE, AND CALCIUM CHLORIDE .6; .31; .03; .02 G/100ML; G/100ML; G/100ML; G/100ML
IRRIGANT IRRIGATION AS NEEDED
Status: DISCONTINUED | OUTPATIENT
Start: 2021-09-07 | End: 2021-09-07 | Stop reason: HOSPADM

## 2021-09-07 RX ORDER — LIDOCAINE HYDROCHLORIDE 20 MG/ML
INJECTION, SOLUTION INFILTRATION; PERINEURAL AS NEEDED
Status: DISCONTINUED | OUTPATIENT
Start: 2021-09-07 | End: 2021-09-07 | Stop reason: SURG

## 2021-09-07 RX ORDER — HYDROMORPHONE HYDROCHLORIDE 1 MG/ML
0.5 INJECTION, SOLUTION INTRAMUSCULAR; INTRAVENOUS; SUBCUTANEOUS
Status: DISCONTINUED | OUTPATIENT
Start: 2021-09-07 | End: 2021-09-07 | Stop reason: HOSPADM

## 2021-09-07 RX ORDER — DEXAMETHASONE SODIUM PHOSPHATE 10 MG/ML
INJECTION INTRAMUSCULAR; INTRAVENOUS AS NEEDED
Status: DISCONTINUED | OUTPATIENT
Start: 2021-09-07 | End: 2021-09-07 | Stop reason: SURG

## 2021-09-07 RX ORDER — DEXAMETHASONE SODIUM PHOSPHATE 4 MG/ML
8 INJECTION, SOLUTION INTRA-ARTICULAR; INTRALESIONAL; INTRAMUSCULAR; INTRAVENOUS; SOFT TISSUE ONCE AS NEEDED
Status: DISCONTINUED | OUTPATIENT
Start: 2021-09-07 | End: 2021-09-07 | Stop reason: HOSPADM

## 2021-09-07 RX ORDER — NALOXONE HCL 0.4 MG/ML
0.2 VIAL (ML) INJECTION AS NEEDED
Status: DISCONTINUED | OUTPATIENT
Start: 2021-09-07 | End: 2021-09-07 | Stop reason: HOSPADM

## 2021-09-07 RX ORDER — DIPHENHYDRAMINE HYDROCHLORIDE 50 MG/ML
12.5 INJECTION INTRAMUSCULAR; INTRAVENOUS
Status: DISCONTINUED | OUTPATIENT
Start: 2021-09-07 | End: 2021-09-07 | Stop reason: HOSPADM

## 2021-09-07 RX ORDER — ACETAMINOPHEN 500 MG
500 TABLET ORAL ONCE
Status: COMPLETED | OUTPATIENT
Start: 2021-09-07 | End: 2021-09-07

## 2021-09-07 RX ORDER — FAMOTIDINE 10 MG/ML
20 INJECTION, SOLUTION INTRAVENOUS ONCE
Status: COMPLETED | OUTPATIENT
Start: 2021-09-07 | End: 2021-09-07

## 2021-09-07 RX ORDER — SODIUM CHLORIDE, SODIUM LACTATE, POTASSIUM CHLORIDE, CALCIUM CHLORIDE 600; 310; 30; 20 MG/100ML; MG/100ML; MG/100ML; MG/100ML
9 INJECTION, SOLUTION INTRAVENOUS CONTINUOUS
Status: DISCONTINUED | OUTPATIENT
Start: 2021-09-07 | End: 2021-09-07 | Stop reason: HOSPADM

## 2021-09-07 RX ORDER — OMEPRAZOLE 40 MG/1
CAPSULE, DELAYED RELEASE ORAL
Qty: 90 CAPSULE | Refills: 1 | Status: SHIPPED | OUTPATIENT
Start: 2021-09-07 | End: 2022-03-04

## 2021-09-07 RX ORDER — PROMETHAZINE HYDROCHLORIDE 25 MG/1
25 SUPPOSITORY RECTAL ONCE AS NEEDED
Status: DISCONTINUED | OUTPATIENT
Start: 2021-09-07 | End: 2021-09-07 | Stop reason: HOSPADM

## 2021-09-07 RX ORDER — PROMETHAZINE HYDROCHLORIDE 25 MG/1
12.5 TABLET ORAL ONCE
Status: COMPLETED | OUTPATIENT
Start: 2021-09-07 | End: 2021-09-07

## 2021-09-07 RX ADMIN — PROPOFOL 150 MG: 10 INJECTION, EMULSION INTRAVENOUS at 09:09

## 2021-09-07 RX ADMIN — LIDOCAINE HYDROCHLORIDE 100 MG: 20 INJECTION, SOLUTION INFILTRATION; PERINEURAL at 09:09

## 2021-09-07 RX ADMIN — SODIUM CHLORIDE, POTASSIUM CHLORIDE, SODIUM LACTATE AND CALCIUM CHLORIDE 9 ML/HR: 600; 310; 30; 20 INJECTION, SOLUTION INTRAVENOUS at 07:17

## 2021-09-07 RX ADMIN — FAMOTIDINE 20 MG: 10 INJECTION INTRAVENOUS at 07:49

## 2021-09-07 RX ADMIN — FENTANYL CITRATE 50 MCG: 50 INJECTION INTRAMUSCULAR; INTRAVENOUS at 09:28

## 2021-09-07 RX ADMIN — DEXAMETHASONE SODIUM PHOSPHATE 8 MG: 10 INJECTION INTRAMUSCULAR; INTRAVENOUS at 09:26

## 2021-09-07 RX ADMIN — VANCOMYCIN HYDROCHLORIDE 1500 MG: 10 INJECTION, POWDER, LYOPHILIZED, FOR SOLUTION INTRAVENOUS at 08:24

## 2021-09-07 RX ADMIN — HYDROCODONE BITARTRATE AND ACETAMINOPHEN 1 TABLET: 7.5; 325 TABLET ORAL at 10:05

## 2021-09-07 RX ADMIN — FENTANYL CITRATE 50 MCG: 0.05 INJECTION, SOLUTION INTRAMUSCULAR; INTRAVENOUS at 10:10

## 2021-09-07 RX ADMIN — ACETAMINOPHEN 500 MG: 500 TABLET, FILM COATED ORAL at 07:48

## 2021-09-07 RX ADMIN — FENTANYL CITRATE 50 MCG: 50 INJECTION INTRAMUSCULAR; INTRAVENOUS at 09:09

## 2021-09-07 RX ADMIN — FENTANYL CITRATE 50 MCG: 0.05 INJECTION, SOLUTION INTRAMUSCULAR; INTRAVENOUS at 10:05

## 2021-09-07 RX ADMIN — PROMETHAZINE HYDROCHLORIDE 12.5 MG: 25 TABLET ORAL at 07:48

## 2021-09-07 NOTE — ANESTHESIA POSTPROCEDURE EVALUATION
Patient: Hussein Nino    Procedure Summary     Date: 09/07/21 Room / [facility]:  [facility]  [facility]    Anesthesia Start: 0903 Anesthesia Stop: 0959    Procedure: RIGHT KNEE ARTHROSCOPY WITH PARTIAL MEDIAL AND LATERAL MENISECTOMY WITH DEBRIDEMENT OF ARTHRITIS  (Right Knee) Diagnosis:       Other tear of medial meniscus of right knee as current injury, initial encounter      Tear of lateral meniscus of right knee, current, unspecified tear type, initial encounter      (Other tear of medial meniscus of right knee as current injury, initial encounter [S83.241A])      (Tear of lateral meniscus of right knee, current, unspecified tear type, initial encounter [S83.281A])    Surgeons: Ella Muller MD Provider: Uriah Strickland MD    Anesthesia Type: general ASA Status: 3          Anesthesia Type: general    Vitals  Vitals Value Taken Time   /89 09/07/21 1016   Temp 36.4 °C (97.6 °F) 09/07/21 1025   Pulse 61 09/07/21 1029   Resp 18 09/07/21 1025   SpO2 97 % 09/07/21 1029   Vitals shown include unvalidated device data.        Post Anesthesia Care and Evaluation    Patient location during evaluation: bedside  Patient participation: complete - patient participated  Level of consciousness: awake and alert  Pain management: adequate  Airway patency: patent  Anesthetic complications: No anesthetic complications  PONV Status: controlled  Cardiovascular status: blood pressure returned to baseline and acceptable  Respiratory status: acceptable  Hydration status: acceptable

## 2021-09-07 NOTE — H&P
History & Physical       Patient: Hussein Nino    Date of Admission: 9/7/2021  6:47 AM    YOB: 1965    Medical Record Number: [id number]    Attending Physician: Ella Muller MD        Chief Complaints: Other tear of medial meniscus of right knee as current injury, initial encounter [S83.241A]  Tear of lateral meniscus of right knee, current, unspecified tear type, initial encounter [Z58.435A]      History of Present Illness: This patient has several month history of knee pain he does appear to have tears of medial and lateral meniscus and he presents for arthroscopy     Allergies:   Allergies   Allergen Reactions   • Fish-Derived Products Anaphylaxis     Can eat shell fish without problem, but can't eat fish sandwich, cod, tuna or salmon. , is unable to take fish oil supplements.    • Ondansetron Rash   • Penicillins Rash       Medications:   Home Medications:  No current facility-administered medications on file prior to encounter.     Current Outpatient Medications on File Prior to Encounter   Medication Sig   • fluticasone (FLONASE) 50 MCG/ACT nasal spray 2 sprays into each nostril Daily.   • albuterol (PROVENTIL HFA;VENTOLIN HFA) 108 (90 BASE) MCG/ACT inhaler Inhale 2 puffs Every 4 (Four) Hours As Needed for wheezing.   • clindamycin (CLEOCIN) 300 MG capsule To take pre-dental appointments   • tamsulosin (FLOMAX) 0.4 MG capsule 24 hr capsule Take 1 capsule by mouth 2 (two) times a day. (Patient taking differently: Take 1 capsule by mouth 2 (Two) Times a Day As Needed.)   • [DISCONTINUED] omeprazole (priLOSEC) 40 MG capsule Take 1 capsule by mouth Daily. (Patient taking differently: Take 40 mg by mouth Every Night.)     Current Medications:  Scheduled Meds:acetaminophen, 500 mg, Oral, Once  famotidine, 20 mg, Intravenous, Once  promethazine, 12.5 mg, Oral, Once  sodium chloride, 3 mL, Intravenous, Q12H  vancomycin, 15 mg/kg, Intravenous, Once      Continuous Infusions:lactated ringers, 9  mL/hr, Last Rate: 9 mL/hr (21 0717)      PRN Meds:.midazolam  •  sodium chloride    Past Medical History:   Diagnosis Date   • Allergies    • Aortic stenosis     SEVERE   • Epistaxis    • GERD (gastroesophageal reflux disease)    • Gout    • History of COVID-19    • Hyperlipidemia    • Right knee meniscal tear    • Sleep apnea     CPAP, DOES NOT USE        Past Surgical History:   Procedure Laterality Date   • ADENOIDECTOMY     • AORTIC VALVE REPAIR/REPLACEMENT  10/05/2016    mechanical valve   • ASCENDING ARCH/HEMIARCH REPLACEMENT N/A 10/5/2016    Procedure: MIDLINE STERNOTOMY, AORTIC VALVE REPLACEMENT, ASCENDING AND HEMIARCH REPLACEMENT, REPAIR OF PERIVALVULAR LEAK, WITH NEURO MONITOIRING, INTRAOPERATIVE WARREN;  Surgeon: Sukhi Wilkinson MD;  Location: [facility] MAIN OR;  Service:    • CARDIAC CATHETERIZATION N/A 2016    Procedure: Coronary angiography;  Surgeon: Lio Roman MD;  Location:  [facility];  Service:    • CARDIAC CATHETERIZATION N/A 2016    Procedure: Left Heart Cath;  Surgeon: iLo Roman MD;  Location: [facility] CATH INVASIVE LOCATION;  Service:    • CHOLECYSTECTOMY     • COLONOSCOPY      Dr. Palomares   • CORONARY ARTERY BYPASS GRAFT          Social History     Occupational History   • Not on file   Tobacco Use   • Smoking status: Former Smoker     Packs/day: 0.50     Years: 14.00     Pack years: 7.00     Types: Cigarettes     Quit date:      Years since quittin.7   • Smokeless tobacco: Never Used   • Tobacco comment: QUIT AGE 23   Vaping Use   • Vaping Use: Never used   Substance and Sexual Activity   • Alcohol use: Yes     Comment: 1-2 PER WEEK   • Drug use: No   • Sexual activity: Defer      Social History     Social History Narrative   • Not on file        Family History   Problem Relation Age of Onset   • Heart failure Mother    • Kidney disease Mother    • Hypertension Mother    • Heart disease Mother    • Alzheimer's disease Father     • Thyroid disease Sister    • Hypertension Sister    • Hypertension Brother    • Malig Hyperthermia Neg Hx        Review of Systems      Physical Exam: 56 y.o. male  General Appearance:    Alert, cooperative, in no acute distress                      Vitals:    09/07/21 0722   BP: 140/90   BP Location: Left arm   Patient Position: Sitting   Pulse: 68   Resp: 16   Temp: 97.8 °F (36.6 °C)   TempSrc: Oral   SpO2: 96%        Head:    Normocephalic, without obvious abnormality, atraumatic   Eyes:            conjunctivae and sclerae normal, no pallor, corneas clear,    Ears:    Ears appear intact with no abnormalities noted   Throat:   No oral lesions, no thrush, oral mucosa moist   Neck:   No adenopathy, supple, trachea midline, no thyromegaly,    Back:     No kyphosis present, no scoliosis present, no skin lesions,      erythema or scars, no tenderness to percussion or                   palpation,   range of motion normal   Lungs:     Clear to auscultation,respirations regular, even and                  unlabored    Heart:    Regular rhythm and normal rate               Chest Wall:    No abnormalities observed   Abdomen:     Normal bowel sounds, no masses, no organomegaly, soft        non-tender, non-distended, no guarding, no rebound                tenderness   Rectal:     Deferred   Extremities:    Moves all extremities well, no edema,   no cyanosis, no redness   Pulses:   Pulses palpable and equal bilaterally   Skin:   No bleeding, bruising or rash   Lymph nodes:   No palpable adenopathy   Neurologic:   Appears neurologic intact             Assessment:  Patient Active Problem List   Diagnosis   • Allergic rhinitis   • Asthma   • Gastroesophageal reflux disease   • Hyperlipidemia   • Irritable bowel syndrome   • Raynaud's phenomenon   • Essential hypertriglyceridemia   • S/P AVR (aortic valve replacement)   • Mechanical heart valve present   • Anticoagulated   • Benign essential HTN   • Benign prostatic hyperplasia  with nocturia   • KVNG (obstructive sleep apnea)   • Venous insufficiency   • Tear of medial meniscus of right knee, current   • Tear of lateral meniscus of right knee, current           Plan: All risks, benefits and alternatives were discussed.  Risks including to but not exclusive to anesthetic complications, including death, MI, CVA, infection, bleeding DVT, PE,  fracture, residual pain and need for future surgery.  Patient understood all and agrees to proceed.

## 2021-09-07 NOTE — OP NOTE
Operative Note      Facility: [facility]  Patient Name: Hussein Nino  YOB: 1965  Date: 9/7/2021  Medical Record Number: [id number]      Pre-op Diagnosis:   Other tear of medial meniscus of right knee as current injury, initial encounter [S83.241A]  Tear of lateral meniscus of right knee, current, unspecified tear type, initial encounter [S83.281A]    Post-Op Diagnosis Codes:     * Other tear of medial meniscus of right knee as current injury, initial encounter [S83.241A]     * Tear of lateral meniscus of right knee, current, unspecified tear type, initial encounter [S83.281A]  Grade 2 changes trochlear groove  Procedure(s):  RIGHT KNEE ARTHROSCOPY WITH PARTIAL MEDIAL AND LATERAL MENISECTOMY WITH DEBRIDEMENT OF ARTHRITIS /chondroplasty of the trochlear groove    Surgeon(s):  Ella Muller MD    Anesthesia: General  Anesthesiologist: Uriah Strickland MD  CRNA: Kendra Huizar CRNA    Staff:   Circulator: Keesha Napoles RN  Scrub Person: Lana Jacobson    Assistants : none      Estimated Blood Loss: 5 cc    Specimens:    none     Drains: None    Findings: See Dictation    Complications: None      Indication for procedure: This patient has had a several month history of knee pain and has an exam and an MRI which are consistent with meniscal pathology. They understand all options and wished to proceed with arthroscopy.      Description of procedure: The patient was taken to the operating room. They were placed supine on the operating room table. After induction of adequate LMA anesthesia, IV antibiotics the underwent exam under anesthesia was symmetric full range of motion. Nonsterile tourniquet was applied patient was placed in the thigh almendarez all prominent areas well padded and into the table dropped. The leg was prepped and draped in usual sterile fashion. Standard lateral incision was made with 11 blade. Blunt trocar penetrated into the joint scope followed in the  evaluation began patella.  Is essentially within the trochlear groove he did have some degenerative changes in the trochlear groove patella looked pretty good.  To that end of the medial compartment under spinal needle localization visualization a medial portals established.  He had a obvious complex tear of the medial meniscus with a large fragment flipped into the joint.  This was resected with various angles biters baskets motorized stella and ultimately the Sellers device back to a nice stable edge.  Notch was evaluated this was normal then entered the lateral compartment did have a central tear of the lateral meniscus that was resected with biters baskets motorized stella ultimately the Apollo device.  I then turned my attention patellofemoral joint gently abrade the trochlear groove which is felt to have grade 2 degenerative changes             At this point everything was thoroughly irrigated it was suctioned all 3 compartments, the gutters the suprapatellar pouch were all evaluated there was no further acute pathology seen.  Everything was thoroughly irrigated it was injected with Marcaine Depo-Medrol.  The portals were closed with 3-0 nylon interrupted fashion.  Sterile dressings and Ace wraps were applied.  The patient tolerated the procedure well and was taken to recovery room in good condition.  All sponge and needle count were correct                    Date: 9/7/2021  Time: 10:10 EDT

## 2021-09-07 NOTE — ANESTHESIA PROCEDURE NOTES
Airway  Urgency: elective    Date/Time: 9/7/2021 9:10 AM  Airway not difficult    General Information and Staff    Patient location during procedure: OR  Anesthesiologist: Uriah Strickland MD  CRNA: Kendra Huizar CRNA    Indications and Patient Condition  Indications for airway management: airway protection    Preoxygenated: yes  MILS maintained throughout  Mask difficulty assessment: 1 - vent by mask    Final Airway Details  Final airway type: supraglottic airway      Successful airway: classic and LMA  Size 5    Number of attempts at approach: 1  Assessment: lips, teeth, and gum same as pre-op and atraumatic intubation

## 2021-09-07 NOTE — ANESTHESIA PREPROCEDURE EVALUATION
Anesthesia Evaluation     no history of anesthetic complications:  NPO Solid Status: > 8 hours  NPO Liquid Status: > 2 hours           Airway   Mallampati: II  Neck ROM: full  no difficulty expected  Dental - normal exam     Pulmonary - normal exam   (+) a smoker Former, asthma,sleep apnea,   (-) COPD    PE comment: nonlabored  Cardiovascular - normal exam    Rhythm: regular  Rate: normal    (+) hypertension, valvular problems/murmurs (s/p AVR) AS, CABG, hyperlipidemia,   (-) past MI, CAD, dysrhythmias, angina    ROS comment: S/p ascending aorta and hemiarch replacement & AVR    Neuro/Psych- negative ROS  (-) seizures, TIA, CVA  GI/Hepatic/Renal/Endo    (+) obesity,  GERD,    (-) liver disease, no renal disease, diabetes, no thyroid disorder    Musculoskeletal     (+) arthralgias,   Abdominal    Substance History      OB/GYN          Other   blood dyscrasia (anticoagulated due to aortic valve replacement),       Other Comment: Raynaud's phenomenon  ROS/Med Hx Other: H/o COVID19                  Anesthesia Plan    ASA 3     general       Anesthetic plan, all risks, benefits, and alternatives have been provided, discussed and informed consent has been obtained with: patient.

## 2021-09-09 ENCOUNTER — ANTICOAGULATION VISIT (OUTPATIENT)
Dept: CARDIOLOGY | Facility: CLINIC | Age: 56
End: 2021-09-09

## 2021-09-09 ENCOUNTER — HOSPITAL ENCOUNTER (OUTPATIENT)
Dept: CARDIOLOGY | Facility: HOSPITAL | Age: 56
Discharge: HOME | End: 2021-09-09
Admitting: INTERNAL MEDICINE

## 2021-09-09 LAB — INR PPP: 1.1

## 2021-09-09 PROCEDURE — 85610 PROTHROMBIN TIME: CPT | Performed by: INTERNAL MEDICINE

## 2021-09-11 LAB — INR PPP: 1.8

## 2021-09-14 ENCOUNTER — HOSPITAL ENCOUNTER (OUTPATIENT)
Dept: CARDIOLOGY | Facility: HOSPITAL | Age: 56
Discharge: HOME | End: 2021-09-14
Admitting: INTERNAL MEDICINE

## 2021-09-14 ENCOUNTER — ANTICOAGULATION VISIT (OUTPATIENT)
Dept: CARDIOLOGY | Facility: CLINIC | Age: 56
End: 2021-09-14

## 2021-09-14 LAB — INR PPP: 5.1

## 2021-09-14 PROCEDURE — 85610 PROTHROMBIN TIME: CPT | Performed by: INTERNAL MEDICINE

## 2021-09-20 ENCOUNTER — OFFICE VISIT (OUTPATIENT)
Dept: INTERNAL MEDICINE | Facility: CLINIC | Age: 56
End: 2021-09-20

## 2021-09-20 VITALS
SYSTOLIC BLOOD PRESSURE: 121 MMHG | WEIGHT: 230 LBS | TEMPERATURE: 97.8 F | HEIGHT: 72 IN | DIASTOLIC BLOOD PRESSURE: 74 MMHG | BODY MASS INDEX: 31.15 KG/M2

## 2021-09-20 DIAGNOSIS — E78.2 MIXED HYPERLIPIDEMIA: ICD-10-CM

## 2021-09-20 DIAGNOSIS — R41.3 MEMORY CHANGES: Primary | ICD-10-CM

## 2021-09-20 PROCEDURE — 99214 OFFICE O/P EST MOD 30 MIN: CPT | Performed by: PHYSICIAN ASSISTANT

## 2021-09-20 RX ORDER — FENOFIBRATE 145 MG/1
145 TABLET, COATED ORAL DAILY
Qty: 90 TABLET | Refills: 3 | Status: SHIPPED | OUTPATIENT
Start: 2021-09-20 | End: 2021-09-20

## 2021-09-20 RX ORDER — FENOFIBRATE 145 MG/1
145 TABLET, COATED ORAL DAILY
Qty: 90 TABLET | Refills: 3 | Status: SHIPPED | OUTPATIENT
Start: 2021-09-20 | End: 2022-07-06 | Stop reason: SDUPTHER

## 2021-09-20 RX ORDER — SILDENAFIL 50 MG/1
50 TABLET, FILM COATED ORAL DAILY PRN
Qty: 10 TABLET | Refills: 0 | Status: SHIPPED | OUTPATIENT
Start: 2021-09-20 | End: 2022-03-21 | Stop reason: SDUPTHER

## 2021-09-20 NOTE — PROGRESS NOTES
Subjective   Chief Complaint   Patient presents with   • Memory Loss       History of Present Illness     He had meniscus and ACL tear, his pain is well controlled nad he is feeling good. He has fup with Dr. Muller tomorrow.     His father had alzheimers and started showing symptoms in his late 50s -early 60s he has noticed that he is forgetting names that is new for him in the last 6 months. He has noticed no other obvious memory changes. He is concerned.     Has had sx of ED which is new in the last year, has not tried anything in the past.      Patient Active Problem List   Diagnosis   • Allergic rhinitis   • Asthma   • Gastroesophageal reflux disease   • Hyperlipidemia   • Irritable bowel syndrome   • Raynaud's phenomenon   • Essential hypertriglyceridemia   • S/P AVR (aortic valve replacement)   • Mechanical heart valve present   • Anticoagulated   • Benign essential HTN   • Benign prostatic hyperplasia with nocturia   • KVNG (obstructive sleep apnea)   • Venous insufficiency   • Tear of medial meniscus of right knee, current   • Tear of lateral meniscus of right knee, current       Allergies   Allergen Reactions   • Fish-Derived Products Anaphylaxis     Can eat shell fish without problem, but can't eat fish sandwich, cod, tuna or salmon. , is unable to take fish oil supplements.    • Ondansetron Rash   • Penicillins Rash       Current Outpatient Medications on File Prior to Visit   Medication Sig Dispense Refill   • albuterol (PROVENTIL HFA;VENTOLIN HFA) 108 (90 BASE) MCG/ACT inhaler Inhale 2 puffs Every 4 (Four) Hours As Needed for wheezing.     • fluticasone (FLONASE) 50 MCG/ACT nasal spray 2 sprays into each nostril Daily. 1 each 3   • omeprazole (priLOSEC) 40 MG capsule TAKE 1 CAPSULE DAILY 90 capsule 1   • pravastatin (PRAVACHOL) 40 MG tablet TAKE 1 TABLET DAILY 90 tablet 1   • tamsulosin (FLOMAX) 0.4 MG capsule 24 hr capsule Take 1 capsule by mouth 2 (two) times a day. (Patient taking differently: Take 1  capsule by mouth 2 (Two) Times a Day As Needed.) 90 capsule    • warfarin (Jantoven) 5 MG tablet TAKE 1 TABLET DAILY OR AS  DIRECTED TO MAINTAIN       INTERNATIONAL NORMALIZED   RATIO 2.5-3.5 90 tablet 1   • [DISCONTINUED] fenofibrate (TRICOR) 145 MG tablet TAKE 1 TABLET DAILY (Patient taking differently: Take 145 mg by mouth Daily.) 90 tablet 0   • [DISCONTINUED] clindamycin (CLEOCIN) 300 MG capsule To take pre-dental appointments     • [DISCONTINUED] enoxaparin (Lovenox) 100 MG/ML solution syringe Inject 1.1 mL under the skin into the appropriate area as directed Every 12 (Twelve) Hours. 10 mL 0   • [DISCONTINUED] HYDROcodone-acetaminophen (NORCO) 5-325 MG per tablet Take 1 tablet by mouth Every 4 (Four) Hours As Needed for Severe Pain . 30 tablet 0     No current facility-administered medications on file prior to visit.       Past Medical History:   Diagnosis Date   • Allergies    • Aortic stenosis     SEVERE   • Epistaxis    • GERD (gastroesophageal reflux disease)    • Gout    • History of COVID-19    • Hyperlipidemia    • Right knee meniscal tear    • Sleep apnea     CPAP, DOES NOT USE       Family History   Problem Relation Age of Onset   • Heart failure Mother    • Kidney disease Mother    • Hypertension Mother    • Heart disease Mother    • Alzheimer's disease Father    • Thyroid disease Sister    • Hypertension Sister    • Hypertension Brother    • Malig Hyperthermia Neg Hx        Social History     Socioeconomic History   • Marital status:      Spouse name: Not on file   • Number of children: Not on file   • Years of education: Not on file   • Highest education level: Not on file   Tobacco Use   • Smoking status: Former Smoker     Packs/day: 0.50     Years: 14.00     Pack years: 7.00     Types: Cigarettes     Quit date:      Years since quittin.7   • Smokeless tobacco: Never Used   • Tobacco comment: QUIT AGE 23   Vaping Use   • Vaping Use: Never used   Substance and Sexual Activity   •  Alcohol use: Yes     Comment: 1-2 PER WEEK   • Drug use: No   • Sexual activity: Defer       Past Surgical History:   Procedure Laterality Date   • ADENOIDECTOMY     • AORTIC VALVE REPAIR/REPLACEMENT  10/05/2016    mechanical valve   • ASCENDING ARCH/HEMIARCH REPLACEMENT N/A 10/5/2016    Procedure: MIDLINE STERNOTOMY, AORTIC VALVE REPLACEMENT, ASCENDING AND HEMIARCH REPLACEMENT, REPAIR OF PERIVALVULAR LEAK, WITH NEURO MONITOIRING, INTRAOPERATIVE WARREN;  Surgeon: Sukhi Wilkinson MD;  Location: [facility] MAIN OR;  Service:    • CARDIAC CATHETERIZATION N/A 9/27/2016    Procedure: Coronary angiography;  Surgeon: Lio Roman MD;  [facility];  Service:    • CARDIAC CATHETERIZATION N/A 9/27/2016    Procedure: Left Heart Cath;  Surgeon: Lio Roman MD;  Location: [facility] CATH INVASIVE LOCATION;  Service:    • CHOLECYSTECTOMY     • COLONOSCOPY  12/212/2016    Dr. Palomares   • CORONARY ARTERY BYPASS GRAFT     • KNEE ARTHROSCOPY Right 9/7/2021    Procedure: RIGHT KNEE ARTHROSCOPY WITH PARTIAL MEDIAL AND LATERAL MENISECTOMY WITH DEBRIDEMENT OF ARTHRITIS ;  Surgeon: Ella Muller MD;  Location: [facility] OR OSC;  Service: Orthopedics;  Laterality: Right;         The following portions of the patient's history were reviewed and updated as appropriate: problem list, allergies, current medications, past medical history, past family history, past social history and past surgical history.    Review of Systems   Cardiovascular: Negative for chest pain and dyspnea on exertion.   Genitourinary:        ED   Psychiatric/Behavioral: Positive for memory loss.       Immunization History   Administered Date(s) Administered   • COVID-19 (MODERNA) 03/22/2021, 04/19/2021   • FluLaval >6 Months 10/19/2019, 10/10/2020   • Hepatitis A 09/16/2019, 06/18/2020   • Pneumococcal Polysaccharide (PPSV23) 12/18/2020   • Tdap 09/16/2019       Objective   Vitals:    09/20/21 1056 09/20/21 1113   BP:  121/74   Temp:  "97.8 °F (36.6 °C)    Weight: 104 kg (230 lb)    Height: 182.9 cm (72\")      Body mass index is 31.19 kg/m².  Physical Exam  Vitals reviewed.   Constitutional:       Appearance: Normal appearance.   HENT:      Head: Normocephalic and atraumatic.   Cardiovascular:      Rate and Rhythm: Normal rate and regular rhythm.      Comments: Audible click from mechanical valve  Neurological:      General: No focal deficit present.      Mental Status: He is alert and oriented to person, place, and time.      Comments: 30/30 MMSE           Assessment/Plan   Diagnoses and all orders for this visit:    1. Memory changes (Primary)  -     TSH  -     Hemoglobin A1c  -     Vitamin B12  -     RPR    2. Mixed hyperlipidemia  -     Discontinue: fenofibrate (TRICOR) 145 MG tablet; Take 1 tablet by mouth Daily.  Dispense: 90 tablet; Refill: 3  -     fenofibrate (TRICOR) 145 MG tablet; Take 1 tablet by mouth Daily.  Dispense: 90 tablet; Refill: 3  -     Cancel: Comprehensive Metabolic Panel  -     Cancel: Lipid Panel With / Chol / HDL Ratio  -     Lipid Panel With / Chol / HDL Ratio  -     Comprehensive Metabolic Panel    Other orders  -     sildenafil (Viagra) 50 MG tablet; Take 1 tablet by mouth Daily As Needed for Erectile Dysfunction.  Dispense: 10 tablet; Refill: 0    30/30 on MMSE today, will get labs also. Discussed today that starting to forget names at this age is very common, however with his family history I would like to closely monitor his symptoms and any changes.     Will get labs today for his lipids.     Will try Viagra for ED.     Return in about 6 months (around 3/20/2022).           "

## 2021-09-21 ENCOUNTER — HOSPITAL ENCOUNTER (OUTPATIENT)
Dept: CARDIOLOGY | Facility: HOSPITAL | Age: 56
Discharge: HOME | End: 2021-09-21
Admitting: INTERNAL MEDICINE

## 2021-09-21 ENCOUNTER — OFFICE VISIT (OUTPATIENT)
Dept: ORTHOPEDIC SURGERY | Facility: CLINIC | Age: 56
End: 2021-09-21

## 2021-09-21 ENCOUNTER — ANTICOAGULATION VISIT (OUTPATIENT)
Dept: CARDIOLOGY | Facility: CLINIC | Age: 56
End: 2021-09-21

## 2021-09-21 VITALS — BODY MASS INDEX: 31.15 KG/M2 | TEMPERATURE: 97.1 F | HEIGHT: 72 IN | WEIGHT: 230 LBS

## 2021-09-21 DIAGNOSIS — Z98.890 S/P ARTHROSCOPY OF KNEE: Primary | ICD-10-CM

## 2021-09-21 LAB
ALBUMIN SERPL-MCNC: 4.7 G/DL (ref 3.5–5.2)
ALBUMIN/GLOB SERPL: 2.2 G/DL
ALP SERPL-CCNC: 110 U/L (ref 39–117)
ALT SERPL-CCNC: 71 U/L (ref 1–41)
AST SERPL-CCNC: 51 U/L (ref 1–40)
BILIRUB SERPL-MCNC: 0.5 MG/DL (ref 0–1.2)
BUN SERPL-MCNC: 11 MG/DL (ref 6–20)
BUN/CREAT SERPL: 10.5 (ref 7–25)
CALCIUM SERPL-MCNC: 9.5 MG/DL (ref 8.6–10.5)
CHLORIDE SERPL-SCNC: 103 MMOL/L (ref 98–107)
CHOLEST SERPL-MCNC: 179 MG/DL (ref 0–200)
CHOLEST/HDLC SERPL: 6.63 {RATIO}
CO2 SERPL-SCNC: 27.6 MMOL/L (ref 22–29)
CREAT SERPL-MCNC: 1.05 MG/DL (ref 0.76–1.27)
GLOBULIN SER CALC-MCNC: 2.1 GM/DL
GLUCOSE SERPL-MCNC: 92 MG/DL (ref 65–99)
HBA1C MFR BLD: 5.3 % (ref 4.8–5.6)
HDLC SERPL-MCNC: 27 MG/DL (ref 40–60)
INR PPP: 2.7
LDLC SERPL CALC-MCNC: 77 MG/DL (ref 0–100)
POTASSIUM SERPL-SCNC: 4.2 MMOL/L (ref 3.5–5.2)
PROT SERPL-MCNC: 6.8 G/DL (ref 6–8.5)
RPR SER QL: NORMAL
SODIUM SERPL-SCNC: 140 MMOL/L (ref 136–145)
TRIGL SERPL-MCNC: 467 MG/DL (ref 0–150)
TSH SERPL DL<=0.005 MIU/L-ACNC: 2.4 UIU/ML (ref 0.27–4.2)
VIT B12 SERPL-MCNC: 438 PG/ML (ref 211–946)
VLDLC SERPL CALC-MCNC: 75 MG/DL (ref 5–40)

## 2021-09-21 PROCEDURE — 99024 POSTOP FOLLOW-UP VISIT: CPT | Performed by: ORTHOPAEDIC SURGERY

## 2021-09-21 PROCEDURE — 85610 PROTHROMBIN TIME: CPT | Performed by: INTERNAL MEDICINE

## 2021-09-21 NOTE — PROGRESS NOTES
Right Knee Scope follow Up 1st Visit      Patient: Hussein Nino        YOB: 1965      Chief Complaints: right  knee pain      History of Present Illness: Pt is here f/u knee arthroscopy he states he is doing great he really has no pain at all is happy with where he is.  He is back on his Coumadin his INR today was 2.7 which is really where they want it.  States his knee is doing well        Allergies:   Allergies   Allergen Reactions   • Fish-Derived Products Anaphylaxis     Can eat shell fish without problem, but can't eat fish sandwich, cod, tuna or salmon. , is unable to take fish oil supplements.    • Ondansetron Rash   • Penicillins Rash       Medications:   Home Medications:  Current Outpatient Medications on File Prior to Visit   Medication Sig   • albuterol (PROVENTIL HFA;VENTOLIN HFA) 108 (90 BASE) MCG/ACT inhaler Inhale 2 puffs Every 4 (Four) Hours As Needed for wheezing.   • fenofibrate (TRICOR) 145 MG tablet Take 1 tablet by mouth Daily.   • fluticasone (FLONASE) 50 MCG/ACT nasal spray 2 sprays into each nostril Daily.   • omeprazole (priLOSEC) 40 MG capsule TAKE 1 CAPSULE DAILY   • pravastatin (PRAVACHOL) 40 MG tablet TAKE 1 TABLET DAILY   • sildenafil (Viagra) 50 MG tablet Take 1 tablet by mouth Daily As Needed for Erectile Dysfunction.   • tamsulosin (FLOMAX) 0.4 MG capsule 24 hr capsule Take 1 capsule by mouth 2 (two) times a day. (Patient taking differently: Take 1 capsule by mouth 2 (Two) Times a Day As Needed.)   • warfarin (Jantoven) 5 MG tablet TAKE 1 TABLET DAILY OR AS  DIRECTED TO MAINTAIN       INTERNATIONAL NORMALIZED   RATIO 2.5-3.5     No current facility-administered medications on file prior to visit.     Current Medications:  Scheduled Meds:  Continuous Infusions:No current facility-administered medications for this visit.    PRN Meds:.          Physical Exam: 56 y.o. male  General Appearance:    Alert, cooperative, in no acute distress                 There were no  vitals filed for this visit.   Patient is alert and oriented ×3 no acute distress normal mood physical exam.  Physical exam of the knee, incisions looked good there is no erythema, calf is soft and non-tender.  No sign or sx of DVT      Assessment  S/P knee scope.  I did review intraoperative findings and arthroscopic pictures with the patient.          Plan: To remove sutures today place Steri-Strips and start into  physical therapy on his own as he is seeing them before and I will have thrm follow up in 4 weeks.  If he is doing well he can follow-up as needed

## 2021-09-23 ENCOUNTER — TELEPHONE (OUTPATIENT)
Dept: ORTHOPEDIC SURGERY | Facility: CLINIC | Age: 56
End: 2021-09-23

## 2021-09-23 NOTE — TELEPHONE ENCOUNTER
Spoke with patient.  He is an  and .  Primarily 70 percent sitting and 30% walking.    Dr. Muller says that he may return to work on 9/27/21.    Patient notified Sent this message to [employer]ash[employer] since he has short term disability and will need a return to work status sent.

## 2021-09-23 NOTE — TELEPHONE ENCOUNTER
Left voicemail.  No email to use and I already updated his disability.  He can print from [employer] if he wants.

## 2021-09-23 NOTE — TELEPHONE ENCOUNTER
Provider: DR BRITT   Caller: GILBERT MOMIN   Relationship to Patient: SELF  Phone Number: [phone]  Reason for Call: PATIENT CALLED AND SAID HE HAD SURGERY 9/7/21 AND WANTS TO KNOW IF HE CAN RETURN TO WORK 9/27/21 PLEASE ADVISE

## 2021-09-23 NOTE — TELEPHONE ENCOUNTER
Provider: BALWINDER  Caller: GILBERT MOMIN  Relationship to Patient: PATIENT  Pharmacy: N/A  Phone Number: [phone]  Reason for Call: PATIENT IS NEEDING EMAIL ADDRESS FOR PAPERWORK FROM HIS EMPLOYER RE: RTW  IF YOU MISS HIM, PLEASE LEAVE THE INFORMATION ON HIS VM  When was the patient last seen: 9.21.21

## 2021-09-24 DIAGNOSIS — R79.89 ABNORMAL LFTS: Primary | ICD-10-CM

## 2021-09-24 NOTE — PROGRESS NOTES
Patient informed, he states that he has never had a work up for elevated LFT. He also wants to know if there is anything he can take for his memory that you would recommend?

## 2021-09-28 ENCOUNTER — LAB (OUTPATIENT)
Dept: LAB | Facility: HOSPITAL | Age: 56
End: 2021-09-28

## 2021-09-28 DIAGNOSIS — Z79.01 ANTICOAGULATED: ICD-10-CM

## 2021-09-28 DIAGNOSIS — Z95.2 MECHANICAL HEART VALVE PRESENT: ICD-10-CM

## 2021-09-28 DIAGNOSIS — Z95.2 S/P AVR (AORTIC VALVE REPLACEMENT): ICD-10-CM

## 2021-09-28 LAB
ALPHA1 GLOB MFR UR ELPH: 129 MG/DL (ref 90–200)
BASOPHILS # BLD AUTO: 0.03 10*3/MM3 (ref 0–0.2)
BASOPHILS NFR BLD AUTO: 0.6 % (ref 0–1.5)
CERULOPLASMIN SERPL-MCNC: 24 MG/DL (ref 16–31)
DEPRECATED RDW RBC AUTO: 44.5 FL (ref 37–54)
EOSINOPHIL # BLD AUTO: 0.17 10*3/MM3 (ref 0–0.4)
EOSINOPHIL NFR BLD AUTO: 3.4 % (ref 0.3–6.2)
ERYTHROCYTE [DISTWIDTH] IN BLOOD BY AUTOMATED COUNT: 14.6 % (ref 12.3–15.4)
FERRITIN SERPL-MCNC: 103 NG/ML (ref 30–400)
HBV SURFACE AB SER RIA-ACNC: NORMAL
HBV SURFACE AG SERPL QL IA: NORMAL
HCT VFR BLD AUTO: 42.5 % (ref 37.5–51)
HGB BLD-MCNC: 14.7 G/DL (ref 13–17.7)
IMM GRANULOCYTES # BLD AUTO: 0.02 10*3/MM3 (ref 0–0.05)
IMM GRANULOCYTES NFR BLD AUTO: 0.4 % (ref 0–0.5)
INR PPP: 1.74 (ref 0.9–1.1)
LYMPHOCYTES # BLD AUTO: 1.07 10*3/MM3 (ref 0.7–3.1)
LYMPHOCYTES NFR BLD AUTO: 21.4 % (ref 19.6–45.3)
MCH RBC QN AUTO: 29.1 PG (ref 26.6–33)
MCHC RBC AUTO-ENTMCNC: 34.6 G/DL (ref 31.5–35.7)
MCV RBC AUTO: 84.2 FL (ref 79–97)
MONOCYTES # BLD AUTO: 0.32 10*3/MM3 (ref 0.1–0.9)
MONOCYTES NFR BLD AUTO: 6.4 % (ref 5–12)
NEUTROPHILS NFR BLD AUTO: 3.4 10*3/MM3 (ref 1.7–7)
NEUTROPHILS NFR BLD AUTO: 67.8 % (ref 42.7–76)
NRBC BLD AUTO-RTO: 0 /100 WBC (ref 0–0.2)
PLATELET # BLD AUTO: 161 10*3/MM3 (ref 140–450)
PMV BLD AUTO: 9.3 FL (ref 6–12)
PROTHROMBIN TIME: 20.1 SECONDS (ref 11.7–14.2)
RBC # BLD AUTO: 5.05 10*6/MM3 (ref 4.14–5.8)
WBC # BLD AUTO: 5.01 10*3/MM3 (ref 3.4–10.8)

## 2021-09-28 PROCEDURE — 86803 HEPATITIS C AB TEST: CPT | Performed by: PHYSICIAN ASSISTANT

## 2021-09-28 PROCEDURE — 86235 NUCLEAR ANTIGEN ANTIBODY: CPT | Performed by: PHYSICIAN ASSISTANT

## 2021-09-28 PROCEDURE — 86225 DNA ANTIBODY NATIVE: CPT | Performed by: PHYSICIAN ASSISTANT

## 2021-09-28 PROCEDURE — 83516 IMMUNOASSAY NONANTIBODY: CPT | Performed by: PHYSICIAN ASSISTANT

## 2021-09-28 PROCEDURE — 82728 ASSAY OF FERRITIN: CPT | Performed by: PHYSICIAN ASSISTANT

## 2021-09-28 PROCEDURE — 86255 FLUORESCENT ANTIBODY SCREEN: CPT | Performed by: PHYSICIAN ASSISTANT

## 2021-09-28 PROCEDURE — 85025 COMPLETE CBC W/AUTO DIFF WBC: CPT | Performed by: PHYSICIAN ASSISTANT

## 2021-09-28 PROCEDURE — 86706 HEP B SURFACE ANTIBODY: CPT | Performed by: PHYSICIAN ASSISTANT

## 2021-09-28 PROCEDURE — 36415 COLL VENOUS BLD VENIPUNCTURE: CPT | Performed by: PHYSICIAN ASSISTANT

## 2021-09-28 PROCEDURE — 85610 PROTHROMBIN TIME: CPT

## 2021-09-28 PROCEDURE — 82784 ASSAY IGA/IGD/IGG/IGM EACH: CPT | Performed by: PHYSICIAN ASSISTANT

## 2021-09-28 PROCEDURE — 82390 ASSAY OF CERULOPLASMIN: CPT | Performed by: PHYSICIAN ASSISTANT

## 2021-09-28 PROCEDURE — 82103 ALPHA-1-ANTITRYPSIN TOTAL: CPT | Performed by: PHYSICIAN ASSISTANT

## 2021-09-28 PROCEDURE — 86704 HEP B CORE ANTIBODY TOTAL: CPT | Performed by: PHYSICIAN ASSISTANT

## 2021-09-28 PROCEDURE — 87340 HEPATITIS B SURFACE AG IA: CPT | Performed by: PHYSICIAN ASSISTANT

## 2021-09-28 PROCEDURE — 86038 ANTINUCLEAR ANTIBODIES: CPT | Performed by: PHYSICIAN ASSISTANT

## 2021-09-29 LAB
ENDOMYSIUM IGA SER QL: NEGATIVE
GLIADIN PEPTIDE IGA SER-ACNC: 6 UNITS (ref 0–19)
GLIADIN PEPTIDE IGG SER-ACNC: 3 UNITS (ref 0–19)
HBV CORE AB SERPL QL IA: NEGATIVE
HCV AB S/CO SERPL IA: 0.1 S/CO RATIO (ref 0–0.9)
HCV AB SERPL QL IA: NORMAL
IGA SERPL-MCNC: 111 MG/DL (ref 90–386)
TTG IGA SER-ACNC: <2 U/ML (ref 0–3)
TTG IGG SER-ACNC: <2 U/ML (ref 0–5)

## 2021-09-30 ENCOUNTER — ANTICOAGULATION VISIT (OUTPATIENT)
Dept: CARDIOLOGY | Facility: CLINIC | Age: 56
End: 2021-09-30

## 2021-09-30 LAB
ANA HOMOGEN TITR SER: ABNORMAL {TITER}
ANA TITR SER IF: POSITIVE {TITER}
CENTROMERE B AB SER-ACNC: <0.2 AI (ref 0–0.9)
CHROMATIN AB SERPL-ACNC: <0.2 AI (ref 0–0.9)
DSDNA AB SER-ACNC: <1 IU/ML (ref 0–9)
ENA JO1 AB SER-ACNC: <0.2 AI (ref 0–0.9)
ENA RNP AB SER-ACNC: 0.3 AI (ref 0–0.9)
ENA SCL70 AB SER-ACNC: <0.2 AI (ref 0–0.9)
ENA SM AB SER-ACNC: <0.2 AI (ref 0–0.9)
ENA SS-A AB SER-ACNC: <0.2 AI (ref 0–0.9)
ENA SS-B AB SER-ACNC: <0.2 AI (ref 0–0.9)
LABORATORY COMMENT REPORT: ABNORMAL
Lab: ABNORMAL
Lab: ABNORMAL

## 2021-10-01 ENCOUNTER — ANTICOAGULATION VISIT (OUTPATIENT)
Dept: CARDIOLOGY | Facility: CLINIC | Age: 56
End: 2021-10-01

## 2021-10-01 ENCOUNTER — HOSPITAL ENCOUNTER (OUTPATIENT)
Dept: CARDIOLOGY | Facility: HOSPITAL | Age: 56
Discharge: HOME | End: 2021-10-01
Admitting: INTERNAL MEDICINE

## 2021-10-01 LAB — INR PPP: 2.6

## 2021-10-01 PROCEDURE — 85610 PROTHROMBIN TIME: CPT | Performed by: INTERNAL MEDICINE

## 2021-10-05 ENCOUNTER — HOSPITAL ENCOUNTER (OUTPATIENT)
Dept: CARDIOLOGY | Facility: HOSPITAL | Age: 56
Discharge: HOME | End: 2021-10-05
Admitting: INTERNAL MEDICINE

## 2021-10-05 ENCOUNTER — ANTICOAGULATION VISIT (OUTPATIENT)
Dept: CARDIOLOGY | Facility: CLINIC | Age: 56
End: 2021-10-05

## 2021-10-05 LAB — INR PPP: 3.1

## 2021-10-05 PROCEDURE — 85610 PROTHROMBIN TIME: CPT | Performed by: INTERNAL MEDICINE

## 2021-10-07 LAB
Lab: NORMAL
WRITTEN AUTHORIZATION: NORMAL

## 2021-10-12 ENCOUNTER — HOSPITAL ENCOUNTER (OUTPATIENT)
Dept: CARDIOLOGY | Facility: HOSPITAL | Age: 56
Discharge: HOME | End: 2021-10-12
Admitting: INTERNAL MEDICINE

## 2021-10-12 ENCOUNTER — ANTICOAGULATION VISIT (OUTPATIENT)
Dept: CARDIOLOGY | Facility: CLINIC | Age: 56
End: 2021-10-12

## 2021-10-12 LAB — INR PPP: 4.3

## 2021-10-12 PROCEDURE — 85610 PROTHROMBIN TIME: CPT | Performed by: INTERNAL MEDICINE

## 2021-10-13 ENCOUNTER — HOSPITAL ENCOUNTER (OUTPATIENT)
Dept: ULTRASOUND IMAGING | Facility: HOSPITAL | Age: 56
Discharge: HOME | End: 2021-10-13
Admitting: PHYSICIAN ASSISTANT

## 2021-10-13 DIAGNOSIS — R79.89 ABNORMAL LFTS: ICD-10-CM

## 2021-10-13 PROCEDURE — 76705 ECHO EXAM OF ABDOMEN: CPT

## 2021-10-18 DIAGNOSIS — E78.2 MIXED HYPERLIPIDEMIA: ICD-10-CM

## 2021-10-18 RX ORDER — PRAVASTATIN SODIUM 40 MG
40 TABLET ORAL DAILY
Qty: 90 TABLET | Refills: 1 | Status: SHIPPED | OUTPATIENT
Start: 2021-10-18 | End: 2022-03-14

## 2021-10-19 ENCOUNTER — ANTICOAGULATION VISIT (OUTPATIENT)
Dept: CARDIOLOGY | Facility: CLINIC | Age: 56
End: 2021-10-19

## 2021-10-19 ENCOUNTER — HOSPITAL ENCOUNTER (OUTPATIENT)
Dept: CARDIOLOGY | Facility: HOSPITAL | Age: 56
Discharge: HOME | End: 2021-10-19
Admitting: INTERNAL MEDICINE

## 2021-10-19 LAB — INR PPP: 4.7

## 2021-10-19 PROCEDURE — 85610 PROTHROMBIN TIME: CPT | Performed by: INTERNAL MEDICINE

## 2021-10-20 ENCOUNTER — TELEPHONE (OUTPATIENT)
Dept: INTERNAL MEDICINE | Facility: CLINIC | Age: 56
End: 2021-10-20

## 2021-10-20 DIAGNOSIS — K76.0 FATTY LIVER DISEASE, NONALCOHOLIC: Primary | ICD-10-CM

## 2021-10-20 NOTE — TELEPHONE ENCOUNTER
I will have him see Dr. Brian. +DAVID can be from autoimmune inflammation of the liver, I would like their input for further distinction

## 2021-10-20 NOTE — TELEPHONE ENCOUNTER
Pt states that he has seen GI in past but was not pleased with hi and does not remember Name.  He would like to referred to someone else. He also had questions as to what it meant to have a diffused Fatty Liver and what causes a + DAVID.    SJB

## 2021-10-20 NOTE — TELEPHONE ENCOUNTER
Caller: Hussein Nino    Relationship: Self    Best call back number: [phone] (H)    Caller requesting test results: ULTRASOUND OF LIVER    What test was performed: ULTRASOUND OF LIVER    When was the test performed: 10/13/21    Where was the test performed:     Additional notes: PATIENT CALLED TO REQUEST A CALLBACK TO DISCUSS THE RESULTS FROM HIS LIVER ULTRASOUND.     PLEASE CONTACT PATIENT TO ADVISE.          THANKS

## 2021-10-27 ENCOUNTER — ANTICOAGULATION VISIT (OUTPATIENT)
Dept: CARDIOLOGY | Facility: CLINIC | Age: 56
End: 2021-10-27

## 2021-10-27 ENCOUNTER — HOSPITAL ENCOUNTER (OUTPATIENT)
Dept: CARDIOLOGY | Facility: HOSPITAL | Age: 56
Discharge: HOME | End: 2021-10-27
Admitting: INTERNAL MEDICINE

## 2021-10-27 LAB — INR PPP: 2.5

## 2021-10-27 PROCEDURE — 85610 PROTHROMBIN TIME: CPT | Performed by: INTERNAL MEDICINE

## 2021-11-02 ENCOUNTER — ANTICOAGULATION VISIT (OUTPATIENT)
Dept: CARDIOLOGY | Facility: CLINIC | Age: 56
End: 2021-11-02

## 2021-11-02 ENCOUNTER — HOSPITAL ENCOUNTER (OUTPATIENT)
Dept: CARDIOLOGY | Facility: HOSPITAL | Age: 56
Discharge: HOME | End: 2021-11-02
Admitting: INTERNAL MEDICINE

## 2021-11-02 LAB — INR PPP: 5.2

## 2021-11-02 PROCEDURE — 85610 PROTHROMBIN TIME: CPT | Performed by: INTERNAL MEDICINE

## 2021-11-03 NOTE — PROGRESS NOTES
Patient: Hussein Nino  YOB: 1965  Date of Service: 11/3/2021    Chief Complaints: Right knee pain    Subjective:    History of Present Illness: Pt is seen in the office today with complaints of right knee pain he is status post knee arthroscopy and September was doing great but recently has had pain going up and down stairs his pain is primarily anterior he was at [place] in October going up some the rock stairs and that is when it started again its anterior is mostly with prolonged sitting or going up and down stairs.          Allergies:   Allergies   Allergen Reactions   • Fish-Derived Products Anaphylaxis     Can eat shell fish without problem, but can't eat fish sandwich, cod, tuna or salmon. , is unable to take fish oil supplements.    • Ondansetron Rash   • Penicillins Rash       Medications:   Home Medications:  Current Outpatient Medications on File Prior to Visit   Medication Sig   • albuterol (PROVENTIL HFA;VENTOLIN HFA) 108 (90 BASE) MCG/ACT inhaler Inhale 2 puffs Every 4 (Four) Hours As Needed for wheezing.   • fenofibrate (TRICOR) 145 MG tablet Take 1 tablet by mouth Daily.   • fluticasone (FLONASE) 50 MCG/ACT nasal spray 2 sprays into each nostril Daily.   • omeprazole (priLOSEC) 40 MG capsule TAKE 1 CAPSULE DAILY   • pravastatin (PRAVACHOL) 40 MG tablet Take 1 tablet by mouth Daily.   • sildenafil (Viagra) 50 MG tablet Take 1 tablet by mouth Daily As Needed for Erectile Dysfunction.   • tamsulosin (FLOMAX) 0.4 MG capsule 24 hr capsule Take 1 capsule by mouth 2 (two) times a day. (Patient taking differently: Take 1 capsule by mouth 2 (Two) Times a Day As Needed.)   • warfarin (Jantoven) 5 MG tablet TAKE 1 TABLET DAILY OR AS  DIRECTED TO MAINTAIN       INTERNATIONAL NORMALIZED   RATIO 2.5-3.5     No current facility-administered medications on file prior to visit.     Current Medications:  Scheduled Meds:  Continuous Infusions:No current facility-administered medications for this  visit.    PRN Meds:.    I have reviewed the patient's medical history in detail and updated the computerized patient record.  Review and summarization of old records include:    Past Medical History:   Diagnosis Date   • Allergies    • Aortic stenosis     SEVERE   • Epistaxis    • GERD (gastroesophageal reflux disease)    • Gout    • History of COVID-19    • Hyperlipidemia    • Right knee meniscal tear    • Sleep apnea     CPAP, DOES NOT USE        Past Surgical History:   Procedure Laterality Date   • ADENOIDECTOMY     • AORTIC VALVE REPAIR/REPLACEMENT  10/05/2016    mechanical valve   • ASCENDING ARCH/HEMIARCH REPLACEMENT N/A 10/5/2016    Procedure: MIDLINE STERNOTOMY, AORTIC VALVE REPLACEMENT, ASCENDING AND HEMIARCH REPLACEMENT, REPAIR OF PERIVALVULAR LEAK, WITH NEURO MONITOIRING, INTRAOPERATIVE WARREN;  Surgeon: Sukhi Wilkinson MD;  Location: [facility] OR;  Service:    • CARDIAC CATHETERIZATION N/A 2016    Procedure: Coronary angiography;  Surgeon: Lio Romna MD;  [facility];  Service:    • CARDIAC CATHETERIZATION N/A 2016    Procedure: Left Heart Cath;  Surgeon: Lio Roman MD;  Location: [facility] CATH INVASIVE LOCATION;  Service:    • CHOLECYSTECTOMY     • COLONOSCOPY      Dr. Palomares   • CORONARY ARTERY BYPASS GRAFT     • KNEE ARTHROSCOPY Right 2021    Procedure: RIGHT KNEE ARTHROSCOPY WITH PARTIAL MEDIAL AND LATERAL MENISECTOMY WITH DEBRIDEMENT OF ARTHRITIS ;  Surgeon: Ella Muller MD;  Location: [facility] OR [facility];  Service: Orthopedics;  Laterality: Right;        Social History     Occupational History   • Not on file   Tobacco Use   • Smoking status: Former Smoker     Packs/day: 0.50     Years: 14.00     Pack years: 7.00     Types: Cigarettes     Quit date:      Years since quittin.8   • Smokeless tobacco: Never Used   • Tobacco comment: QUIT AGE 23   Vaping Use   • Vaping Use: Never used   Substance and Sexual Activity   •  Alcohol use: Yes     Comment: 1-2 PER WEEK   • Drug use: No   • Sexual activity: Defer      Social History     Social History Narrative   • Not on file        Family History   Problem Relation Age of Onset   • Heart failure Mother    • Kidney disease Mother    • Hypertension Mother    • Heart disease Mother    • Alzheimer's disease Father    • Thyroid disease Sister    • Hypertension Sister    • Hypertension Brother    • Malig Hyperthermia Neg Hx        ROS: 14 point review of systems was performed and was negative except for documented findings in HPI and today's encounter.     Allergies:   Allergies   Allergen Reactions   • Fish-Derived Products Anaphylaxis     Can eat shell fish without problem, but can't eat fish sandwich, cod, tuna or salmon. , is unable to take fish oil supplements.    • Ondansetron Rash   • Penicillins Rash     Constitutional:  Denies fever, shaking or chills   Eyes:  Denies change in visual acuity   HENT:  Denies nasal congestion or sore throat   Respiratory:  Denies cough or shortness of breath   Cardiovascular:  Denies chest pain or severe LE edema   GI:  Denies abdominal pain, nausea, vomiting, bloody stools or diarrhea   Musculoskeletal:  Numbness, tingling, or loss of motor function only as noted above in history of present illness.  : Denies painful urination or hematuria  Integument:  Denies rash, lesion or ulceration   Neurologic:  Denies headache or focal weakness  Endocrine:  Denies lymphadenopathy  Psych:  Denies confusion or change in mental status   Hem:  Denies active bleeding      Physical Exam: 56 y.o. male  Wt Readings from Last 3 Encounters:   09/21/21 104 kg (230 lb)   09/20/21 104 kg (230 lb)   08/30/21 106 kg (234 lb 1.6 oz)       There is no height or weight on file to calculate BMI.  No height and weight on file for this encounter.  There were no vitals filed for this visit.  Vital signs reviewed.   General Appearance:    Alert, cooperative, in no acute distress                     Ortho exam    Physical exam of the right knee reveals no effusion, no erythema.  The patient has no palpable tenderness along the medial joint line, no tenderness about the lateral joint line.  Patient does have crepitus with patellofemoral range of motion.  They also have subjective symptoms anteriorly during exam.  The patient has a negative bounce home, negative Gal and a stable ligamentous exam.  Quad tone is reasonable and symmetric.  There are no overlying skin changes no lymphedema no lymphadenopathy.  There is good hip range of motion which is full symmetric and asymptomatic and a normal ankle exam.  Hamstrings and IT band are tight bilaterally.             .time    Assessment: Status post knee arthroscopy with right knee pain I think this is more patellofemoral I think it benefit from an injection if he fails to do that we could consider other means of testing to see if there is a recurrent meniscus tear but I think that is less likely  Plan:   Follow up as indicated.  Ice, elevate, and rest as needed.  Discussed conservative measures of pain control including ice, bracing.  Also talked about the importance of strengthening     Ella Muller M.D.

## 2021-11-09 ENCOUNTER — ANTICOAGULATION VISIT (OUTPATIENT)
Dept: CARDIOLOGY | Facility: CLINIC | Age: 56
End: 2021-11-09

## 2021-11-09 ENCOUNTER — HOSPITAL ENCOUNTER (OUTPATIENT)
Dept: CARDIOLOGY | Facility: HOSPITAL | Age: 56
Discharge: HOME | End: 2021-11-09
Admitting: INTERNAL MEDICINE

## 2021-11-09 LAB — INR PPP: 3

## 2021-11-09 PROCEDURE — 85610 PROTHROMBIN TIME: CPT | Performed by: INTERNAL MEDICINE

## 2021-11-11 ENCOUNTER — OFFICE VISIT (OUTPATIENT)
Dept: ORTHOPEDIC SURGERY | Facility: CLINIC | Age: 56
End: 2021-11-11

## 2021-11-11 VITALS — BODY MASS INDEX: 31.77 KG/M2 | HEIGHT: 72 IN | WEIGHT: 234.6 LBS | TEMPERATURE: 97.3 F

## 2021-11-11 DIAGNOSIS — Z98.890 S/P ARTHROSCOPY OF KNEE: Primary | ICD-10-CM

## 2021-11-11 DIAGNOSIS — M25.861 PATELLOFEMORAL DYSFUNCTION OF RIGHT KNEE: ICD-10-CM

## 2021-11-11 PROCEDURE — 99024 POSTOP FOLLOW-UP VISIT: CPT | Performed by: ORTHOPAEDIC SURGERY

## 2021-11-16 ENCOUNTER — ANTICOAGULATION VISIT (OUTPATIENT)
Dept: CARDIOLOGY | Facility: CLINIC | Age: 56
End: 2021-11-16

## 2021-11-16 ENCOUNTER — HOSPITAL ENCOUNTER (OUTPATIENT)
Dept: CARDIOLOGY | Facility: HOSPITAL | Age: 56
Discharge: HOME | End: 2021-11-16
Admitting: INTERNAL MEDICINE

## 2021-11-16 LAB — INR PPP: 3.3

## 2021-11-16 PROCEDURE — 85610 PROTHROMBIN TIME: CPT | Performed by: INTERNAL MEDICINE

## 2021-11-30 ENCOUNTER — HOSPITAL ENCOUNTER (OUTPATIENT)
Dept: CARDIOLOGY | Facility: HOSPITAL | Age: 56
Discharge: HOME | End: 2021-11-30
Admitting: INTERNAL MEDICINE

## 2021-11-30 ENCOUNTER — ANTICOAGULATION VISIT (OUTPATIENT)
Dept: CARDIOLOGY | Facility: CLINIC | Age: 56
End: 2021-11-30

## 2021-11-30 LAB — INR PPP: 3.7

## 2021-11-30 PROCEDURE — 85610 PROTHROMBIN TIME: CPT | Performed by: INTERNAL MEDICINE

## 2021-12-07 ENCOUNTER — ANTICOAGULATION VISIT (OUTPATIENT)
Dept: CARDIOLOGY | Facility: CLINIC | Age: 56
End: 2021-12-07

## 2021-12-07 ENCOUNTER — HOSPITAL ENCOUNTER (OUTPATIENT)
Dept: CARDIOLOGY | Facility: HOSPITAL | Age: 56
Discharge: HOME | End: 2021-12-07
Admitting: INTERNAL MEDICINE

## 2021-12-07 DIAGNOSIS — I38 HEART VALVE DISEASE: ICD-10-CM

## 2021-12-07 DIAGNOSIS — M84.38XA STRESS FRACTURE OF OTHER SITE, INITIAL ENCOUNTER: Primary | ICD-10-CM

## 2021-12-07 LAB — INR PPP: 2.9

## 2021-12-07 PROCEDURE — 85610 PROTHROMBIN TIME: CPT | Performed by: INTERNAL MEDICINE

## 2021-12-07 RX ORDER — WARFARIN SODIUM 5 MG/1
TABLET ORAL
Qty: 90 TABLET | Refills: 1 | Status: SHIPPED | OUTPATIENT
Start: 2021-12-07 | End: 2022-07-06 | Stop reason: SDUPTHER

## 2021-12-07 RX ORDER — WARFARIN SODIUM 5 MG/1
TABLET ORAL
Qty: 90 TABLET | Refills: 1 | Status: SHIPPED | OUTPATIENT
Start: 2021-12-07 | End: 2021-12-07 | Stop reason: SDUPTHER

## 2021-12-14 ENCOUNTER — PREP FOR SURGERY (OUTPATIENT)
Dept: SURGERY | Facility: SURGERY CENTER | Age: 56
End: 2021-12-14

## 2021-12-14 ENCOUNTER — OFFICE VISIT (OUTPATIENT)
Dept: GASTROENTEROLOGY | Facility: CLINIC | Age: 56
End: 2021-12-14

## 2021-12-14 VITALS
WEIGHT: 231.5 LBS | HEIGHT: 72 IN | BODY MASS INDEX: 31.36 KG/M2 | TEMPERATURE: 97.8 F | OXYGEN SATURATION: 95 % | DIASTOLIC BLOOD PRESSURE: 88 MMHG | HEART RATE: 80 BPM | SYSTOLIC BLOOD PRESSURE: 162 MMHG

## 2021-12-14 DIAGNOSIS — K21.00 GASTROESOPHAGEAL REFLUX DISEASE WITH ESOPHAGITIS, UNSPECIFIED WHETHER HEMORRHAGE: Primary | ICD-10-CM

## 2021-12-14 DIAGNOSIS — D12.6 ADENOMATOUS POLYP OF COLON, UNSPECIFIED PART OF COLON: ICD-10-CM

## 2021-12-14 DIAGNOSIS — K21.00 GASTROESOPHAGEAL REFLUX DISEASE WITH ESOPHAGITIS, UNSPECIFIED WHETHER HEMORRHAGE: ICD-10-CM

## 2021-12-14 DIAGNOSIS — Z12.11 ENCOUNTER FOR SCREENING FOR MALIGNANT NEOPLASM OF COLON: Primary | ICD-10-CM

## 2021-12-14 DIAGNOSIS — Z86.010 HISTORY OF COLON POLYPS: ICD-10-CM

## 2021-12-14 DIAGNOSIS — K58.9 IRRITABLE BOWEL SYNDROME, UNSPECIFIED TYPE: ICD-10-CM

## 2021-12-14 DIAGNOSIS — K76.0 FATTY LIVER: ICD-10-CM

## 2021-12-14 DIAGNOSIS — R74.8 ELEVATED LIVER ENZYMES: ICD-10-CM

## 2021-12-14 PROCEDURE — 99204 OFFICE O/P NEW MOD 45 MIN: CPT | Performed by: INTERNAL MEDICINE

## 2021-12-14 RX ORDER — SODIUM CHLORIDE 0.9 % (FLUSH) 0.9 %
3 SYRINGE (ML) INJECTION EVERY 12 HOURS SCHEDULED
Status: CANCELLED | OUTPATIENT
Start: 2022-06-14

## 2021-12-14 RX ORDER — SODIUM CHLORIDE, SODIUM LACTATE, POTASSIUM CHLORIDE, CALCIUM CHLORIDE 600; 310; 30; 20 MG/100ML; MG/100ML; MG/100ML; MG/100ML
30 INJECTION, SOLUTION INTRAVENOUS CONTINUOUS PRN
Status: CANCELLED | OUTPATIENT
Start: 2022-06-14

## 2021-12-14 RX ORDER — SODIUM CHLORIDE 0.9 % (FLUSH) 0.9 %
10 SYRINGE (ML) INJECTION AS NEEDED
Status: CANCELLED | OUTPATIENT
Start: 2022-06-14

## 2021-12-14 NOTE — PATIENT INSTRUCTIONS
Schedule EGD and colonoscopy for further evaluation.    Check lab work today to evaluate cause of liver enzyme elevation.    For fatty liver, weight loss is recommended. Recommend following a low fat and low sugar diet. Recommend management of diabetes and elevated cholesterol with primary care provider if indicated. Regular exercise is recommended. Alcohol avoidance is recommended.

## 2021-12-14 NOTE — PROGRESS NOTES
"Chief Complaint   Patient presents with   • Elevated Hepatic Enzymes           History of Present Illness  56-year-old gentleman previous patient of Dr. Palomares here for fatty liver disease as well as history of colon polyps and esophagitis    Patient presents today with concerns about elevated liver enzymes. He reports these were discovered to be elevated around 1 year ago. Reports no history of liver disease. AST and ALT were mildly elevated. He had an ultrasound showing fatty liver. Denies history of hepatitis and reports only rarely drinks alcohol.    Denies any new medications.    He reports a history of IBS. He had an EGD in 2017 that showed esophagitis.        Review of Systems     Result Review :       SCANNED - COLONOSCOPY (08/25/2017) egd report  ENDOSCOPY, INT (08/25/2017) esophagitis  SCANNED - COLONOSCOPY (12/12/2016) 5 mm polyp      Vital Signs:   /88   Pulse 80   Temp 97.8 °F (36.6 °C)   Ht 182.9 cm (72\")   Wt 105 kg (231 lb 8 oz)   SpO2 95%   BMI 31.40 kg/m²     Body mass index is 31.4 kg/m².     Physical Exam  Vitals reviewed.   Constitutional:       Appearance: Normal appearance.   HENT:      Nose: No nasal deformity.   Eyes:      General: No scleral icterus.  Neck:      Comments: Trachea midline.  Cardiovascular:      Rate and Rhythm: Normal rate and regular rhythm.   Pulmonary:      Effort: No respiratory distress.      Breath sounds: Normal breath sounds.   Abdominal:      General: Bowel sounds are normal. There is no distension.      Palpations: Abdomen is soft. There is no mass.      Tenderness: There is no abdominal tenderness.   Lymphadenopathy:      Comments: No periumbilical lymphadenopathy.   Skin:     General: Skin is warm.   Neurological:      Mental Status: He is alert.           Assessment and Plan    Diagnoses and all orders for this visit:    1. Gastroesophageal reflux disease with esophagitis, unspecified whether hemorrhage (Primary)    2. Irritable bowel syndrome, " unspecified type    3. Adenomatous polyp of colon, unspecified part of colon    4. Fatty liver  -     Anti-Smooth Muscle Antibody Titer  -     IgG, IgA, IgM  -     Mitochondrial Antibodies, M2  -     Hepatic Function Panel    5. Elevated liver enzymes  -     Anti-Smooth Muscle Antibody Titer  -     IgG, IgA, IgM  -     Mitochondrial Antibodies, M2  -     Hepatic Function Panel             I have reviewed and confirmed the accuracy of the HPI and Assessment and Plan as documented by the APRN YOLY Albert        Follow Up   Return for Follow up to review results after testing complete.    Patient Instructions   Schedule EGD and colonoscopy for further evaluation.    Check lab work today to evaluate cause of liver enzyme elevation.    For fatty liver, weight loss is recommended. Recommend following a low fat and low sugar diet. Recommend management of diabetes and elevated cholesterol with primary care provider if indicated. Regular exercise is recommended. Alcohol avoidance is recommended.

## 2021-12-15 LAB
ACTIN IGG SERPL-ACNC: 13 UNITS (ref 0–19)
ALBUMIN SERPL-MCNC: 4.9 G/DL (ref 3.8–4.9)
ALP SERPL-CCNC: 50 IU/L (ref 44–121)
ALT SERPL-CCNC: 37 IU/L (ref 0–44)
AST SERPL-CCNC: 30 IU/L (ref 0–40)
BILIRUB DIRECT SERPL-MCNC: 0.16 MG/DL (ref 0–0.4)
BILIRUB SERPL-MCNC: 0.5 MG/DL (ref 0–1.2)
IGA SERPL-MCNC: 123 MG/DL (ref 90–386)
IGG SERPL-MCNC: 1115 MG/DL (ref 603–1613)
IGM SERPL-MCNC: 171 MG/DL (ref 20–172)
MITOCHONDRIA M2 IGG SER-ACNC: <20 UNITS (ref 0–20)
PROT SERPL-MCNC: 7.4 G/DL (ref 6–8.5)

## 2021-12-17 ENCOUNTER — PATIENT ROUNDING (BHMG ONLY) (OUTPATIENT)
Dept: GASTROENTEROLOGY | Facility: CLINIC | Age: 56
End: 2021-12-17

## 2021-12-20 NOTE — PROGRESS NOTES
December 17, 2021    Hello, may I speak with Hussein Nino?    My name is FELIX      I am  with [facility]  [address] [address] [phone].    Before we get started may I verify your date of birth? 1965 - VERIFIED    I am calling to officially welcome you to our practice and ask about your recent visit. Is this a good time to talk? YES    Tell me about your visit with us. What things went well?  SAYS EVERYONE WAS EFFICIENT. WENT OVER RESULTS WITH PT.       We're always looking for ways to make our patients' experiences even better. Do you have recommendations on ways we may improve? NO    Overall were you satisfied with your first visit to our practice? YES       I appreciate you taking the time to speak with me today. Is there anything else I can do for you? NO      Thank you, and have a great day.

## 2021-12-21 ENCOUNTER — ANTICOAGULATION VISIT (OUTPATIENT)
Dept: CARDIOLOGY | Facility: CLINIC | Age: 56
End: 2021-12-21

## 2021-12-21 ENCOUNTER — HOSPITAL ENCOUNTER (OUTPATIENT)
Dept: CARDIOLOGY | Facility: HOSPITAL | Age: 56
Discharge: HOME | End: 2021-12-21
Admitting: INTERNAL MEDICINE

## 2021-12-21 LAB — INR PPP: 3

## 2021-12-21 PROCEDURE — 85610 PROTHROMBIN TIME: CPT | Performed by: INTERNAL MEDICINE

## 2022-01-04 ENCOUNTER — ANTICOAGULATION VISIT (OUTPATIENT)
Dept: CARDIOLOGY | Facility: CLINIC | Age: 57
End: 2022-01-04

## 2022-01-04 ENCOUNTER — HOSPITAL ENCOUNTER (OUTPATIENT)
Dept: CARDIOLOGY | Facility: HOSPITAL | Age: 57
Discharge: HOME | End: 2022-01-04
Admitting: INTERNAL MEDICINE

## 2022-01-04 LAB — INR PPP: 2.7

## 2022-01-04 PROCEDURE — 85610 PROTHROMBIN TIME: CPT | Performed by: INTERNAL MEDICINE

## 2022-01-11 ENCOUNTER — HOSPITAL ENCOUNTER (OUTPATIENT)
Dept: MRI IMAGING | Facility: HOSPITAL | Age: 57
Discharge: HOME | End: 2022-01-11
Admitting: ORTHOPAEDIC SURGERY

## 2022-01-11 DIAGNOSIS — M84.38XA STRESS FRACTURE OF OTHER SITE, INITIAL ENCOUNTER: ICD-10-CM

## 2022-01-11 PROCEDURE — 73721 MRI JNT OF LWR EXTRE W/O DYE: CPT

## 2022-01-12 ENCOUNTER — TELEPHONE (OUTPATIENT)
Dept: ORTHOPEDIC SURGERY | Facility: CLINIC | Age: 57
End: 2022-01-12

## 2022-01-12 NOTE — TELEPHONE ENCOUNTER
----- Message from Ella Muller MD sent at 1/12/2022 12:47 PM EST -----  Please let him know there is no new meniscus tear his MRI looks pretty much unchanged we should be able to treat this with injections physical therapy and activity modification I am happy to discuss further in follow-up if needed

## 2022-01-14 ENCOUNTER — TELEPHONE (OUTPATIENT)
Dept: CARDIOLOGY | Facility: CLINIC | Age: 57
End: 2022-01-14

## 2022-01-14 NOTE — TELEPHONE ENCOUNTER
Received fax from [facility] stating the patient is needing a clearance and to hold Warfarin.    Per  patient is cleared for surgery. From a cardiac stand point patient is to bridge with Lovenox. Instructions as followed:     Patient is to hold Warfarin 2 days prior, check PT/ INR, begin Lovenox 1 mg/kg BID after INR is below 2.5  Last dose night prior to procedure. Resume after approved by surgeon. Will restart Lovenox BID and Warfarin recheck in 2 days post op. May stop Lovenox when INR reaches 2.5.     Sent letter over and informed patient.

## 2022-01-18 ENCOUNTER — HOSPITAL ENCOUNTER (OUTPATIENT)
Dept: CARDIOLOGY | Facility: HOSPITAL | Age: 57
Discharge: HOME | End: 2022-01-18
Admitting: INTERNAL MEDICINE

## 2022-01-18 ENCOUNTER — ANTICOAGULATION VISIT (OUTPATIENT)
Dept: CARDIOLOGY | Facility: CLINIC | Age: 57
End: 2022-01-18

## 2022-01-18 LAB — INR PPP: 2

## 2022-01-18 PROCEDURE — 85610 PROTHROMBIN TIME: CPT | Performed by: INTERNAL MEDICINE

## 2022-01-19 ENCOUNTER — TELEPHONE (OUTPATIENT)
Dept: CARDIOLOGY | Facility: CLINIC | Age: 57
End: 2022-01-19

## 2022-01-25 ENCOUNTER — ANTICOAGULATION VISIT (OUTPATIENT)
Dept: CARDIOLOGY | Facility: CLINIC | Age: 57
End: 2022-01-25

## 2022-01-25 ENCOUNTER — HOSPITAL ENCOUNTER (OUTPATIENT)
Dept: CARDIOLOGY | Facility: HOSPITAL | Age: 57
Discharge: HOME | End: 2022-01-25
Admitting: INTERNAL MEDICINE

## 2022-01-25 LAB — INR PPP: 2.5

## 2022-01-25 PROCEDURE — 85610 PROTHROMBIN TIME: CPT | Performed by: INTERNAL MEDICINE

## 2022-02-02 ENCOUNTER — HOSPITAL ENCOUNTER (OUTPATIENT)
Dept: CARDIOLOGY | Facility: HOSPITAL | Age: 57
Discharge: HOME | End: 2022-02-02
Admitting: INTERNAL MEDICINE

## 2022-02-02 ENCOUNTER — ANTICOAGULATION VISIT (OUTPATIENT)
Dept: CARDIOLOGY | Facility: CLINIC | Age: 57
End: 2022-02-02

## 2022-02-02 LAB — INR PPP: 2.5

## 2022-02-02 PROCEDURE — 85610 PROTHROMBIN TIME: CPT | Performed by: INTERNAL MEDICINE

## 2022-02-02 NOTE — PATIENT INSTRUCTIONS
Follow instructions on the calender for coumadin dose.  Return for your follow- up appointment on : 2/15/22  At 8 am

## 2022-02-15 ENCOUNTER — HOSPITAL ENCOUNTER (OUTPATIENT)
Dept: CARDIOLOGY | Facility: HOSPITAL | Age: 57
Discharge: HOME | End: 2022-02-15
Admitting: INTERNAL MEDICINE

## 2022-02-15 ENCOUNTER — ANTICOAGULATION VISIT (OUTPATIENT)
Dept: CARDIOLOGY | Facility: CLINIC | Age: 57
End: 2022-02-15

## 2022-02-15 LAB — INR PPP: 2.8

## 2022-02-15 PROCEDURE — 85610 PROTHROMBIN TIME: CPT | Performed by: INTERNAL MEDICINE

## 2022-02-16 NOTE — PROGRESS NOTES
Knee MRI Follow Up      Patient: Hussein Nino        YOB: 1965            Chief Complaints: Knee pain right      History of Present Illness: The patient is here follow-up of an MRI of the knee MRI demonstrates some worsening of his arthritis note no new meniscus tear.  Symptoms are still activity limiting he is going out of a hiking trip in August and would like to be prepared      Physical Exam: 56 y.o. male  General Appearance:    Alert, cooperative, in no acute distress                 There were no vitals filed for this visit.     Patient is alert and read ×3 no acute distress appears her above-listed at height weight and age.  Affect is normal respiratory rate is normal unlabored. Heart rate regular rate rhythm, sclera, dentition and hearing are normal for the purpose of this exam.      Ortho Exam  Physical exam of the right  knee reveals no effusion no redness.  The patient does have tenderness about the medial l joint line.  No tenderness about the lateral l joint line.  A negative bounce home and a positive l medial Gal.    Patient has a stable ligamentous exam.  The patient has a negative Lachman and negative anterior drawer and a negative pivot shift.  Quads are reasonable and symmetric bilaterally.  Calf is soft and nontender.  There is no overlying skin changes no lymphedema lymphadenopathy.  Patient has good hip range of motion full symmetric and asymptomatic and a normal ankle exam.  She has good distal pulses and sensation distally is intact        MRI Results: MRIs as above I reviewed the films myself and agree with the findings    Large Joint Arthrocentesis: R knee  Date/Time: 2/17/2022 8:46 AM  Consent given by: patient  Site marked: site marked  Timeout: Immediately prior to procedure a time out was called to verify the correct patient, procedure, equipment, support staff and site/side marked as required   Supporting Documentation  Indications: pain   Procedure  Details  Location: knee - R knee  Preparation: Patient was prepped and draped in the usual sterile fashion  Needle gauge: 21.  Approach: lateral  Medications administered: 1 mL lidocaine (cardiac); 80 mg triamcinolone acetonide 40 MG/ML  Patient tolerance: patient tolerated the procedure well with no immediate complications            Assessment/Plan: Right knee pain I do not appreciate any meniscal pathology on MRI he does have some worsening of his degenerative changes.  I do think he benefit from an injection but as important I think he benefit from physical therapy working on quad and core strengthening.  He is trying to get his weight down a little bit which I think will help as well.

## 2022-02-17 ENCOUNTER — OFFICE VISIT (OUTPATIENT)
Dept: ORTHOPEDIC SURGERY | Facility: CLINIC | Age: 57
End: 2022-02-17

## 2022-02-17 VITALS — BODY MASS INDEX: 31.69 KG/M2 | TEMPERATURE: 97.5 F | HEIGHT: 72 IN | WEIGHT: 234 LBS

## 2022-02-17 DIAGNOSIS — Z98.890 S/P ARTHROSCOPY OF KNEE: Primary | ICD-10-CM

## 2022-02-17 DIAGNOSIS — M17.11 PRIMARY LOCALIZED OSTEOARTHROSIS OF RIGHT LOWER LEG: ICD-10-CM

## 2022-02-17 PROCEDURE — 99213 OFFICE O/P EST LOW 20 MIN: CPT | Performed by: ORTHOPAEDIC SURGERY

## 2022-02-17 PROCEDURE — 20610 DRAIN/INJ JOINT/BURSA W/O US: CPT | Performed by: ORTHOPAEDIC SURGERY

## 2022-02-17 RX ORDER — TRIAMCINOLONE ACETONIDE 40 MG/ML
80 INJECTION, SUSPENSION INTRA-ARTICULAR; INTRAMUSCULAR
Status: COMPLETED | OUTPATIENT
Start: 2022-02-17 | End: 2022-02-17

## 2022-02-17 RX ADMIN — TRIAMCINOLONE ACETONIDE 80 MG: 40 INJECTION, SUSPENSION INTRA-ARTICULAR; INTRAMUSCULAR at 08:46

## 2022-03-01 ENCOUNTER — ANTICOAGULATION VISIT (OUTPATIENT)
Dept: CARDIOLOGY | Facility: CLINIC | Age: 57
End: 2022-03-01

## 2022-03-01 ENCOUNTER — HOSPITAL ENCOUNTER (OUTPATIENT)
Dept: CARDIOLOGY | Facility: HOSPITAL | Age: 57
Discharge: HOME | End: 2022-03-01
Admitting: INTERNAL MEDICINE

## 2022-03-01 LAB — INR PPP: 2.2

## 2022-03-01 PROCEDURE — 85610 PROTHROMBIN TIME: CPT | Performed by: INTERNAL MEDICINE

## 2022-03-04 DIAGNOSIS — K21.9 GASTROESOPHAGEAL REFLUX DISEASE WITHOUT ESOPHAGITIS: ICD-10-CM

## 2022-03-04 RX ORDER — OMEPRAZOLE 40 MG/1
CAPSULE, DELAYED RELEASE ORAL
Qty: 90 CAPSULE | Refills: 1 | Status: SHIPPED | OUTPATIENT
Start: 2022-03-04 | End: 2022-07-06 | Stop reason: SDUPTHER

## 2022-03-10 ENCOUNTER — TELEPHONE (OUTPATIENT)
Dept: GASTROENTEROLOGY | Facility: CLINIC | Age: 57
End: 2022-03-10

## 2022-03-10 NOTE — TELEPHONE ENCOUNTER
Spoke to the patient to schedule his egd/colonoscopy at [facility] and he is aware that  will be doing his procedures. He will be working with his cardiologist on managing his lovenox prior to this. They have placed a clearance letter in this chart. He is scheduled for 6/8@7:30

## 2022-03-12 DIAGNOSIS — E78.2 MIXED HYPERLIPIDEMIA: ICD-10-CM

## 2022-03-14 RX ORDER — SODIUM CHLORIDE, SODIUM LACTATE, POTASSIUM CHLORIDE, CALCIUM CHLORIDE 600; 310; 30; 20 MG/100ML; MG/100ML; MG/100ML; MG/100ML
30 INJECTION, SOLUTION INTRAVENOUS CONTINUOUS PRN
Status: CANCELLED | OUTPATIENT
Start: 2022-03-14

## 2022-03-14 RX ORDER — PRAVASTATIN SODIUM 40 MG
TABLET ORAL
Qty: 90 TABLET | Refills: 1 | Status: SHIPPED | OUTPATIENT
Start: 2022-03-14 | End: 2022-07-06 | Stop reason: SDUPTHER

## 2022-03-14 RX ORDER — SODIUM CHLORIDE 0.9 % (FLUSH) 0.9 %
10 SYRINGE (ML) INJECTION AS NEEDED
Status: CANCELLED | OUTPATIENT
Start: 2022-03-14

## 2022-03-14 RX ORDER — SODIUM CHLORIDE 0.9 % (FLUSH) 0.9 %
3 SYRINGE (ML) INJECTION EVERY 12 HOURS SCHEDULED
Status: CANCELLED | OUTPATIENT
Start: 2022-03-14

## 2022-03-15 ENCOUNTER — ANTICOAGULATION VISIT (OUTPATIENT)
Dept: CARDIOLOGY | Facility: CLINIC | Age: 57
End: 2022-03-15

## 2022-03-15 ENCOUNTER — HOSPITAL ENCOUNTER (OUTPATIENT)
Dept: CARDIOLOGY | Facility: HOSPITAL | Age: 57
Discharge: HOME | End: 2022-03-15
Admitting: INTERNAL MEDICINE

## 2022-03-15 LAB — INR PPP: 2.5

## 2022-03-15 PROCEDURE — 85610 PROTHROMBIN TIME: CPT | Performed by: INTERNAL MEDICINE

## 2022-03-16 PROBLEM — Z12.11 ENCOUNTER FOR SCREENING FOR MALIGNANT NEOPLASM OF COLON: Status: ACTIVE | Noted: 2022-03-16

## 2022-03-16 PROBLEM — Z86.0100 HISTORY OF COLON POLYPS: Status: ACTIVE | Noted: 2022-03-16

## 2022-03-16 PROBLEM — Z86.010 HISTORY OF COLON POLYPS: Status: ACTIVE | Noted: 2022-03-16

## 2022-03-21 ENCOUNTER — OFFICE VISIT (OUTPATIENT)
Dept: INTERNAL MEDICINE | Facility: CLINIC | Age: 57
End: 2022-03-21

## 2022-03-21 VITALS
TEMPERATURE: 97.8 F | WEIGHT: 229.2 LBS | HEIGHT: 72 IN | SYSTOLIC BLOOD PRESSURE: 130 MMHG | DIASTOLIC BLOOD PRESSURE: 80 MMHG | BODY MASS INDEX: 31.04 KG/M2

## 2022-03-21 DIAGNOSIS — K75.81 NASH (NONALCOHOLIC STEATOHEPATITIS): Primary | ICD-10-CM

## 2022-03-21 DIAGNOSIS — K21.00 GASTROESOPHAGEAL REFLUX DISEASE WITH ESOPHAGITIS, UNSPECIFIED WHETHER HEMORRHAGE: ICD-10-CM

## 2022-03-21 PROCEDURE — 99214 OFFICE O/P EST MOD 30 MIN: CPT | Performed by: PHYSICIAN ASSISTANT

## 2022-03-21 RX ORDER — SILDENAFIL 50 MG/1
50 TABLET, FILM COATED ORAL DAILY PRN
Qty: 10 TABLET | Refills: 0 | Status: SHIPPED | OUTPATIENT
Start: 2022-03-21 | End: 2022-07-06 | Stop reason: SDUPTHER

## 2022-03-21 NOTE — PROGRESS NOTES
Subjective   Chief Complaint   Patient presents with   • memory changes   • abnormal lft       History of Present Illness     He feels his memory is about the same. Not much different. No worse. No new concerns. LFTs were normal on recent lab and further workup with GI was negative. Concluded he had RUTLEDGE. He is scheduled for a colonoscopy. He has no new or changing abdominal pain. He is exercising more.     Patient Active Problem List   Diagnosis   • Allergic rhinitis   • Asthma   • Gastroesophageal reflux disease   • Hyperlipidemia   • Irritable bowel syndrome   • Raynaud's phenomenon   • Essential hypertriglyceridemia   • S/P AVR (aortic valve replacement)   • Mechanical heart valve present   • Anticoagulated   • Benign essential HTN   • Benign prostatic hyperplasia with nocturia   • KVNG (obstructive sleep apnea)   • Venous insufficiency   • Tear of medial meniscus of right knee, current   • Tear of lateral meniscus of right knee, current   • Encounter for screening for malignant neoplasm of colon   • History of colon polyps   • RUTLEDGE (nonalcoholic steatohepatitis)       Allergies   Allergen Reactions   • Fish-Derived Products Anaphylaxis     Can eat shell fish without problem, but can't eat fish sandwich, cod, tuna or salmon. , is unable to take fish oil supplements.    • Ondansetron Rash   • Penicillins Rash       Current Outpatient Medications on File Prior to Visit   Medication Sig Dispense Refill   • fenofibrate (TRICOR) 145 MG tablet Take 1 tablet by mouth Daily. 90 tablet 3   • fluticasone (FLONASE) 50 MCG/ACT nasal spray 2 sprays into each nostril Daily. 1 each 3   • omeprazole (priLOSEC) 40 MG capsule TAKE 1 CAPSULE DAILY 90 capsule 1   • pravastatin (PRAVACHOL) 40 MG tablet TAKE 1 TABLET DAILY 90 tablet 1   • tamsulosin (FLOMAX) 0.4 MG capsule 24 hr capsule Take 1 capsule by mouth 2 (two) times a day. (Patient taking differently: Take 1 capsule by mouth 2 (Two) Times a Day As Needed.) 90 capsule    •  warfarin (Jantoven) 5 MG tablet TAKE 1 TABLET DAILY OR AS  DIRECTED TO MAINTAIN       INTERNATIONAL NORMALIZED   RATIO 2.5-3.5 90 tablet 1   • [DISCONTINUED] sildenafil (Viagra) 50 MG tablet Take 1 tablet by mouth Daily As Needed for Erectile Dysfunction. 10 tablet 0   • albuterol (PROVENTIL HFA;VENTOLIN HFA) 108 (90 BASE) MCG/ACT inhaler Inhale 2 puffs Every 4 (Four) Hours As Needed for wheezing.       No current facility-administered medications on file prior to visit.       Past Medical History:   Diagnosis Date   • Allergies    • Aortic stenosis     SEVERE   • Epistaxis    • GERD (gastroesophageal reflux disease)    • Gout    • History of COVID-19    • Hyperlipidemia    • IBS (irritable bowel syndrome)    • Right knee meniscal tear    • Sleep apnea     CPAP, DOES NOT USE       Family History   Problem Relation Age of Onset   • Heart failure Mother    • Kidney disease Mother    • Hypertension Mother    • Heart disease Mother    • Alzheimer's disease Father    • Thyroid disease Sister    • Hypertension Sister    • Hypertension Brother    • Colon polyps Maternal Uncle    • Irritable bowel syndrome Cousin    • Malig Hyperthermia Neg Hx    • Colon cancer Neg Hx    • Crohn's disease Neg Hx    • Ulcerative colitis Neg Hx        Social History     Socioeconomic History   • Marital status:    Tobacco Use   • Smoking status: Former Smoker     Packs/day: 0.50     Years: 14.00     Pack years: 7.00     Types: Cigarettes     Quit date:      Years since quittin.2   • Smokeless tobacco: Never Used   • Tobacco comment: QUIT AGE 23   Vaping Use   • Vaping Use: Never used   Substance and Sexual Activity   • Alcohol use: Yes     Comment: 1-2 PER WEEK   • Drug use: No   • Sexual activity: Defer       Past Surgical History:   Procedure Laterality Date   • ADENOIDECTOMY     • AORTIC VALVE REPAIR/REPLACEMENT  10/05/2016    mechanical valve   • ASCENDING ARCH/HEMIARCH REPLACEMENT N/A 10/5/2016    Procedure: MIDLINE  "STERNOTOMY, AORTIC VALVE REPLACEMENT, ASCENDING AND HEMIARCH REPLACEMENT, REPAIR OF PERIVALVULAR LEAK, WITH NEURO MONITOIRING, INTRAOPERATIVE WARREN;  Surgeon: Sukhi Wilkinson MD;  Location: [facility] MAIN OR;  Service:    • CARDIAC CATHETERIZATION N/A 9/27/2016    Procedure: Coronary angiography;  Surgeon: Lio Roman MD;  [facility];  Service:    • CARDIAC CATHETERIZATION N/A 9/27/2016    Procedure: Left Heart Cath;  Surgeon: Lio Roman MD;  Location: [facility] CATH INVASIVE LOCATION;  Service:    • CHOLECYSTECTOMY     • COLONOSCOPY  12/212/2016    Dr. Palomares   • CORONARY ARTERY BYPASS GRAFT     • KNEE ARTHROSCOPY Right 9/7/2021    Procedure: RIGHT KNEE ARTHROSCOPY WITH PARTIAL MEDIAL AND LATERAL MENISECTOMY WITH DEBRIDEMENT OF ARTHRITIS ;  Surgeon: Ella Muller MD;  Location: [facility] OR [facility];  Service: Orthopedics;  Laterality: Right;   • UPPER GASTROINTESTINAL ENDOSCOPY           The following portions of the patient's history were reviewed and updated as appropriate: problem list, allergies, current medications, past medical history, past family history, past social history and past surgical history.    ROS     See HPI    Immunization History   Administered Date(s) Administered   • COVID-19 (MODERNA) 1st, 2nd, 3rd Dose Only 03/22/2021, 04/19/2021   • FluLaval/Fluarix/Fluzone >6 10/19/2019, 10/10/2020   • Hepatitis A 09/16/2019, 06/18/2020   • Pneumococcal Polysaccharide (PPSV23) 12/18/2020   • Tdap 09/16/2019       Objective   Vitals:    03/21/22 1637 03/21/22 1654   BP:  130/80   Temp: 97.8 °F (36.6 °C)    Weight: 104 kg (229 lb 3.2 oz)    Height: 182.9 cm (72.01\")      Body mass index is 31.08 kg/m².  Physical Exam  Vitals reviewed.   Constitutional:       Appearance: He is well-developed.   HENT:      Head: Normocephalic and atraumatic.   Cardiovascular:      Rate and Rhythm: Normal rate and regular rhythm.      Heart sounds: Normal heart sounds, S1 normal and S2 " normal.   Pulmonary:      Effort: Pulmonary effort is normal.      Breath sounds: Normal breath sounds.   Skin:     General: Skin is warm.   Neurological:      Mental Status: He is alert.   Psychiatric:         Behavior: Behavior normal.           Assessment/Plan   Diagnoses and all orders for this visit:    1. RUTLEDGE (nonalcoholic steatohepatitis) (Primary)    2. Gastroesophageal reflux disease with esophagitis, unspecified whether hemorrhage    Other orders  -     sildenafil (Viagra) 50 MG tablet; Take 1 tablet by mouth Daily As Needed for Erectile Dysfunction.  Dispense: 10 tablet; Refill: 0    Continue to work on diet and weight loss for RUTLEDGE. Most recent LFTs were normal. He is scheduled for colonoscopy. Reviewed workup with GI and office notes. Memory is stable, father had Alzheimer's early onset will continue to monitor. Labs were normal and no worrisome features.     Return in about 6 months (around 9/21/2022) for Annual physical, Lab Appt Before FUP.

## 2022-03-29 ENCOUNTER — APPOINTMENT (OUTPATIENT)
Dept: CARDIOLOGY | Facility: HOSPITAL | Age: 57
End: 2022-03-29

## 2022-04-05 ENCOUNTER — ANTICOAGULATION VISIT (OUTPATIENT)
Dept: CARDIOLOGY | Facility: CLINIC | Age: 57
End: 2022-04-05

## 2022-04-05 ENCOUNTER — HOSPITAL ENCOUNTER (OUTPATIENT)
Dept: CARDIOLOGY | Facility: HOSPITAL | Age: 57
Discharge: HOME | End: 2022-04-05
Admitting: INTERNAL MEDICINE

## 2022-04-05 LAB — INR PPP: 3.2

## 2022-04-05 PROCEDURE — 85610 PROTHROMBIN TIME: CPT | Performed by: INTERNAL MEDICINE

## 2022-04-19 ENCOUNTER — ANTICOAGULATION VISIT (OUTPATIENT)
Dept: CARDIOLOGY | Facility: CLINIC | Age: 57
End: 2022-04-19

## 2022-04-19 ENCOUNTER — HOSPITAL ENCOUNTER (OUTPATIENT)
Dept: CARDIOLOGY | Facility: HOSPITAL | Age: 57
Discharge: HOME | End: 2022-04-19
Admitting: INTERNAL MEDICINE

## 2022-04-19 LAB — INR PPP: 3.4

## 2022-04-19 PROCEDURE — 85610 PROTHROMBIN TIME: CPT | Performed by: INTERNAL MEDICINE

## 2022-05-03 ENCOUNTER — ANTICOAGULATION VISIT (OUTPATIENT)
Dept: CARDIOLOGY | Facility: CLINIC | Age: 57
End: 2022-05-03

## 2022-05-03 ENCOUNTER — HOSPITAL ENCOUNTER (OUTPATIENT)
Dept: CARDIOLOGY | Facility: HOSPITAL | Age: 57
Discharge: HOME | End: 2022-05-03
Admitting: INTERNAL MEDICINE

## 2022-05-03 LAB — INR PPP: 3.8

## 2022-05-03 PROCEDURE — 85610 PROTHROMBIN TIME: CPT | Performed by: INTERNAL MEDICINE

## 2022-05-17 ENCOUNTER — ANTICOAGULATION VISIT (OUTPATIENT)
Dept: CARDIOLOGY | Facility: CLINIC | Age: 57
End: 2022-05-17

## 2022-05-17 ENCOUNTER — HOSPITAL ENCOUNTER (OUTPATIENT)
Dept: CARDIOLOGY | Facility: HOSPITAL | Age: 57
Discharge: HOME | End: 2022-05-17
Admitting: INTERNAL MEDICINE

## 2022-05-17 LAB — INR PPP: 5.3

## 2022-05-17 PROCEDURE — 85610 PROTHROMBIN TIME: CPT | Performed by: INTERNAL MEDICINE

## 2022-05-25 ENCOUNTER — ANTICOAGULATION VISIT (OUTPATIENT)
Dept: CARDIOLOGY | Facility: CLINIC | Age: 57
End: 2022-05-25

## 2022-05-25 ENCOUNTER — HOSPITAL ENCOUNTER (OUTPATIENT)
Dept: CARDIOLOGY | Facility: HOSPITAL | Age: 57
Discharge: HOME | End: 2022-05-25
Admitting: INTERNAL MEDICINE

## 2022-05-25 LAB — INR PPP: 2.2

## 2022-05-25 PROCEDURE — 85610 PROTHROMBIN TIME: CPT | Performed by: INTERNAL MEDICINE

## 2022-06-03 ENCOUNTER — ANESTHESIA EVENT (OUTPATIENT)
Dept: PERIOP | Facility: HOSPITAL | Age: 57
End: 2022-06-03

## 2022-06-06 ENCOUNTER — LAB (OUTPATIENT)
Dept: LAB | Facility: HOSPITAL | Age: 57
End: 2022-06-06

## 2022-06-06 ENCOUNTER — ANTICOAGULATION VISIT (OUTPATIENT)
Dept: CARDIOLOGY | Facility: CLINIC | Age: 57
End: 2022-06-06

## 2022-06-06 ENCOUNTER — TRANSCRIBE ORDERS (OUTPATIENT)
Dept: ADMINISTRATIVE | Facility: HOSPITAL | Age: 57
End: 2022-06-06

## 2022-06-06 DIAGNOSIS — Z86.010 HISTORY OF COLON POLYPS: ICD-10-CM

## 2022-06-06 DIAGNOSIS — K21.00 GASTROESOPHAGEAL REFLUX DISEASE WITH ESOPHAGITIS, UNSPECIFIED WHETHER HEMORRHAGE: ICD-10-CM

## 2022-06-06 DIAGNOSIS — Z01.818 PRE-OP EXAM: Primary | ICD-10-CM

## 2022-06-06 DIAGNOSIS — Z79.01 LONG TERM (CURRENT) USE OF ANTICOAGULANTS: ICD-10-CM

## 2022-06-06 DIAGNOSIS — Z95.2 S/P AVR (AORTIC VALVE REPLACEMENT): Primary | ICD-10-CM

## 2022-06-06 DIAGNOSIS — Z12.11 ENCOUNTER FOR SCREENING FOR MALIGNANT NEOPLASM OF COLON: ICD-10-CM

## 2022-06-06 DIAGNOSIS — Z01.818 PRE-OP EXAM: ICD-10-CM

## 2022-06-06 LAB
INR PPP: 3.19 (ref 0.9–1.1)
PROTHROMBIN TIME: 31.7 SECONDS (ref 11.7–14.2)
SARS-COV-2 RNA PNL SPEC NAA+PROBE: NOT DETECTED

## 2022-06-06 PROCEDURE — C9803 HOPD COVID-19 SPEC COLLECT: HCPCS

## 2022-06-06 PROCEDURE — 36415 COLL VENOUS BLD VENIPUNCTURE: CPT

## 2022-06-06 PROCEDURE — 87635 SARS-COV-2 COVID-19 AMP PRB: CPT | Performed by: INTERNAL MEDICINE

## 2022-06-06 PROCEDURE — 85610 PROTHROMBIN TIME: CPT

## 2022-06-07 ENCOUNTER — LAB (OUTPATIENT)
Dept: LAB | Facility: HOSPITAL | Age: 57
End: 2022-06-07

## 2022-06-07 DIAGNOSIS — Z79.01 ANTICOAGULATED: Primary | ICD-10-CM

## 2022-06-07 LAB
INR PPP: 2.17 (ref 0.9–1.1)
PROTHROMBIN TIME: 23.7 SECONDS (ref 11.7–14.2)

## 2022-06-07 PROCEDURE — 85610 PROTHROMBIN TIME: CPT | Performed by: NURSE PRACTITIONER

## 2022-06-07 PROCEDURE — 36415 COLL VENOUS BLD VENIPUNCTURE: CPT | Performed by: NURSE PRACTITIONER

## 2022-06-08 ENCOUNTER — HOSPITAL ENCOUNTER (OUTPATIENT)
Facility: HOSPITAL | Age: 57
Setting detail: HOSPITAL OUTPATIENT SURGERY
Discharge: HOME | End: 2022-06-08
Attending: INTERNAL MEDICINE | Admitting: INTERNAL MEDICINE

## 2022-06-08 ENCOUNTER — ANESTHESIA (OUTPATIENT)
Dept: PERIOP | Facility: HOSPITAL | Age: 57
End: 2022-06-08

## 2022-06-08 VITALS
OXYGEN SATURATION: 94 % | RESPIRATION RATE: 18 BRPM | SYSTOLIC BLOOD PRESSURE: 149 MMHG | TEMPERATURE: 97.5 F | DIASTOLIC BLOOD PRESSURE: 87 MMHG | HEART RATE: 62 BPM | WEIGHT: 219.6 LBS | BODY MASS INDEX: 29.78 KG/M2

## 2022-06-08 DIAGNOSIS — Z12.11 ENCOUNTER FOR SCREENING FOR MALIGNANT NEOPLASM OF COLON: ICD-10-CM

## 2022-06-08 DIAGNOSIS — K21.00 GASTROESOPHAGEAL REFLUX DISEASE WITH ESOPHAGITIS, UNSPECIFIED WHETHER HEMORRHAGE: ICD-10-CM

## 2022-06-08 DIAGNOSIS — Z86.010 HISTORY OF COLON POLYPS: ICD-10-CM

## 2022-06-08 LAB
INR PPP: 1.8 (ref 0.9–1.1)
PROTHROMBIN TIME: 20.7 SECONDS (ref 12.1–15)
QT INTERVAL: 411 MS

## 2022-06-08 PROCEDURE — 88305 TISSUE EXAM BY PATHOLOGIST: CPT | Performed by: INTERNAL MEDICINE

## 2022-06-08 PROCEDURE — 45385 COLONOSCOPY W/LESION REMOVAL: CPT | Performed by: INTERNAL MEDICINE

## 2022-06-08 PROCEDURE — 93010 ELECTROCARDIOGRAM REPORT: CPT | Performed by: INTERNAL MEDICINE

## 2022-06-08 PROCEDURE — 43239 EGD BIOPSY SINGLE/MULTIPLE: CPT | Performed by: INTERNAL MEDICINE

## 2022-06-08 PROCEDURE — 25010000002 PROPOFOL 10 MG/ML EMULSION: Performed by: ANESTHESIOLOGY

## 2022-06-08 PROCEDURE — 93005 ELECTROCARDIOGRAM TRACING: CPT | Performed by: ANESTHESIOLOGY

## 2022-06-08 PROCEDURE — 85610 PROTHROMBIN TIME: CPT | Performed by: ANESTHESIOLOGY

## 2022-06-08 RX ORDER — MAGNESIUM HYDROXIDE 1200 MG/15ML
LIQUID ORAL AS NEEDED
Status: DISCONTINUED | OUTPATIENT
Start: 2022-06-08 | End: 2022-06-08 | Stop reason: HOSPADM

## 2022-06-08 RX ORDER — SODIUM CHLORIDE, SODIUM LACTATE, POTASSIUM CHLORIDE, CALCIUM CHLORIDE 600; 310; 30; 20 MG/100ML; MG/100ML; MG/100ML; MG/100ML
9 INJECTION, SOLUTION INTRAVENOUS CONTINUOUS
Status: DISCONTINUED | OUTPATIENT
Start: 2022-06-08 | End: 2022-06-08 | Stop reason: HOSPADM

## 2022-06-08 RX ORDER — SODIUM CHLORIDE 0.9 % (FLUSH) 0.9 %
10 SYRINGE (ML) INJECTION EVERY 12 HOURS SCHEDULED
Status: DISCONTINUED | OUTPATIENT
Start: 2022-06-08 | End: 2022-06-08 | Stop reason: HOSPADM

## 2022-06-08 RX ORDER — SODIUM CHLORIDE 0.9 % (FLUSH) 0.9 %
3 SYRINGE (ML) INJECTION EVERY 12 HOURS SCHEDULED
Status: DISCONTINUED | OUTPATIENT
Start: 2022-06-08 | End: 2022-06-08 | Stop reason: HOSPADM

## 2022-06-08 RX ORDER — SODIUM CHLORIDE 0.9 % (FLUSH) 0.9 %
10 SYRINGE (ML) INJECTION AS NEEDED
Status: DISCONTINUED | OUTPATIENT
Start: 2022-06-08 | End: 2022-06-08 | Stop reason: HOSPADM

## 2022-06-08 RX ORDER — PROPOFOL 10 MG/ML
VIAL (ML) INTRAVENOUS AS NEEDED
Status: DISCONTINUED | OUTPATIENT
Start: 2022-06-08 | End: 2022-06-08 | Stop reason: SURG

## 2022-06-08 RX ORDER — SODIUM CHLORIDE 9 MG/ML
40 INJECTION, SOLUTION INTRAVENOUS AS NEEDED
Status: DISCONTINUED | OUTPATIENT
Start: 2022-06-08 | End: 2022-06-08 | Stop reason: HOSPADM

## 2022-06-08 RX ORDER — GLYCOPYRROLATE 0.2 MG/ML
INJECTION INTRAMUSCULAR; INTRAVENOUS AS NEEDED
Status: DISCONTINUED | OUTPATIENT
Start: 2022-06-08 | End: 2022-06-08 | Stop reason: SURG

## 2022-06-08 RX ORDER — LIDOCAINE HYDROCHLORIDE 10 MG/ML
0.5 INJECTION, SOLUTION EPIDURAL; INFILTRATION; INTRACAUDAL; PERINEURAL ONCE AS NEEDED
Status: DISCONTINUED | OUTPATIENT
Start: 2022-06-08 | End: 2022-06-08 | Stop reason: HOSPADM

## 2022-06-08 RX ORDER — SODIUM CHLORIDE, SODIUM LACTATE, POTASSIUM CHLORIDE, CALCIUM CHLORIDE 600; 310; 30; 20 MG/100ML; MG/100ML; MG/100ML; MG/100ML
30 INJECTION, SOLUTION INTRAVENOUS CONTINUOUS PRN
Status: DISCONTINUED | OUTPATIENT
Start: 2022-06-08 | End: 2022-06-08 | Stop reason: HOSPADM

## 2022-06-08 RX ORDER — LIDOCAINE HYDROCHLORIDE 20 MG/ML
INJECTION, SOLUTION INFILTRATION; PERINEURAL AS NEEDED
Status: DISCONTINUED | OUTPATIENT
Start: 2022-06-08 | End: 2022-06-08 | Stop reason: SURG

## 2022-06-08 RX ORDER — DEXMEDETOMIDINE HYDROCHLORIDE 100 UG/ML
INJECTION, SOLUTION INTRAVENOUS AS NEEDED
Status: DISCONTINUED | OUTPATIENT
Start: 2022-06-08 | End: 2022-06-08 | Stop reason: SURG

## 2022-06-08 RX ADMIN — LIDOCAINE HYDROCHLORIDE 50 MG: 20 INJECTION, SOLUTION INFILTRATION; PERINEURAL at 14:27

## 2022-06-08 RX ADMIN — DEXMEDETOMIDINE 8 MCG: 100 INJECTION, SOLUTION, CONCENTRATE INTRAVENOUS at 14:23

## 2022-06-08 RX ADMIN — PROPOFOL 150 MG: 10 INJECTION, EMULSION INTRAVENOUS at 14:23

## 2022-06-08 RX ADMIN — SODIUM CHLORIDE, POTASSIUM CHLORIDE, SODIUM LACTATE AND CALCIUM CHLORIDE 9 ML/HR: 600; 310; 30; 20 INJECTION, SOLUTION INTRAVENOUS at 13:44

## 2022-06-08 RX ADMIN — GLYCOPYRROLATE 0.2 MG: 0.2 INJECTION INTRAMUSCULAR; INTRAVENOUS at 14:22

## 2022-06-08 RX ADMIN — LIDOCAINE HYDROCHLORIDE 50 MG: 20 INJECTION, SOLUTION INFILTRATION; PERINEURAL at 14:23

## 2022-06-08 NOTE — ANESTHESIA POSTPROCEDURE EVALUATION
Patient: Hussein Nino    Procedure Summary     Date: 06/08/22 Room / Location: [facility] ENDOSCOPY 2 /  [facility] OR    Anesthesia Start: 1421 Anesthesia Stop: 1450    Procedures:       ESOPHAGOGASTRODUODENOSCOPY WITH POLYPECTOMY (N/A Esophagus)      COLONOSCOPY WITH POLYPECTOMY (N/A ) Diagnosis:       Encounter for screening for malignant neoplasm of colon      History of colon polyps      Gastroesophageal reflux disease with esophagitis, unspecified whether hemorrhage      (Encounter for screening for malignant neoplasm of colon [Z12.11])      (History of colon polyps [Z86.010])      (Gastroesophageal reflux disease with esophagitis, unspecified whether hemorrhage [K21.00])    Surgeons: Kana Chowdary MD Provider: Debi Aguilar MD    Anesthesia Type: MAC ASA Status: 3          Anesthesia Type: MAC    Vitals  Vitals Value Taken Time   /87 06/08/22 1522   Temp     Pulse 62 06/08/22 1522   Resp 18 06/08/22 1522   SpO2 94 % 06/08/22 1522           Post Anesthesia Care and Evaluation    Patient location during evaluation: bedside  Patient participation: complete - patient participated  Level of consciousness: awake and alert  Pain score: 0  Pain management: adequate    Airway patency: patent  Anesthetic complications: No anesthetic complications  PONV Status: none  Cardiovascular status: acceptable  Respiratory status: acceptable  Hydration status: acceptable  No anesthesia care post op

## 2022-06-08 NOTE — ANESTHESIA PREPROCEDURE EVALUATION
Anesthesia Evaluation     Patient summary reviewed and Nursing notes reviewed   no history of anesthetic complications:  NPO Solid Status: > 8 hours  NPO Liquid Status: > 8 hours           Airway   Mallampati: III  TM distance: >3 FB  Neck ROM: full  Possible difficult intubation  Dental - normal exam     Pulmonary - normal exam   (+) asthma ( no recent inhaler use),sleep apnea,   (-) not a smoker  Cardiovascular   Exercise tolerance: good (4-7 METS)    ECG reviewed  Rhythm: regular  Rate: normal    (+) hypertension well controlled less than 2 medications, valvular problems/murmurs (replaced 6 years ago) AS, CABG, hyperlipidemia,       Neuro/Psych  GI/Hepatic/Renal/Endo    (+) obesity,  GERD well controlled,    (-) hepatitis, liver disease    Musculoskeletal     Abdominal   (+) obese,    Substance History   (+) alcohol use,      OB/GYN negative ob/gyn ROS         Other   arthritis, blood dyscrasia ( anticoagulated for heart valve),                   Anesthesia Plan    ASA 3     MAC     intravenous induction     Anesthetic plan, risks, benefits, and alternatives have been provided, discussed and informed consent has been obtained with: patient.  Use of blood products discussed with patient  Consented to blood products.       CODE STATUS:

## 2022-06-08 NOTE — H&P
No chief complaint on file.      HPI  Patient here today for an EGD due to GERD and a colonoscopy for screening due to history of polyps.         Problem List:    Patient Active Problem List   Diagnosis   • Allergic rhinitis   • Asthma   • Gastroesophageal reflux disease   • Hyperlipidemia   • Irritable bowel syndrome   • Raynaud's phenomenon   • Essential hypertriglyceridemia   • S/P AVR (aortic valve replacement)   • Mechanical heart valve present   • Anticoagulated   • Benign essential HTN   • Benign prostatic hyperplasia with nocturia   • KVNG (obstructive sleep apnea)   • Venous insufficiency   • Tear of medial meniscus of right knee, current   • Tear of lateral meniscus of right knee, current   • Encounter for screening for malignant neoplasm of colon   • History of colon polyps   • RUTLEDGE (nonalcoholic steatohepatitis)       Medical History:    Past Medical History:   Diagnosis Date   • Allergies    • Aortic stenosis     SEVERE   • Epistaxis    • GERD (gastroesophageal reflux disease)    • Gout    • History of COVID-19     2020   • Hyperlipidemia    • IBS (irritable bowel syndrome)    • Right knee meniscal tear    • Sleep apnea     CPAP, DOES NOT USE        Social History:    Social History     Socioeconomic History   • Marital status:    Tobacco Use   • Smoking status: Former Smoker     Packs/day: 0.50     Years: 14.00     Pack years: 7.00     Types: Cigarettes     Quit date:      Years since quittin.4   • Smokeless tobacco: Never Used   • Tobacco comment: QUIT AGE 23   Vaping Use   • Vaping Use: Never used   Substance and Sexual Activity   • Alcohol use: Yes     Comment: 1-2 PER WEEK   • Drug use: No   • Sexual activity: Defer       Family History:   Family History   Problem Relation Age of Onset   • Heart failure Mother    • Kidney disease Mother    • Hypertension Mother    • Heart disease Mother    • Alzheimer's disease Father    • Thyroid disease Sister    • Hypertension Sister    •  Hypertension Brother    • Colon polyps Maternal Uncle    • Irritable bowel syndrome Cousin    • Malig Hyperthermia Neg Hx    • Colon cancer Neg Hx    • Crohn's disease Neg Hx    • Ulcerative colitis Neg Hx        Surgical History:   Past Surgical History:   Procedure Laterality Date   • ADENOIDECTOMY     • AORTIC VALVE REPAIR/REPLACEMENT  10/05/2016    mechanical valve   • ASCENDING ARCH/HEMIARCH REPLACEMENT N/A 10/5/2016    Procedure: MIDLINE STERNOTOMY, AORTIC VALVE REPLACEMENT, ASCENDING AND HEMIARCH REPLACEMENT, REPAIR OF PERIVALVULAR LEAK, WITH NEURO MONITOIRING, INTRAOPERATIVE WARREN;  Surgeon: Sukhi Wilkinson MD;  Location: [facility] MAIN OR;  Service:    • CARDIAC CATHETERIZATION N/A 9/27/2016    Procedure: Coronary angiography;  Surgeon: Lio Roman MD;  Location:  [facility] CATH INVASIVE LOCATION;  Service:    • CARDIAC CATHETERIZATION N/A 9/27/2016    Procedure: Left Heart Cath;  Surgeon: Lio Roman MD;  Location:  [facility] CATH INVASIVE LOCATION;  Service:    • CHOLECYSTECTOMY     • COLONOSCOPY  12/212/2016    Dr. Palomares   • CORONARY ARTERY BYPASS GRAFT     • KNEE ARTHROSCOPY Right 9/7/2021    Procedure: RIGHT KNEE ARTHROSCOPY WITH PARTIAL MEDIAL AND LATERAL MENISECTOMY WITH DEBRIDEMENT OF ARTHRITIS ;  Surgeon: Ella Muller MD;  [facility];  Service: Orthopedics;  Laterality: Right;   • UPPER GASTROINTESTINAL ENDOSCOPY           Current Facility-Administered Medications:   •  lactated ringers infusion, 9 mL/hr, Intravenous, Continuous, Debi Aguilar MD, Last Rate: 9 mL/hr at 06/08/22 1344, 9 mL/hr at 06/08/22 1344  •  lactated ringers infusion, 30 mL/hr, Intravenous, Continuous PRN, Kana Chowdary MD  •  lidocaine PF 1% (XYLOCAINE) injection 0.5 mL, 0.5 mL, Injection, Once PRN, Debi Aguilar MD  •  sodium chloride 0.9 % flush 10 mL, 10 mL, Intravenous, PRN, Debi Aguilar MD  •  sodium chloride 0.9 % flush 10 mL, 10 mL, Intravenous, Q12H, Debi Aguilar MD  •  sodium chloride  0.9 % flush 10 mL, 10 mL, Intravenous, PRN, Kana Chowdary MD  •  sodium chloride 0.9 % flush 3 mL, 3 mL, Intravenous, Q12H, Kana Chowdary MD  •  sodium chloride 0.9 % infusion 40 mL, 40 mL, Intravenous, PRN, Debi Aguilar MD    Allergies:   Allergies   Allergen Reactions   • Fish-Derived Products Anaphylaxis     Can eat shell fish without problem, but can't eat fish sandwich, cod, tuna or salmon. , is unable to take fish oil supplements.    • Ondansetron Rash   • Penicillins Rash          Review of Systems   Pertinent items are noted in HPI      The following portions of the patient's history were reviewed by me and updated as appropriate: review of systems, allergies, current medications, past family history, past medical history, past social history, past surgical history and problem list.    Pulse 68   Temp 97.5 °F (36.4 °C) (Oral)   Resp 15   Wt 99.6 kg (219 lb 9.6 oz)   SpO2 94%   BMI 29.78 kg/m²     PHYSICAL EXAM:    CONSTITUTIONAL:  today's vital signs reviewed by me  GASTROINTESTINAL: abdomen is soft nontender nondistended with normal active bowel sounds, no masses are appreciated    Debilities: None  Emotional Behavior: Appropriate    Assessment/ Plan  We will proceed today with EGD and colonoscopy.    Risks and benefits as well as alternatives to endoscopic evaluation were explained to the patient and they voiced understanding and wish to proceed.  These risks include but are not limited to the risk of bleeding, perforation, adverse reaction to sedation, and missed lesions.  The patient was given the opportunity to ask questions prior to the endoscopic procedure.

## 2022-06-10 LAB
LAB AP CASE REPORT: NORMAL
PATH REPORT.FINAL DX SPEC: NORMAL
PATH REPORT.GROSS SPEC: NORMAL

## 2022-06-14 ENCOUNTER — ANTICOAGULATION VISIT (OUTPATIENT)
Dept: CARDIOLOGY | Facility: CLINIC | Age: 57
End: 2022-06-14

## 2022-06-14 ENCOUNTER — HOSPITAL ENCOUNTER (OUTPATIENT)
Dept: CARDIOLOGY | Facility: HOSPITAL | Age: 57
Discharge: HOME | End: 2022-06-14
Admitting: INTERNAL MEDICINE

## 2022-06-14 LAB — INR PPP: 2.1

## 2022-06-14 PROCEDURE — 85610 PROTHROMBIN TIME: CPT | Performed by: INTERNAL MEDICINE

## 2022-06-21 ENCOUNTER — HOSPITAL ENCOUNTER (OUTPATIENT)
Dept: CARDIOLOGY | Facility: HOSPITAL | Age: 57
Discharge: HOME | End: 2022-06-21
Admitting: INTERNAL MEDICINE

## 2022-06-21 ENCOUNTER — ANTICOAGULATION VISIT (OUTPATIENT)
Dept: CARDIOLOGY | Facility: CLINIC | Age: 57
End: 2022-06-21

## 2022-06-21 LAB — INR PPP: 4.3

## 2022-06-21 PROCEDURE — 85610 PROTHROMBIN TIME: CPT | Performed by: INTERNAL MEDICINE

## 2022-06-24 ENCOUNTER — ANTICOAGULATION VISIT (OUTPATIENT)
Dept: CARDIOLOGY | Facility: CLINIC | Age: 57
End: 2022-06-24

## 2022-06-24 LAB — INR PPP: 3.9

## 2022-07-06 DIAGNOSIS — I38 HEART VALVE DISEASE: ICD-10-CM

## 2022-07-06 DIAGNOSIS — E78.2 MIXED HYPERLIPIDEMIA: ICD-10-CM

## 2022-07-06 DIAGNOSIS — K21.9 GASTROESOPHAGEAL REFLUX DISEASE WITHOUT ESOPHAGITIS: ICD-10-CM

## 2022-07-07 ENCOUNTER — TELEPHONE (OUTPATIENT)
Dept: CARDIOLOGY | Facility: CLINIC | Age: 57
End: 2022-07-07

## 2022-07-07 ENCOUNTER — ANTICOAGULATION VISIT (OUTPATIENT)
Dept: CARDIOLOGY | Facility: CLINIC | Age: 57
End: 2022-07-07

## 2022-07-07 LAB — INR PPP: 3.4

## 2022-07-07 RX ORDER — FENOFIBRATE 145 MG/1
145 TABLET, COATED ORAL DAILY
Qty: 90 TABLET | Refills: 3 | Status: SHIPPED | OUTPATIENT
Start: 2022-07-07

## 2022-07-07 RX ORDER — PRAVASTATIN SODIUM 40 MG
40 TABLET ORAL DAILY
Qty: 90 TABLET | Refills: 1 | Status: SHIPPED | OUTPATIENT
Start: 2022-07-07 | End: 2023-01-12

## 2022-07-07 RX ORDER — OMEPRAZOLE 40 MG/1
40 CAPSULE, DELAYED RELEASE ORAL DAILY
Qty: 90 CAPSULE | Refills: 1 | Status: SHIPPED | OUTPATIENT
Start: 2022-07-07 | End: 2023-02-20

## 2022-07-07 RX ORDER — SILDENAFIL 50 MG/1
50 TABLET, FILM COATED ORAL DAILY PRN
Qty: 10 TABLET | Refills: 0 | Status: SHIPPED | OUTPATIENT
Start: 2022-07-07

## 2022-07-07 RX ORDER — WARFARIN SODIUM 5 MG/1
TABLET ORAL
Qty: 90 TABLET | Refills: 1 | Status: SHIPPED | OUTPATIENT
Start: 2022-07-07 | End: 2022-07-19 | Stop reason: SDUPTHER

## 2022-07-13 LAB — INR PPP: 5.1

## 2022-07-15 ENCOUNTER — ANTICOAGULATION VISIT (OUTPATIENT)
Dept: CARDIOLOGY | Facility: CLINIC | Age: 57
End: 2022-07-15

## 2022-07-18 LAB — INR PPP: 2.7

## 2022-07-19 DIAGNOSIS — I38 HEART VALVE DISEASE: ICD-10-CM

## 2022-07-19 RX ORDER — WARFARIN SODIUM 5 MG/1
TABLET ORAL
Qty: 90 TABLET | Refills: 1 | Status: SHIPPED | OUTPATIENT
Start: 2022-07-19

## 2022-07-22 ENCOUNTER — ANTICOAGULATION VISIT (OUTPATIENT)
Dept: CARDIOLOGY | Facility: CLINIC | Age: 57
End: 2022-07-22

## 2022-07-27 ENCOUNTER — ANTICOAGULATION VISIT (OUTPATIENT)
Dept: CARDIOLOGY | Facility: CLINIC | Age: 57
End: 2022-07-27

## 2022-07-27 LAB — INR PPP: 4.3

## 2022-08-02 ENCOUNTER — TELEPHONE (OUTPATIENT)
Dept: CARDIOLOGY | Facility: CLINIC | Age: 57
End: 2022-08-02

## 2022-08-02 LAB — INR PPP: 2.9

## 2022-08-04 ENCOUNTER — ANTICOAGULATION VISIT (OUTPATIENT)
Dept: CARDIOLOGY | Facility: CLINIC | Age: 57
End: 2022-08-04

## 2022-08-09 ENCOUNTER — ANTICOAGULATION VISIT (OUTPATIENT)
Dept: CARDIOLOGY | Facility: CLINIC | Age: 57
End: 2022-08-09

## 2022-08-09 LAB — INR PPP: 4.9

## 2022-08-12 ENCOUNTER — ANTICOAGULATION VISIT (OUTPATIENT)
Dept: CARDIOLOGY | Facility: CLINIC | Age: 57
End: 2022-08-12

## 2022-08-12 LAB — INR PPP: 3.3

## 2022-08-18 ENCOUNTER — ANTICOAGULATION VISIT (OUTPATIENT)
Dept: CARDIOLOGY | Facility: CLINIC | Age: 57
End: 2022-08-18

## 2022-08-18 ENCOUNTER — TELEPHONE (OUTPATIENT)
Dept: CARDIOLOGY | Facility: CLINIC | Age: 57
End: 2022-08-18

## 2022-08-18 LAB — INR PPP: 2.3

## 2022-08-23 ENCOUNTER — ANTICOAGULATION VISIT (OUTPATIENT)
Dept: CARDIOLOGY | Facility: CLINIC | Age: 57
End: 2022-08-23

## 2022-08-23 LAB — INR PPP: 3.5

## 2022-08-30 ENCOUNTER — ANTICOAGULATION VISIT (OUTPATIENT)
Dept: CARDIOLOGY | Facility: CLINIC | Age: 57
End: 2022-08-30

## 2022-08-30 LAB — INR PPP: 2

## 2022-09-06 ENCOUNTER — HOSPITAL ENCOUNTER (OUTPATIENT)
Dept: CARDIOLOGY | Facility: HOSPITAL | Age: 57
Discharge: HOME | End: 2022-09-06
Admitting: INTERNAL MEDICINE

## 2022-09-06 ENCOUNTER — ANTICOAGULATION VISIT (OUTPATIENT)
Dept: CARDIOLOGY | Facility: CLINIC | Age: 57
End: 2022-09-06

## 2022-09-06 LAB — INR PPP: 5.2

## 2022-09-06 PROCEDURE — 85610 PROTHROMBIN TIME: CPT | Performed by: INTERNAL MEDICINE

## 2022-09-13 ENCOUNTER — ANTICOAGULATION VISIT (OUTPATIENT)
Dept: CARDIOLOGY | Facility: CLINIC | Age: 57
End: 2022-09-13

## 2022-09-13 LAB — INR PPP: 2.2

## 2022-09-16 ENCOUNTER — ANTICOAGULATION VISIT (OUTPATIENT)
Dept: CARDIOLOGY | Facility: CLINIC | Age: 57
End: 2022-09-16

## 2022-09-16 LAB — INR PPP: 3

## 2022-09-20 ENCOUNTER — ANTICOAGULATION VISIT (OUTPATIENT)
Dept: CARDIOLOGY | Facility: CLINIC | Age: 57
End: 2022-09-20

## 2022-09-20 LAB — INR PPP: 2.8

## 2022-09-27 ENCOUNTER — ANTICOAGULATION VISIT (OUTPATIENT)
Dept: CARDIOLOGY | Facility: CLINIC | Age: 57
End: 2022-09-27

## 2022-09-27 ENCOUNTER — OFFICE VISIT (OUTPATIENT)
Dept: INTERNAL MEDICINE | Facility: CLINIC | Age: 57
End: 2022-09-27

## 2022-09-27 VITALS
BODY MASS INDEX: 31.29 KG/M2 | WEIGHT: 231 LBS | SYSTOLIC BLOOD PRESSURE: 130 MMHG | DIASTOLIC BLOOD PRESSURE: 76 MMHG | TEMPERATURE: 97.8 F | HEIGHT: 72 IN

## 2022-09-27 DIAGNOSIS — E78.2 MIXED HYPERLIPIDEMIA: ICD-10-CM

## 2022-09-27 DIAGNOSIS — Z23 NEED FOR VACCINATION FOR H FLU TYPE B: Primary | ICD-10-CM

## 2022-09-27 DIAGNOSIS — R42 DIZZINESS: ICD-10-CM

## 2022-09-27 DIAGNOSIS — Z12.5 SCREENING PSA (PROSTATE SPECIFIC ANTIGEN): ICD-10-CM

## 2022-09-27 LAB
ALBUMIN SERPL-MCNC: 4.8 G/DL (ref 3.5–5.2)
ALBUMIN/GLOB SERPL: 2 G/DL
ALP SERPL-CCNC: 40 U/L (ref 39–117)
ALT SERPL-CCNC: 24 U/L (ref 1–41)
AST SERPL-CCNC: 32 U/L (ref 1–40)
BILIRUB SERPL-MCNC: 0.5 MG/DL (ref 0–1.2)
BUN SERPL-MCNC: 18 MG/DL (ref 6–20)
BUN/CREAT SERPL: 13 (ref 7–25)
CALCIUM SERPL-MCNC: 9.7 MG/DL (ref 8.6–10.5)
CHLORIDE SERPL-SCNC: 103 MMOL/L (ref 98–107)
CHOLEST SERPL-MCNC: 152 MG/DL (ref 0–200)
CHOLEST/HDLC SERPL: 5.63 {RATIO}
CO2 SERPL-SCNC: 29 MMOL/L (ref 22–29)
CREAT SERPL-MCNC: 1.38 MG/DL (ref 0.76–1.27)
EGFRCR SERPLBLD CKD-EPI 2021: 59.3 ML/MIN/1.73
GLOBULIN SER CALC-MCNC: 2.4 GM/DL
GLUCOSE SERPL-MCNC: 93 MG/DL (ref 65–99)
HDLC SERPL-MCNC: 27 MG/DL (ref 40–60)
INR PPP: 3.3
LDLC SERPL CALC-MCNC: 90 MG/DL (ref 0–100)
POTASSIUM SERPL-SCNC: 4.3 MMOL/L (ref 3.5–5.2)
PROT SERPL-MCNC: 7.2 G/DL (ref 6–8.5)
PSA SERPL-MCNC: 0.3 NG/ML (ref 0–4)
SODIUM SERPL-SCNC: 141 MMOL/L (ref 136–145)
TRIGL SERPL-MCNC: 203 MG/DL (ref 0–150)
VLDLC SERPL CALC-MCNC: 35 MG/DL (ref 5–40)

## 2022-09-27 PROCEDURE — 90471 IMMUNIZATION ADMIN: CPT | Performed by: PHYSICIAN ASSISTANT

## 2022-09-27 PROCEDURE — 90686 IIV4 VACC NO PRSV 0.5 ML IM: CPT | Performed by: PHYSICIAN ASSISTANT

## 2022-09-27 PROCEDURE — 99396 PREV VISIT EST AGE 40-64: CPT | Performed by: PHYSICIAN ASSISTANT

## 2022-09-27 NOTE — PROGRESS NOTES
Subjective   Chief Complaint   Patient presents with   • Annual Exam       History of Present Illness     Pt is here today for CPE.      Has had some intermittent dizziness for the last 1 1/2 to 2 years. Lasts for a few seconds to 1-2 minutes. Has to hold onto something. Can occur at rest or while he is exerting himself. No dbl vision or blurry vision. No headaches associated with it. Occurs less than once a week. Had carotid US in 2020 was normal.     Has an occasional numbness and tingling in the tip of his tongue and then will move into his lower jaw and teeth. Not associated with any particular food. Lasts a few seconds. Will have some jaw pain as well that lasts a few seconds. Occurred a few weeks ago and then again 6 months ago.     On September the 13th he had an  intense headache that lasted for 2 minutes, felt like strong pressure. No dizziness associated with it. Has not had another episode.     Has no CP or SOA.     Patient Active Problem List   Diagnosis   • Allergic rhinitis   • Asthma   • Gastroesophageal reflux disease   • Hyperlipidemia   • Irritable bowel syndrome   • Raynaud's phenomenon   • Essential hypertriglyceridemia   • S/P AVR (aortic valve replacement)   • Mechanical heart valve present   • Anticoagulated   • Benign essential HTN   • Benign prostatic hyperplasia with nocturia   • KVNG (obstructive sleep apnea)   • Venous insufficiency   • Tear of medial meniscus of right knee, current   • Tear of lateral meniscus of right knee, current   • Encounter for screening for malignant neoplasm of colon   • History of colon polyps   • RUTLEDGE (nonalcoholic steatohepatitis)       Allergies   Allergen Reactions   • Fish-Derived Products Anaphylaxis     Can eat shell fish without problem, but can't eat fish sandwich, cod, tuna or salmon. , is unable to take fish oil supplements.    • Ondansetron Rash   • Penicillins Rash       Current Outpatient Medications on File Prior to Visit   Medication Sig Dispense  Refill   • albuterol (PROVENTIL HFA;VENTOLIN HFA) 108 (90 BASE) MCG/ACT inhaler Inhale 2 puffs Every 4 (Four) Hours As Needed for wheezing.     • fenofibrate (TRICOR) 145 MG tablet Take 1 tablet by mouth Daily. 90 tablet 3   • fluticasone (FLONASE) 50 MCG/ACT nasal spray 2 sprays into each nostril Daily. 1 each 3   • omeprazole (priLOSEC) 40 MG capsule Take 1 capsule by mouth Daily. 90 capsule 1   • pravastatin (PRAVACHOL) 40 MG tablet Take 1 tablet by mouth Daily. 90 tablet 1   • sildenafil (Viagra) 50 MG tablet Take 1 tablet by mouth Daily As Needed for Erectile Dysfunction. 10 tablet 0   • warfarin (Jantoven) 5 MG tablet TAKE 1 TABLET DAILY OR AS  DIRECTED TO MAINTAIN       INTERNATIONAL NORMALIZED   RATIO 2.5-3.5 90 tablet 1     No current facility-administered medications on file prior to visit.       Past Medical History:   Diagnosis Date   • Allergies    • Aortic stenosis     SEVERE   • Epistaxis    • GERD (gastroesophageal reflux disease)    • Gout    • History of COVID-19     2020   • Hyperlipidemia    • IBS (irritable bowel syndrome)    • Right knee meniscal tear    • Sleep apnea     CPAP, DOES NOT USE       Family History   Problem Relation Age of Onset   • Heart failure Mother    • Kidney disease Mother    • Hypertension Mother    • Heart disease Mother    • Alzheimer's disease Father    • Thyroid disease Sister    • Hypertension Sister    • Hypertension Brother    • Colon polyps Maternal Uncle    • Irritable bowel syndrome Cousin    • Malig Hyperthermia Neg Hx    • Colon cancer Neg Hx    • Crohn's disease Neg Hx    • Ulcerative colitis Neg Hx        Social History     Socioeconomic History   • Marital status:    Tobacco Use   • Smoking status: Former Smoker     Packs/day: 0.50     Years: 14.00     Pack years: 7.00     Types: Cigarettes     Quit date:      Years since quittin.7   • Smokeless tobacco: Never Used   • Tobacco comment: QUIT AGE 23   Vaping Use   • Vaping Use: Never used    Substance and Sexual Activity   • Alcohol use: Yes     Comment: 1-2 PER WEEK   • Drug use: No   • Sexual activity: Defer       Past Surgical History:   Procedure Laterality Date   • ADENOIDECTOMY     • AORTIC VALVE REPAIR/REPLACEMENT  10/05/2016    mechanical valve   • ASCENDING ARCH/HEMIARCH REPLACEMENT N/A 10/5/2016    Procedure: MIDLINE STERNOTOMY, AORTIC VALVE REPLACEMENT, ASCENDING AND HEMIARCH REPLACEMENT, REPAIR OF PERIVALVULAR LEAK, WITH NEURO MONITOIRING, INTRAOPERATIVE WARREN;  Surgeon: Sukhi Wilkinson MD;  [facility] [facility];  Service:    • CARDIAC CATHETERIZATION N/A 9/27/2016    Procedure: Coronary angiography;  Surgeon: Lio Roman MD;  Location: [facility]VASIVE LOCATION;  Service:    • CARDIAC CATHETERIZATION N/A 9/27/2016    Procedure: Left Heart Cath;  Surgeon: Lio Roman MD;  Location: [facility]IVE [facility];  Service:    • CHOLECYSTECTOMY     • COLONOSCOPY  12/212/2016    Dr. Palomares   • COLONOSCOPY W/ POLYPECTOMY N/A 6/8/2022    Procedure: COLONOSCOPY WITH POLYPECTOMY;  Surgeon: Kana Chowdary MD;  Location: [facility] OR;  Service: Gastroenterology;  Laterality: N/A;  hemorrhoids  ascending colon polyp (cold snare)  transverse colon polyp (cold snare)   • CORONARY ARTERY BYPASS GRAFT     • ENDOSCOPY WITH POLYPECTOMY N/A 6/8/2022    Procedure: ESOPHAGOGASTRODUODENOSCOPY WITH POLYPECTOMY;  Surgeon: Kana Chowdary MD;  Location: [facility] OR;  Service: Gastroenterology;  Laterality: N/A;  gastric polyps   • KNEE ARTHROSCOPY Right 9/7/2021    Procedure: RIGHT KNEE ARTHROSCOPY WITH PARTIAL MEDIAL AND LATERAL MENISECTOMY WITH DEBRIDEMENT OF ARTHRITIS ;  Surgeon: Ella Muller MD;  Location: [facility];  Service: Orthopedics;  Laterality: Right;   • UPPER GASTROINTESTINAL ENDOSCOPY           The following portions of the patient's history were reviewed and updated as appropriate: problem list, allergies, current medications, past medical history, past  "family history, past social history and past surgical history.    ROS    Immunization History   Administered Date(s) Administered   • COVID-19 (MODERNA) 1st, 2nd, 3rd Dose Only 03/22/2021, 04/19/2021   • COVID-19 (MODERNA) BOOSTER 01/20/2022   • FluLaval/Fluzone >6mos 10/19/2019, 10/10/2020, 09/27/2022   • Hepatitis A 09/16/2019, 06/18/2020   • Pneumococcal Polysaccharide (PPSV23) 12/18/2020   • Tdap 09/16/2019       Objective   Vitals:    09/27/22 0752 09/27/22 0829   BP:  130/76   Temp: 97.8 °F (36.6 °C)    Weight: 105 kg (231 lb)    Height: 182.9 cm (72.01\")      Body mass index is 31.32 kg/m².  Physical Exam  Vitals reviewed.   Constitutional:       Appearance: He is well-developed.   HENT:      Head: Normocephalic and atraumatic.   Eyes:      Extraocular Movements: Extraocular movements intact.      Conjunctiva/sclera: Conjunctivae normal.      Pupils: Pupils are equal, round, and reactive to light.   Neck:      Vascular: No carotid bruit.   Cardiovascular:      Rate and Rhythm: Normal rate and regular rhythm.      Heart sounds: Normal heart sounds, S1 normal and S2 normal.   Pulmonary:      Effort: Pulmonary effort is normal.      Breath sounds: Normal breath sounds.   Abdominal:      General: Abdomen is flat. Bowel sounds are normal.      Palpations: Abdomen is soft.   Skin:     General: Skin is warm.   Neurological:      General: No focal deficit present.      Mental Status: He is alert and oriented to person, place, and time.   Psychiatric:         Mood and Affect: Mood normal.         Behavior: Behavior normal.         Thought Content: Thought content normal.         Judgment: Judgment normal.           Assessment & Plan   Diagnoses and all orders for this visit:    1. Need for vaccination for H flu type B (Primary)  -     FluLaval/Fluzone >6 mos (7905-1736)    2. Mixed hyperlipidemia  -     Comprehensive Metabolic Panel  -     Lipid Panel With / Chol / HDL Ratio    3. Screening PSA (prostate specific " antigen)  -     PSA Screen    4. Dizziness  -     MRI Angiogram Head Without Contrast; Future  -     MRI Angiogram Neck With Contrast; Future    Fasting labs today. Check MRA head/neck for ongoing bouts of dizziness. May all be positional vertigo. Flu shot today. Will monitor episodes of tongue numbness. Recommended Shingrix at the pharmacy. Recommended healthy diet, and 150 min of aerobic exercise per week      Return in about 6 months (around 3/27/2023) for Lab Today, Lab Appt Before FUP.

## 2022-09-28 DIAGNOSIS — R79.89 BLOOD CREATININE INCREASED COMPARED WITH PRIOR MEASUREMENT: Primary | ICD-10-CM

## 2022-10-04 ENCOUNTER — ANTICOAGULATION VISIT (OUTPATIENT)
Dept: CARDIOLOGY | Facility: CLINIC | Age: 57
End: 2022-10-04

## 2022-10-04 LAB — INR PPP: 3.9

## 2022-10-11 ENCOUNTER — ANTICOAGULATION VISIT (OUTPATIENT)
Dept: CARDIOLOGY | Facility: CLINIC | Age: 57
End: 2022-10-11

## 2022-10-11 LAB — INR PPP: 3.5

## 2022-10-12 ENCOUNTER — OFFICE VISIT (OUTPATIENT)
Dept: INTERNAL MEDICINE | Facility: CLINIC | Age: 57
End: 2022-10-12

## 2022-10-12 VITALS — BODY MASS INDEX: 31.42 KG/M2 | TEMPERATURE: 98 F | HEIGHT: 72 IN | WEIGHT: 232 LBS

## 2022-10-12 DIAGNOSIS — L30.9 ECZEMA, UNSPECIFIED TYPE: Primary | ICD-10-CM

## 2022-10-12 PROCEDURE — 99212 OFFICE O/P EST SF 10 MIN: CPT | Performed by: PHYSICIAN ASSISTANT

## 2022-10-12 RX ORDER — TRIAMCINOLONE ACETONIDE 5 MG/G
1 OINTMENT TOPICAL 2 TIMES DAILY
Qty: 15 G | Refills: 3 | Status: SHIPPED | OUTPATIENT
Start: 2022-10-12

## 2022-10-12 NOTE — PROGRESS NOTES
Subjective   Chief Complaint   Patient presents with   • Ear Drainage       History of Present Illness     Pt has drainage and rash around his external ears bilaterally. Coming and going for several months. No pain.      Patient Active Problem List   Diagnosis   • Allergic rhinitis   • Asthma   • Gastroesophageal reflux disease   • Hyperlipidemia   • Irritable bowel syndrome   • Raynaud's phenomenon   • Essential hypertriglyceridemia   • S/P AVR (aortic valve replacement)   • Mechanical heart valve present   • Anticoagulated   • Benign essential HTN   • Benign prostatic hyperplasia with nocturia   • KVNG (obstructive sleep apnea)   • Venous insufficiency   • Tear of medial meniscus of right knee, current   • Tear of lateral meniscus of right knee, current   • Encounter for screening for malignant neoplasm of colon   • History of colon polyps   • RUTLEDGE (nonalcoholic steatohepatitis)       Allergies   Allergen Reactions   • Fish-Derived Products Anaphylaxis     Can eat shell fish without problem, but can't eat fish sandwich, cod, tuna or salmon. , is unable to take fish oil supplements.    • Ondansetron Rash   • Penicillins Rash       Current Outpatient Medications on File Prior to Visit   Medication Sig Dispense Refill   • albuterol (PROVENTIL HFA;VENTOLIN HFA) 108 (90 BASE) MCG/ACT inhaler Inhale 2 puffs Every 4 (Four) Hours As Needed for wheezing.     • fenofibrate (TRICOR) 145 MG tablet Take 1 tablet by mouth Daily. 90 tablet 3   • fluticasone (FLONASE) 50 MCG/ACT nasal spray 2 sprays into each nostril Daily. 1 each 3   • omeprazole (priLOSEC) 40 MG capsule Take 1 capsule by mouth Daily. 90 capsule 1   • pravastatin (PRAVACHOL) 40 MG tablet Take 1 tablet by mouth Daily. 90 tablet 1   • sildenafil (Viagra) 50 MG tablet Take 1 tablet by mouth Daily As Needed for Erectile Dysfunction. 10 tablet 0   • warfarin (Jantoven) 5 MG tablet TAKE 1 TABLET DAILY OR AS  DIRECTED TO MAINTAIN       INTERNATIONAL NORMALIZED   RATIO  2.5-3.5 90 tablet 1     No current facility-administered medications on file prior to visit.       Past Medical History:   Diagnosis Date   • Allergies    • Aortic stenosis     SEVERE   • Epistaxis    • GERD (gastroesophageal reflux disease)    • Gout    • History of COVID-19     2020   • Hyperlipidemia    • IBS (irritable bowel syndrome)    • Right knee meniscal tear    • Sleep apnea     CPAP, DOES NOT USE       Family History   Problem Relation Age of Onset   • Heart failure Mother    • Kidney disease Mother    • Hypertension Mother    • Heart disease Mother    • Alzheimer's disease Father    • Thyroid disease Sister    • Hypertension Sister    • Hypertension Brother    • Colon polyps Maternal Uncle    • Irritable bowel syndrome Cousin    • Malig Hyperthermia Neg Hx    • Colon cancer Neg Hx    • Crohn's disease Neg Hx    • Ulcerative colitis Neg Hx        Social History     Socioeconomic History   • Marital status:    Tobacco Use   • Smoking status: Former     Packs/day: 0.50     Years: 14.00     Pack years: 7.00     Types: Cigarettes     Quit date:      Years since quittin.8   • Smokeless tobacco: Never   • Tobacco comments:     QUIT AGE 23   Vaping Use   • Vaping Use: Never used   Substance and Sexual Activity   • Alcohol use: Yes     Comment: 1-2 PER WEEK   • Drug use: No   • Sexual activity: Defer       Past Surgical History:   Procedure Laterality Date   • ADENOIDECTOMY     • AORTIC VALVE REPAIR/REPLACEMENT  10/05/2016    mechanical valve   • ASCENDING ARCH/HEMIARCH REPLACEMENT N/A 10/5/2016    Procedure: MIDLINE STERNOTOMY, AORTIC VALVE REPLACEMENT, ASCENDING AND HEMIARCH REPLACEMENT, REPAIR OF PERIVALVULAR LEAK, WITH NEURO MONITOIRING, INTRAOPERATIVE WARREN;  Surgeon: Sukhi Wilkinson MD;  Location: [facility] OR;  Service:    • CARDIAC CATHETERIZATION N/A 2016    Procedure: Coronary angiography;  Surgeon: Lio Roman MD;  Location: [facility] INVASIVE LOCATION;  Service:   "  • CARDIAC CATHETERIZATION N/A 9/27/2016    Procedure: Left Heart Cath;  Surgeon: Lio Roman MD;  [facility];  Service:    • CHOLECYSTECTOMY     • COLONOSCOPY  12/212/2016    Dr. Palomares   • COLONOSCOPY W/ POLYPECTOMY N/A 6/8/2022    Procedure: COLONOSCOPY WITH POLYPECTOMY;  Surgeon: Kana Chowdary MD;  Location:  [facility] OR;  Service: Gastroenterology;  Laterality: N/A;  hemorrhoids  ascending colon polyp (cold snare)  transverse colon polyp (cold snare)   • CORONARY ARTERY BYPASS GRAFT     • ENDOSCOPY WITH POLYPECTOMY N/A 6/8/2022    Procedure: ESOPHAGOGASTRODUODENOSCOPY WITH POLYPECTOMY;  Surgeon: Kana Chowdary MD;  Location: [facility] OR;  Service: Gastroenterology;  Laterality: N/A;  gastric polyps   • KNEE ARTHROSCOPY Right 9/7/2021    Procedure: RIGHT KNEE ARTHROSCOPY WITH PARTIAL MEDIAL AND LATERAL MENISECTOMY WITH DEBRIDEMENT OF ARTHRITIS ;  Surgeon: Ella Muller MD;  Location: [facility];  Service: Orthopedics;  Laterality: Right;   • UPPER GASTROINTESTINAL ENDOSCOPY           The following portions of the patient's history were reviewed and updated as appropriate: problem list, allergies, current medications, past medical history, past family history, past social history and past surgical history.    ROS     See HPI    Immunization History   Administered Date(s) Administered   • COVID-19 (MODERNA) 1st, 2nd, 3rd Dose Only 03/22/2021, 04/19/2021   • COVID-19 (MODERNA) BOOSTER 01/20/2022   • FluLaval/Fluzone >6mos 10/19/2019, 10/10/2020, 09/27/2022   • Hepatitis A 09/16/2019, 06/18/2020   • Pneumococcal Polysaccharide (PPSV23) 12/18/2020   • Tdap 09/16/2019       Objective   Vitals:    10/12/22 1407   Temp: 98 °F (36.7 °C)   Weight: 105 kg (232 lb)   Height: 182.9 cm (72.01\")     Body mass index is 31.46 kg/m².  Physical Exam  Constitutional:       Appearance: Normal appearance.   HENT:      Right Ear: Ear canal normal.      Left Ear: Ear canal normal. "      Ears:      Comments: Eczematous rash auricles bilaterally with serous drainage  Neurological:      Mental Status: He is alert.           Assessment & Plan   Diagnoses and all orders for this visit:    1. Eczema, unspecified type (Primary)  -     triamcinolone (KENALOG) 0.5 % ointment; Apply 1 application topically to the appropriate area as directed 2 (Two) Times a Day.  Dispense: 15 g; Refill: 3    Use topical triamcinolone twice daily until improving, then can decrease to once a day.

## 2022-10-25 ENCOUNTER — HOSPITAL ENCOUNTER (OUTPATIENT)
Dept: CARDIOLOGY | Facility: HOSPITAL | Age: 57
Discharge: HOME | End: 2022-10-25
Admitting: INTERNAL MEDICINE

## 2022-10-25 ENCOUNTER — ANTICOAGULATION VISIT (OUTPATIENT)
Dept: CARDIOLOGY | Facility: CLINIC | Age: 57
End: 2022-10-25

## 2022-10-25 LAB — INR PPP: 3.1

## 2022-10-25 PROCEDURE — 85610 PROTHROMBIN TIME: CPT | Performed by: INTERNAL MEDICINE

## 2022-11-01 ENCOUNTER — ANTICOAGULATION VISIT (OUTPATIENT)
Dept: CARDIOLOGY | Facility: CLINIC | Age: 57
End: 2022-11-01

## 2022-11-01 LAB — INR PPP: 2.9

## 2022-11-03 ENCOUNTER — HOSPITAL ENCOUNTER (OUTPATIENT)
Dept: MRI IMAGING | Facility: HOSPITAL | Age: 57
Discharge: HOME | End: 2022-11-03

## 2022-11-03 ENCOUNTER — LAB (OUTPATIENT)
Dept: LAB | Facility: HOSPITAL | Age: 57
End: 2022-11-03

## 2022-11-03 DIAGNOSIS — R42 DIZZINESS: ICD-10-CM

## 2022-11-03 LAB — CREAT BLDA-MCNC: 1.5 MG/DL (ref 0.6–1.3)

## 2022-11-03 PROCEDURE — A9577 INJ MULTIHANCE: HCPCS | Performed by: PHYSICIAN ASSISTANT

## 2022-11-03 PROCEDURE — 70549 MR ANGIOGRAPH NECK W/O&W/DYE: CPT

## 2022-11-03 PROCEDURE — 0 GADOBENATE DIMEGLUMINE 529 MG/ML SOLUTION: Performed by: PHYSICIAN ASSISTANT

## 2022-11-03 PROCEDURE — 82565 ASSAY OF CREATININE: CPT

## 2022-11-03 PROCEDURE — 80048 BASIC METABOLIC PNL TOTAL CA: CPT | Performed by: PHYSICIAN ASSISTANT

## 2022-11-03 PROCEDURE — 70544 MR ANGIOGRAPHY HEAD W/O DYE: CPT

## 2022-11-03 RX ADMIN — GADOBENATE DIMEGLUMINE 20 ML: 529 INJECTION, SOLUTION INTRAVENOUS at 17:41

## 2022-11-04 DIAGNOSIS — L30.9 ECZEMA, UNSPECIFIED TYPE: Primary | ICD-10-CM

## 2022-11-04 DIAGNOSIS — R79.89 BLOOD CREATININE INCREASED COMPARED WITH PRIOR MEASUREMENT: ICD-10-CM

## 2022-11-09 ENCOUNTER — ANTICOAGULATION VISIT (OUTPATIENT)
Dept: CARDIOLOGY | Facility: CLINIC | Age: 57
End: 2022-11-09

## 2022-11-09 LAB — INR PPP: 3.1

## 2022-11-11 ENCOUNTER — HOSPITAL ENCOUNTER (OUTPATIENT)
Dept: ULTRASOUND IMAGING | Facility: HOSPITAL | Age: 57
Discharge: HOME | End: 2022-11-11
Admitting: PHYSICIAN ASSISTANT

## 2022-11-11 DIAGNOSIS — R79.89 BLOOD CREATININE INCREASED COMPARED WITH PRIOR MEASUREMENT: ICD-10-CM

## 2022-11-11 PROCEDURE — 76775 US EXAM ABDO BACK WALL LIM: CPT

## 2022-11-16 ENCOUNTER — ANTICOAGULATION VISIT (OUTPATIENT)
Dept: CARDIOLOGY | Facility: CLINIC | Age: 57
End: 2022-11-16

## 2022-11-16 LAB — INR PPP: 2.3

## 2022-11-22 ENCOUNTER — ANTICOAGULATION VISIT (OUTPATIENT)
Dept: CARDIOLOGY | Facility: CLINIC | Age: 57
End: 2022-11-22

## 2022-11-22 LAB — INR PPP: 2.7

## 2022-11-29 ENCOUNTER — ANTICOAGULATION VISIT (OUTPATIENT)
Dept: CARDIOLOGY | Facility: CLINIC | Age: 57
End: 2022-11-29

## 2022-11-29 ENCOUNTER — TELEPHONE (OUTPATIENT)
Dept: INTERNAL MEDICINE | Facility: CLINIC | Age: 57
End: 2022-11-29

## 2022-11-29 LAB — INR PPP: 3.3

## 2022-12-20 ENCOUNTER — ANTICOAGULATION VISIT (OUTPATIENT)
Dept: CARDIOLOGY | Facility: CLINIC | Age: 57
End: 2022-12-20

## 2022-12-20 ENCOUNTER — HOSPITAL ENCOUNTER (OUTPATIENT)
Dept: CARDIOLOGY | Facility: HOSPITAL | Age: 57
Discharge: HOME | End: 2022-12-20
Admitting: INTERNAL MEDICINE

## 2022-12-20 LAB — INR PPP: 2.3

## 2022-12-20 PROCEDURE — 85610 PROTHROMBIN TIME: CPT | Performed by: INTERNAL MEDICINE

## 2022-12-27 LAB — INR PPP: 2.8

## 2022-12-28 ENCOUNTER — ANTICOAGULATION VISIT (OUTPATIENT)
Dept: CARDIOLOGY | Age: 57
End: 2022-12-28

## 2023-01-08 ENCOUNTER — ANTICOAGULATION VISIT (OUTPATIENT)
Dept: CARDIOLOGY | Facility: CLINIC | Age: 58
End: 2023-01-08
Payer: COMMERCIAL

## 2023-01-08 LAB — INR PPP: 3.3

## 2023-01-11 DIAGNOSIS — E78.2 MIXED HYPERLIPIDEMIA: ICD-10-CM

## 2023-01-12 RX ORDER — PRAVASTATIN SODIUM 40 MG
TABLET ORAL
Qty: 90 TABLET | Refills: 1 | Status: SHIPPED | OUTPATIENT
Start: 2023-01-12

## 2023-01-16 ENCOUNTER — ANTICOAGULATION VISIT (OUTPATIENT)
Dept: CARDIOLOGY | Facility: CLINIC | Age: 58
End: 2023-01-16
Payer: COMMERCIAL

## 2023-01-16 LAB — INR PPP: 3.5

## 2023-01-24 ENCOUNTER — ANTICOAGULATION VISIT (OUTPATIENT)
Dept: CARDIOLOGY | Facility: CLINIC | Age: 58
End: 2023-01-24
Payer: COMMERCIAL

## 2023-01-24 LAB — INR PPP: 2.6

## 2023-01-25 DIAGNOSIS — R04.0 EPISTAXIS: Primary | ICD-10-CM

## 2023-02-02 ENCOUNTER — ANTICOAGULATION VISIT (OUTPATIENT)
Dept: CARDIOLOGY | Facility: CLINIC | Age: 58
End: 2023-02-02
Payer: COMMERCIAL

## 2023-02-02 LAB — INR PPP: 2.5

## 2023-02-07 ENCOUNTER — ANTICOAGULATION VISIT (OUTPATIENT)
Dept: CARDIOLOGY | Facility: CLINIC | Age: 58
End: 2023-02-07
Payer: COMMERCIAL

## 2023-02-07 LAB — INR PPP: 3.6

## 2023-02-14 ENCOUNTER — ANTICOAGULATION VISIT (OUTPATIENT)
Dept: CARDIOLOGY | Facility: CLINIC | Age: 58
End: 2023-02-14
Payer: COMMERCIAL

## 2023-02-14 LAB — INR PPP: 3.5

## 2023-02-20 DIAGNOSIS — K21.9 GASTROESOPHAGEAL REFLUX DISEASE WITHOUT ESOPHAGITIS: ICD-10-CM

## 2023-02-20 RX ORDER — OMEPRAZOLE 40 MG/1
CAPSULE, DELAYED RELEASE ORAL
Qty: 90 CAPSULE | Refills: 1 | Status: SHIPPED | OUTPATIENT
Start: 2023-02-20

## 2023-02-21 ENCOUNTER — ANTICOAGULATION VISIT (OUTPATIENT)
Dept: CARDIOLOGY | Facility: CLINIC | Age: 58
End: 2023-02-21
Payer: COMMERCIAL

## 2023-02-21 LAB — INR PPP: 2

## 2023-02-28 ENCOUNTER — ANTICOAGULATION VISIT (OUTPATIENT)
Dept: CARDIOLOGY | Facility: CLINIC | Age: 58
End: 2023-02-28
Payer: COMMERCIAL

## 2023-02-28 LAB — INR PPP: 1.3

## 2023-03-07 ENCOUNTER — ANTICOAGULATION VISIT (OUTPATIENT)
Dept: CARDIOLOGY | Facility: CLINIC | Age: 58
End: 2023-03-07
Payer: COMMERCIAL

## 2023-03-07 LAB — INR PPP: 2.6

## 2023-03-15 LAB — INR PPP: 3.2

## 2023-03-16 ENCOUNTER — ANTICOAGULATION VISIT (OUTPATIENT)
Dept: CARDIOLOGY | Facility: CLINIC | Age: 58
End: 2023-03-16
Payer: COMMERCIAL

## 2023-03-21 ENCOUNTER — ANTICOAGULATION VISIT (OUTPATIENT)
Dept: CARDIOLOGY | Facility: CLINIC | Age: 58
End: 2023-03-21
Payer: COMMERCIAL

## 2023-03-21 LAB — INR PPP: 2.6

## 2023-03-28 ENCOUNTER — OFFICE VISIT (OUTPATIENT)
Dept: INTERNAL MEDICINE | Facility: CLINIC | Age: 58
End: 2023-03-28
Payer: COMMERCIAL

## 2023-03-28 VITALS
HEIGHT: 72 IN | TEMPERATURE: 98 F | BODY MASS INDEX: 31.15 KG/M2 | WEIGHT: 230 LBS | SYSTOLIC BLOOD PRESSURE: 122 MMHG | DIASTOLIC BLOOD PRESSURE: 70 MMHG

## 2023-03-28 DIAGNOSIS — E78.2 MIXED HYPERLIPIDEMIA: ICD-10-CM

## 2023-03-28 DIAGNOSIS — R79.89 ELEVATED SERUM CREATININE: Primary | ICD-10-CM

## 2023-03-28 DIAGNOSIS — Z12.5 SCREENING PSA (PROSTATE SPECIFIC ANTIGEN): ICD-10-CM

## 2023-03-28 LAB
BUN SERPL-MCNC: 20 MG/DL (ref 6–20)
BUN/CREAT SERPL: 13.8 (ref 7–25)
CALCIUM SERPL-MCNC: 10.1 MG/DL (ref 8.6–10.5)
CHLORIDE SERPL-SCNC: 103 MMOL/L (ref 98–107)
CO2 SERPL-SCNC: 27.8 MMOL/L (ref 22–29)
CREAT SERPL-MCNC: 1.45 MG/DL (ref 0.76–1.27)
EGFRCR SERPLBLD CKD-EPI 2021: 55.9 ML/MIN/1.73
GLUCOSE SERPL-MCNC: 129 MG/DL (ref 65–99)
POTASSIUM SERPL-SCNC: 3.6 MMOL/L (ref 3.5–5.2)
SODIUM SERPL-SCNC: 139 MMOL/L (ref 136–145)

## 2023-03-28 PROCEDURE — 99213 OFFICE O/P EST LOW 20 MIN: CPT | Performed by: PHYSICIAN ASSISTANT

## 2023-03-28 NOTE — PROGRESS NOTES
Subjective   Chief Complaint   Patient presents with   • renal function       History of Present Illness     Pt here today for fup on his renal function. He has been hydrating well and avoided oral nsaids also. He had a normal Renal US.      Patient Active Problem List   Diagnosis   • Allergic rhinitis   • Asthma   • Gastroesophageal reflux disease   • Hyperlipidemia   • Irritable bowel syndrome   • Raynaud's phenomenon   • Essential hypertriglyceridemia   • S/P AVR (aortic valve replacement)   • Mechanical heart valve present   • Anticoagulated   • Benign essential HTN   • Benign prostatic hyperplasia with nocturia   • KVNG (obstructive sleep apnea)   • Venous insufficiency   • Tear of medial meniscus of right knee, current   • Tear of lateral meniscus of right knee, current   • Encounter for screening for malignant neoplasm of colon   • History of colon polyps   • RUTLEDGE (nonalcoholic steatohepatitis)       Allergies   Allergen Reactions   • Fish-Derived Products Anaphylaxis     Can eat shell fish without problem, but can't eat fish sandwich, cod, tuna or salmon. , is unable to take fish oil supplements.    • Ondansetron Rash   • Penicillins Rash       Current Outpatient Medications on File Prior to Visit   Medication Sig Dispense Refill   • albuterol (PROVENTIL HFA;VENTOLIN HFA) 108 (90 BASE) MCG/ACT inhaler Inhale 2 puffs Every 4 (Four) Hours As Needed for Wheezing.     • fenofibrate (TRICOR) 145 MG tablet Take 1 tablet by mouth Daily. 90 tablet 3   • fluticasone (FLONASE) 50 MCG/ACT nasal spray 2 sprays into each nostril Daily. 1 each 3   • omeprazole (priLOSEC) 40 MG capsule TAKE 1 CAPSULE DAILY 90 capsule 1   • pravastatin (PRAVACHOL) 40 MG tablet TAKE 1 TABLET DAILY 90 tablet 1   • sildenafil (Viagra) 50 MG tablet Take 1 tablet by mouth Daily As Needed for Erectile Dysfunction. 10 tablet 0   • triamcinolone (KENALOG) 0.5 % ointment Apply 1 application topically to the appropriate area as directed 2 (Two) Times  a Day. 15 g 3   • warfarin (Jantoven) 5 MG tablet TAKE 1 TABLET DAILY OR AS  DIRECTED TO MAINTAIN       INTERNATIONAL NORMALIZED   RATIO 2.5-3.5 90 tablet 1     No current facility-administered medications on file prior to visit.       Past Medical History:   Diagnosis Date   • Allergies    • Aortic stenosis     SEVERE   • Epistaxis    • GERD (gastroesophageal reflux disease)    • Gout    • History of COVID-19     2020   • Hyperlipidemia    • IBS (irritable bowel syndrome)    • Right knee meniscal tear    • Sleep apnea     CPAP, DOES NOT USE       Family History   Problem Relation Age of Onset   • Heart failure Mother    • Kidney disease Mother    • Hypertension Mother    • Heart disease Mother    • Alzheimer's disease Father    • Thyroid disease Sister    • Hypertension Sister    • Hypertension Brother    • Colon polyps Maternal Uncle    • Irritable bowel syndrome Cousin    • Malig Hyperthermia Neg Hx    • Colon cancer Neg Hx    • Crohn's disease Neg Hx    • Ulcerative colitis Neg Hx        Social History     Socioeconomic History   • Marital status:    Tobacco Use   • Smoking status: Former     Packs/day: 0.50     Years: 14.00     Pack years: 7.00     Types: Cigarettes     Quit date:      Years since quittin.2   • Smokeless tobacco: Never   • Tobacco comments:     QUIT AGE 23   Vaping Use   • Vaping Use: Never used   Substance and Sexual Activity   • Alcohol use: Yes     Comment: 1-2 PER WEEK   • Drug use: No   • Sexual activity: Defer       Past Surgical History:   Procedure Laterality Date   • ADENOIDECTOMY     • AORTIC VALVE REPAIR/REPLACEMENT  10/05/2016    mechanical valve   • ASCENDING ARCH/HEMIARCH REPLACEMENT N/A 10/5/2016    Procedure: MIDLINE STERNOTOMY, AORTIC VALVE REPLACEMENT, ASCENDING AND HEMIARCH REPLACEMENT, REPAIR OF PERIVALVULAR LEAK, WITH NEURO MONITOIRING, INTRAOPERATIVE WARREN;  Surgeon: Sukhi Wilkinson MD;  Location: [facility] OR;  Service:    • CARDIAC CATHETERIZATION  "N/A 9/27/2016    Procedure: Coronary angiography;  Surgeon: Lio Roman MD;  Location: [facility] CATH INVASIVE LOCATION;  Service:    • CARDIAC CATHETERIZATION N/A 9/27/2016    Procedure: Left Heart Cath;  Surgeon: Lio Roman MD;  [facility];  Service:    • CHOLECYSTECTOMY     • COLONOSCOPY  12/212/2016    Dr. Palomares   • COLONOSCOPY W/ POLYPECTOMY N/A 6/8/2022    Procedure: COLONOSCOPY WITH POLYPECTOMY;  Surgeon: Kana Chowdary MD;  Location:  [facility] OR;  Service: Gastroenterology;  Laterality: N/A;  hemorrhoids  ascending colon polyp (cold snare)  transverse colon polyp (cold snare)   • CORONARY ARTERY BYPASS GRAFT     • ENDOSCOPY WITH POLYPECTOMY N/A 6/8/2022    Procedure: ESOPHAGOGASTRODUODENOSCOPY WITH POLYPECTOMY;  Surgeon: Kana Chowdary MD;  Location: [facility] OR;  Service: Gastroenterology;  Laterality: N/A;  gastric polyps   • KNEE ARTHROSCOPY Right 9/7/2021    Procedure: RIGHT KNEE ARTHROSCOPY WITH PARTIAL MEDIAL AND LATERAL MENISECTOMY WITH DEBRIDEMENT OF ARTHRITIS ;  Surgeon: Ella Muller MD;  Location: [facility] OR OSC;  Service: Orthopedics;  Laterality: Right;   • UPPER GASTROINTESTINAL ENDOSCOPY           The following portions of the patient's history were reviewed and updated as appropriate: problem list, allergies, current medications, past medical history, past family history, past social history and past surgical history.    ROS     See HPI    Immunization History   Administered Date(s) Administered   • COVID-19 (MODERNA) 1st, 2nd, 3rd Dose Only 03/22/2021, 04/19/2021   • COVID-19 (MODERNA) BOOSTER 01/20/2022   • FluLaval/Fluzone >6mos 10/19/2019, 10/10/2020, 09/27/2022   • Hepatitis A 09/16/2019, 06/18/2020   • Pneumococcal Polysaccharide (PPSV23) 12/18/2020   • Tdap 09/16/2019       Objective   Vitals:    03/28/23 0834 03/28/23 0845   BP:  122/70   Temp: 98 °F (36.7 °C)    Weight: 104 kg (230 lb)    Height: 182.9 cm (72.01\")      Body " mass index is 31.19 kg/m².  Physical Exam  Vitals reviewed.   Constitutional:       Appearance: Normal appearance.   HENT:      Head: Normocephalic and atraumatic.   Cardiovascular:      Rate and Rhythm: Normal rate.      Comments: Mechanical valve click  Neurological:      Mental Status: He is alert.       Assessment & Plan   Diagnoses and all orders for this visit:    1. Elevated serum creatinine (Primary)  -     Basic Metabolic Panel    2. Mixed hyperlipidemia  -     Lipid Panel With / Chol / HDL Ratio; Future  -     Comprehensive Metabolic Panel; Future    3. Screening PSA (prostate specific antigen)  -     PSA Screen; Future    Recheck today, Renal US was normal. If not improved will have him see nephrology.     Return in about 6 months (around 9/28/2023) for Lab Today, Annual physical, Lab Appt Before FUP.

## 2023-03-29 DIAGNOSIS — N18.2 CKD (CHRONIC KIDNEY DISEASE), STAGE II: Primary | ICD-10-CM

## 2023-03-30 ENCOUNTER — ANTICOAGULATION VISIT (OUTPATIENT)
Dept: CARDIOLOGY | Facility: CLINIC | Age: 58
End: 2023-03-30
Payer: COMMERCIAL

## 2023-03-30 LAB — INR PPP: 3

## 2023-04-05 ENCOUNTER — ANTICOAGULATION VISIT (OUTPATIENT)
Dept: CARDIOLOGY | Facility: CLINIC | Age: 58
End: 2023-04-05
Payer: COMMERCIAL

## 2023-04-05 LAB — INR PPP: 3.6

## 2023-04-07 DIAGNOSIS — J30.89 NON-SEASONAL ALLERGIC RHINITIS, UNSPECIFIED TRIGGER: Primary | ICD-10-CM

## 2023-04-10 DIAGNOSIS — J30.1 NON-SEASONAL ALLERGIC RHINITIS DUE TO POLLEN: Primary | ICD-10-CM

## 2023-04-11 ENCOUNTER — ANTICOAGULATION VISIT (OUTPATIENT)
Dept: CARDIOLOGY | Facility: CLINIC | Age: 58
End: 2023-04-11
Payer: COMMERCIAL

## 2023-04-11 LAB — INR PPP: 3

## 2023-04-18 ENCOUNTER — ANTICOAGULATION VISIT (OUTPATIENT)
Dept: CARDIOLOGY | Facility: CLINIC | Age: 58
End: 2023-04-18
Payer: COMMERCIAL

## 2023-04-18 LAB — INR PPP: 1.9

## 2023-05-02 LAB — INR PPP: 2.7

## 2023-05-05 ENCOUNTER — ANTICOAGULATION VISIT (OUTPATIENT)
Dept: CARDIOLOGY | Facility: CLINIC | Age: 58
End: 2023-05-05
Payer: COMMERCIAL

## 2023-05-09 ENCOUNTER — ANTICOAGULATION VISIT (OUTPATIENT)
Dept: CARDIOLOGY | Facility: CLINIC | Age: 58
End: 2023-05-09
Payer: COMMERCIAL

## 2023-05-09 LAB — INR PPP: 4.4

## 2023-05-10 DIAGNOSIS — E78.2 MIXED HYPERLIPIDEMIA: ICD-10-CM

## 2023-05-10 RX ORDER — FENOFIBRATE 145 MG/1
TABLET, COATED ORAL
Qty: 90 TABLET | Refills: 3 | Status: SHIPPED | OUTPATIENT
Start: 2023-05-10

## 2023-05-11 ENCOUNTER — ANTICOAGULATION VISIT (OUTPATIENT)
Dept: CARDIOLOGY | Facility: CLINIC | Age: 58
End: 2023-05-11
Payer: COMMERCIAL

## 2023-05-15 RX ORDER — ALBUTEROL SULFATE 90 UG/1
2 AEROSOL, METERED RESPIRATORY (INHALATION) EVERY 4 HOURS PRN
Qty: 18 G | Refills: 1 | Status: SHIPPED | OUTPATIENT
Start: 2023-05-15

## 2023-05-16 ENCOUNTER — ANTICOAGULATION VISIT (OUTPATIENT)
Dept: CARDIOLOGY | Facility: CLINIC | Age: 58
End: 2023-05-16
Payer: COMMERCIAL

## 2023-05-16 ENCOUNTER — ANTICOAGULATION VISIT (OUTPATIENT)
Dept: CARDIOLOGY | Facility: CLINIC | Age: 58
End: 2023-05-16

## 2023-05-16 LAB — INR PPP: 2.8

## 2023-05-23 ENCOUNTER — ANTICOAGULATION VISIT (OUTPATIENT)
Dept: CARDIOLOGY | Facility: CLINIC | Age: 58
End: 2023-05-23

## 2023-05-23 LAB — INR PPP: 2.3

## 2023-06-02 ENCOUNTER — ANTICOAGULATION VISIT (OUTPATIENT)
Dept: CARDIOLOGY | Facility: CLINIC | Age: 58
End: 2023-06-02
Payer: COMMERCIAL

## 2023-06-02 LAB — INR PPP: 3.6

## 2023-06-06 ENCOUNTER — ANTICOAGULATION VISIT (OUTPATIENT)
Dept: CARDIOLOGY | Facility: CLINIC | Age: 58
End: 2023-06-06
Payer: COMMERCIAL

## 2023-06-06 LAB — INR PPP: 2.9

## 2023-06-14 LAB — INR PPP: 3.2

## 2023-06-16 ENCOUNTER — ANTICOAGULATION VISIT (OUTPATIENT)
Dept: CARDIOLOGY | Facility: CLINIC | Age: 58
End: 2023-06-16
Payer: COMMERCIAL

## 2023-08-01 ENCOUNTER — ANTICOAGULATION VISIT (OUTPATIENT)
Dept: CARDIOLOGY | Facility: CLINIC | Age: 58
End: 2023-08-01
Payer: COMMERCIAL

## 2023-08-01 LAB — INR PPP: 3

## 2023-08-08 ENCOUNTER — ANTICOAGULATION VISIT (OUTPATIENT)
Dept: CARDIOLOGY | Facility: CLINIC | Age: 58
End: 2023-08-08
Payer: COMMERCIAL

## 2023-08-08 LAB — INR PPP: 3.9

## 2023-08-15 LAB — INR PPP: 3.7

## 2023-08-16 ENCOUNTER — ANTICOAGULATION VISIT (OUTPATIENT)
Dept: CARDIOLOGY | Facility: CLINIC | Age: 58
End: 2023-08-16
Payer: COMMERCIAL

## 2023-08-24 DIAGNOSIS — I38 HEART VALVE DISEASE: ICD-10-CM

## 2023-08-25 ENCOUNTER — ANTICOAGULATION VISIT (OUTPATIENT)
Dept: CARDIOLOGY | Facility: CLINIC | Age: 58
End: 2023-08-25
Payer: COMMERCIAL

## 2023-08-25 LAB — INR PPP: 3.2

## 2023-08-25 RX ORDER — WARFARIN SODIUM 5 MG/1
TABLET ORAL
Qty: 90 TABLET | Refills: 1 | Status: SHIPPED | OUTPATIENT
Start: 2023-08-25

## 2023-08-31 ENCOUNTER — ANTICOAGULATION VISIT (OUTPATIENT)
Dept: CARDIOLOGY | Facility: CLINIC | Age: 58
End: 2023-08-31
Payer: COMMERCIAL

## 2023-08-31 LAB — INR PPP: 3.5

## 2023-09-13 ENCOUNTER — ANTICOAGULATION VISIT (OUTPATIENT)
Dept: CARDIOLOGY | Facility: CLINIC | Age: 58
End: 2023-09-13
Payer: COMMERCIAL

## 2023-09-13 LAB — INR PPP: 2.3

## 2023-09-19 ENCOUNTER — ANTICOAGULATION VISIT (OUTPATIENT)
Dept: CARDIOLOGY | Facility: CLINIC | Age: 58
End: 2023-09-19
Payer: COMMERCIAL

## 2023-09-19 DIAGNOSIS — K21.9 GASTROESOPHAGEAL REFLUX DISEASE WITHOUT ESOPHAGITIS: ICD-10-CM

## 2023-09-19 LAB — INR PPP: 3.8

## 2023-09-19 RX ORDER — OMEPRAZOLE 40 MG/1
40 CAPSULE, DELAYED RELEASE ORAL DAILY
Qty: 90 CAPSULE | Refills: 3 | Status: SHIPPED | OUTPATIENT
Start: 2023-09-19

## 2023-09-26 ENCOUNTER — ANTICOAGULATION VISIT (OUTPATIENT)
Dept: CARDIOLOGY | Facility: CLINIC | Age: 58
End: 2023-09-26
Payer: COMMERCIAL

## 2023-09-26 LAB — INR PPP: 4

## 2023-10-02 ENCOUNTER — OFFICE VISIT (OUTPATIENT)
Dept: INTERNAL MEDICINE | Facility: CLINIC | Age: 58
End: 2023-10-02
Payer: COMMERCIAL

## 2023-10-02 VITALS
SYSTOLIC BLOOD PRESSURE: 152 MMHG | HEIGHT: 72 IN | DIASTOLIC BLOOD PRESSURE: 88 MMHG | WEIGHT: 231 LBS | TEMPERATURE: 98.2 F | BODY MASS INDEX: 31.29 KG/M2

## 2023-10-02 DIAGNOSIS — I10 BENIGN ESSENTIAL HTN: ICD-10-CM

## 2023-10-02 DIAGNOSIS — Z00.00 HEALTHCARE MAINTENANCE: Primary | ICD-10-CM

## 2023-10-02 DIAGNOSIS — E78.2 MIXED HYPERLIPIDEMIA: ICD-10-CM

## 2023-10-02 DIAGNOSIS — G47.33 OSA (OBSTRUCTIVE SLEEP APNEA): ICD-10-CM

## 2023-10-02 DIAGNOSIS — E78.1 ESSENTIAL HYPERTRIGLYCERIDEMIA: ICD-10-CM

## 2023-10-02 DIAGNOSIS — K21.00 GASTROESOPHAGEAL REFLUX DISEASE WITH ESOPHAGITIS, UNSPECIFIED WHETHER HEMORRHAGE: ICD-10-CM

## 2023-10-02 DIAGNOSIS — K75.81 NASH (NONALCOHOLIC STEATOHEPATITIS): ICD-10-CM

## 2023-10-02 DIAGNOSIS — N18.31 STAGE 3A CHRONIC KIDNEY DISEASE: ICD-10-CM

## 2023-10-02 DIAGNOSIS — Z23 NEED FOR INFLUENZA VACCINATION: ICD-10-CM

## 2023-10-02 DIAGNOSIS — R35.1 BENIGN PROSTATIC HYPERPLASIA WITH NOCTURIA: ICD-10-CM

## 2023-10-02 DIAGNOSIS — N40.1 BENIGN PROSTATIC HYPERPLASIA WITH NOCTURIA: ICD-10-CM

## 2023-10-02 PROCEDURE — 90686 IIV4 VACC NO PRSV 0.5 ML IM: CPT | Performed by: PHYSICIAN ASSISTANT

## 2023-10-02 PROCEDURE — 99396 PREV VISIT EST AGE 40-64: CPT | Performed by: PHYSICIAN ASSISTANT

## 2023-10-02 PROCEDURE — 90471 IMMUNIZATION ADMIN: CPT | Performed by: PHYSICIAN ASSISTANT

## 2023-10-02 RX ORDER — AMLODIPINE BESYLATE 5 MG/1
5 TABLET ORAL DAILY
Qty: 90 TABLET | Refills: 0 | Status: SHIPPED | OUTPATIENT
Start: 2023-10-02

## 2023-10-02 NOTE — PROGRESS NOTES
----- Message from Latasha Lamb sent at 10/12/2018  2:34 PM CDT -----  Contact: pt  States she fell and hurt her foot and she wants to know if she needs to come in. Please call pt at [phone]. Thank you    Subjective   Chief Complaint   Patient presents with    Annual Exam     With labs       History of Present Illness      Pt is here today for CPE. He saw his allergist last week after trying a humidifier over the summer. He has started using it again and it is now helping. He is also on a nasal rinse now. He is sleeping better now. Denies CP and SOA. Breathing has been doing well, asthma sx are well controlled.     Patient Active Problem List   Diagnosis    Allergic rhinitis    Asthma    Gastroesophageal reflux disease    Hyperlipidemia    Irritable bowel syndrome    Raynaud's phenomenon    Essential hypertriglyceridemia    S/P AVR (aortic valve replacement)    Mechanical heart valve present    Anticoagulated    Benign essential HTN    Benign prostatic hyperplasia with nocturia    KVNG (obstructive sleep apnea)    Venous insufficiency    Tear of medial meniscus of right knee, current    Tear of lateral meniscus of right knee, current    Encounter for screening for malignant neoplasm of colon    History of colon polyps    RUTLEDGE (nonalcoholic steatohepatitis)    Stage 3a chronic kidney disease       Allergies   Allergen Reactions    Fish-Derived Products Anaphylaxis     Can eat shell fish without problem, but can't eat fish sandwich, cod, tuna or salmon. , is unable to take fish oil supplements.     Ondansetron Rash    Penicillins Rash       Current Outpatient Medications on File Prior to Visit   Medication Sig Dispense Refill    albuterol sulfate  (90 Base) MCG/ACT inhaler Inhale 2 puffs Every 4 (Four) Hours As Needed for Wheezing. 18 g 1    fenofibrate (TRICOR) 145 MG tablet TAKE 1 TABLET DAILY 90 tablet 3    fluticasone (FLONASE) 50 MCG/ACT nasal spray 2 sprays into each nostril Daily. 1 each 3    omeprazole (priLOSEC) 40 MG capsule Take 1 capsule by mouth Daily. 90 capsule 3    pravastatin (PRAVACHOL) 40 MG tablet TAKE 1 TABLET DAILY 90 tablet 1    sildenafil (Viagra) 50 MG tablet Take 1 tablet by mouth Daily  As Needed for Erectile Dysfunction. 10 tablet 0    triamcinolone (KENALOG) 0.5 % ointment Apply 1 application topically to the appropriate area as directed 2 (Two) Times a Day. 15 g 3    warfarin (Jantoven) 5 MG tablet TAKE 1 TABLET DAILY OR AS  DIRECTED TO MAINTAIN       INTERNATIONAL NORMALIZED   RATIO 2.5-3.5 90 tablet 1     No current facility-administered medications on file prior to visit.       Past Medical History:   Diagnosis Date    Allergies     Aortic stenosis     SEVERE    Epistaxis     GERD (gastroesophageal reflux disease)     Gout     History of COVID-19     2020    Hyperlipidemia     IBS (irritable bowel syndrome)     Right knee meniscal tear     Sleep apnea     CPAP, DOES NOT USE       Family History   Problem Relation Age of Onset    Heart failure Mother     Kidney disease Mother     Hypertension Mother     Heart disease Mother     Alzheimer's disease Father     Thyroid disease Sister     Hypertension Sister     Hypertension Brother     Colon polyps Maternal Uncle     Irritable bowel syndrome Cousin     Malig Hyperthermia Neg Hx     Colon cancer Neg Hx     Crohn's disease Neg Hx     Ulcerative colitis Neg Hx        Social History     Socioeconomic History    Marital status:    Tobacco Use    Smoking status: Former     Packs/day: 0.50     Years: 14.00     Pack years: 7.00     Types: Cigarettes     Quit date:      Years since quittin.7    Smokeless tobacco: Never    Tobacco comments:     QUIT AGE 23   Vaping Use    Vaping Use: Never used   Substance and Sexual Activity    Alcohol use: Yes     Comment: 1-2 PER WEEK    Drug use: No    Sexual activity: Defer       Past Surgical History:   Procedure Laterality Date    ADENOIDECTOMY      AORTIC VALVE REPAIR/REPLACEMENT  10/05/2016    mechanical valve    ASCENDING ARCH/HEMIARCH REPLACEMENT N/A 10/5/2016    Procedure: MIDLINE STERNOTOMY, AORTIC VALVE REPLACEMENT, ASCENDING AND HEMIARCH REPLACEMENT, REPAIR OF PERIVALVULAR LEAK, WITH  NEURO MONITOIRING, INTRAOPERATIVE WARREN;  Surgeon: Sukhi Wilkinson MD;  Location: [facility] MAIN OR;  Service:     CARDIAC CATHETERIZATION N/A 9/27/2016    Procedure: Coronary angiography;  Surgeon: Lio Roman MD;  [facility];  Service:     CARDIAC CATHETERIZATION N/A 9/27/2016    Procedure: Left Heart Cath;  Surgeon: Lio Roman MD;  Location: [facility] CATH INVASIVE LOCATION;  Service:     CHOLECYSTECTOMY      COLONOSCOPY  12/212/2016    Dr. Palomares    COLONOSCOPY W/ POLYPECTOMY N/A 6/8/2022    Procedure: COLONOSCOPY WITH POLYPECTOMY;  Surgeon: Kana Chowdary MD;  Location: [facility] OR;  Service: Gastroenterology;  Laterality: N/A;  hemorrhoids  ascending colon polyp (cold snare)  transverse colon polyp (cold snare)    CORONARY ARTERY BYPASS GRAFT      ENDOSCOPY WITH POLYPECTOMY N/A 6/8/2022    Procedure: ESOPHAGOGASTRODUODENOSCOPY WITH POLYPECTOMY;  Surgeon: Kana Chowdary MD;  Location: [facility] OR;  Service: Gastroenterology;  Laterality: N/A;  gastric polyps    KNEE ARTHROSCOPY Right 9/7/2021    Procedure: RIGHT KNEE ARTHROSCOPY WITH PARTIAL MEDIAL AND LATERAL MENISECTOMY WITH DEBRIDEMENT OF ARTHRITIS ;  Surgeon: Ella Muller MD;  Location: [facility] OR OSC;  Service: Orthopedics;  Laterality: Right;    UPPER GASTROINTESTINAL ENDOSCOPY           The following portions of the patient's history were reviewed and updated as appropriate: problem list, allergies, current medications, past medical history, past family history, past social history, and past surgical history.    ROS    See HPI    Immunization History   Administered Date(s) Administered    COVID-19 (MODERNA) 1st,2nd,3rd Dose Monovalent 03/22/2021, 04/19/2021    COVID-19 (MODERNA) Monovalent Original Booster 01/20/2022    Fluzone (or Fluarix & Flulaval for VFC) >6mos 10/19/2019, 10/10/2020, 09/27/2022    Hepatitis A 09/16/2019, 06/18/2020    Pneumococcal Polysaccharide (PPSV23) 12/18/2020    Tdap  "09/16/2019       Objective   Vitals:    10/02/23 0755 10/02/23 0815   BP: 148/80 152/88   Temp: 98.2 °F (36.8 °C)    Weight: 105 kg (231 lb)    Height: 182.9 cm (72.01\")      Body mass index is 31.32 kg/m².  Physical Exam  Vitals and nursing note reviewed.   Constitutional:       Appearance: Normal appearance. He is well-developed.   HENT:      Head: Normocephalic and atraumatic.      Right Ear: Ear canal normal.      Left Ear: Ear canal normal.      Nose: Nose normal.      Mouth/Throat:      Mouth: Mucous membranes are moist.      Pharynx: Oropharynx is clear.   Eyes:      Extraocular Movements: Extraocular movements intact.      Conjunctiva/sclera: Conjunctivae normal.      Pupils: Pupils are equal, round, and reactive to light.   Neck:      Thyroid: No thyromegaly.      Vascular: No carotid bruit.   Cardiovascular:      Rate and Rhythm: Normal rate and regular rhythm.      Comments: Audible click  Pulmonary:      Effort: Pulmonary effort is normal.      Breath sounds: Normal breath sounds.   Musculoskeletal:      Cervical back: Neck supple.   Lymphadenopathy:      Cervical: No cervical adenopathy.   Skin:     General: Skin is warm and dry.   Neurological:      General: No focal deficit present.      Mental Status: He is alert and oriented to person, place, and time.      Gait: Gait normal.   Psychiatric:         Mood and Affect: Mood normal.         Behavior: Behavior normal.         Thought Content: Thought content normal.         Judgment: Judgment normal.         Assessment & Plan   Diagnoses and all orders for this visit:    1. Healthcare maintenance (Primary)    2. Benign essential HTN  Comments:  BP is elevated, start Amlodipine 5 mg daily and fup in 6 weeks.  Orders:  -     amLODIPine (NORVASC) 5 MG tablet; Take 1 tablet by mouth Daily.  Dispense: 90 tablet; Refill: 0    3. Benign prostatic hyperplasia with nocturia    4. Essential hypertriglyceridemia  Comments:  Improved with addition of fenofibrate, " continue.    5. Gastroesophageal reflux disease with esophagitis, unspecified whether hemorrhage  Comments:  Sx are controlled.    6. Mixed hyperlipidemia  Comments:  Controlled.    7. RULTEDGE (nonalcoholic steatohepatitis)  Comments:  LFTs are normal at this time.    8. KVNG (obstructive sleep apnea)    9. Stage 3a chronic kidney disease  Comments:  Stable.    Recommended healthy diet, and 150 min of aerobic exercise per week       BMI is >= 30 and <35. (Class 1 Obesity). The following options were offered after discussion;: exercise counseling/recommendations and nutrition counseling/recommendations      Return in about 6 weeks (around 11/13/2023).

## 2023-10-03 ENCOUNTER — ANTICOAGULATION VISIT (OUTPATIENT)
Dept: CARDIOLOGY | Facility: CLINIC | Age: 58
End: 2023-10-03
Payer: COMMERCIAL

## 2023-10-03 LAB — INR PPP: 3.8

## 2023-10-10 ENCOUNTER — TELEPHONE (OUTPATIENT)
Dept: CARDIOLOGY | Facility: CLINIC | Age: 58
End: 2023-10-10
Payer: COMMERCIAL

## 2023-10-10 ENCOUNTER — ANTICOAGULATION VISIT (OUTPATIENT)
Dept: CARDIOLOGY | Facility: CLINIC | Age: 58
End: 2023-10-10
Payer: COMMERCIAL

## 2023-10-10 LAB
INR PPP: 3.5
INR PPP: 3.5

## 2023-10-10 NOTE — PATIENT INSTRUCTIONS
Is to continue 2.5 mg coumadin everyday except Friday and Sunday, will take 5 mg on those days.  Is to repeat INR on Tuesday 10/17/2023 and call us the results.  He is able to repeat back the instructions.

## 2023-10-10 NOTE — TELEPHONE ENCOUNTER
Patient called in INR of 3.5, Returned his call and anticoagulation track reviewed.   Is to continue 2.5 mg coumadin everyday except Friday and Sunday, will take 5 mg on those days.  Is to repeat INR on Tuesday 10/17/2023 and call us the results.  He is able to repeat back the instructions.

## 2023-10-13 DIAGNOSIS — I10 BENIGN ESSENTIAL HTN: Primary | ICD-10-CM

## 2023-10-13 RX ORDER — NEBIVOLOL 5 MG/1
5 TABLET ORAL DAILY
Qty: 90 TABLET | Refills: 1 | Status: SHIPPED | OUTPATIENT
Start: 2023-10-13

## 2023-10-16 DIAGNOSIS — E78.2 MIXED HYPERLIPIDEMIA: ICD-10-CM

## 2023-10-17 ENCOUNTER — ANTICOAGULATION VISIT (OUTPATIENT)
Dept: CARDIOLOGY | Facility: CLINIC | Age: 58
End: 2023-10-17
Payer: COMMERCIAL

## 2023-10-17 LAB — INR PPP: 3.1

## 2023-10-17 RX ORDER — PRAVASTATIN SODIUM 40 MG
40 TABLET ORAL DAILY
Qty: 90 TABLET | Refills: 0 | Status: SHIPPED | OUTPATIENT
Start: 2023-10-17

## 2023-10-25 ENCOUNTER — ANTICOAGULATION VISIT (OUTPATIENT)
Dept: CARDIOLOGY | Facility: CLINIC | Age: 58
End: 2023-10-25
Payer: COMMERCIAL

## 2023-10-25 LAB — INR PPP: 2.6

## 2023-10-31 LAB — INR PPP: 2.9

## 2023-11-03 ENCOUNTER — ANTICOAGULATION VISIT (OUTPATIENT)
Dept: CARDIOLOGY | Facility: CLINIC | Age: 58
End: 2023-11-03
Payer: COMMERCIAL

## 2023-11-07 ENCOUNTER — ANTICOAGULATION VISIT (OUTPATIENT)
Dept: CARDIOLOGY | Facility: CLINIC | Age: 58
End: 2023-11-07
Payer: COMMERCIAL

## 2023-11-07 LAB — INR PPP: 2.8

## 2023-11-14 ENCOUNTER — ANTICOAGULATION VISIT (OUTPATIENT)
Dept: CARDIOLOGY | Facility: CLINIC | Age: 58
End: 2023-11-14
Payer: COMMERCIAL

## 2023-11-14 LAB — INR PPP: 3.2

## 2023-11-29 LAB — INR PPP: 2.6

## 2023-11-30 ENCOUNTER — ANTICOAGULATION VISIT (OUTPATIENT)
Dept: CARDIOLOGY | Facility: CLINIC | Age: 58
End: 2023-11-30
Payer: COMMERCIAL

## 2023-12-05 ENCOUNTER — ANTICOAGULATION VISIT (OUTPATIENT)
Dept: CARDIOLOGY | Facility: CLINIC | Age: 58
End: 2023-12-05
Payer: COMMERCIAL

## 2023-12-05 LAB — INR PPP: 3.6

## 2023-12-12 ENCOUNTER — ANTICOAGULATION VISIT (OUTPATIENT)
Dept: CARDIOLOGY | Facility: CLINIC | Age: 58
End: 2023-12-12
Payer: COMMERCIAL

## 2023-12-12 LAB — INR PPP: 3.2

## 2023-12-19 LAB — INR PPP: 3.2

## 2023-12-21 ENCOUNTER — ANTICOAGULATION VISIT (OUTPATIENT)
Dept: CARDIOLOGY | Facility: CLINIC | Age: 58
End: 2023-12-21
Payer: COMMERCIAL

## 2023-12-26 LAB — INR PPP: 3.3

## 2023-12-27 ENCOUNTER — ANTICOAGULATION VISIT (OUTPATIENT)
Dept: CARDIOLOGY | Facility: CLINIC | Age: 58
End: 2023-12-27
Payer: COMMERCIAL

## 2024-01-02 ENCOUNTER — ANTICOAGULATION VISIT (OUTPATIENT)
Dept: CARDIOLOGY | Facility: CLINIC | Age: 59
End: 2024-01-02
Payer: COMMERCIAL

## 2024-01-02 LAB — INR PPP: 2.6

## 2024-01-03 DIAGNOSIS — E78.2 MIXED HYPERLIPIDEMIA: ICD-10-CM

## 2024-01-03 RX ORDER — PRAVASTATIN SODIUM 40 MG
40 TABLET ORAL DAILY
Qty: 90 TABLET | Refills: 1 | Status: SHIPPED | OUTPATIENT
Start: 2024-01-03

## 2024-01-16 ENCOUNTER — ANTICOAGULATION VISIT (OUTPATIENT)
Dept: CARDIOLOGY | Facility: CLINIC | Age: 59
End: 2024-01-16
Payer: COMMERCIAL

## 2024-01-16 LAB — INR PPP: 2.8

## 2024-01-24 ENCOUNTER — ANTICOAGULATION VISIT (OUTPATIENT)
Dept: CARDIOLOGY | Facility: CLINIC | Age: 59
End: 2024-01-24
Payer: COMMERCIAL

## 2024-01-24 LAB — INR PPP: 3.6

## 2024-02-09 ENCOUNTER — OFFICE VISIT (OUTPATIENT)
Dept: INTERNAL MEDICINE | Facility: CLINIC | Age: 59
End: 2024-02-09
Payer: COMMERCIAL

## 2024-02-09 VITALS
HEIGHT: 72 IN | TEMPERATURE: 98 F | BODY MASS INDEX: 32.29 KG/M2 | SYSTOLIC BLOOD PRESSURE: 160 MMHG | WEIGHT: 238.4 LBS | DIASTOLIC BLOOD PRESSURE: 90 MMHG

## 2024-02-09 DIAGNOSIS — I10 BENIGN ESSENTIAL HTN: ICD-10-CM

## 2024-02-09 DIAGNOSIS — R51.9 ACUTE NONINTRACTABLE HEADACHE, UNSPECIFIED HEADACHE TYPE: ICD-10-CM

## 2024-02-09 DIAGNOSIS — R20.0 FACIAL NUMBNESS: Primary | ICD-10-CM

## 2024-02-09 PROCEDURE — 99214 OFFICE O/P EST MOD 30 MIN: CPT | Performed by: PHYSICIAN ASSISTANT

## 2024-02-09 RX ORDER — NEBIVOLOL 10 MG/1
10 TABLET ORAL DAILY
Qty: 90 TABLET | Refills: 1 | Status: SHIPPED | OUTPATIENT
Start: 2024-02-09

## 2024-02-27 ENCOUNTER — ANTICOAGULATION VISIT (OUTPATIENT)
Dept: CARDIOLOGY | Facility: CLINIC | Age: 59
End: 2024-02-27
Payer: COMMERCIAL

## 2024-02-27 LAB — INR PPP: 3.5

## 2024-02-28 ENCOUNTER — OFFICE VISIT (OUTPATIENT)
Dept: INTERNAL MEDICINE | Facility: CLINIC | Age: 59
End: 2024-02-28
Payer: COMMERCIAL

## 2024-02-28 VITALS — BODY MASS INDEX: 32.37 KG/M2 | TEMPERATURE: 98 F | WEIGHT: 239 LBS | HEIGHT: 72 IN

## 2024-02-28 DIAGNOSIS — I10 BENIGN ESSENTIAL HTN: Primary | ICD-10-CM

## 2024-02-28 PROCEDURE — 99213 OFFICE O/P EST LOW 20 MIN: CPT | Performed by: PHYSICIAN ASSISTANT

## 2024-02-28 RX ORDER — HYDRALAZINE HYDROCHLORIDE 25 MG/1
25 TABLET, FILM COATED ORAL 2 TIMES DAILY
Qty: 180 TABLET | Refills: 0 | Status: SHIPPED | OUTPATIENT
Start: 2024-02-28 | End: 2024-02-29 | Stop reason: SDUPTHER

## 2024-02-28 NOTE — PROGRESS NOTES
Subjective   Chief Complaint   Patient presents with    Hypertension       History of Present Illness     Pt is here today with ongoing elevated BP. It has not been improving sine increasing his Bystolic.      Patient Active Problem List   Diagnosis    Allergic rhinitis    Asthma    Gastroesophageal reflux disease    Hyperlipidemia    Irritable bowel syndrome    Raynaud's phenomenon    Essential hypertriglyceridemia    S/P AVR (aortic valve replacement)    Mechanical heart valve present    Anticoagulated    Benign essential HTN    Benign prostatic hyperplasia with nocturia    KVNG (obstructive sleep apnea)    Venous insufficiency    Tear of medial meniscus of right knee, current    Tear of lateral meniscus of right knee, current    Encounter for screening for malignant neoplasm of colon    History of colon polyps    RUTLEDGE (nonalcoholic steatohepatitis)    Stage 3a chronic kidney disease       Allergies   Allergen Reactions    Fish-Derived Products Anaphylaxis     Can eat shell fish without problem, but can't eat fish sandwich, cod, tuna or salmon. , is unable to take fish oil supplements.     Amlodipine Hives    Ondansetron Rash    Penicillins Rash       Current Outpatient Medications on File Prior to Visit   Medication Sig Dispense Refill    albuterol sulfate  (90 Base) MCG/ACT inhaler Inhale 2 puffs Every 4 (Four) Hours As Needed for Wheezing. 18 g 1    fenofibrate (TRICOR) 145 MG tablet TAKE 1 TABLET DAILY 90 tablet 3    fluticasone (FLONASE) 50 MCG/ACT nasal spray 2 sprays into each nostril Daily. 1 each 3    nebivolol (Bystolic) 10 MG tablet Take 1 tablet by mouth Daily. 90 tablet 1    omeprazole (priLOSEC) 40 MG capsule Take 1 capsule by mouth Daily. 90 capsule 3    pravastatin (PRAVACHOL) 40 MG tablet Take 1 tablet by mouth Daily. 90 tablet 1    sildenafil (Viagra) 50 MG tablet Take 1 tablet by mouth Daily As Needed for Erectile Dysfunction. 10 tablet 0    triamcinolone (KENALOG) 0.5 % ointment Apply 1  application topically to the appropriate area as directed 2 (Two) Times a Day. 15 g 3    warfarin (Jantoven) 5 MG tablet TAKE 1 TABLET DAILY OR AS  DIRECTED TO MAINTAIN       INTERNATIONAL NORMALIZED   RATIO 2.5-3.5 90 tablet 1     No current facility-administered medications on file prior to visit.       Past Medical History:   Diagnosis Date    Allergies     Aortic stenosis     SEVERE    Epistaxis     GERD (gastroesophageal reflux disease)     Gout     History of COVID-19     2020    Hyperlipidemia     IBS (irritable bowel syndrome)     Right knee meniscal tear     Sleep apnea     CPAP, DOES NOT USE       Family History   Problem Relation Age of Onset    Heart failure Mother     Kidney disease Mother     Hypertension Mother     Heart disease Mother     Alzheimer's disease Father     Thyroid disease Sister     Hypertension Sister     Hypertension Brother     Colon polyps Maternal Uncle     Irritable bowel syndrome Cousin     Malig Hyperthermia Neg Hx     Colon cancer Neg Hx     Crohn's disease Neg Hx     Ulcerative colitis Neg Hx        Social History     Socioeconomic History    Marital status:    Tobacco Use    Smoking status: Former     Current packs/day: 0.00     Average packs/day: 0.5 packs/day for 14.0 years (7.0 ttl pk-yrs)     Types: Cigarettes     Start date:      Quit date:      Years since quittin.2    Smokeless tobacco: Never    Tobacco comments:     QUIT AGE 23   Vaping Use    Vaping status: Never Used   Substance and Sexual Activity    Alcohol use: Yes     Comment: 1-2 PER WEEK    Drug use: No    Sexual activity: Defer       Past Surgical History:   Procedure Laterality Date    ADENOIDECTOMY      AORTIC VALVE REPAIR/REPLACEMENT  10/05/2016    mechanical valve    ASCENDING ARCH/HEMIARCH REPLACEMENT N/A 10/5/2016    Procedure: MIDLINE STERNOTOMY, AORTIC VALVE REPLACEMENT, ASCENDING AND HEMIARCH REPLACEMENT, REPAIR OF PERIVALVULAR LEAK, WITH NEURO MONITOIRING, INTRAOPERATIVE WARREN;   Surgeon: Sukhi Wilkinson MD;  Location: [facility] MAIN OR;  Service:     CARDIAC CATHETERIZATION N/A 9/27/2016    Procedure: Coronary angiography;  Surgeon: Lio Roman MD;  [facility];  Service:     CARDIAC CATHETERIZATION N/A 9/27/2016    Procedure: Left Heart Cath;  Surgeon: Lio Roman MD;  [facility];  Service:     CHOLECYSTECTOMY      COLONOSCOPY  12/212/2016    Dr. Palomares    COLONOSCOPY W/ POLYPECTOMY N/A 6/8/2022    Procedure: COLONOSCOPY WITH POLYPECTOMY;  Surgeon: Kana Chowdary MD;  Location:  [facility] OR;  Service: Gastroenterology;  Laterality: N/A;  hemorrhoids  ascending colon polyp (cold snare)  transverse colon polyp (cold snare)    CORONARY ARTERY BYPASS GRAFT      ENDOSCOPY WITH POLYPECTOMY N/A 6/8/2022    Procedure: ESOPHAGOGASTRODUODENOSCOPY WITH POLYPECTOMY;  Surgeon: Kana Chowdary MD;  Location:  [facility] OR;  Service: Gastroenterology;  Laterality: N/A;  gastric polyps    KNEE ARTHROSCOPY Right 9/7/2021    Procedure: RIGHT KNEE ARTHROSCOPY WITH PARTIAL MEDIAL AND LATERAL MENISECTOMY WITH DEBRIDEMENT OF ARTHRITIS ;  Surgeon: Ella Muller MD;  [facility];  Service: Orthopedics;  Laterality: Right;    UPPER GASTROINTESTINAL ENDOSCOPY           The following portions of the patient's history were reviewed and updated as appropriate: problem list, allergies, current medications, past medical history, past family history, past social history, and past surgical history.    ROS    See HPI    Immunization History   Administered Date(s) Administered    COVID-19 (MODERNA) 1st,2nd,3rd Dose Monovalent 03/22/2021, 04/19/2021    COVID-19 (MODERNA) Monovalent Original Booster 01/20/2022    Fluzone (or Fluarix & Flulaval for VFC) >6mos 10/19/2019, 10/10/2020, 09/27/2022, 10/02/2023    Hepatitis A 09/16/2019, 06/18/2020    Pneumococcal Polysaccharide (PPSV23) 12/18/2020    Tdap 09/16/2019       Objective  "  Vitals:    02/28/24 1501   Temp: 98 °F (36.7 °C)   Weight: 108 kg (239 lb)   Height: 182.9 cm (72.01\")   BP is still 156/90  Body mass index is 32.41 kg/m².  Physical Exam  Vitals reviewed.   Constitutional:       Appearance: Normal appearance.   HENT:      Head: Normocephalic and atraumatic.   Cardiovascular:      Rate and Rhythm: Normal rate and regular rhythm.   Neurological:      Mental Status: He is alert.           Assessment & Plan   Diagnoses and all orders for this visit:    1. Benign essential HTN (Primary)    Other orders  -     Discontinue: hydrALAZINE (APRESOLINE) 25 MG tablet; Take 1 tablet by mouth 2 (Two) Times a Day.  Dispense: 180 tablet; Refill: 0  -     hydrALAZINE (APRESOLINE) 25 MG tablet; Take 1 tablet by mouth 2 (Two) Times a Day.  Dispense: 180 tablet; Refill: 0     Add hydralazine and keep fup with me in a few weeks.     No follow-ups on file.           "

## 2024-02-29 RX ORDER — HYDRALAZINE HYDROCHLORIDE 25 MG/1
25 TABLET, FILM COATED ORAL 2 TIMES DAILY
Qty: 180 TABLET | Refills: 0 | Status: SHIPPED | OUTPATIENT
Start: 2024-02-29

## 2024-03-05 ENCOUNTER — ANTICOAGULATION VISIT (OUTPATIENT)
Dept: CARDIOLOGY | Facility: CLINIC | Age: 59
End: 2024-03-05
Payer: COMMERCIAL

## 2024-03-05 LAB — INR PPP: 2.9

## 2024-03-07 ENCOUNTER — HOSPITAL ENCOUNTER (OUTPATIENT)
Dept: MRI IMAGING | Facility: HOSPITAL | Age: 59
Discharge: HOME | End: 2024-03-07
Payer: COMMERCIAL

## 2024-03-07 PROCEDURE — 72141 MRI NECK SPINE W/O DYE: CPT

## 2024-03-07 PROCEDURE — 70553 MRI BRAIN STEM W/O & W/DYE: CPT

## 2024-03-07 PROCEDURE — 0 GADOBENATE DIMEGLUMINE 529 MG/ML SOLUTION: Performed by: PHYSICIAN ASSISTANT

## 2024-03-07 PROCEDURE — A9577 INJ MULTIHANCE: HCPCS | Performed by: PHYSICIAN ASSISTANT

## 2024-03-07 RX ADMIN — GADOBENATE DIMEGLUMINE 20 ML: 529 INJECTION, SOLUTION INTRAVENOUS at 17:41

## 2024-03-12 ENCOUNTER — ANTICOAGULATION VISIT (OUTPATIENT)
Dept: CARDIOLOGY | Facility: CLINIC | Age: 59
End: 2024-03-12
Payer: COMMERCIAL

## 2024-03-12 LAB — INR PPP: 3.1

## 2024-03-15 ENCOUNTER — OFFICE VISIT (OUTPATIENT)
Dept: INTERNAL MEDICINE | Facility: CLINIC | Age: 59
End: 2024-03-15
Payer: COMMERCIAL

## 2024-03-15 VITALS
BODY MASS INDEX: 32.23 KG/M2 | DIASTOLIC BLOOD PRESSURE: 80 MMHG | TEMPERATURE: 98.1 F | HEIGHT: 72 IN | SYSTOLIC BLOOD PRESSURE: 146 MMHG | WEIGHT: 238 LBS

## 2024-03-15 DIAGNOSIS — M48.02 CERVICAL STENOSIS OF SPINAL CANAL: ICD-10-CM

## 2024-03-15 DIAGNOSIS — G93.89 MULTIPLE HEMOSIDERIN DEPOSITS IN BRAIN: ICD-10-CM

## 2024-03-15 DIAGNOSIS — I10 BENIGN ESSENTIAL HTN: Primary | ICD-10-CM

## 2024-03-15 DIAGNOSIS — E83.19 MULTIPLE HEMOSIDERIN DEPOSITS IN BRAIN: ICD-10-CM

## 2024-03-15 PROCEDURE — 99214 OFFICE O/P EST MOD 30 MIN: CPT | Performed by: PHYSICIAN ASSISTANT

## 2024-03-15 RX ORDER — HYDRALAZINE HYDROCHLORIDE 25 MG/1
50 TABLET, FILM COATED ORAL 2 TIMES DAILY
Start: 2024-03-15

## 2024-03-15 RX ORDER — CLONIDINE HYDROCHLORIDE 0.1 MG/1
0.1 TABLET ORAL 2 TIMES DAILY
Qty: 180 TABLET | Refills: 1 | Status: SHIPPED | OUTPATIENT
Start: 2024-03-15

## 2024-03-15 NOTE — PROGRESS NOTES
Subjective   Chief Complaint   Patient presents with    Hypertension     F/U       History of Present Illness     Pt is here today for fup on his HTN. He had continual elevated reading snad I had him increase his Hydralazine to 50 mg BID.      Patient Active Problem List   Diagnosis    Allergic rhinitis    Asthma    Gastroesophageal reflux disease    Hyperlipidemia    Irritable bowel syndrome    Raynaud's phenomenon    Essential hypertriglyceridemia    S/P AVR (aortic valve replacement)    Mechanical heart valve present    Anticoagulated    Benign essential HTN    Benign prostatic hyperplasia with nocturia    KVNG (obstructive sleep apnea)    Venous insufficiency    Tear of medial meniscus of right knee, current    Tear of lateral meniscus of right knee, current    Encounter for screening for malignant neoplasm of colon    History of colon polyps    RUTLEDGE (nonalcoholic steatohepatitis)    Stage 3a chronic kidney disease       Allergies   Allergen Reactions    Fish-Derived Products Anaphylaxis     Can eat shell fish without problem, but can't eat fish sandwich, cod, tuna or salmon. , is unable to take fish oil supplements.     Amlodipine Hives    Ondansetron Rash    Penicillins Rash       Current Outpatient Medications on File Prior to Visit   Medication Sig Dispense Refill    albuterol sulfate  (90 Base) MCG/ACT inhaler Inhale 2 puffs Every 4 (Four) Hours As Needed for Wheezing. 18 g 1    fenofibrate (TRICOR) 145 MG tablet TAKE 1 TABLET DAILY 90 tablet 3    fluticasone (FLONASE) 50 MCG/ACT nasal spray 2 sprays into each nostril Daily. 1 each 3    nebivolol (Bystolic) 10 MG tablet Take 1 tablet by mouth Daily. 90 tablet 1    omeprazole (priLOSEC) 40 MG capsule Take 1 capsule by mouth Daily. 90 capsule 3    pravastatin (PRAVACHOL) 40 MG tablet Take 1 tablet by mouth Daily. 90 tablet 1    sildenafil (Viagra) 50 MG tablet Take 1 tablet by mouth Daily As Needed for Erectile Dysfunction. 10 tablet 0    triamcinolone  (KENALOG) 0.5 % ointment Apply 1 application topically to the appropriate area as directed 2 (Two) Times a Day. 15 g 3    warfarin (Jantoven) 5 MG tablet TAKE 1 TABLET DAILY OR AS  DIRECTED TO MAINTAIN       INTERNATIONAL NORMALIZED   RATIO 2.5-3.5 90 tablet 1     No current facility-administered medications on file prior to visit.       Past Medical History:   Diagnosis Date    Allergies     Aortic stenosis     SEVERE    Epistaxis     GERD (gastroesophageal reflux disease)     Gout     History of COVID-19     2020    Hyperlipidemia     IBS (irritable bowel syndrome)     Right knee meniscal tear     Sleep apnea     CPAP, DOES NOT USE       Family History   Problem Relation Age of Onset    Heart failure Mother     Kidney disease Mother     Hypertension Mother     Heart disease Mother     Alzheimer's disease Father     Thyroid disease Sister     Hypertension Sister     Hypertension Brother     Colon polyps Maternal Uncle     Irritable bowel syndrome Cousin     Malig Hyperthermia Neg Hx     Colon cancer Neg Hx     Crohn's disease Neg Hx     Ulcerative colitis Neg Hx        Social History     Socioeconomic History    Marital status:    Tobacco Use    Smoking status: Former     Current packs/day: 0.00     Average packs/day: 0.5 packs/day for 14.0 years (7.0 ttl pk-yrs)     Types: Cigarettes     Start date:      Quit date:      Years since quittin.2    Smokeless tobacco: Never    Tobacco comments:     QUIT AGE 23   Vaping Use    Vaping status: Never Used   Substance and Sexual Activity    Alcohol use: Yes     Comment: 1-2 PER WEEK    Drug use: No    Sexual activity: Defer       Past Surgical History:   Procedure Laterality Date    ADENOIDECTOMY      AORTIC VALVE REPAIR/REPLACEMENT  10/05/2016    mechanical valve    ASCENDING ARCH/HEMIARCH REPLACEMENT N/A 10/5/2016    Procedure: MIDLINE STERNOTOMY, AORTIC VALVE REPLACEMENT, ASCENDING AND HEMIARCH REPLACEMENT, REPAIR OF PERIVALVULAR LEAK, WITH NEURO  MONITOIRING, INTRAOPERATIVE WARREN;  Surgeon: Sukhi Wilkinson MD;  Location: [facility] MAIN OR;  Service:     CARDIAC CATHETERIZATION N/A 9/27/2016    Procedure: Coronary angiography;  Surgeon: Lio Roman MD;  [facility];  Service:     CARDIAC CATHETERIZATION N/A 9/27/2016    Procedure: Left Heart Cath;  Surgeon: Lio Roman MD;  Location: [facility] CATH INVASIVE LOCATION;  Service:     CHOLECYSTECTOMY      COLONOSCOPY  12/212/2016    Dr. Palomares    COLONOSCOPY W/ POLYPECTOMY N/A 6/8/2022    Procedure: COLONOSCOPY WITH POLYPECTOMY;  Surgeon: Kana Chowdary MD;  Location: [facility] OR;  Service: Gastroenterology;  Laterality: N/A;  hemorrhoids  ascending colon polyp (cold snare)  transverse colon polyp (cold snare)    CORONARY ARTERY BYPASS GRAFT      ENDOSCOPY WITH POLYPECTOMY N/A 6/8/2022    Procedure: ESOPHAGOGASTRODUODENOSCOPY WITH POLYPECTOMY;  Surgeon: Kana Chowdary MD;  Location: [facility] OR;  Service: Gastroenterology;  Laterality: N/A;  gastric polyps    KNEE ARTHROSCOPY Right 9/7/2021    Procedure: RIGHT KNEE ARTHROSCOPY WITH PARTIAL MEDIAL AND LATERAL MENISECTOMY WITH DEBRIDEMENT OF ARTHRITIS ;  Surgeon: Ella Muller MD;  Location: [facility] OR [facility];  Service: Orthopedics;  Laterality: Right;    UPPER GASTROINTESTINAL ENDOSCOPY         The following portions of the patient's history were reviewed and updated as appropriate: problem list, allergies, current medications, past medical history, past family history, past social history, and past surgical history.    ROS    See HPI    Immunization History   Administered Date(s) Administered    COVID-19 (MODERNA) 1st,2nd,3rd Dose Monovalent 03/22/2021, 04/19/2021    COVID-19 (MODERNA) Monovalent Original Booster 01/20/2022    Fluzone (or Fluarix & Flulaval for VFC) >6mos 10/19/2019, 10/10/2020, 09/27/2022, 10/02/2023    Hepatitis A 09/16/2019, 06/18/2020    Pneumococcal Polysaccharide (PPSV23) 12/18/2020    Tdap  "09/16/2019       Objective   Vitals:    03/15/24 0747   BP: 146/80   Temp: 98.1 °F (36.7 °C)   Weight: 108 kg (238 lb)   Height: 182.9 cm (72.01\")     Body mass index is 32.27 kg/m².  Physical Exam  Vitals reviewed.   Constitutional:       Appearance: Normal appearance.   HENT:      Head: Normocephalic and atraumatic.   Cardiovascular:      Rate and Rhythm: Normal rate and regular rhythm.   Neurological:      Mental Status: He is alert.           Assessment & Plan   Diagnoses and all orders for this visit:    1. Benign essential HTN (Primary)  -     cloNIDine (Catapres) 0.1 MG tablet; Take 1 tablet by mouth 2 (Two) Times a Day.  Dispense: 180 tablet; Refill: 1    2. Cervical stenosis of spinal canal  -     Ambulatory Referral to Pain Management  -     Ambulatory Referral to Physical Therapy Evaluate and treat    3. 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     I am going to add Clonidine, his BP is still not controlled. He has some hemosiderin deposition on his MRI of the brain as well, will ask neurosurgery for their input regarding this.     Return in about 2 weeks (around 3/29/2024).           "

## 2024-03-19 ENCOUNTER — ANTICOAGULATION VISIT (OUTPATIENT)
Dept: CARDIOLOGY | Facility: CLINIC | Age: 59
End: 2024-03-19
Payer: COMMERCIAL

## 2024-03-19 LAB — INR PPP: 3.2

## 2024-03-26 ENCOUNTER — TREATMENT (OUTPATIENT)
Dept: PHYSICAL THERAPY | Facility: CLINIC | Age: 59
End: 2024-03-26
Payer: COMMERCIAL

## 2024-03-26 DIAGNOSIS — M50.30 DDD (DEGENERATIVE DISC DISEASE), CERVICAL: Primary | ICD-10-CM

## 2024-03-26 DIAGNOSIS — M54.2 PAIN, NECK: ICD-10-CM

## 2024-03-26 PROCEDURE — 97162 PT EVAL MOD COMPLEX 30 MIN: CPT | Performed by: PHYSICAL THERAPIST

## 2024-03-26 PROCEDURE — 97110 THERAPEUTIC EXERCISES: CPT | Performed by: PHYSICAL THERAPIST

## 2024-03-26 NOTE — PROGRESS NOTES
"Physical Therapy Initial Evaluation and Plan of Care  [address] [address] [address]  [place] [address]    Patient: Hussein Nino   : 1965  Diagnosis/ICD-10 Code:  DDD (degenerative disc disease), cervical [M50.30]  Referring practitioner: Margarita Dickinson PA-C  Date of Initial Visit: 3/26/2024  Today's Date: 3/26/2024  Patient seen for 6 sessions           Visit Diagnoses:     ICD-10-CM ICD-9-CM   1. DDD (degenerative disc disease), cervical  M50.30 722.4   2. Pain, neck  M54.2 723.1       Subjective Questionnaire:  NDI       Subjective Evaluation    History of Present Illness  Mechanism of injury: CHIEF C/O:  headaches (to the point of tears) and dizziness.      CURRENT EPISODE   Patient reports with initial onset of dizziness,   and intermittent R sided tongue and face numbness/pain.   He states that he has had the dizziness in standing or sitting.  That last event was this past weekend when he was driving.  The prior dizziness event was 24 spinning, blurred vision lasting 10 min and again on 3/15/24 that was \"short lived\".   MRI completed on 3/7/24.  He has been referred.  The dizziness is not concurrent with the headaches.   IMAGING:   MRI 3/7/24 cervical  C34 C45 osteophyte complex mild flattening of the ventral surface of thecal sac and cord, mod to severe foraminal stenosis R  mod hemangioma of pedicle and lamina of C@ R. HA are short duration.     MRI 3/7/24 brain supratentorially and infratentorially (too numerous to  count) on the susceptibility weighted sequence consistent with  hemosiderin deposition.  ONSET   1 yr.  LOCATION   head, R sided face and tip of tongue  DESCRIPTION:  neck is stiff.  HA at R occipital with progression along temporal area.  Numbness on the tip of the tongue.  PAIN LEVELS:    neck -  0 - 4      headaches  0 - 6 ( short lived)   1ST AM:   no change of neck stiffness  TIME OF DAY:   not related to time of day.  SLEEP:    BSL.    Denies any dizziness " with rolling in the bed or getting out of bed.   TX:   no heat/ice, meds.    EX PROGRAM/ACTIVITIES:  carlos chang  PAST MEDICAL HISTORY:        (-)  pacemaker, DM, CA, latex allergy, or metal implants  SURGICAL HISTORY:  heart valve metal 2016.  R knee scope approx 3 yrs ago.       Quality of life: good    Hand dominance: right           Objective          Postural Observations    Additional Postural Observation Details  Rounding of the shldrs with shldr elevation, FHP 13 and OA ext     Neurological Testing     Sensation   Cervical/Thoracic   Left   Intact: light touch    Right   Intact: light touch    Reflexes   Left   Biceps (C5/C6): absent (0)  Brachioradialis (C6): trace (1+)  Triceps (C7): trace (1+)    Right   Biceps (C5/C6): absent (0)  Brachioradialis (C6): absent (0)  Triceps (C7): absent (0)    Active Range of Motion   Cervical/Thoracic Spine   Cervical    Flexion: 50 degrees   Extension: 35 degrees   Left lateral flexion: 35 degrees   Right lateral flexion: 35 degrees   Left rotation: 39 degrees   Right rotation: 36 degrees     Additional Active Range of Motion Details  Flex 1x no pain or symptoms;  repeated with slight weird feeling.   Ext   1x slight dizzy   reps with equal symptoms  SB   L with slight dizziness  Joseluis rotation: wo symptoms    Passive Range of Motion     Additional Passive Range of Motion Details  Hypomobile joseluis facet glides    Strength/Myotome Testing   Cervical Spine     Left   Normal strength    Right   Normal strength    Additional Strength Details  :    R 95#,   L 102#       Tests   Cervical     Right   Positive Spurling's sign.     Functional Assessment     Comments  (-) VOR, vestibular hypofunction.  Negative roll test and DHP vestibular testing.   (-) AA           Assessment & Plan       Assessment  Impairments: abnormal muscle tone, abnormal or restricted ROM, activity intolerance, impaired physical strength, lacks appropriate home exercise program and pain with  function   Functional limitations: carrying objects, sleeping, uncomfortable because of pain, reaching overhead and unable to perform repetitive tasks   Assessment details: Kwaku Nino is a 58 yr old male referred to PT due to reports of ongoing headaches, dizziness and numbness of the tongue/R sided of his face.  He notes the HA are sudden sharp and short duration.  The dizziness can occur at any time.    He has had an MRI that notes several osteophyte formations with mild flattening of the thecal sac and cord @ C34 and C45.   The initial PT evaluation was completed today.  Vestibular tested are negative.  There pare postural limitations with clinic noting dizziness with cervical extension. There are also limitations of  postural imbalances, weakness in the deep neck flexors and posterior trunk/scapular stabilizers.  The s/s are consistent with cervical DDD with soft tissue association. Due to mild cervical pain and limited radicular c/os and MRI results, PT will be held at this time until client is consulted with neurosurgeon.    Otherwise outpatient PT in order to to the goals and maximize functional mobility.    Eval complexity:  moderte  Barriers to therapy: pain onset greater than 3 months  Prognosis: good    Goals  Plan Goals: Patient goals:    1) minimize the symptoms    ST visits     1)  pain levels 0-6/10     2)  pnt to report a 30% reduction of dizziness and headaches.      3)  improve quanity of sleep by 40%     4)  pnt to demonstrate FHP < 9 degrees, no OA extension    LTG:   DC     1)  pain levles 0-3/10     2)  > 80% improvement in UE radicular symptoms     3)  no dizzy events.      4)  MMT davey shldr 5/5 in order to resume prior work capacity     5)  cervical ROM WNLs and wo pain in order to complete tasks as turning head when driving, sleeping, dressing wo greater pain     6)  improve NDI score =/> 5 points ( eval score 6/50)    Plan  Therapy options: will be seen for skilled therapy  services  Planned modality interventions: cryotherapy, high voltage pulsed current (pain management), traction, ultrasound, thermotherapy (hydrocollator packs) and dry needling  Planned therapy interventions: manual therapy, neuromuscular re-education, postural training, soft tissue mobilization, spinal/joint mobilization, strengthening, stretching, therapeutic activities, home exercise program, flexibility, body mechanics training and abdominal trunk stabilization  Frequency: 2x week  Treatment plan discussed with: patient  Plan details: Traction, manual work for increased cervical passive motion, postural ex for anterior trunk stretching and posterior trunk strengthening.        Timed:         Manual Therapy:         mins  14142;     Therapeutic Exercise:    12     mins  12677;     Neuromuscular Leana:        mins  17643;    Therapeutic Activity:          mins  86861;     Gait Training:           mins  40947;     Ultrasound:          mins  86015;    Ionto                                   mins   59223  Self Care                            mins   76891  Canalith Repos         mins 02170      Un-Timed:  Electrical Stimulation:         mins  44995 ( );  Dry Needling          mins self-pay  Traction          mins 90535  Low Eval          Mins  56883  Mod Eval     36     Mins  61929  High Eval                            Mins  99491  Re-Eval                               mins  79792        Timed Treatment:   12   mins   Total Treatment:     48   mins        PT SIGNATURE: Evon Moffett, PT   DATE TREATMENT INITIATED: 3/26/2024    Initial Certification  Certification Period: from 6/24/2024  I certify that the therapy services are furnished while this patient is under my care.  The services outlined above are required by this patient, and will be reviewed every 90 days.     PHYSICIAN: Margarita Dickinson PA-C      DATE:     Please sign and return via fax to [phone].. Thank you, [facility].

## 2024-03-27 DIAGNOSIS — G93.89 MULTIPLE HEMOSIDERIN DEPOSITS IN BRAIN: Primary | ICD-10-CM

## 2024-03-27 DIAGNOSIS — E83.19 MULTIPLE HEMOSIDERIN DEPOSITS IN BRAIN: Primary | ICD-10-CM

## 2024-04-01 ENCOUNTER — OFFICE VISIT (OUTPATIENT)
Dept: NEUROLOGY | Facility: CLINIC | Age: 59
End: 2024-04-01
Payer: COMMERCIAL

## 2024-04-01 VITALS
DIASTOLIC BLOOD PRESSURE: 70 MMHG | SYSTOLIC BLOOD PRESSURE: 150 MMHG | HEART RATE: 64 BPM | BODY MASS INDEX: 32.23 KG/M2 | OXYGEN SATURATION: 99 % | HEIGHT: 72 IN | WEIGHT: 238 LBS

## 2024-04-01 DIAGNOSIS — R90.89 ABNORMAL BRAIN MRI: ICD-10-CM

## 2024-04-01 DIAGNOSIS — M54.81 OCCIPITAL NEURALGIA OF RIGHT SIDE: Primary | ICD-10-CM

## 2024-04-01 PROCEDURE — 99204 OFFICE O/P NEW MOD 45 MIN: CPT | Performed by: PSYCHIATRY & NEUROLOGY

## 2024-04-01 NOTE — ASSESSMENT & PLAN NOTE
I also spoke with him extensively with regards to the fact that the micro-bleeds noted on the brain MRI scan maybe due to chronic warfarin use vs cerebral amyloid angiopathy.  I advised him to work closely with his PCP to get his BP under better control as it is elevated today at 150/70.  Also he needs to do routine INR checks to be sure he is not supra-therapeutic which can also increase his risk for intracerebral hemorrhage.  Also if he experiences a sudden severe headache he needs to be taken to the ED immediately and have a head CT scan evaluation to rule out possible hemorrhage.

## 2024-04-01 NOTE — ASSESSMENT & PLAN NOTE
58 year old right handed man with occipital neuralgia headaches and abnormal brain MRI findings with multiple hemosiderin depositions.  He reports a history of headaches involving the back of his head starting over a year ago.  The headaches start in the back of his head and slowly move up the back of his head to the top of his head in the occipital nerve distribution.  The headaches are always right sided and they can get as severe as 9-10/10 on pain scale 1-10 when most severe without any associated light or sound sensitivity.  The pain is constant and pressure like sensation.  He has a mechanical valve in his heart so he is on life long Warfarin treatment.  He has also had some dizziness and numbness at times as well.  He has some numbness and tingling involving his mouth and tongue as well which he reports gets worse in his teeth. He does see his dentist regularly.  He had a brain MRI scan on 3/7/2024 which I reviewed the images independently on his visit today and demonstrates multiple small foci of signal loss both supratentorially and infratentorially on the susceptibility weighted sequence consistent with hemosiderin deposition which may be secondary to small areas of microhemorrhage or amyloid angiopathy.  Unfortunately again he has to be on life long anti-coagulation treatment due to having mechanical valve in his heart.  He has been on Warfarin since 10/2016.  He tells me one of his cousins had a brain bleed.  He does have significant DDD of cervical spine and some neuro-foraminal narrowing was noted as well.  The numbness and tingling starts in the tip of his tongue to the right side in the trigeminal nerve distribution. On examination he has tenderness to palpation over the right GREATER and LESSER occipital nerve distributions.  I spoke with him about occipital neuralgia which is what he is experiencing and will set him up for right GREATER and LESSER occipital nerve blocks after discussing potential  side effects.  I also spoke with him extensively with regards to the fact that the micro-bleeds noted on the brain MRI scan maybe due to chronic warfarin use vs cerebral amyloid angiopathy.  I advised him to work closely with his PCP to get his BP under better control as it is elevated today at 150/70.  Also if he experiences a sudden severe headache he needs to be taken to the ED immediately and have a head CT scan evaluation to rule out possible hemorrhage.

## 2024-04-01 NOTE — PROGRESS NOTES
Chief Complaint  Headaches and abnormal brain MRI with multiple hemosiderin deposition.      Subjective          Hussein Nino presents to [facility] NEUROLOGY for   HISTORY OF PRESENT ILLNESS:    Hussein Nino is a 58 year old right handed man who presents to neurology clinic for initial evaluation and treatment of headaches and abnormal brain MRI findings with multiple hemosiderin depositions.  He reports a history of headaches involving the back of his head starting over a year ago.  The headaches start in the back of his head and slowly move up the back of his head to the top of his head in the occipital nerve distribution.  The headaches are always right sided and they can get as severe as 9-10/10 on pain scale 1-10 when most severe without any associated light or sound sensitivity.  The pain is constant and pressure like sensation.  He has a mechanical valve in his heart so he is on life long Warfarin treatment.  He has also had some dizziness and numbness at times as well.  He has some numbness and tingling involving his mouth and tongue as well which he reports gets worse in his teeth. He does see his dentist regularly.  He had a brain MRI scan on 3/7/2024 which I reviewed the images independently on his visit today and demonstrates multiple small foci of signal loss both supratentorially and infratentorially on the susceptibility weighted sequence consistent with hemosiderin deposition which may be secondary to small areas of microhemorrhage or amyloid angiopathy.  Unfortunately again he has to be on life long anti-coagulation treatment due to having mechanical valve in his heart.  He has been on Warfarin since 10/2016.  He tells me one of his cousins had a brain bleed.  He does have significant DDD of cervical spine and some neuro-foraminal narrowing was noted as well.  The numbness and tingling starts in the tip of his tongue to the right side in the trigeminal nerve distribution.      Past Medical History:   Diagnosis Date    Allergies     Aortic stenosis     SEVERE    Epistaxis     GERD (gastroesophageal reflux disease)     Gout     History of COVID-19     2020    Hyperlipidemia     IBS (irritable bowel syndrome)     Right knee meniscal tear     Sleep apnea     CPAP, DOES NOT USE        Family History   Problem Relation Age of Onset    Heart failure Mother     Kidney disease Mother     Hypertension Mother     Heart disease Mother     Alzheimer's disease Father     Thyroid disease Sister     Hypertension Sister     Hypertension Brother     Colon polyps Maternal Uncle     Irritable bowel syndrome Cousin     Du Hyperthermia Neg Hx     Colon cancer Neg Hx     Crohn's disease Neg Hx     Ulcerative colitis Neg Hx         Social History     Socioeconomic History    Marital status:    Tobacco Use    Smoking status: Former     Current packs/day: 0.00     Average packs/day: 0.5 packs/day for 14.0 years (7.0 ttl pk-yrs)     Types: Cigarettes     Start date:      Quit date:      Years since quittin.2    Smokeless tobacco: Never    Tobacco comments:     QUIT AGE 23   Vaping Use    Vaping status: Never Used   Substance and Sexual Activity    Alcohol use: Yes     Comment: 1-2 PER WEEK    Drug use: No    Sexual activity: Defer        I have reviewed and confirmed the accuracy of the ROS as documented by the MA/LPN/RN Faye Fair MD   Review of Systems   Constitutional:  Negative for activity change and appetite change.   HENT:  Negative for trouble swallowing and voice change.    Eyes:  Positive for pain. Negative for blurred vision and double vision.   Musculoskeletal:  Positive for back pain (DDD) and neck pain. Negative for gait problem.   Allergic/Immunologic: Positive for environmental allergies. Negative for food allergies.   Neurological:  Positive for dizziness, light-headedness and numbness. Negative for tremors, seizures, syncope, facial asymmetry, speech difficulty,  "weakness, headache, memory problem and confusion.   Psychiatric/Behavioral:  Positive for sleep disturbance and stress. Negative for agitation, behavioral problems, decreased concentration, dysphoric mood, hallucinations, self-injury, suicidal ideas, negative for hyperactivity and depressed mood. The patient is not nervous/anxious.         Objective   Vital Signs:   /70   Pulse 64   Ht 182.9 cm (72.01\")   Wt 108 kg (238 lb)   SpO2 99%   BMI 32.27 kg/m²       PHYSICAL EXAM:    General   Mental Status - Alert. General Appearance - Well developed, Well groomed, Oriented and Cooperative. Orientation - Oriented X3.       Head and Neck  Head - normocephalic, atraumatic with no lesions or palpable masses. Tenderness to palpation over the right greater and lesser occipital nerve distribution.    Neck    Global Assessment - supple.       Eye   Sclera/Conjunctiva - Bilateral - Normal.    ENMT  Mouth and Throat   Oral Cavity/Oropharynx: Oropharynx - the soft palate,uvula and tongue are normal in appearance.    Chest and Lung Exam   Chest - lung clear to auscultation bilaterally.    Cardiovascular   Cardiovascular examination reveals  - normal heart sounds, regular rate and rhythm.    Neurologic   Mental Status: Speech - Normal. Cognitive function - appropriate fund of knowledge. No impairment of attention, Impairment of concentration, impairment of long term memory or impairment of short term memory.  Cranial Nerves:   II Optic: Visual acuity - Left - Normal. Right - Normal. Visual fields - Normal (to confrontation).  III Oculomotor: Pupillary constriction - Left - Normal. Right - Normal.  VII Facial: - Normal Bilaterally.   IX Glossopharyngeal / X Vagus - Normal.  XI Accessory: Trapezius - Bilateral - Normal. Sternocleidomastoid - Bilateral - Normal.  XII Hypoglossal - Bilateral - Normal.  Eye Movements: - Normal Bilaterally.  Sensory:   Light Touch: Intact - Globally.  Motor:   Bulk and Contour: - Normal.  Tone: - " Normal.  Tremor: Not present.  Strength: 5/5 normal muscle strength - All Muscles.   General Assessment of Reflexes: - deep tendon reflexes are normal. Coordination - No Impairment of finger-to-nose or Impairment of rapid alternating movements. Gait - Normal.       Result Review :                 Assessment and Plan    Problem List Items Addressed This Visit          Musculoskeletal and Injuries    Occipital neuralgia of right side - Primary    Current Assessment & Plan     58 year old right handed man with occipital neuralgia headaches and abnormal brain MRI findings with multiple hemosiderin depositions.  He reports a history of headaches involving the back of his head starting over a year ago.  The headaches start in the back of his head and slowly move up the back of his head to the top of his head in the occipital nerve distribution.  The headaches are always right sided and they can get as severe as 9-10/10 on pain scale 1-10 when most severe without any associated light or sound sensitivity.  The pain is constant and pressure like sensation.  He has a mechanical valve in his heart so he is on life long Warfarin treatment.  He has also had some dizziness and numbness at times as well.  He has some numbness and tingling involving his mouth and tongue as well which he reports gets worse in his teeth. He does see his dentist regularly.  He had a brain MRI scan on 3/7/2024 which I reviewed the images independently on his visit today and demonstrates multiple small foci of signal loss both supratentorially and infratentorially on the susceptibility weighted sequence consistent with hemosiderin deposition which may be secondary to small areas of microhemorrhage or amyloid angiopathy.  Unfortunately again he has to be on life long anti-coagulation treatment due to having mechanical valve in his heart.  He has been on Warfarin since 10/2016.  He tells me one of his cousins had a brain bleed.  He does have significant  DDD of cervical spine and some neuro-foraminal narrowing was noted as well.  The numbness and tingling starts in the tip of his tongue to the right side in the trigeminal nerve distribution. On examination he has tenderness to palpation over the right GREATER and LESSER occipital nerve distributions.  I spoke with him about occipital neuralgia which is what he is experiencing and will set him up for right GREATER and LESSER occipital nerve blocks after discussing potential side effects.  I also spoke with him extensively with regards to the fact that the micro-bleeds noted on the brain MRI scan maybe due to chronic warfarin use vs cerebral amyloid angiopathy.  I advised him to work closely with his PCP to get his BP under better control as it is elevated today at 150/70.  Also if he experiences a sudden severe headache he needs to be taken to the ED immediately and have a head CT scan evaluation to rule out possible hemorrhage.              Neuro    Abnormal brain MRI    Current Assessment & Plan     I also spoke with him extensively with regards to the fact that the micro-bleeds noted on the brain MRI scan maybe due to chronic warfarin use vs cerebral amyloid angiopathy.  I advised him to work closely with his PCP to get his BP under better control as it is elevated today at 150/70.  Also he needs to do routine INR checks to be sure he is not supra-therapeutic which can also increase his risk for intracerebral hemorrhage.  Also if he experiences a sudden severe headache he needs to be taken to the ED immediately and have a head CT scan evaluation to rule out possible hemorrhage.              I spent 49 minutes caring for Hussein on this date of service. This time includes time spent by me in the following activities:preparing for the visit, reviewing tests, obtaining and/or reviewing a separately obtained history, performing a medically appropriate examination and/or evaluation , counseling and educating the  patient/family/caregiver, ordering medications, tests, or procedures, documenting information in the medical record, independently interpreting results and communicating that information with the patient/family/caregiver, and care coordination    Follow Up   Return in about 3 months (around 7/1/2024).  Patient was given instructions and counseling regarding his condition or for health maintenance advice. Please see specific information pulled into the AVS if appropriate.

## 2024-04-02 ENCOUNTER — OFFICE VISIT (OUTPATIENT)
Dept: INTERNAL MEDICINE | Facility: CLINIC | Age: 59
End: 2024-04-02
Payer: COMMERCIAL

## 2024-04-02 ENCOUNTER — ANTICOAGULATION VISIT (OUTPATIENT)
Dept: CARDIOLOGY | Facility: CLINIC | Age: 59
End: 2024-04-02
Payer: COMMERCIAL

## 2024-04-02 VITALS
HEIGHT: 72 IN | BODY MASS INDEX: 31.69 KG/M2 | DIASTOLIC BLOOD PRESSURE: 74 MMHG | WEIGHT: 234 LBS | SYSTOLIC BLOOD PRESSURE: 130 MMHG | TEMPERATURE: 98.4 F

## 2024-04-02 DIAGNOSIS — I10 BENIGN ESSENTIAL HTN: ICD-10-CM

## 2024-04-02 LAB — INR PPP: 3

## 2024-04-02 PROCEDURE — 99214 OFFICE O/P EST MOD 30 MIN: CPT | Performed by: PHYSICIAN ASSISTANT

## 2024-04-02 RX ORDER — CLONIDINE HYDROCHLORIDE 0.1 MG/1
0.1 TABLET ORAL 3 TIMES DAILY
Start: 2024-04-02

## 2024-04-02 RX ORDER — HYDRALAZINE HYDROCHLORIDE 25 MG/1
50 TABLET, FILM COATED ORAL 3 TIMES DAILY
Start: 2024-04-02 | End: 2024-04-02

## 2024-04-02 RX ORDER — HYDRALAZINE HYDROCHLORIDE 50 MG/1
50 TABLET, FILM COATED ORAL 3 TIMES DAILY
Qty: 270 TABLET | Refills: 1 | Status: SHIPPED | OUTPATIENT
Start: 2024-04-02

## 2024-04-02 NOTE — PROGRESS NOTES
Subjective   Chief Complaint   Patient presents with    Hypertension       History of Present Illness     Pt is here today for fup on HTN. He saw Dr. Fair yesterday who thinks his hemosiderin is from his chronic warfarin usage. Unfortunately he has a prosthetic valve and must stay on this life long. He checked his BP last night up to 140 systolic, but yesterday morning was in the 120's/70's. Dr. Fair is going to do an occipital block tomorrow for his headaches.     His BP was elevated at his neuro appt yesterday and he is taking hismorning meds around 7 AM. His appt yesterday was at 11 AM.      Patient Active Problem List   Diagnosis    Allergic rhinitis    Asthma    Gastroesophageal reflux disease    Hyperlipidemia    Irritable bowel syndrome    Raynaud's phenomenon    Essential hypertriglyceridemia    S/P AVR (aortic valve replacement)    Mechanical heart valve present    Anticoagulated    Benign essential HTN    Benign prostatic hyperplasia with nocturia    KVNG (obstructive sleep apnea)    Venous insufficiency    Tear of medial meniscus of right knee, current    Tear of lateral meniscus of right knee, current    Encounter for screening for malignant neoplasm of colon    History of colon polyps    RUTLEDGE (nonalcoholic steatohepatitis)    Stage 3a chronic kidney disease    Occipital neuralgia of right side    Abnormal brain MRI       Allergies   Allergen Reactions    Fish-Derived Products Anaphylaxis     Can eat shell fish without problem, but can't eat fish sandwich, cod, tuna or salmon. , is unable to take fish oil supplements.     Amlodipine Hives    Ondansetron Rash    Penicillins Rash       Current Outpatient Medications on File Prior to Visit   Medication Sig Dispense Refill    albuterol sulfate  (90 Base) MCG/ACT inhaler Inhale 2 puffs Every 4 (Four) Hours As Needed for Wheezing. 18 g 1    fenofibrate (TRICOR) 145 MG tablet TAKE 1 TABLET DAILY 90 tablet 3    fluticasone (FLONASE) 50 MCG/ACT nasal  spray 2 sprays into each nostril Daily. 1 each 3    nebivolol (Bystolic) 10 MG tablet Take 1 tablet by mouth Daily. 90 tablet 1    omeprazole (priLOSEC) 40 MG capsule Take 1 capsule by mouth Daily. 90 capsule 3    pravastatin (PRAVACHOL) 40 MG tablet Take 1 tablet by mouth Daily. 90 tablet 1    sildenafil (Viagra) 50 MG tablet Take 1 tablet by mouth Daily As Needed for Erectile Dysfunction. 10 tablet 0    triamcinolone (KENALOG) 0.5 % ointment Apply 1 application topically to the appropriate area as directed 2 (Two) Times a Day. 15 g 3    warfarin (Jantoven) 5 MG tablet TAKE 1 TABLET DAILY OR AS  DIRECTED TO MAINTAIN       INTERNATIONAL NORMALIZED   RATIO 2.5-3.5 90 tablet 1    [DISCONTINUED] cloNIDine (Catapres) 0.1 MG tablet Take 1 tablet by mouth 2 (Two) Times a Day. 180 tablet 1    [DISCONTINUED] hydrALAZINE (APRESOLINE) 25 MG tablet Take 2 tablets by mouth 2 (Two) Times a Day.       No current facility-administered medications on file prior to visit.       Past Medical History:   Diagnosis Date    Allergies     Aortic stenosis     SEVERE    Epistaxis     GERD (gastroesophageal reflux disease)     Gout     History of COVID-19     NOV 2020    Hyperlipidemia     IBS (irritable bowel syndrome)     Right knee meniscal tear     Sleep apnea     CPAP, DOES NOT USE       Family History   Problem Relation Age of Onset    Heart failure Mother     Kidney disease Mother     Hypertension Mother     Heart disease Mother     Alzheimer's disease Father     Thyroid disease Sister     Hypertension Sister     Hypertension Brother     Colon polyps Maternal Uncle     Irritable bowel syndrome Cousin     Malig Hyperthermia Neg Hx     Colon cancer Neg Hx     Crohn's disease Neg Hx     Ulcerative colitis Neg Hx        Social History     Socioeconomic History    Marital status:    Tobacco Use    Smoking status: Former     Current packs/day: 0.00     Average packs/day: 0.5 packs/day for 14.0 years (7.0 ttl pk-yrs)     Types:  Cigarettes     Start date:      Quit date:      Years since quittin.2    Smokeless tobacco: Never    Tobacco comments:     QUIT AGE 23   Vaping Use    Vaping status: Never Used   Substance and Sexual Activity    Alcohol use: Yes     Comment: 1-2 PER WEEK    Drug use: No    Sexual activity: Defer       Past Surgical History:   Procedure Laterality Date    ADENOIDECTOMY      AORTIC VALVE REPAIR/REPLACEMENT  10/05/2016    mechanical valve    ASCENDING ARCH/HEMIARCH REPLACEMENT N/A 10/5/2016    Procedure: MIDLINE STERNOTOMY, AORTIC VALVE REPLACEMENT, ASCENDING AND HEMIARCH REPLACEMENT, REPAIR OF PERIVALVULAR LEAK, WITH NEURO MONITOIRING, INTRAOPERATIVE WARREN;  Surgeon: Sukhi Wilkinson MD;  Location: [facility] MAIN OR;  Service:     CARDIAC CATHETERIZATION N/A 2016    Procedure: Coronary angiography;  Surgeon: Lio Roman MD;  [facility];  Service:     CARDIAC CATHETERIZATION N/A 2016    Procedure: Left Heart Cath;  Surgeon: Lio Roman MD;  Location: [facility] CATH INVASIVE LOCATION;  Service:     CHOLECYSTECTOMY      COLONOSCOPY      Dr. Palomares    COLONOSCOPY W/ POLYPECTOMY N/A 2022    Procedure: COLONOSCOPY WITH POLYPECTOMY;  Surgeon: Kana Chowdary MD;  [facility];  Service: Gastroenterology;  Laterality: N/A;  hemorrhoids  ascending colon polyp (cold snare)  transverse colon polyp (cold snare)    CORONARY ARTERY BYPASS GRAFT      ENDOSCOPY WITH POLYPECTOMY N/A 2022    Procedure: ESOPHAGOGASTRODUODENOSCOPY WITH POLYPECTOMY;  Surgeon: Kana Chowdary MD;  Location:  [facility] OR;  Service: Gastroenterology;  Laterality: N/A;  gastric polyps    KNEE ARTHROSCOPY Right 2021    Procedure: RIGHT KNEE ARTHROSCOPY WITH PARTIAL MEDIAL AND LATERAL MENISECTOMY WITH DEBRIDEMENT OF ARTHRITIS ;  Surgeon: Ella Muller MD;  Location:  [facility];  Service: Orthopedics;  Laterality: Right;    UPPER GASTROINTESTINAL ENDOSCOPY   "         The following portions of the patient's history were reviewed and updated as appropriate: problem list, allergies, current medications, past medical history, past family history, past social history, and past surgical history      ROS    See HPI    Immunization History   Administered Date(s) Administered    COVID-19 (MODERNA) 1st,2nd,3rd Dose Monovalent 03/22/2021, 04/19/2021    COVID-19 (MODERNA) Monovalent Original Booster 01/20/2022    Fluzone (or Fluarix & Flulaval for VFC) >6mos 10/19/2019, 10/10/2020, 09/27/2022, 10/02/2023    Hepatitis A 09/16/2019, 06/18/2020    Pneumococcal Polysaccharide (PPSV23) 12/18/2020    Tdap 09/16/2019       Objective   Vitals:    04/02/24 0750   BP: 130/74   Temp: 98.4 °F (36.9 °C)   Weight: 106 kg (234 lb)   Height: 182.9 cm (72.01\")     Body mass index is 31.73 kg/m².  Physical Exam  Vitals reviewed.   Constitutional:       Appearance: Normal appearance.   HENT:      Head: Normocephalic and atraumatic.   Cardiovascular:      Rate and Rhythm: Normal rate and regular rhythm.      Heart sounds: Normal heart sounds.   Neurological:      Mental Status: He is alert.         Assessment & Plan   Diagnoses and all orders for this visit:    1. Benign essential HTN  -     cloNIDine (Catapres) 0.1 MG tablet; Take 1 tablet by mouth 3 times a day.  -     hydrALAZINE (APRESOLINE) 50 MG tablet; Take 1 tablet by mouth 3 (Three) Times a Day.  Dispense: 270 tablet; Refill: 1    Other orders  -     Discontinue: hydrALAZINE (APRESOLINE) 25 MG tablet; Take 2 tablets by mouth 3 (Three) Times a Day.     Making progress with his HTN. I am going to add a third dose daily of his Clonidine and Hydralazine. I think he needs broader coverage throughout the day. Will have him fup with me in 3 weeks. He continues to check his BP daily at home. Again, reiterated any new sudden onset severe headache requires ED evaluation immediately.     Return in about 3 weeks (around 4/23/2024).           "

## 2024-04-04 ENCOUNTER — PATIENT ROUNDING (BHMG ONLY) (OUTPATIENT)
Dept: NEUROLOGY | Facility: CLINIC | Age: 59
End: 2024-04-04
Payer: COMMERCIAL

## 2024-04-09 ENCOUNTER — ANTICOAGULATION VISIT (OUTPATIENT)
Dept: CARDIOLOGY | Facility: CLINIC | Age: 59
End: 2024-04-09
Payer: COMMERCIAL

## 2024-04-09 LAB — INR PPP: 3.2

## 2024-04-10 ENCOUNTER — PROCEDURE VISIT (OUTPATIENT)
Dept: NEUROLOGY | Facility: CLINIC | Age: 59
End: 2024-04-10
Payer: COMMERCIAL

## 2024-04-10 DIAGNOSIS — M54.81 OCCIPITAL NEURALGIA OF RIGHT SIDE: Primary | ICD-10-CM

## 2024-04-10 RX ORDER — METHYLPREDNISOLONE ACETATE 40 MG/ML
40 INJECTION, SUSPENSION INTRA-ARTICULAR; INTRALESIONAL; INTRAMUSCULAR; SOFT TISSUE ONCE
Status: COMPLETED | OUTPATIENT
Start: 2024-04-10 | End: 2024-04-10

## 2024-04-10 RX ORDER — LIDOCAINE HYDROCHLORIDE 20 MG/ML
5 INJECTION, SOLUTION EPIDURAL; INFILTRATION; INTRACAUDAL; PERINEURAL ONCE
Status: COMPLETED | OUTPATIENT
Start: 2024-04-10 | End: 2024-04-10

## 2024-04-10 RX ADMIN — METHYLPREDNISOLONE ACETATE 40 MG: 40 INJECTION, SUSPENSION INTRA-ARTICULAR; INTRALESIONAL; INTRAMUSCULAR; SOFT TISSUE at 08:01

## 2024-04-10 RX ADMIN — LIDOCAINE HYDROCHLORIDE 5 ML: 20 INJECTION, SOLUTION EPIDURAL; INFILTRATION; INTRACAUDAL; PERINEURAL at 08:01

## 2024-04-10 NOTE — PROGRESS NOTES
Occipital Nerve Block Procedure Note:    PROCEDURE IN DETAIL:  The patient was injected in the Right GREATER and LESSER occipital nerves with 2% lidocaine, a total of 2.5 mL times two, and Depomedrol total of 0.5 mL or 20 mg times two after obtaining written consent. Sterile technique was used including sterile gloves and needles. Injection sites identified using known anatomical landmarks.  The area to be injected was prepped with sterilizing agent. The patient tolerated the procedure without any complications. She will be returning for injection as indicated and clinic for follow up as previously scheduled.       Right Greater and Lesser

## 2024-04-15 DIAGNOSIS — I38 HEART VALVE DISEASE: ICD-10-CM

## 2024-04-15 RX ORDER — WARFARIN SODIUM 5 MG/1
TABLET ORAL
Qty: 90 TABLET | Refills: 1 | Status: CANCELLED | OUTPATIENT
Start: 2024-04-15

## 2024-04-16 ENCOUNTER — ANTICOAGULATION VISIT (OUTPATIENT)
Dept: CARDIOLOGY | Facility: CLINIC | Age: 59
End: 2024-04-16
Payer: COMMERCIAL

## 2024-04-16 LAB — INR PPP: 3.1

## 2024-04-18 DIAGNOSIS — I38 HEART VALVE DISEASE: ICD-10-CM

## 2024-04-18 RX ORDER — WARFARIN SODIUM 5 MG/1
TABLET ORAL
Qty: 90 TABLET | Refills: 1 | Status: SHIPPED | OUTPATIENT
Start: 2024-04-18

## 2024-04-19 ENCOUNTER — TREATMENT (OUTPATIENT)
Dept: PHYSICAL THERAPY | Facility: CLINIC | Age: 59
End: 2024-04-19
Payer: COMMERCIAL

## 2024-04-19 DIAGNOSIS — M54.2 PAIN, NECK: Primary | ICD-10-CM

## 2024-04-19 DIAGNOSIS — M50.30 DDD (DEGENERATIVE DISC DISEASE), CERVICAL: ICD-10-CM

## 2024-04-19 NOTE — PROGRESS NOTES
Physical Therapy Daily Treatment Note  [id number] [address] [address]  [place] [address]      Patient: Hussein Nino  : 1965  Referring Practitioner: Margarita Dickinson PA-C  Date of Initial Visit: Type: THERAPY  Noted: 2024  Today's Date: 2024  Patient seen for: 1 sessions      Visit Diagnoses:     ICD-10-CM ICD-9-CM   1. Pain, neck  M54.2 723.1   2. DDD (degenerative disc disease), cervical  M50.30 722.4       Subjective   Hussein Nino reports that he did see the neurologist.  States that the MD is suggesting the numbness is from greater and lesser occipital neuritis and there are brain bleeds.  The bleeds could be from the anticoagulant meds he is taking from the mechanical heart valve.    Follow up with MD is July.    He did receive injections at the same visit.  The face numbness has been mostly gone since the MD visit.    States that his BP is elevated; PCP has adjusted his meds.               Pain Level (0-10): 0 headache.     Neck pain 2 from moving dirt over the last several days.     Dizziness at random times, including sitting still.     Objective   BP (supine)   128/70   94% PO2, 67 HR   Cervical motion:   limited all planes.  Mild increase pain with ext and BSB      See Exercise, Manual, and Modality Logs for complete treatment.     Patient Education: complete ex at home 2x per day.     Assessment/Plan  Kwaku has returned to PT after seeing the neurologist.   He did receive 2 injections for greater and lesser occipital neuritis .   Since the injections the face numbness has resolved.  He has restricted active cervical motion with postural dysfunction.  PT to focus on the prior mentioned with progression into posterior trunk strengthening as tolerated.     Plan:   cont with current plan for cervical motion and postural control.   Re-ck vestibular if he reports dizziness.           Timed:         Manual Therapy:    14     mins  17565;     Therapeutic Exercise:    16     mins   37986;     Neuromuscular Leana:        mins  68532;    Therapeutic Activity:    8      mins  26665;     Gait Training:           mins  27991;     Ultrasound:          mins  10877;    Ionto                                   mins   30092  Self Care                            mins   63265      Un-Timed:  Electrical Stimulation:         mins  02177 ( );  Traction          mins 79588    No Charge:   [place]:        mins  Hydrocollator MHP:         mins      Timed Treatment:   38   mins   Total Treatment:     38   mins              Evon Moffett PT    Physical Therapist

## 2024-04-22 ENCOUNTER — TREATMENT (OUTPATIENT)
Dept: PHYSICAL THERAPY | Facility: CLINIC | Age: 59
End: 2024-04-22
Payer: COMMERCIAL

## 2024-04-22 DIAGNOSIS — M54.2 PAIN, NECK: Primary | ICD-10-CM

## 2024-04-22 DIAGNOSIS — M50.30 DDD (DEGENERATIVE DISC DISEASE), CERVICAL: ICD-10-CM

## 2024-04-22 PROCEDURE — 97140 MANUAL THERAPY 1/> REGIONS: CPT | Performed by: PHYSICAL THERAPIST

## 2024-04-22 PROCEDURE — 97530 THERAPEUTIC ACTIVITIES: CPT | Performed by: PHYSICAL THERAPIST

## 2024-04-22 PROCEDURE — 97110 THERAPEUTIC EXERCISES: CPT | Performed by: PHYSICAL THERAPIST

## 2024-04-22 NOTE — PROGRESS NOTES
Physical Therapy Daily Treatment Note      [facility]              [address], [address]                [facility] [place]  [address]        Patient: Hussein Nino   : 1965  Diagnosis/ICD-10 Code:  Pain, neck [M54.2]  Referring practitioner: Margarita Dickinson PA-C  Date of Initial Visit: Type: THERAPY  Noted: 2024  Today's Date: 2024  Patient seen for 2 sessions         Subjective    Hussein Nino reports: 1 episode of dizziness when he spun around at work.  It only last a few seconds.           Objective   See Exercise, Manual, and Modality Logs for complete treatment.     EY=507/72 mmHg    Assessment/Plan  Continued tightness noted in UT and MT greater on the (R) than (L).  Cues for posture with all exercises and pt able to return demonstrate improvements.  Progressed scapular strengthening as noted and pt able to return demonstrate proper mechanics.  Will monitor tolerance and progress as able.      Progress per Plan of Care, Progress Note Due next session.            Timed:  Manual Therapy:    10     mins  45946;  Therapeutic Exercise:    10     mins  24973;     Neuromuscular Leana:        mins  95332;    Therapeutic Activity:     8     mins  89261;     Gait Training:           mins  55736;     Ultrasound:          mins  04364;     Work Conditioning/Hardening (initial 2 hours)        mins  05007  Work Conditioning/Hardening (each add'l hour)        mins  90166    Untimed:   Electrical Stimulation:         mins  05274 ( );  Traction          mins 67309    Timed Treatment:   28   mins   Total Treatment:     28   mins    Bailey Nails PTA  Physical Therapist Assistant

## 2024-04-23 ENCOUNTER — OFFICE VISIT (OUTPATIENT)
Dept: INTERNAL MEDICINE | Facility: CLINIC | Age: 59
End: 2024-04-23
Payer: COMMERCIAL

## 2024-04-23 VITALS
BODY MASS INDEX: 31.97 KG/M2 | SYSTOLIC BLOOD PRESSURE: 128 MMHG | TEMPERATURE: 98.6 F | DIASTOLIC BLOOD PRESSURE: 72 MMHG | HEIGHT: 72 IN | WEIGHT: 236 LBS

## 2024-04-23 DIAGNOSIS — Z79.01 ANTICOAGULATED: Primary | ICD-10-CM

## 2024-04-23 DIAGNOSIS — I10 BENIGN ESSENTIAL HTN: ICD-10-CM

## 2024-04-23 LAB
BUN SERPL-MCNC: 21 MG/DL (ref 6–20)
BUN/CREAT SERPL: 13.6 (ref 7–25)
CALCIUM SERPL-MCNC: 9.4 MG/DL (ref 8.6–10.5)
CHLORIDE SERPL-SCNC: 107 MMOL/L (ref 98–107)
CO2 SERPL-SCNC: 24.9 MMOL/L (ref 22–29)
CREAT SERPL-MCNC: 1.54 MG/DL (ref 0.76–1.27)
EGFRCR SERPLBLD CKD-EPI 2021: 51.6 ML/MIN/1.73
GLUCOSE SERPL-MCNC: 115 MG/DL (ref 65–99)
POTASSIUM SERPL-SCNC: 3.9 MMOL/L (ref 3.5–5.2)
SODIUM SERPL-SCNC: 142 MMOL/L (ref 136–145)

## 2024-04-23 PROCEDURE — 99213 OFFICE O/P EST LOW 20 MIN: CPT | Performed by: PHYSICIAN ASSISTANT

## 2024-04-23 NOTE — PROGRESS NOTES
Subjective   Chief Complaint   Patient presents with    Hypertension     F/u [id number]       History of Present Illness        Pt is here today for fup on his HTN. Tolerating increased dose of Hydralazine. Continues to check his BP twice a day. Mostly wnl.  Does occasional miss some doses.     Patient Active Problem List   Diagnosis    Allergic rhinitis    Asthma    Gastroesophageal reflux disease    Hyperlipidemia    Irritable bowel syndrome    Raynaud's phenomenon    Essential hypertriglyceridemia    S/P AVR (aortic valve replacement)    Mechanical heart valve present    Anticoagulated    Benign essential HTN    Benign prostatic hyperplasia with nocturia    KVNG (obstructive sleep apnea)    Venous insufficiency    Tear of medial meniscus of right knee, current    Tear of lateral meniscus of right knee, current    Encounter for screening for malignant neoplasm of colon    History of colon polyps    RUTLEDGE (nonalcoholic steatohepatitis)    Stage 3a chronic kidney disease    Occipital neuralgia of right side    Abnormal brain MRI       Allergies   Allergen Reactions    Fish-Derived Products Anaphylaxis     Can eat shell fish without problem, but can't eat fish sandwich, cod, tuna or salmon. , is unable to take fish oil supplements.     Amlodipine Hives    Ondansetron Rash    Penicillins Rash       Current Outpatient Medications on File Prior to Visit   Medication Sig Dispense Refill    albuterol sulfate  (90 Base) MCG/ACT inhaler Inhale 2 puffs Every 4 (Four) Hours As Needed for Wheezing. 18 g 1    cloNIDine (Catapres) 0.1 MG tablet Take 1 tablet by mouth 3 times a day.      fenofibrate (TRICOR) 145 MG tablet TAKE 1 TABLET DAILY 90 tablet 3    hydrALAZINE (APRESOLINE) 50 MG tablet Take 1 tablet by mouth 3 (Three) Times a Day. 270 tablet 1    nebivolol (Bystolic) 10 MG tablet Take 1 tablet by mouth Daily. 90 tablet 1    omeprazole (priLOSEC) 40 MG capsule Take 1 capsule by mouth Daily. 90 capsule 3    pravastatin  (PRAVACHOL) 40 MG tablet Take 1 tablet by mouth Daily. 90 tablet 1    sildenafil (Viagra) 50 MG tablet Take 1 tablet by mouth Daily As Needed for Erectile Dysfunction. 10 tablet 0    warfarin (Jantoven) 5 MG tablet TAKE 1 TABLET DAILY OR AS  DIRECTED TO MAINTAIN       INTERNATIONAL NORMALIZED   RATIO 2.5-3.5 90 tablet 1    fluticasone (FLONASE) 50 MCG/ACT nasal spray 2 sprays into each nostril Daily. (Patient not taking: Reported on 2024) 1 each 3    [DISCONTINUED] triamcinolone (KENALOG) 0.5 % ointment Apply 1 application topically to the appropriate area as directed 2 (Two) Times a Day. (Patient not taking: Reported on 2024) 15 g 3     No current facility-administered medications on file prior to visit.       Past Medical History:   Diagnosis Date    Allergies     Aortic stenosis     SEVERE    Epistaxis     GERD (gastroesophageal reflux disease)     Gout     History of COVID-19     2020    Hyperlipidemia     IBS (irritable bowel syndrome)     Right knee meniscal tear     Sleep apnea     CPAP, DOES NOT USE       Family History   Problem Relation Age of Onset    Heart failure Mother     Kidney disease Mother     Hypertension Mother     Heart disease Mother     Alzheimer's disease Father     Thyroid disease Sister     Hypertension Sister     Hypertension Brother     Colon polyps Maternal Uncle     Irritable bowel syndrome Cousin     Malig Hyperthermia Neg Hx     Colon cancer Neg Hx     Crohn's disease Neg Hx     Ulcerative colitis Neg Hx        Social History     Socioeconomic History    Marital status:    Tobacco Use    Smoking status: Former     Current packs/day: 0.00     Average packs/day: 0.5 packs/day for 14.0 years (7.0 ttl pk-yrs)     Types: Cigarettes     Start date:      Quit date:      Years since quittin.3    Smokeless tobacco: Never    Tobacco comments:     QUIT AGE 23   Vaping Use    Vaping status: Never Used   Substance and Sexual Activity    Alcohol use: Yes      Comment: 1-2 PER WEEK    Drug use: No    Sexual activity: Defer       Past Surgical History:   Procedure Laterality Date    ADENOIDECTOMY      AORTIC VALVE REPAIR/REPLACEMENT  10/05/2016    mechanical valve    ASCENDING ARCH/HEMIARCH REPLACEMENT N/A 10/5/2016    Procedure: MIDLINE STERNOTOMY, AORTIC VALVE REPLACEMENT, ASCENDING AND HEMIARCH REPLACEMENT, REPAIR OF PERIVALVULAR LEAK, WITH NEURO MONITOIRING, INTRAOPERATIVE WARREN;  Surgeon: Sukhi Wilkinson MD;  Location: [facility] MAIN OR;  Service:     CARDIAC CATHETERIZATION N/A 9/27/2016    Procedure: Coronary angiography;  Surgeon: Lio Roman MD;  Location: [facility] CATH INVASIVE LOCATION;  Service:     CARDIAC CATHETERIZATION N/A 9/27/2016    Procedure: Left Heart Cath;  Surgeon: Lio Roman MD;  Location: [facility] CATH INVASIVE LOCATION;  Service:     CHOLECYSTECTOMY      COLONOSCOPY  12/212/2016    Dr. Palomares    COLONOSCOPY W/ POLYPECTOMY N/A 6/8/2022    Procedure: COLONOSCOPY WITH POLYPECTOMY;  Surgeon: Kana Chowdary MD;  Location: [facility] OR;  Service: Gastroenterology;  Laterality: N/A;  hemorrhoids  ascending colon polyp (cold snare)  transverse colon polyp (cold snare)    CORONARY ARTERY BYPASS GRAFT      ENDOSCOPY WITH POLYPECTOMY N/A 6/8/2022    Procedure: ESOPHAGOGASTRODUODENOSCOPY WITH POLYPECTOMY;  Surgeon: Kana Chowdary MD;  Location: [facility] OR;  Service: Gastroenterology;  Laterality: N/A;  gastric polyps    KNEE ARTHROSCOPY Right 9/7/2021    Procedure: RIGHT KNEE ARTHROSCOPY WITH PARTIAL MEDIAL AND LATERAL MENISECTOMY WITH DEBRIDEMENT OF ARTHRITIS ;  Surgeon: Ella Muller MD;  Location: [facility] OR OSC;  Service: Orthopedics;  Laterality: Right;    UPPER GASTROINTESTINAL ENDOSCOPY           The following portions of the patient's history were reviewed and updated as appropriate: problem list, allergies, current medications, past medical history, past family history, past social history, and past surgical  "history.    ROS    See HPI    Immunization History   Administered Date(s) Administered    COVID-19 (MODERNA) 1st,2nd,3rd Dose Monovalent 03/22/2021, 04/19/2021    COVID-19 (MODERNA) Monovalent Original Booster 01/20/2022    Fluzone (or Fluarix & Flulaval for VFC) >6mos 10/19/2019, 10/10/2020, 09/27/2022, 10/02/2023    Hepatitis A 09/16/2019, 06/18/2020    Pneumococcal Polysaccharide (PPSV23) 12/18/2020    Tdap 09/16/2019       Objective   Vitals:    04/23/24 0823   BP: 128/72   Temp: 98.6 °F (37 °C)   Weight: 107 kg (236 lb)   Height: 182.9 cm (72.01\")     Body mass index is 32 kg/m².  Physical Exam  Vitals reviewed.   Constitutional:       Appearance: He is well-developed.   HENT:      Head: Normocephalic and atraumatic.   Cardiovascular:      Rate and Rhythm: Normal rate and regular rhythm.      Heart sounds: Normal heart sounds, S1 normal and S2 normal.   Pulmonary:      Effort: Pulmonary effort is normal.      Breath sounds: Normal breath sounds.   Skin:     General: Skin is warm.   Neurological:      Mental Status: He is alert.   Psychiatric:         Behavior: Behavior normal.           Assessment & Plan   Diagnoses and all orders for this visit:    1. Anticoagulated (Primary)    2. Benign essential HTN  -     Basic Metabolic Panel       BP is finally well controlled. Can decrease frequency of checking BP. Will check BMP today. FUP in 3 mo.   Return in about 3 months (around 7/23/2024) for Lab Today.           "

## 2024-04-26 ENCOUNTER — ANTICOAGULATION VISIT (OUTPATIENT)
Dept: CARDIOLOGY | Facility: CLINIC | Age: 59
End: 2024-04-26
Payer: COMMERCIAL

## 2024-04-26 LAB
INR PPP: 3.1
INR PPP: 3.2

## 2024-04-29 ENCOUNTER — TREATMENT (OUTPATIENT)
Dept: PHYSICAL THERAPY | Facility: CLINIC | Age: 59
End: 2024-04-29
Payer: COMMERCIAL

## 2024-04-29 DIAGNOSIS — M50.30 DDD (DEGENERATIVE DISC DISEASE), CERVICAL: ICD-10-CM

## 2024-04-29 DIAGNOSIS — M54.2 PAIN, NECK: Primary | ICD-10-CM

## 2024-04-29 NOTE — PROGRESS NOTES
Physical Therapy 30-Day [place] and  Treatment Note  [address] [address] [address]  [place] [address]      Patient: Hussein Nino  : 1965  Referring Practitioner: Margarita Dickinson PA-C  Date of Initial Visit: Type: THERAPY  Noted: 2024  Today's Date: 2024  Patient seen for: 3 sessions      Visit Diagnoses:   Neck pain  M54.2  DDD cervical    M50.30     Subjective   Hussein Nino reports that his dizziness is erratic and wo consistency.   Neck pain   States that his BP is now under control   Pain   2/10 at [facility] highest, intermittent.  HA are seldom.     Face numbness has been alleviated.   Subjective Questionnaire:  NDI     Objective   FHP  29   Cervical ROM:   flex 60  ext 50   LSB  28   RSB 25   L rotation   55   R rotation  50    PIVM hypomobile throughout the cervical spine,    Mildly (+) VOR horizontal.    L DHP mild symptoms with immediate onset, duration 25 sec. No nystagmus  See Exercise, Manual, and Modality Logs for complete treatment.     Patient Education: review of neutral posture,     Assessment/Plan  Kwaku was seen on 3/26/24 for the initial evaluation for face numbness, neck pain and dizziness.  At that time, it was decided to hold on PT until he was seen by neurosurgery.    PT was able to start on 24 post that appt.   Kwaku does report the face numbness has mostly resolved.  He has had occasional bouts of dizziness.  He currently presents with cervical hypomobility, postural impairments, vestibular hypofunction and mild BPPV.  PT is indicated for continued progression toward the stated goals.     Patient goals:    1) minimize the symptoms    ST visits     1)  pain levels 0-6/10  met 24      2)  pnt to report a 30% reduction of dizziness and headaches.  Met 24     3)  improve quanity of sleep by 40% achieved 24     4)  pnt to demonstrate FHP < 9 degrees, no OA extension not met 24     LTG:   DC     1)  pain levles 0-3/10     2)  > 80%  improvement in UE radicular symptoms     3)  no dizzy events.      4)  MMT davey shldr 5/5 in order to resume prior work capacity     5)  cervical ROM WNLs and wo pain in order to complete tasks as turning head when driving, sleeping, dressing wo greater pain     6)  improve NDI score =/> 5 points ( eval score 6/50)      Plan:   cont with current tx plan for cervical on a consistent visit basis and PRN for the vertigo    Progress per Plan of Care    Timed:         Manual Therapy:         mins  39656;     Therapeutic Exercise:         mins  50275;     Neuromuscular Leana:        mins  67038;    Therapeutic Activity:     13     mins  73851;     Gait Training:           mins  25452;     Ultrasound:          mins  82415;    Ionto                                   mins   37260  Self Care                            mins   81619      Un-Timed:  Electrical Stimulation:         mins  74669 ( );  Traction          mins 90942  Canalith repositioning   16 min 19563        Timed Treatment:   13   mins   Total Treatment:     29   mins              Bailey Nails PTA    Physical Therapist

## 2024-04-29 NOTE — PROGRESS NOTES
Physical Therapy Daily Treatment Note      [facility] [place]              [address], [address]                [facility] [place]  [address]        Patient: Hussein Nino   : 1965  Diagnosis/ICD-10 Code:  No primary diagnosis found.  Referring practitioner: Margarita Dickinson PA-C  Date of Initial Visit: Type: THERAPY  Noted: 2024  Today's Date: 2024  Patient seen for 3 sessions         Subjective    Hussein Nino reports: the pain is inconsistent.  No pain right now.  Minor pain with sitting looking down at times. 1 or 2 episodes of dizziness when he has spun around.            Objective   See Exercise, Manual, and Modality Logs for complete treatment.     See Progress Note    Assessment/Plan  Tightness present in (L) UT however (B) cervical paraspinals WNL.  Progressed postural, shoulder, and scapular strengthening exercises as noted.    Plan in progress note portion.            Timed:  Manual Therapy:    8     mins  68902;  Therapeutic Exercise:    15     mins  54868;     Neuromuscular Leana:    10    mins  48780;    Therapeutic Activity:          mins  24816;     Gait Training:           mins  95840;     Ultrasound:          mins  00307;     Work Conditioning/Hardening (initial 2 hours)        mins  47019  Work Conditioning/Hardening (each add'l hour)        mins  28938    Untimed:   Electrical Stimulation:         mins  48320 ( );  Traction          mins 76144    Timed Treatment:   33   mins   Total Treatment:     33   mins    Bailey Nails PTA  Physical Therapist Assistant

## 2024-04-30 ENCOUNTER — TREATMENT (OUTPATIENT)
Dept: PHYSICAL THERAPY | Facility: CLINIC | Age: 59
End: 2024-04-30
Payer: COMMERCIAL

## 2024-04-30 DIAGNOSIS — M54.2 PAIN, NECK: Primary | ICD-10-CM

## 2024-04-30 DIAGNOSIS — M50.30 DDD (DEGENERATIVE DISC DISEASE), CERVICAL: ICD-10-CM

## 2024-04-30 PROCEDURE — 97112 NEUROMUSCULAR REEDUCATION: CPT | Performed by: PHYSICAL THERAPIST

## 2024-04-30 PROCEDURE — 97110 THERAPEUTIC EXERCISES: CPT | Performed by: PHYSICAL THERAPIST

## 2024-04-30 PROCEDURE — 97140 MANUAL THERAPY 1/> REGIONS: CPT | Performed by: PHYSICAL THERAPIST

## 2024-04-30 NOTE — PROGRESS NOTES
Physical Therapy Daily Treatment Note    Patient: Hussein Nino  : 1965  Referring practitioner: Margarita Dickinson PA-C  Today's Date: 2024    VISIT#: 4      Subjective   Hussein Nino reports: Doing ok, neck a little sore from the exercises yesterday but doing ok.       Objective     See Exercise, Manual, and Modality Logs for complete treatment.     Patient Education: continue HEP.     Assessment/Plan  Continued manual therapy and exercises as tolerated. Good relief with SOR. Focus more on flexibility exercises today due to some soreness after yesterdays session.     Progress per Plan of Care            Timed:         Manual Therapy:    10     mins  53023;     Therapeutic Exercise:    12     mins  30413;     Neuromuscular Leana:    8    mins  89879;    Therapeutic Activity:          mins  93108;     Gait Training:           mins  21639;     Ultrasound:          mins  01618;    Ionto:                                   mins   88423  Self Care:                            mins   17953    Un-Timed:  Electrical Stimulation:         mins  53967 (MC );  Dry Needling          mins self-pay  Traction          mins 97915  Re-Eval                               mins  31742    Timed Treatment:   30   mins   Total Treatment:     30   mins    Ijeoma Napoles, PT  Physical Therapist  [place] PT license #: [id number]  [place] PT license #: [id number]

## 2024-05-13 ENCOUNTER — TREATMENT (OUTPATIENT)
Dept: PHYSICAL THERAPY | Facility: CLINIC | Age: 59
End: 2024-05-13
Payer: COMMERCIAL

## 2024-05-13 DIAGNOSIS — M50.30 DDD (DEGENERATIVE DISC DISEASE), CERVICAL: ICD-10-CM

## 2024-05-13 DIAGNOSIS — M54.2 PAIN, NECK: Primary | ICD-10-CM

## 2024-05-13 PROCEDURE — 97530 THERAPEUTIC ACTIVITIES: CPT | Performed by: PHYSICAL THERAPIST

## 2024-05-13 PROCEDURE — 97110 THERAPEUTIC EXERCISES: CPT | Performed by: PHYSICAL THERAPIST

## 2024-05-13 NOTE — PROGRESS NOTES
Physical Therapy Daily Treatment Note      [facility] [place]              [address], [address]                [place]  [address]        Patient: Hussein Nino   : 1965  Diagnosis/ICD-10 Code:  Pain, neck [M54.2]  Referring practitioner: Margarita Dickinson PA-C  Date of Initial Visit: Type: THERAPY  Noted: 2024  Today's Date: 2024  Patient seen for 5 sessions         Subjective    Hussein Nino reports: his head and neck are doing good.  No issues.  He said he is tired and trying to get over jet lag.  He said he tired to do his exercises when he was walking around.            Objective   See Exercise, Manual, and Modality Logs for complete treatment.       Assessment/Plan  Pt was out of town for ~2 weeks since last session and reported no significant pain or issues with head/neck. Held manual d/t this and focused on strengthening.  He was able to progress without increased complaints of pain.  Good activation/posture with cervical and scapular strengthening.  Will continue to monitor and progress exercises as able and reintroduce manual if necessary.      Progress per Plan of Care           Timed:  Manual Therapy:         mins  60614;  Therapeutic Exercise:    10     mins  19823;     Neuromuscular Leana:        mins  90469;    Therapeutic Activity:     10     mins  00907;     Gait Training:           mins  79729;     Ultrasound:          mins  62627;     Work Conditioning/Hardening (initial 2 hours)        mins  93130  Work Conditioning/Hardening (each add'l hour)        mins  87695    Untimed:   Electrical Stimulation:         mins  49124 ( );  Traction          mins 77627    Timed Treatment:   20   mins   Total Treatment:     20   mins    Bailey Nails PTA  Physical Therapist Assistant

## 2024-05-14 ENCOUNTER — ANTICOAGULATION VISIT (OUTPATIENT)
Dept: CARDIOLOGY | Facility: CLINIC | Age: 59
End: 2024-05-14
Payer: COMMERCIAL

## 2024-05-14 LAB — INR PPP: 2.6

## 2024-05-16 ENCOUNTER — TREATMENT (OUTPATIENT)
Dept: PHYSICAL THERAPY | Facility: CLINIC | Age: 59
End: 2024-05-16
Payer: COMMERCIAL

## 2024-05-16 DIAGNOSIS — M54.2 PAIN, NECK: Primary | ICD-10-CM

## 2024-05-16 DIAGNOSIS — R42 VERTIGO: ICD-10-CM

## 2024-05-16 DIAGNOSIS — M50.30 DDD (DEGENERATIVE DISC DISEASE), CERVICAL: ICD-10-CM

## 2024-05-16 PROCEDURE — 97530 THERAPEUTIC ACTIVITIES: CPT | Performed by: PHYSICAL THERAPIST

## 2024-05-16 PROCEDURE — 97110 THERAPEUTIC EXERCISES: CPT | Performed by: PHYSICAL THERAPIST

## 2024-05-16 NOTE — PROGRESS NOTES
Physical Therapy Daily Treatment Note      [facility] [place]              [address], [address]                [facility] [place]  [address]        Patient: Hussein Nino   : 1965  Diagnosis/ICD-10 Code:  Pain, neck [M54.2]  Referring practitioner: Margarita Dickinson PA-C  Date of Initial Visit: Type: THERAPY  Noted: 2024  Today's Date: 2024  Patient seen for 6 sessions         Subjective    Hussein Nino reports: he is doing good.  He was able to do a virtual reality game yesterday and he was fine.  He said he hasn't had any recent dizzy spells.           Objective   See Exercise, Manual, and Modality Logs for complete treatment.     Forward head posture    Assessment/Plan  No pain or symptoms in last week.  Reviewed and updated HEP.  Good technique noted with exercises.  No questions or concerns with exercises.  Per consult with PT, pt to return in 1 week with possible D/C at that time if symptoms remain controlled.  If symptoms return or problems arise will readdress with PT if necessary.    Progress strengthening /stabilization /functional activity           Timed:  Manual Therapy:         mins  51731;  Therapeutic Exercise:    10     mins  96969;     Neuromuscular Leana:        mins  87680;    Therapeutic Activity:     10     mins  35062;     Gait Training:           mins  04620;     Ultrasound:          mins  83800;     Work Conditioning/Hardening (initial 2 hours)        mins  55710  Work Conditioning/Hardening (each add'l hour)        mins  62246    Untimed:   Electrical Stimulation:         mins  89370 (MC );  Traction          mins 08630    Timed Treatment:   20   mins   Total Treatment:     20   mins    Bailey Nails PTA  Physical Therapist Assistant

## 2024-05-16 NOTE — PATIENT INSTRUCTIONS
Access Code: [email or web address]  URL: [email or web address]  Date: 05/16/2024  Prepared by: Bailey Nails    Exercises  - Seated Levator Scapulae Stretch  - 2 x daily - 7 x weekly - 1 sets - 2-3 reps - 20 hold  - Seated Cervical Sidebending Stretch  - 2 x daily - 7 x weekly - 1 sets - 2-3 reps - 20 hold  - Seated Scapular Retraction  - 1 x daily - 7 x weekly - 1 sets - 10 reps - 5 hold  - STAND cervical retraction with towel   - 2 x daily - 7 x weekly - 1 sets - 10 reps - 5 hold  - Supine Deep Neck Flexor Training - Repetitions  - 1 x daily - 7 x weekly - 1 sets - 10 reps - 5 hold  - neck windshield wiper   - 2 x daily - 7 x weekly - 1 sets - 10 reps  - Cervical Retraction at Wall  - 2 x daily - 7 x weekly - 1 sets - 10 reps - 5 hold  - Shoulder External Rotation and Scapular Retraction with Resistance  - 1 x daily - 7 x weekly - 1 sets - 15 reps  - Standing Row with Anchored Resistance  - 1 x daily - 7 x weekly - 1 sets - 15 reps - 5 hold  - Shoulder extension with resistance - Neutral  - 1 x daily - 7 x weekly - 1 sets - 15 reps - 5 hold  - Standing Backward Shoulder Rolls  - 1 x daily - 7 x weekly - 1 sets - 20 reps  - Standing Shoulder Retraction and Depression  - 1 x daily - 7 x weekly - 1 sets - 20 reps  - Standing Shoulder Flexion to 90 Degrees with Dumbbells  - 1 x daily - 7 x weekly - 1 sets - 10 reps  - Shoulder Abduction with Dumbbells - Thumbs Up  - 1 x daily - 7 x weekly - 1 sets - 20 reps  - Scaption with Dumbbells  - 1 x daily - 7 x weekly - 1 sets - 20 reps

## 2024-05-20 ENCOUNTER — OFFICE VISIT (OUTPATIENT)
Dept: CARDIOLOGY | Facility: CLINIC | Age: 59
End: 2024-05-20
Payer: COMMERCIAL

## 2024-05-20 VITALS
DIASTOLIC BLOOD PRESSURE: 68 MMHG | BODY MASS INDEX: 31.29 KG/M2 | WEIGHT: 231 LBS | SYSTOLIC BLOOD PRESSURE: 119 MMHG | HEART RATE: 66 BPM | HEIGHT: 72 IN

## 2024-05-20 DIAGNOSIS — Z95.2 MECHANICAL HEART VALVE PRESENT: ICD-10-CM

## 2024-05-20 DIAGNOSIS — Z79.01 CHRONIC ANTICOAGULATION: ICD-10-CM

## 2024-05-20 DIAGNOSIS — R06.02 SOB (SHORTNESS OF BREATH): ICD-10-CM

## 2024-05-20 DIAGNOSIS — Z95.2 S/P AVR (AORTIC VALVE REPLACEMENT): ICD-10-CM

## 2024-05-20 DIAGNOSIS — R94.31 ABNORMAL EKG: Primary | ICD-10-CM

## 2024-05-20 NOTE — PROGRESS NOTES
Subjective:        Hussein Nino is a 58 y.o. male who here for follow up    CC  Sob on excertion    Micro brain bleed  S/p mechnical aortic valve  HPI  58-year-old male with mechanical aortic valve recently has been complaining of shortness of breath patient also had MRI of the brain done which showed punctate hemorrhages, and was advised to discussed the role of anticoagulation     Problems Addressed this Visit          Cardiac and Vasculature    S/P AVR (aortic valve replacement)    Mechanical heart valve present       Coag and Thromboembolic    Chronic anticoagulation     Other Visit Diagnoses       Abnormal EKG    -  Primary    Relevant Orders    Stress Test With Myocardial Perfusion One Day    Adult Transthoracic Echo Complete W/ Cont if Necessary Per Protocol    SOB (shortness of breath)        Relevant Orders    Stress Test With Myocardial Perfusion One Day    Adult Transthoracic Echo Complete W/ Cont if Necessary Per Protocol          Diagnoses         Codes Comments    Abnormal EKG    -  Primary ICD-10-CM: R94.31  ICD-9-CM: 794.31     SOB (shortness of breath)     ICD-10-CM: R06.02  ICD-9-CM: 786.05     Mechanical heart valve present     ICD-10-CM: Z95.2  ICD-9-CM: V43.3     S/P AVR (aortic valve replacement)     ICD-10-CM: Z95.2  ICD-9-CM: V43.3     Chronic anticoagulation     ICD-10-CM: Z79.01  ICD-9-CM: V58.61           .    The following portions of the patient's history were reviewed and updated as appropriate: allergies, current medications, past family history, past medical history, past social history, past surgical history and problem list.    Past Medical History:   Diagnosis Date    Allergies     Aortic stenosis     SEVERE    Epistaxis     GERD (gastroesophageal reflux disease)     Gout     History of COVID-19     NOV 2020    Hyperlipidemia     IBS (irritable bowel syndrome)     Right knee meniscal tear     Sleep apnea     CPAP, DOES NOT USE     reports that he quit smoking about 41 years  "ago. His smoking use included cigarettes. He started smoking about 55 years ago. He has a 7 pack-year smoking history. He has been exposed to tobacco smoke. He has never used smokeless tobacco. He reports current alcohol use. He reports that he does not use drugs.   Family History   Problem Relation Age of Onset    Heart failure Mother     Kidney disease Mother     Hypertension Mother     Heart disease Mother     Alzheimer's disease Father     Thyroid disease Sister     Hypertension Sister     Hypertension Brother     Colon polyps Maternal Uncle     Irritable bowel syndrome Cousin     Malig Hyperthermia Neg Hx     Colon cancer Neg Hx     Crohn's disease Neg Hx     Ulcerative colitis Neg Hx        Review of Systems  Constitutional: No wt loss, fever, fatigue  Gastrointestinal: No nausea, abdominal pain  Behavioral/Psych: No insomnia or anxiety   Cardiovascular shortness of breath on exertion  Objective:       Physical Exam  /68 (BP Location: Left arm, Patient Position: Sitting, Cuff Size: Adult)   Pulse 66   Ht 182.9 cm (72\")   Wt 105 kg (231 lb)   BMI 31.33 kg/m²   General appearance: No acute changes   Neck: Trachea midline; NECK, supple, no thyromegaly or lymphadenopathy   Lungs: Normal size and shape, normal breath sounds, equal distribution of air, no rales and rhonchi   CV: Mechanical heart sounds  Abdomen: Soft, nontender; no masses , no abnormal abdominal sounds   Extremities: No deformity , normal color , no peripheral edema   Skin: Normal temperature, turgor and texture; no rash, ulcers            ECG 12 Lead    Date/Time: 5/20/2024 9:09 AM  Performed by: Sofya Eddy MD    Authorized by: Sofya Eddy MD  Comparison: compared with previous ECG   Similar to previous ECG  Rhythm: sinus rhythm    Clinical impression: non-specific ECG            Echocardiogram:    Results for orders placed in visit on 06/15/21    Adult Transthoracic Echo Complete W/ Cont if Necessary Per " Protocol    Interpretation Summary  · The left ventricular cavity is mildly dilated.  · Calculated left ventricular EF = 58.7% Estimated left ventricular EF was in agreement with the calculated left ventricular EF.  · Left ventricular diastolic function was normal.  · The right ventricular cavity is borderline dilated.  · Left atrial volume is mildly increased.  · The right atrial cavity is mildly dilated.  · There is no evidence of pericardial effusion. .          Current Outpatient Medications:     albuterol sulfate  (90 Base) MCG/ACT inhaler, Inhale 2 puffs Every 4 (Four) Hours As Needed for Wheezing., Disp: 18 g, Rfl: 1    cloNIDine (Catapres) 0.1 MG tablet, Take 1 tablet by mouth 3 times a day., Disp: , Rfl:     fenofibrate (TRICOR) 145 MG tablet, TAKE 1 TABLET DAILY, Disp: 90 tablet, Rfl: 3    hydrALAZINE (APRESOLINE) 50 MG tablet, Take 1 tablet by mouth 3 (Three) Times a Day., Disp: 270 tablet, Rfl: 1    omeprazole (priLOSEC) 40 MG capsule, Take 1 capsule by mouth Daily., Disp: 90 capsule, Rfl: 3    pravastatin (PRAVACHOL) 40 MG tablet, Take 1 tablet by mouth Daily., Disp: 90 tablet, Rfl: 1    sildenafil (Viagra) 50 MG tablet, Take 1 tablet by mouth Daily As Needed for Erectile Dysfunction., Disp: 10 tablet, Rfl: 0    warfarin (Jantoven) 5 MG tablet, TAKE 1 TABLET DAILY OR AS  DIRECTED TO MAINTAIN       INTERNATIONAL NORMALIZED   RATIO 2.5-3.5, Disp: 90 tablet, Rfl: 1   Assessment:                Plan:          ICD-10-CM ICD-9-CM   1. Abnormal EKG  R94.31 794.31   2. SOB (shortness of breath)  R06.02 786.05   3. Mechanical heart valve present  Z95.2 V43.3   4. S/P AVR (aortic valve replacement)  Z95.2 V43.3   5. Chronic anticoagulation  Z79.01 V58.61     1. SOB (shortness of breath)  Considering the patient's symptoms as well as clinical situation and  EKG findings, along with cardiac risk factors, ischemic workup is necessary to rule out ischemic cardiomyopathy, stress induced arrhythmias, and  functional capacity for diagnosis as well as prognostic consideration    - Stress Test With Myocardial Perfusion One Day  - Adult Transthoracic Echo Complete W/ Cont if Necessary Per Protocol    2. Abnormal EKG  Considering patient's medical condition as well as the risk factors, patient will require echocardiogram for further evaluation for the LV function, four-chamber evaluation, including the pressures, valvular function and  pericardial disease and pericardial effusion    - Stress Test With Myocardial Perfusion One Day  - Adult Transthoracic Echo Complete W/ Cont if Necessary Per Protocol    3. Mechanical heart valve present  Patient has been advised that if anticoagulation needs to be stopped patient needs to switch from mechanical valve to tissue valve    4. S/P AVR (aortic valve replacement)  May need to switch over to the tissue valve if we need to stop the anticoagulation    5.  Chronic Coumadin therapy         If we need to stop patient will need to change valve from mechanical to tissue    ADVISED TO HAVE DISCUSSION WITH NEURO SURG AND NEURO TO SEE IF MECHANICAL AORTIC VALVE NEEDS TO BE REPLACED    [facility]  COUNSELING:    Hussein Groveseling was given to patient for the following topics: diagnostic results, risk factor reductions, impressions, risks and benefits of treatment options and importance of treatment compliance .       SMOKING COUNSELING:        Dictated using Dragon dictation

## 2024-05-22 ENCOUNTER — TELEPHONE (OUTPATIENT)
Dept: CARDIOLOGY | Facility: CLINIC | Age: 59
End: 2024-05-22
Payer: COMMERCIAL

## 2024-05-22 RX ORDER — ALBUTEROL SULFATE 90 UG/1
2 AEROSOL, METERED RESPIRATORY (INHALATION) EVERY 4 HOURS PRN
Qty: 18 G | Refills: 1 | Status: SHIPPED | OUTPATIENT
Start: 2024-05-22

## 2024-05-22 NOTE — TELEPHONE ENCOUNTER
SPOKE WITH PATIENT HE HAS TALKED TO HIS NEURO   HE IS SCHEDULED FOR TESTING ON 6/4 AND THEN FOLLOW UP WITH DR ERNST ON 6/10 PATIENT WILL MAKE A LIST OF QUESTIONS AND ADDRESS WITH DR MCKEON THEN

## 2024-05-23 ENCOUNTER — TREATMENT (OUTPATIENT)
Dept: PHYSICAL THERAPY | Facility: CLINIC | Age: 59
End: 2024-05-23
Payer: COMMERCIAL

## 2024-05-23 DIAGNOSIS — M54.2 PAIN, NECK: Primary | ICD-10-CM

## 2024-05-23 DIAGNOSIS — M50.30 DDD (DEGENERATIVE DISC DISEASE), CERVICAL: ICD-10-CM

## 2024-05-23 PROCEDURE — 97110 THERAPEUTIC EXERCISES: CPT | Performed by: PHYSICAL THERAPIST

## 2024-05-23 PROCEDURE — 97530 THERAPEUTIC ACTIVITIES: CPT | Performed by: PHYSICAL THERAPIST

## 2024-05-23 NOTE — PROGRESS NOTES
Physical Therapy Daily Treatment Note      [facility] [employer]              [address], [address]                [facility] [place]  [address]        Patient: Hussein Nino   : 1965  Diagnosis/ICD-10 Code:  Pain, neck [M54.2]  Referring practitioner: Margarita Dickinson PA-C  Date of Initial Visit: Type: THERAPY  Noted: 2024  Today's Date: 2024  Patient seen for 7 sessions         Subjective    Hussein Nino reports: he pulled a muscle in his back and he thinks that tightened everything up.  He said he pulled too much with the red strap.  It is pretty much back to normal.  He said his head and neck are good.  He has a sinus headache today.  He is ok with D/C today.    NDI=2% disabled    Current pain=0/10, no pain in the last week     Objective          Active Range of Motion   Cervical/Thoracic Spine   Cervical    Flexion: WFL  Extension: WFL  Left lateral flexion: 12 degrees   Right lateral flexion: 18 degrees   Left rotation: 52 degrees   Right rotation: 55 degrees     Strength/Myotome Testing     Left Shoulder     Planes of Motion   Flexion: 5   Extension: 5   Abduction: 5   External rotation at 0°: 5   Internal rotation at 0°: 5     Isolated Muscles   Supraspinatus: 5     Right Shoulder     Planes of Motion   Flexion: 5   Extension: 5   Abduction: 5   External rotation at 0°: 5   Internal rotation at 0°: 5     Isolated Muscles   Supraspinatus: 5       See Exercise, Manual, and Modality Logs for complete treatment.       Assessment/Plan  Reviewed several exercises and pt reported no questions or concerns.  He was able to demonstrate good technique with all exercises.  No complaints or concerns with daily routine.      Discharged to [facility].    Patient goals:    1) minimize the symptoms    ST visits     1)  pain levels 0-6/10  met 24      2)  pnt to report a 30% reduction of dizziness and headaches.  Met 24     3)  improve quanity of sleep by 40% achieved 24     4)  pnt to demonstrate FHP  < 9 degrees, no OA extension not met 4/29/24     LTG:   DC     1)  pain levles 0-3/10 MET     2)  > 80% improvement in UE radicular symptoms MET     3)  no dizzy events. MET      4)  MMT davey shldr 5/5 in order to resume prior work capacity MET     5)  cervical ROM WNLs and wo pain in order to complete tasks as turning head when driving, sleeping, dressing wo greater pain PARTIALLY MET-Flex/ext ROM and functional aspects of goal met     6)  improve NDI score =/> 5 points ( eval score 6/50) MET       Timed:  Manual Therapy:         mins  77086;  Therapeutic Exercise:    10     mins  29288;     Neuromuscular Leana:        mins  36555;    Therapeutic Activity:     15     mins  24455;     Gait Training:           mins  05550;     Ultrasound:          mins  88517;     Work Conditioning/Hardening (initial 2 hours)        mins  13875  Work Conditioning/Hardening (each add'l hour)        mins  63125    Untimed:   Electrical Stimulation:         mins  16811 (MC );  Traction          mins 37685    Timed Treatment:   25   mins   Total Treatment:     25   mins    Bailey Nails PTA  Physical Therapist Assistant

## 2024-05-28 ENCOUNTER — ANTICOAGULATION VISIT (OUTPATIENT)
Dept: CARDIOLOGY | Facility: CLINIC | Age: 59
End: 2024-05-28
Payer: COMMERCIAL

## 2024-05-28 LAB — INR PPP: 2.5

## 2024-06-04 ENCOUNTER — HOSPITAL ENCOUNTER (OUTPATIENT)
Dept: CARDIOLOGY | Facility: HOSPITAL | Age: 59
Discharge: HOME | End: 2024-06-04
Payer: COMMERCIAL

## 2024-06-04 ENCOUNTER — HOSPITAL ENCOUNTER (OUTPATIENT)
Dept: CARDIOLOGY | Facility: HOSPITAL | Age: 59
Discharge: HOME | End: 2024-06-04
Admitting: INTERNAL MEDICINE
Payer: COMMERCIAL

## 2024-06-04 ENCOUNTER — OFFICE VISIT (OUTPATIENT)
Dept: INTERNAL MEDICINE | Facility: CLINIC | Age: 59
End: 2024-06-04
Payer: COMMERCIAL

## 2024-06-04 ENCOUNTER — TELEPHONE (OUTPATIENT)
Dept: INTERNAL MEDICINE | Facility: CLINIC | Age: 59
End: 2024-06-04

## 2024-06-04 VITALS
BODY MASS INDEX: 31.29 KG/M2 | SYSTOLIC BLOOD PRESSURE: 126 MMHG | WEIGHT: 231 LBS | HEART RATE: 95 BPM | HEIGHT: 72 IN | OXYGEN SATURATION: 97 % | DIASTOLIC BLOOD PRESSURE: 73 MMHG

## 2024-06-04 VITALS
DIASTOLIC BLOOD PRESSURE: 80 MMHG | BODY MASS INDEX: 31.29 KG/M2 | SYSTOLIC BLOOD PRESSURE: 130 MMHG | HEIGHT: 72 IN | HEART RATE: 104 BPM | WEIGHT: 231 LBS

## 2024-06-04 DIAGNOSIS — E78.2 MIXED HYPERLIPIDEMIA: ICD-10-CM

## 2024-06-04 DIAGNOSIS — K58.0 IRRITABLE BOWEL SYNDROME WITH DIARRHEA: Primary | ICD-10-CM

## 2024-06-04 LAB
AORTIC ARCH: 2.8 CM
AORTIC DIMENSIONLESS INDEX: 0.5 (DI)
ASCENDING AORTA: 2.8 CM
BH CV ECHO MEAS - ACS: 1.8 CM
BH CV ECHO MEAS - AO MAX PG: 15.6 MMHG
BH CV ECHO MEAS - AO MEAN PG: 9.3 MMHG
BH CV ECHO MEAS - AO ROOT DIAM: 3.2 CM
BH CV ECHO MEAS - AO V2 MAX: 197.8 CM/SEC
BH CV ECHO MEAS - AO V2 VTI: 29.9 CM
BH CV ECHO MEAS - AVA(I,D): 1.37 CM2
BH CV ECHO MEAS - EDV(CUBED): 103.7 ML
BH CV ECHO MEAS - EDV(MOD-SP2): 108 ML
BH CV ECHO MEAS - EDV(MOD-SP4): 115 ML
BH CV ECHO MEAS - EF(MOD-BP): 63.7 %
BH CV ECHO MEAS - EF(MOD-SP2): 63.9 %
BH CV ECHO MEAS - EF(MOD-SP4): 61.7 %
BH CV ECHO MEAS - ESV(CUBED): 30.8 ML
BH CV ECHO MEAS - ESV(MOD-SP2): 39 ML
BH CV ECHO MEAS - ESV(MOD-SP4): 44 ML
BH CV ECHO MEAS - FS: 33.3 %
BH CV ECHO MEAS - IVS/LVPW: 0.96 CM
BH CV ECHO MEAS - IVSD: 1.17 CM
BH CV ECHO MEAS - LAT PEAK E' VEL: 7.8 CM/SEC
BH CV ECHO MEAS - LV DIASTOLIC VOL/BSA (35-75): 50.8 CM2
BH CV ECHO MEAS - LV MASS(C)D: 210.2 GRAMS
BH CV ECHO MEAS - LV MAX PG: 2.6 MMHG
BH CV ECHO MEAS - LV MEAN PG: 1.35 MMHG
BH CV ECHO MEAS - LV SYSTOLIC VOL/BSA (12-30): 19.4 CM2
BH CV ECHO MEAS - LV V1 MAX: 81 CM/SEC
BH CV ECHO MEAS - LV V1 VTI: 14.6 CM
BH CV ECHO MEAS - LVIDD: 4.7 CM
BH CV ECHO MEAS - LVIDS: 3.1 CM
BH CV ECHO MEAS - LVOT AREA: 2.8 CM2
BH CV ECHO MEAS - LVOT DIAM: 1.89 CM
BH CV ECHO MEAS - LVPWD: 1.22 CM
BH CV ECHO MEAS - MED PEAK E' VEL: 5.1 CM/SEC
BH CV ECHO MEAS - MV A DUR: 0.09 SEC
BH CV ECHO MEAS - MV A MAX VEL: 77.7 CM/SEC
BH CV ECHO MEAS - MV DEC SLOPE: 355.8 CM/SEC2
BH CV ECHO MEAS - MV DEC TIME: 0.17 SEC
BH CV ECHO MEAS - MV E MAX VEL: 59.6 CM/SEC
BH CV ECHO MEAS - MV E/A: 0.77
BH CV ECHO MEAS - MV MAX PG: 2.45 MMHG
BH CV ECHO MEAS - MV MEAN PG: 1.49 MMHG
BH CV ECHO MEAS - MV P1/2T: 53.6 MSEC
BH CV ECHO MEAS - MV V2 VTI: 14.6 CM
BH CV ECHO MEAS - MVA(P1/2T): 4.1 CM2
BH CV ECHO MEAS - MVA(VTI): 2.8 CM2
BH CV ECHO MEAS - PA ACC TIME: 0.06 SEC
BH CV ECHO MEAS - PA V2 MAX: 79.1 CM/SEC
BH CV ECHO MEAS - RAP SYSTOLE: 3 MMHG
BH CV ECHO MEAS - RV MAX PG: 1.29 MMHG
BH CV ECHO MEAS - RV V1 MAX: 56.9 CM/SEC
BH CV ECHO MEAS - RV V1 VTI: 11 CM
BH CV ECHO MEAS - RVSP: 22 MMHG
BH CV ECHO MEAS - SV(LVOT): 40.9 ML
BH CV ECHO MEAS - SV(MOD-SP2): 69 ML
BH CV ECHO MEAS - SV(MOD-SP4): 71 ML
BH CV ECHO MEAS - SVI(LVOT): 18 ML/M2
BH CV ECHO MEAS - SVI(MOD-SP2): 30.5 ML/M2
BH CV ECHO MEAS - SVI(MOD-SP4): 31.3 ML/M2
BH CV ECHO MEAS - TAPSE (>1.6): 1.66 CM
BH CV ECHO MEAS - TR MAX PG: 19.4 MMHG
BH CV ECHO MEAS - TR MAX VEL: 220.2 CM/SEC
BH CV ECHO MEASUREMENTS AVERAGE E/E' RATIO: 9.24
BH CV XLRA - RV BASE: 3.8 CM
BH CV XLRA - RV MID: 2.4 CM
BH CV XLRA - TDI S': 12.1 CM/SEC
LEFT ATRIUM VOLUME INDEX: 20.5 ML/M2
SINUS: 3.1 CM
STJ: 2.9 CM

## 2024-06-04 PROCEDURE — 25510000001 PERFLUTREN (DEFINITY) 8.476 MG IN SODIUM CHLORIDE (PF) 0.9 % 10 ML INJECTION: Performed by: INTERNAL MEDICINE

## 2024-06-04 PROCEDURE — 93306 TTE W/DOPPLER COMPLETE: CPT

## 2024-06-04 PROCEDURE — 93306 TTE W/DOPPLER COMPLETE: CPT | Performed by: INTERNAL MEDICINE

## 2024-06-04 PROCEDURE — 99213 OFFICE O/P EST LOW 20 MIN: CPT | Performed by: PHYSICIAN ASSISTANT

## 2024-06-04 PROCEDURE — 0 TECHNETIUM SESTAMIBI: Performed by: INTERNAL MEDICINE

## 2024-06-04 PROCEDURE — 78452 HT MUSCLE IMAGE SPECT MULT: CPT

## 2024-06-04 PROCEDURE — A9500 TC99M SESTAMIBI: HCPCS | Performed by: INTERNAL MEDICINE

## 2024-06-04 PROCEDURE — 93017 CV STRESS TEST TRACING ONLY: CPT

## 2024-06-04 RX ORDER — MONTELUKAST SODIUM 4 MG/1
1 TABLET, CHEWABLE ORAL 2 TIMES DAILY
Qty: 60 TABLET | Refills: 0 | Status: SHIPPED | OUTPATIENT
Start: 2024-06-04

## 2024-06-04 RX ORDER — MONTELUKAST SODIUM 4 MG/1
1 TABLET, CHEWABLE ORAL 2 TIMES DAILY
Qty: 60 TABLET | Refills: 0 | Status: SHIPPED | OUTPATIENT
Start: 2024-06-04 | End: 2024-06-04

## 2024-06-04 RX ORDER — PROMETHAZINE HYDROCHLORIDE 25 MG/1
25 TABLET ORAL EVERY 6 HOURS PRN
Qty: 30 TABLET | Refills: 0 | Status: SHIPPED | OUTPATIENT
Start: 2024-06-04 | End: 2024-06-04

## 2024-06-04 RX ORDER — PRAVASTATIN SODIUM 40 MG
40 TABLET ORAL DAILY
Qty: 90 TABLET | Refills: 1 | Status: SHIPPED | OUTPATIENT
Start: 2024-06-04

## 2024-06-04 RX ORDER — PROMETHAZINE HYDROCHLORIDE 25 MG/1
25 TABLET ORAL EVERY 6 HOURS PRN
Qty: 30 TABLET | Refills: 0 | Status: SHIPPED | OUTPATIENT
Start: 2024-06-04

## 2024-06-04 RX ADMIN — SODIUM CHLORIDE 3 ML: 9 INJECTION INTRAMUSCULAR; INTRAVENOUS; SUBCUTANEOUS at 07:50

## 2024-06-04 RX ADMIN — TECHNETIUM TC 99M SESTAMIBI 1 DOSE: 1 INJECTION INTRAVENOUS at 08:45

## 2024-06-04 RX ADMIN — TECHNETIUM TC 99M SESTAMIBI 1 DOSE: 1 INJECTION INTRAVENOUS at 07:00

## 2024-06-04 NOTE — TELEPHONE ENCOUNTER
Patient c/o: IBS symptoms (down 12 pounds), he made an appointment for today @ 3:15, he really would like to have medication called in if possible, please advise [phone]

## 2024-06-04 NOTE — PROGRESS NOTES
Subjective   Chief Complaint   Patient presents with    Diarrhea     Lost 12lbs per patient    Nausea       History of Present Illness     Pt is here today with diarrhea and nausea for the last 3 days. Has lost 12 lbs over this time period. Has a hx of IBS. Has had flares like this in the past and usually comes on quickly and lasts a few days. No bloody diarrhea. No fever or chills. No abdominal pain or cramping. Is maintaining hydration. Able to eat a little bland food. Last episode 3 years ago.      Patient Active Problem List   Diagnosis    Allergic rhinitis    Asthma    Gastroesophageal reflux disease    Hyperlipidemia    Irritable bowel syndrome    Raynaud's phenomenon    Essential hypertriglyceridemia    S/P AVR (aortic valve replacement)    Mechanical heart valve present    Chronic anticoagulation    Benign essential HTN    Benign prostatic hyperplasia with nocturia    KVNG (obstructive sleep apnea)    Venous insufficiency    Tear of medial meniscus of right knee, current    Tear of lateral meniscus of right knee, current    Encounter for screening for malignant neoplasm of colon    History of colon polyps    RUTLEDGE (nonalcoholic steatohepatitis)    Stage 3a chronic kidney disease    Occipital neuralgia of right side    Abnormal brain MRI       Allergies   Allergen Reactions    Fish-Derived Products Anaphylaxis     Can eat shell fish without problem, but can't eat fish sandwich, cod, tuna or salmon. , is unable to take fish oil supplements.     Amlodipine Hives    Ondansetron Rash    Penicillins Rash       Current Outpatient Medications on File Prior to Visit   Medication Sig Dispense Refill    albuterol sulfate  (90 Base) MCG/ACT inhaler Inhale 2 puffs Every 4 (Four) Hours As Needed for Wheezing. 18 g 1    cloNIDine (Catapres) 0.1 MG tablet Take 1 tablet by mouth 3 times a day.      fenofibrate (TRICOR) 145 MG tablet TAKE 1 TABLET DAILY 90 tablet 3    hydrALAZINE (APRESOLINE) 50 MG tablet Take 1 tablet  by mouth 3 (Three) Times a Day. 270 tablet 1    omeprazole (priLOSEC) 40 MG capsule Take 1 capsule by mouth Daily. 90 capsule 3    sildenafil (Viagra) 50 MG tablet Take 1 tablet by mouth Daily As Needed for Erectile Dysfunction. 10 tablet 0    warfarin (Jantoven) 5 MG tablet TAKE 1 TABLET DAILY OR AS  DIRECTED TO MAINTAIN       INTERNATIONAL NORMALIZED   RATIO 2.5-3.5 90 tablet 1     No current facility-administered medications on file prior to visit.       Past Medical History:   Diagnosis Date    Allergies     Aortic stenosis     SEVERE    Bicuspid aortic valve     replaced     Epistaxis     GERD (gastroesophageal reflux disease)     Gout     History of COVID-19     2020    Hyperlipidemia     IBS (irritable bowel syndrome)     Right knee meniscal tear     Sleep apnea     CPAP, DOES NOT USE       Family History   Problem Relation Age of Onset    Heart failure Mother     Kidney disease Mother     Hypertension Mother     Heart disease Mother     Alzheimer's disease Father     Thyroid disease Sister     Hypertension Sister     Hypertension Brother     Colon polyps Maternal Uncle     Irritable bowel syndrome Cousin     Malig Hyperthermia Neg Hx     Colon cancer Neg Hx     Crohn's disease Neg Hx     Ulcerative colitis Neg Hx        Social History     Socioeconomic History    Marital status:    Tobacco Use    Smoking status: Former     Current packs/day: 0.00     Average packs/day: 0.5 packs/day for 14.0 years (7.0 ttl pk-yrs)     Types: Cigarettes     Start date:      Quit date:      Years since quittin.4     Passive exposure: Past    Smokeless tobacco: Never    Tobacco comments:     QUIT AGE 23   Vaping Use    Vaping status: Never Used   Substance and Sexual Activity    Alcohol use: Yes     Comment: 1-2 PER WEEK    Drug use: No    Sexual activity: Defer       Past Surgical History:   Procedure Laterality Date    ADENOIDECTOMY      AORTIC VALVE REPAIR/REPLACEMENT  10/05/2016    mechanical  valve    ASCENDING ARCH/HEMIARCH REPLACEMENT N/A 10/05/2016    Procedure: MIDLINE STERNOTOMY, AORTIC VALVE REPLACEMENT, ASCENDING AND HEMIARCH REPLACEMENT, REPAIR OF PERIVALVULAR LEAK, WITH NEURO MONITOIRING, INTRAOPERATIVE WARREN;  Surgeon: Sukhi Wilkinson MD;  Location: [facility] MAIN OR;  Service:     CARDIAC CATHETERIZATION N/A 09/27/2016    Procedure: Coronary angiography;  Surgeon: Lio Roman MD;  Location: [facility] CATH INVASIVE LOCATION;  Service:     CARDIAC CATHETERIZATION N/A 09/27/2016    Procedure: Left Heart Cath;  Surgeon: Lio Roman MD;  Location: [facility] CATH INVASIVE LOCATION;  Service:     CHOLECYSTECTOMY      COLONOSCOPY  12/212/2016    Dr. Palomares    COLONOSCOPY W/ POLYPECTOMY N/A 06/08/2022    Procedure: COLONOSCOPY WITH POLYPECTOMY;  Surgeon: Kana Chowdary MD;  Location: [facility] OR;  Service: Gastroenterology;  Laterality: N/A;  hemorrhoids  ascending colon polyp (cold snare)  transverse colon polyp (cold snare)    ENDOSCOPY WITH POLYPECTOMY N/A 06/08/2022    Procedure: ESOPHAGOGASTRODUODENOSCOPY WITH POLYPECTOMY;  Surgeon: Kana Chowdary MD;  Location: [facility] OR;  Service: Gastroenterology;  Laterality: N/A;  gastric polyps    KNEE ARTHROSCOPY Right 09/07/2021    Procedure: RIGHT KNEE ARTHROSCOPY WITH PARTIAL MEDIAL AND LATERAL MENISECTOMY WITH DEBRIDEMENT OF ARTHRITIS ;  Surgeon: Ella Muller MD;  Location: [facility] OR [facility];  Service: Orthopedics;  Laterality: Right;    UPPER GASTROINTESTINAL ENDOSCOPY           The following portions of the patient's history were reviewed and updated as appropriate: problem list, allergies, current medications, past medical history, past family history, past social history, and past surgical history.    ROS    See HPI    Immunization History   Administered Date(s) Administered    COVID-19 (MODERNA) 1st,2nd,3rd Dose Monovalent 03/22/2021, 04/19/2021    COVID-19 (MODERNA) Monovalent Original Booster 01/20/2022    Fluzone (or Fluarix  "& Flulaval for VFC) >6mos 10/19/2019, 10/10/2020, 09/27/2022, 10/02/2023    Hepatitis A 09/16/2019, 06/18/2020    Pneumococcal Polysaccharide (PPSV23) 12/18/2020    Tdap 09/16/2019       Objective   Vitals:    06/04/24 1508   BP: 116/74   Temp: 98.1 °F (36.7 °C)   Weight: 99.3 kg (219 lb)   Height: 182.9 cm (72.01\")     Body mass index is 29.7 kg/m².  Physical Exam  Vitals reviewed.   Constitutional:       Appearance: Normal appearance.   HENT:      Head: Normocephalic and atraumatic.   Cardiovascular:      Rate and Rhythm: Normal rate and regular rhythm.   Pulmonary:      Effort: Pulmonary effort is normal.      Breath sounds: Normal breath sounds.   Abdominal:      General: Abdomen is flat. Bowel sounds are normal. There is distension.      Palpations: Abdomen is soft.      Tenderness: There is no abdominal tenderness. There is no guarding.   Neurological:      Mental Status: He is alert.           Assessment & Plan   Diagnoses and all orders for this visit:    1. Irritable bowel syndrome with diarrhea (Primary)    Other orders  -     Discontinue: colestipol (COLESTID) 1 g tablet; Take 1 tablet by mouth 2 (Two) Times a Day.  Dispense: 60 tablet; Refill: 0  -     Discontinue: promethazine (PHENERGAN) 25 MG tablet; Take 1 tablet by mouth Every 6 (Six) Hours As Needed for Nausea or Vomiting.  Dispense: 30 tablet; Refill: 0  -     colestipol (COLESTID) 1 g tablet; Take 1 tablet by mouth 2 (Two) Times a Day.  Dispense: 60 tablet; Refill: 0  -     promethazine (PHENERGAN) 25 MG tablet; Take 1 tablet by mouth Every 6 (Six) Hours As Needed for Nausea or Vomiting.  Dispense: 30 tablet; Refill: 0     Reviewed records and hx has done this several times over the years. Will try treating with medication, if not improving in 48 hours will need to get a CT and stool studies. Continue to hydrate well.     No follow-ups on file.           "

## 2024-06-05 VITALS
DIASTOLIC BLOOD PRESSURE: 74 MMHG | SYSTOLIC BLOOD PRESSURE: 116 MMHG | HEIGHT: 72 IN | BODY MASS INDEX: 29.66 KG/M2 | TEMPERATURE: 98.1 F | WEIGHT: 219 LBS

## 2024-06-06 ENCOUNTER — ANTICOAGULATION VISIT (OUTPATIENT)
Dept: CARDIOLOGY | Facility: CLINIC | Age: 59
End: 2024-06-06
Payer: COMMERCIAL

## 2024-06-06 LAB
BH CV IMMEDIATE POST RECOVERY TECH DATA SYMPTOMS: NORMAL
BH CV IMMEDIATE POST TECH DATA BLOOD PRESSURE: NORMAL MMHG
BH CV IMMEDIATE POST TECH DATA HEART RATE: 124 BPM
BH CV REST NUCLEAR ISOTOPE DOSE: 10.9 MCI
BH CV STRESS BP STAGE 1: NORMAL
BH CV STRESS BP STAGE 2: NORMAL
BH CV STRESS DURATION MIN STAGE 1: 3
BH CV STRESS DURATION MIN STAGE 2: 2
BH CV STRESS DURATION SEC STAGE 1: 0
BH CV STRESS DURATION SEC STAGE 2: 32
BH CV STRESS GRADE STAGE 1: 10
BH CV STRESS GRADE STAGE 2: 12
BH CV STRESS HR STAGE 1: 120
BH CV STRESS HR STAGE 2: 140
BH CV STRESS METS STAGE 1: 4.6
BH CV STRESS METS STAGE 2: 6.9
BH CV STRESS NUCLEAR ISOTOPE DOSE: 32.9 MCI
BH CV STRESS O2 STAGE 2: 95
BH CV STRESS PROTOCOL 1: NORMAL
BH CV STRESS RECOVERY BP: NORMAL MMHG
BH CV STRESS RECOVERY HR: 100 BPM
BH CV STRESS SPEED STAGE 1: 1.7
BH CV STRESS SPEED STAGE 2: 2.6
BH CV STRESS STAGE 1: 1
BH CV STRESS STAGE 2: 2
BH CV THREE MINUTE POST TECH DATA BLOOD PRESSURE: NORMAL MMHG
BH CV THREE MINUTE POST TECH DATA HEART RATE: 105 BPM
BH CV THREE MINUTE POST TECH DATA OXYGEN SATURATION: 99 %
BH CV THREE MINUTE RECOVERY TECH DATA SYMPTOM: NORMAL
INR PPP: 1.8
LV EF NUC BP: 53 %
MAXIMAL PREDICTED HEART RATE: 161 BPM
PERCENT MAX PREDICTED HR: 86.96 %
STRESS BASELINE BP: NORMAL MMHG
STRESS BASELINE HR: 96 BPM
STRESS O2 SAT REST: 97 %
STRESS PERCENT HR: 102 %
STRESS POST ESTIMATED WORKLOAD: 6.9 METS
STRESS POST EXERCISE DUR MIN: 5 MIN
STRESS POST EXERCISE DUR SEC: 32 SEC
STRESS POST O2 SAT PEAK: 95 %
STRESS POST PEAK BP: NORMAL MMHG
STRESS POST PEAK HR: 140 BPM
STRESS TARGET HR: 137 BPM

## 2024-06-06 RX ORDER — ENOXAPARIN SODIUM 100 MG/ML
1 INJECTION SUBCUTANEOUS EVERY 12 HOURS SCHEDULED
Qty: 10 ML | Refills: 1 | Status: SHIPPED | OUTPATIENT
Start: 2024-06-06

## 2024-06-06 NOTE — PROGRESS NOTES
PT STATES THAT HE WAS SICK AND DIDN'T TAKE HIS WARFARIN FOR 4 DAYS.  HE HAS BEEN INSTRUCTED TO TAKE LOVENOX TWICE A DAY UNTIL RECHECKING ON MONDAY KATHIE 10, 2024.  PT VERBALIZED UNDERSTANDING.

## 2024-06-10 ENCOUNTER — OFFICE VISIT (OUTPATIENT)
Dept: CARDIOLOGY | Facility: CLINIC | Age: 59
End: 2024-06-10
Payer: COMMERCIAL

## 2024-06-10 VITALS
DIASTOLIC BLOOD PRESSURE: 81 MMHG | BODY MASS INDEX: 30.48 KG/M2 | WEIGHT: 225 LBS | HEIGHT: 72 IN | SYSTOLIC BLOOD PRESSURE: 147 MMHG | HEART RATE: 75 BPM

## 2024-06-10 DIAGNOSIS — Z79.01 CHRONIC ANTICOAGULATION: ICD-10-CM

## 2024-06-10 DIAGNOSIS — R94.31 ABNORMAL EKG: ICD-10-CM

## 2024-06-10 DIAGNOSIS — R06.02 SOB (SHORTNESS OF BREATH): Primary | ICD-10-CM

## 2024-06-10 DIAGNOSIS — Z95.2 S/P AVR (AORTIC VALVE REPLACEMENT): ICD-10-CM

## 2024-06-10 LAB — INR PPP: 2.7

## 2024-06-10 PROCEDURE — 99214 OFFICE O/P EST MOD 30 MIN: CPT | Performed by: INTERNAL MEDICINE

## 2024-06-10 NOTE — PROGRESS NOTES
TEST RESULT   Subjective:        Hussein Nino is a 58 y.o. male who here for follow up    CC  Sob  Results  Brain bleed  HPI  58-year-old male with s/p AVR has been complaining of the shortness of breath underwent results which shows normal stress test and the valve functioning normally     Problems Addressed this Visit          Cardiac and Vasculature    S/P AVR (aortic valve replacement)    Abnormal EKG       Coag and Thromboembolic    Chronic anticoagulation       Pulmonary and Pneumonias    SOB (shortness of breath) - Primary     Diagnoses         Codes Comments    SOB (shortness of breath)    -  Primary ICD-10-CM: R06.02  ICD-9-CM: 786.05     Abnormal EKG     ICD-10-CM: R94.31  ICD-9-CM: 794.31     S/P AVR (aortic valve replacement)     ICD-10-CM: Z95.2  ICD-9-CM: V43.3     Chronic anticoagulation     ICD-10-CM: Z79.01  ICD-9-CM: V58.61           .    The following portions of the patient's history were reviewed and updated as appropriate: allergies, current medications, past family history, past medical history, past social history, past surgical history and problem list.    Past Medical History:   Diagnosis Date    Allergies     Aortic stenosis     SEVERE    Bicuspid aortic valve     replaced 2016    Epistaxis     GERD (gastroesophageal reflux disease)     Gout     History of COVID-19     NOV 2020    Hyperlipidemia     IBS (irritable bowel syndrome)     Right knee meniscal tear     Sleep apnea     CPAP, DOES NOT USE     reports that he quit smoking about 41 years ago. His smoking use included cigarettes. He started smoking about 55 years ago. He has a 7 pack-year smoking history. He has been exposed to tobacco smoke. He has never used smokeless tobacco. He reports current alcohol use. He reports that he does not use drugs.   Family History   Problem Relation Age of Onset    Heart failure Mother     Kidney disease Mother     Hypertension Mother     Heart disease Mother     Alzheimer's disease Father      "Thyroid disease Sister     Hypertension Sister     Hypertension Brother     Colon polyps Maternal Uncle     Irritable bowel syndrome Cousin     Du Hyperthermia Neg Hx     Colon cancer Neg Hx     Crohn's disease Neg Hx     Ulcerative colitis Neg Hx        Review of Systems  Constitutional: No wt loss, fever, fatigue  Gastrointestinal: No nausea, abdominal pain  Behavioral/Psych: No insomnia or anxiety   Cardiovascular shortness of breath  Objective:       Physical Exam  /81   Pulse 75   Ht 182.9 cm (72\")   Wt 102 kg (225 lb)   BMI 30.52 kg/m²   General appearance: No acute changes   Neck: Trachea midline; NECK, supple, no thyromegaly or lymphadenopathy   Lungs: Normal size and shape, normal breath sounds, equal distribution of air, no rales and rhonchi   CV: Normal prosthetic heart sounds  Abdomen: Soft, nontender; no masses , no abnormal abdominal sounds   Extremities: No deformity , normal color , no peripheral edema   Skin: Normal temperature, turgor and texture; no rash, ulcers          Procedures      Echocardiogram:    Results for orders placed in visit on 05/20/24    Adult Transthoracic Echo Complete W/ Cont if Necessary Per Protocol    Interpretation Summary    Left ventricular ejection fraction appears to be 61 - 65%.    Left ventricular diastolic function is consistent with (grade I) impaired relaxation.    Aortic valve area is 1.4 cm2.    Peak velocity of the flow distal to the aortic valve is 197.8 cm/s. Aortic valve maximum pressure gradient is 16 mmHg. Aortic valve mean pressure gradient is 9 mmHg. Aortic valve dimensionless index is 0.5 .    There is a mechanical aortic valve prosthesis present.    Estimated right ventricular systolic pressure from tricuspid regurgitation is normal (<35 mmHg).    Interpretation Summary         Findings consistent with an equivocal ECG stress test.    Myocardial perfusion imaging indicates a normal myocardial perfusion study with no evidence of ischemia. " Impressions are consistent with a low risk study.    Left ventricular ejection fraction is normal (Calculated EF = 53%        Current Outpatient Medications:     albuterol sulfate  (90 Base) MCG/ACT inhaler, Inhale 2 puffs Every 4 (Four) Hours As Needed for Wheezing., Disp: 18 g, Rfl: 1    cloNIDine (Catapres) 0.1 MG tablet, Take 1 tablet by mouth 3 times a day., Disp: , Rfl:     colestipol (COLESTID) 1 g tablet, Take 1 tablet by mouth 2 (Two) Times a Day., Disp: 60 tablet, Rfl: 0    Enoxaparin Sodium (LOVENOX) 100 MG/ML solution prefilled syringe syringe, Inject 1 mL under the skin into the appropriate area as directed Every 12 (Twelve) Hours., Disp: 10 mL, Rfl: 1    fenofibrate (TRICOR) 145 MG tablet, TAKE 1 TABLET DAILY, Disp: 90 tablet, Rfl: 3    hydrALAZINE (APRESOLINE) 50 MG tablet, Take 1 tablet by mouth 3 (Three) Times a Day., Disp: 270 tablet, Rfl: 1    omeprazole (priLOSEC) 40 MG capsule, Take 1 capsule by mouth Daily., Disp: 90 capsule, Rfl: 3    pravastatin (PRAVACHOL) 40 MG tablet, TAKE 1 TABLET DAILY, Disp: 90 tablet, Rfl: 1    promethazine (PHENERGAN) 25 MG tablet, Take 1 tablet by mouth Every 6 (Six) Hours As Needed for Nausea or Vomiting., Disp: 30 tablet, Rfl: 0    sildenafil (Viagra) 50 MG tablet, Take 1 tablet by mouth Daily As Needed for Erectile Dysfunction., Disp: 10 tablet, Rfl: 0    warfarin (Jantoven) 5 MG tablet, TAKE 1 TABLET DAILY OR AS  DIRECTED TO MAINTAIN       INTERNATIONAL NORMALIZED   RATIO 2.5-3.5, Disp: 90 tablet, Rfl: 1   Assessment:                Plan:          ICD-10-CM ICD-9-CM   1. SOB (shortness of breath)  R06.02 786.05   2. Abnormal EKG  R94.31 794.31   3. S/P AVR (aortic valve replacement)  Z95.2 V43.3   4. Chronic anticoagulation  Z79.01 V58.61     1. SOB (shortness of breath)  Multifactorial    2. Abnormal EKG  No angina pectoris    3. S/P AVR (aortic valve replacement)  Aortic valve functioning normally    4. Chronic anticoagulation  Continue current  treatment    Patient evidently had punctate brain bleed, if the patient and the neurologist decide to stop Coumadin mechanical valve will have to be switched over to the porcine valve pros and cons have been discussed in detail  Brain bleed  ? Change to porcine Aortic valve  Advised CT surg CONSULT , PT WILL DISCUSS WITH NEURO FOR FURTHER DISCUSSION    1 YR  COUNSELING:    Hussein Millerounseling was given to patient for the following topics: diagnostic results, risk factor reductions, impressions, risks and benefits of treatment options and importance of treatment compliance .       SMOKING COUNSELING:        Dictated using Dragon dictation

## 2024-06-13 DIAGNOSIS — I10 BENIGN ESSENTIAL HTN: ICD-10-CM

## 2024-06-14 RX ORDER — HYDRALAZINE HYDROCHLORIDE 50 MG/1
50 TABLET, FILM COATED ORAL 3 TIMES DAILY
Qty: 270 TABLET | Refills: 1 | Status: SHIPPED | OUTPATIENT
Start: 2024-06-14

## 2024-06-14 RX ORDER — CLONIDINE HYDROCHLORIDE 0.1 MG/1
0.1 TABLET ORAL 3 TIMES DAILY
Qty: 270 TABLET | Refills: 3
Start: 2024-06-14

## 2024-06-24 DIAGNOSIS — I10 BENIGN ESSENTIAL HTN: ICD-10-CM

## 2024-06-24 RX ORDER — CLONIDINE HYDROCHLORIDE 0.1 MG/1
0.1 TABLET ORAL 3 TIMES DAILY
Qty: 270 TABLET | Refills: 1 | Status: SHIPPED | OUTPATIENT
Start: 2024-06-24

## 2024-06-26 LAB — INR PPP: 2.8

## 2024-06-29 DIAGNOSIS — E78.2 MIXED HYPERLIPIDEMIA: ICD-10-CM

## 2024-07-01 DIAGNOSIS — Z00.00 HEALTHCARE MAINTENANCE: Primary | ICD-10-CM

## 2024-07-01 RX ORDER — FENOFIBRATE 145 MG/1
TABLET, COATED ORAL
Qty: 90 TABLET | Refills: 3 | Status: SHIPPED | OUTPATIENT
Start: 2024-07-01

## 2024-07-02 ENCOUNTER — OFFICE VISIT (OUTPATIENT)
Dept: NEUROLOGY | Facility: CLINIC | Age: 59
End: 2024-07-02
Payer: COMMERCIAL

## 2024-07-02 VITALS
HEIGHT: 72 IN | DIASTOLIC BLOOD PRESSURE: 74 MMHG | HEART RATE: 72 BPM | SYSTOLIC BLOOD PRESSURE: 154 MMHG | OXYGEN SATURATION: 99 % | BODY MASS INDEX: 30.61 KG/M2 | WEIGHT: 226 LBS

## 2024-07-02 DIAGNOSIS — M54.81 OCCIPITAL NEURALGIA OF RIGHT SIDE: ICD-10-CM

## 2024-07-02 DIAGNOSIS — R90.89 ABNORMAL BRAIN MRI: Primary | ICD-10-CM

## 2024-07-02 PROCEDURE — 99214 OFFICE O/P EST MOD 30 MIN: CPT | Performed by: PSYCHIATRY & NEUROLOGY

## 2024-07-02 NOTE — ASSESSMENT & PLAN NOTE
I again spoke with him extensively with regards to the fact that the micro-bleeds noted on the brain MRI scan maybe due to chronic warfarin use vs cerebral amyloid angiopathy. I advised him to work closely with his PCP to get his BP under better control as it is elevated today at 154/74. Also if he experiences a sudden severe headache he needs to be taken to the ED immediately and have a head CT scan evaluation to rule out possible hemorrhage. We can repeat another brain MRI scan in 4/2025 for comparison to be sure the abnormal findings are stable.  Again advised him to monitor his blood pressure routinely at home twice daily and record and also to lose weight through exercising and dieting.  To be taken to the hospital/ED with any signs of bleeding.  He denies any blood in his stool or urine.  He is discussing the possibility of changing his heart valve with his cardiologist so he would not need to be on anticoagulation lifelong and reduce potential for hemorrhage.

## 2024-07-02 NOTE — PROGRESS NOTES
Chief Complaint  Right sided occipital neuralgia and MRI findings of hemosiderin deposition    Subjective          Hussein Nino presents to [facility] for   HISTORY OF PRESENT ILLNESS:    Hussein Nino is a 58 year old right handed man who presents to neurology clinic for follow up evaluation and treatment of occipital neuralgia headaches and abnormal brain MRI findings with multiple hemosiderin depositions.  He reports a history of headaches involving the back of his head starting over a year ago.  The headaches start in the back of his head and slowly move up the back of his head to the top of his head in the occipital nerve distribution.  The headaches are always right sided and they can get as severe as 9-10/10 on pain scale 1-10 when most severe without any associated light or sound sensitivity.  The pain is constant and pressure like sensation.  He has a mechanical valve in his heart so he is on life long Warfarin treatment.  He has also had some dizziness and numbness at times as well.  He has some numbness and tingling involving his mouth and tongue which has improved since last visit. He does see his dentist regularly.  He had a brain MRI scan on 3/7/2024 which demonstrates multiple small foci of signal loss both supratentorially and infratentorially on the susceptibility weighted sequence consistent with hemosiderin deposition which may be secondary to small areas of microhemorrhage or amyloid angiopathy.  Unfortunately again he has to be on life long anti-coagulation treatment due to having mechanical valve in his heart.  He has been on Warfarin since 10/2016 which I informed him that chronic Warfarin use can present similarly on brain MRI.  He tells me one of his cousins had a brain bleed.  He does have significant DDD of cervical spine and some neuro-foraminal narrowing was noted as well and he had physical therapy for his neck as well.  We performed right sided GREATER  and LESSER ONBs for patient which has helped significantly with the posterior headaches.     Past Medical History:   Diagnosis Date    Allergies     Aortic stenosis     SEVERE    Bicuspid aortic valve     replaced     Epistaxis     GERD (gastroesophageal reflux disease)     Gout     History of COVID-19     2020    Hyperlipidemia     IBS (irritable bowel syndrome)     Right knee meniscal tear     Sleep apnea     CPAP, DOES NOT USE        Family History   Problem Relation Age of Onset    Heart failure Mother     Kidney disease Mother     Hypertension Mother     Heart disease Mother     Alzheimer's disease Father     Thyroid disease Sister     Hypertension Sister     Hypertension Brother     Colon polyps Maternal Uncle     Irritable bowel syndrome Cousin     Malig Hyperthermia Neg Hx     Colon cancer Neg Hx     Crohn's disease Neg Hx     Ulcerative colitis Neg Hx         Social History     Socioeconomic History    Marital status:    Tobacco Use    Smoking status: Former     Current packs/day: 0.00     Average packs/day: 0.5 packs/day for 14.0 years (7.0 ttl pk-yrs)     Types: Cigarettes     Start date:      Quit date:      Years since quittin.5     Passive exposure: Past    Smokeless tobacco: Never    Tobacco comments:     QUIT AGE 23   Vaping Use    Vaping status: Never Used   Substance and Sexual Activity    Alcohol use: Yes     Comment: 1-2 PER WEEK    Drug use: No    Sexual activity: Defer        I have reviewed and confirmed the accuracy of the ROS as documented by the MA/LPN/RN Faye Fair MD   Review of Systems   Neurological:  Positive for dizziness, light-headedness and headache. Negative for tremors, seizures, syncope, facial asymmetry, speech difficulty, weakness, numbness, memory problem and confusion.   Psychiatric/Behavioral:  Negative for agitation, behavioral problems, decreased concentration, dysphoric mood, hallucinations, self-injury, sleep disturbance, suicidal ideas,  "negative for hyperactivity, depressed mood and stress. The patient is not nervous/anxious.         Objective   Vital Signs:   /74   Pulse 72   Ht 182.9 cm (72.01\")   Wt 103 kg (226 lb)   SpO2 99%   BMI 30.64 kg/m²       PHYSICAL EXAM:    General   Mental Status - Alert. General Appearance - Well developed, Well groomed, Oriented and Cooperative. Orientation - Oriented X3.       Head and Neck  Head - normocephalic, atraumatic with no lesions or palpable masses.  Neck    Global Assessment - supple.       Eye   Sclera/Conjunctiva - Bilateral - Normal.    ENMT  Mouth and Throat   Oral Cavity/Oropharynx: Oropharynx - the soft palate,uvula and tongue are normal in appearance.    Chest and Lung Exam   Chest - lung clear to auscultation bilaterally.    Cardiovascular   Cardiovascular examination reveals  - normal heart sounds, regular rate and rhythm.    Neurologic   Mental Status: Speech - Normal. Cognitive function - appropriate fund of knowledge. No impairment of attention, Impairment of concentration, impairment of long term memory or impairment of short term memory.  Cranial Nerves:   II Optic: Visual acuity - Left - Normal. Right - Normal. Visual fields - Normal (to confrontation).  III Oculomotor: Pupillary constriction - Left - Normal. Right - Normal.  VII Facial: - Normal Bilaterally.   IX Glossopharyngeal / X Vagus - Normal.  XI Accessory: Trapezius - Bilateral - Normal. Sternocleidomastoid - Bilateral - Normal.  XII Hypoglossal - Bilateral - Normal.  Eye Movements: - Normal Bilaterally.  Sensory:   Light Touch: Intact - Globally.  Motor:   Bulk and Contour: - Normal.  Tone: - Normal.  Tremor: Not present.  Strength: 5/5 normal muscle strength - All Muscles.   General Assessment of Reflexes: - deep tendon reflexes are normal. Coordination - No Impairment of finger-to-nose or Impairment of rapid alternating movements. Gait - Normal.       Result Review :                 Assessment and Plan    Problem " List Items Addressed This Visit          Musculoskeletal and Injuries    Occipital neuralgia of right side    Current Assessment & Plan     58 year old right handed man with occipital neuralgia headaches and abnormal brain MRI findings with multiple hemosiderin depositions.  He reports a history of headaches involving the back of his head starting over a year ago which have responded to the ONBs most recently.  The headaches start in the back of his head and slowly move up the back of his head to the top of his head in the occipital nerve distribution.  The headaches are always right sided and they can get as severe as 9-10/10 on pain scale 1-10 when most severe without any associated light or sound sensitivity.  The pain is constant and pressure like sensation.  He has a mechanical valve in his heart so he is on life long Warfarin treatment.  He has also had some dizziness and numbness at times as well.  He has some numbness and tingling involving his mouth and tongue which has improved. He does see his dentist regularly.  He had a brain MRI scan on 3/7/2024 which demonstrates multiple small foci of signal loss both supratentorially and infratentorially on the susceptibility weighted sequence consistent with hemosiderin deposition which may be secondary to small areas of microhemorrhage or amyloid angiopathy.  Unfortunately again he has to be on life long anti-coagulation treatment due to having mechanical valve in his heart.  He has been on Warfarin since 10/2016 which I informed him that chronic Warfarin use can present similarly on brain MRI.  He tells me one of his cousins had a brain bleed.  He does have significant DDD of cervical spine and some neuro-foraminal narrowing was noted as well and he had physical therapy for his neck as well.  We performed right sided GREATER and LESSER ONBs for patient which has helped significantly with the posterior headaches. I again spoke with him extensively with regards to  the fact that the micro-bleeds noted on the brain MRI scan maybe due to chronic warfarin use vs cerebral amyloid angiopathy. I advised him to work closely with his PCP to get his BP under better control as it is elevated today at 154/74. Also if he experiences a sudden severe headache he needs to be taken to the ED immediately and have a head CT scan evaluation to rule out possible hemorrhage. We can repeat another brain MRI scan in 4/2025 for comparison to be sure the abnormal findings are stable.                Neuro    Abnormal brain MRI - Primary    Current Assessment & Plan     I again spoke with him extensively with regards to the fact that the micro-bleeds noted on the brain MRI scan maybe due to chronic warfarin use vs cerebral amyloid angiopathy. I advised him to work closely with his PCP to get his BP under better control as it is elevated today at 154/74. Also if he experiences a sudden severe headache he needs to be taken to the ED immediately and have a head CT scan evaluation to rule out possible hemorrhage. We can repeat another brain MRI scan in 4/2025 for comparison to be sure the abnormal findings are stable.  Again advised him to monitor his blood pressure routinely at home twice daily and record and also to lose weight through exercising and dieting.  To be taken to the hospital/ED with any signs of bleeding.  He denies any blood in his stool or urine.  He is discussing the possibility of changing his heart valve with his cardiologist so he would not need to be on anticoagulation lifelong and reduce potential for hemorrhage.                I spent 32 minutes caring for Hussein on this date of service. This time includes time spent by me in the following activities: Preparing for the visit, documenting, reviewing tests, discussing results and plan of care with patient at length today.     Follow Up   No follow-ups on file.  Patient was given instructions and counseling regarding his condition or  for health maintenance advice. Please see specific information pulled into the AVS if appropriate.

## 2024-07-02 NOTE — ASSESSMENT & PLAN NOTE
58 year old right handed man with occipital neuralgia headaches and abnormal brain MRI findings with multiple hemosiderin depositions.  He reports a history of headaches involving the back of his head starting over a year ago which have responded to the ONBs most recently.  The headaches start in the back of his head and slowly move up the back of his head to the top of his head in the occipital nerve distribution.  The headaches are always right sided and they can get as severe as 9-10/10 on pain scale 1-10 when most severe without any associated light or sound sensitivity.  The pain is constant and pressure like sensation.  He has a mechanical valve in his heart so he is on life long Warfarin treatment.  He has also had some dizziness and numbness at times as well.  He has some numbness and tingling involving his mouth and tongue which has improved. He does see his dentist regularly.  He had a brain MRI scan on 3/7/2024 which demonstrates multiple small foci of signal loss both supratentorially and infratentorially on the susceptibility weighted sequence consistent with hemosiderin deposition which may be secondary to small areas of microhemorrhage or amyloid angiopathy.  Unfortunately again he has to be on life long anti-coagulation treatment due to having mechanical valve in his heart.  He has been on Warfarin since 10/2016 which I informed him that chronic Warfarin use can present similarly on brain MRI.  He tells me one of his cousins had a brain bleed.  He does have significant DDD of cervical spine and some neuro-foraminal narrowing was noted as well and he had physical therapy for his neck as well.  We performed right sided GREATER and LESSER ONBs for patient which has helped significantly with the posterior headaches. I again spoke with him extensively with regards to the fact that the micro-bleeds noted on the brain MRI scan maybe due to chronic warfarin use vs cerebral amyloid angiopathy. I advised him  to work closely with his PCP to get his BP under better control as it is elevated today at 154/74. Also if he experiences a sudden severe headache he needs to be taken to the ED immediately and have a head CT scan evaluation to rule out possible hemorrhage. We can repeat another brain MRI scan in 4/2025 for comparison to be sure the abnormal findings are stable.  We can repeat the nerve blocks in the future if needed.

## 2024-07-09 LAB — INR PPP: 2.7

## 2024-07-10 ENCOUNTER — ANTICOAGULATION VISIT (OUTPATIENT)
Dept: CARDIOLOGY | Facility: CLINIC | Age: 59
End: 2024-07-10
Payer: COMMERCIAL

## 2024-07-15 RX ORDER — SILDENAFIL 50 MG/1
50 TABLET, FILM COATED ORAL DAILY PRN
Qty: 10 TABLET | Refills: 0 | Status: SHIPPED | OUTPATIENT
Start: 2024-07-15

## 2024-07-16 ENCOUNTER — ANTICOAGULATION VISIT (OUTPATIENT)
Dept: CARDIOLOGY | Facility: CLINIC | Age: 59
End: 2024-07-16
Payer: COMMERCIAL

## 2024-07-16 LAB — INR PPP: 2.9

## 2024-07-23 ENCOUNTER — ANTICOAGULATION VISIT (OUTPATIENT)
Dept: CARDIOLOGY | Facility: CLINIC | Age: 59
End: 2024-07-23
Payer: COMMERCIAL

## 2024-07-23 LAB — INR PPP: 2.9

## 2024-08-05 DIAGNOSIS — M54.10 RADICULAR LOW BACK PAIN: Primary | ICD-10-CM

## 2024-08-05 RX ORDER — HYDRALAZINE HYDROCHLORIDE 100 MG/1
100 TABLET, FILM COATED ORAL 3 TIMES DAILY
Qty: 270 TABLET | Refills: 3 | Status: SHIPPED | OUTPATIENT
Start: 2024-08-05

## 2024-08-06 DIAGNOSIS — I10 BENIGN ESSENTIAL HTN: ICD-10-CM

## 2024-08-06 RX ORDER — CLONIDINE HYDROCHLORIDE 0.2 MG/1
0.2 TABLET ORAL 3 TIMES DAILY
Qty: 270 TABLET | Refills: 1 | Status: SHIPPED | OUTPATIENT
Start: 2024-08-06

## 2024-08-08 ENCOUNTER — ANTICOAGULATION VISIT (OUTPATIENT)
Dept: CARDIOLOGY | Facility: CLINIC | Age: 59
End: 2024-08-08
Payer: COMMERCIAL

## 2024-08-08 LAB — INR PPP: 2.7

## 2024-08-21 LAB — INR PPP: 3.9

## 2024-08-26 ENCOUNTER — HOSPITAL ENCOUNTER (OUTPATIENT)
Dept: MRI IMAGING | Facility: HOSPITAL | Age: 59
Discharge: HOME | End: 2024-08-26
Admitting: PHYSICIAN ASSISTANT
Payer: COMMERCIAL

## 2024-08-26 DIAGNOSIS — M54.10 RADICULAR LOW BACK PAIN: ICD-10-CM

## 2024-08-26 PROCEDURE — 72148 MRI LUMBAR SPINE W/O DYE: CPT

## 2024-08-26 RX ORDER — NOREPINEPHRINE BITARTRATE 0.03 MG/ML
INJECTION, SOLUTION INTRAVENOUS
Status: DISCONTINUED
Start: 2024-08-26 | End: 2024-08-26 | Stop reason: WASHOUT

## 2024-08-27 ENCOUNTER — ANTICOAGULATION VISIT (OUTPATIENT)
Dept: CARDIOLOGY | Facility: CLINIC | Age: 59
End: 2024-08-27
Payer: COMMERCIAL

## 2024-08-27 LAB — INR PPP: 2.5

## 2024-08-28 ENCOUNTER — ANTICOAGULATION VISIT (OUTPATIENT)
Dept: CARDIOLOGY | Facility: CLINIC | Age: 59
End: 2024-08-28
Payer: COMMERCIAL

## 2024-09-03 ENCOUNTER — OFFICE VISIT (OUTPATIENT)
Dept: INTERNAL MEDICINE | Facility: CLINIC | Age: 59
End: 2024-09-03
Payer: COMMERCIAL

## 2024-09-03 VITALS
TEMPERATURE: 98.1 F | DIASTOLIC BLOOD PRESSURE: 98 MMHG | WEIGHT: 226 LBS | HEIGHT: 72 IN | BODY MASS INDEX: 30.61 KG/M2 | SYSTOLIC BLOOD PRESSURE: 152 MMHG

## 2024-09-03 DIAGNOSIS — G47.33 OSA (OBSTRUCTIVE SLEEP APNEA): ICD-10-CM

## 2024-09-03 DIAGNOSIS — I10 BENIGN ESSENTIAL HTN: ICD-10-CM

## 2024-09-03 DIAGNOSIS — M51.37 DDD (DEGENERATIVE DISC DISEASE), LUMBOSACRAL: Primary | ICD-10-CM

## 2024-09-03 PROCEDURE — 99214 OFFICE O/P EST MOD 30 MIN: CPT | Performed by: PHYSICIAN ASSISTANT

## 2024-09-03 RX ORDER — SILDENAFIL 25 MG/1
25 TABLET, FILM COATED ORAL DAILY PRN
Qty: 30 TABLET | Refills: 0 | Status: SHIPPED | OUTPATIENT
Start: 2024-09-03

## 2024-09-03 NOTE — PROGRESS NOTES
Subjective   Chief Complaint   Patient presents with    Back Pain     Review MRI       History of Present Illness     Currently not taking anything for BP due to constipation from both medications. BP has been elevated, stopped his Hydralazine and Clonidine about a week ago. Bowels are improving. Here to review his MRI. Back pain continues to be intermittent. Has hx of KVNG dx about 5 years ago, does not wear his CPAP mask. States never able to tolerate, inquiring about inspire.      Patient Active Problem List   Diagnosis    Allergic rhinitis    Asthma    Gastroesophageal reflux disease    Hyperlipidemia    Irritable bowel syndrome    Raynaud's phenomenon    Essential hypertriglyceridemia    S/P AVR (aortic valve replacement)    Mechanical heart valve present    Chronic anticoagulation    Benign essential HTN    Benign prostatic hyperplasia with nocturia    KVNG (obstructive sleep apnea)    Abnormal EKG    Venous insufficiency    Tear of medial meniscus of right knee, current    Tear of lateral meniscus of right knee, current    Encounter for screening for malignant neoplasm of colon    History of colon polyps    RUTLEDGE (nonalcoholic steatohepatitis)    Stage 3a chronic kidney disease    Occipital neuralgia of right side    Abnormal brain MRI    SOB (shortness of breath)       Allergies   Allergen Reactions    Fish-Derived Products Anaphylaxis     Can eat shell fish without problem, but can't eat fish sandwich, cod, tuna or salmon. , is unable to take fish oil supplements.     Amlodipine Hives    Ondansetron Rash    Penicillins Rash       Current Outpatient Medications on File Prior to Visit   Medication Sig Dispense Refill    albuterol sulfate  (90 Base) MCG/ACT inhaler Inhale 2 puffs Every 4 (Four) Hours As Needed for Wheezing. 18 g 1    colestipol (COLESTID) 1 g tablet Take 1 tablet by mouth 2 (Two) Times a Day. 60 tablet 0    Enoxaparin Sodium (LOVENOX) 100 MG/ML solution prefilled syringe syringe Inject 1  mL under the skin into the appropriate area as directed Every 12 (Twelve) Hours. 10 mL 1    fenofibrate (TRICOR) 145 MG tablet TAKE 1 TABLET DAILY 90 tablet 3    omeprazole (priLOSEC) 40 MG capsule Take 1 capsule by mouth Daily. 90 capsule 3    pravastatin (PRAVACHOL) 40 MG tablet TAKE 1 TABLET DAILY 90 tablet 1    promethazine (PHENERGAN) 25 MG tablet Take 1 tablet by mouth Every 6 (Six) Hours As Needed for Nausea or Vomiting. 30 tablet 0    warfarin (Jantoven) 5 MG tablet TAKE 1 TABLET DAILY OR AS  DIRECTED TO MAINTAIN       INTERNATIONAL NORMALIZED   RATIO 2.5-3.5 90 tablet 1    [DISCONTINUED] sildenafil (Viagra) 50 MG tablet Take 1 tablet by mouth Daily As Needed for Erectile Dysfunction. 10 tablet 0    cloNIDine (Catapres) 0.2 MG tablet Take 1 tablet by mouth 3 times a day. (Patient not taking: Reported on 9/3/2024) 270 tablet 1    hydrALAZINE (APRESOLINE) 100 MG tablet Take 1 tablet by mouth 3 (Three) Times a Day. (Patient not taking: Reported on 9/3/2024) 270 tablet 3     No current facility-administered medications on file prior to visit.       Past Medical History:   Diagnosis Date    Allergies     Aortic stenosis     SEVERE    Bicuspid aortic valve     replaced 2016    Epistaxis     GERD (gastroesophageal reflux disease)     Gout     History of COVID-19     NOV 2020    Hyperlipidemia     IBS (irritable bowel syndrome)     Right knee meniscal tear     Sleep apnea     CPAP, DOES NOT USE       Family History   Problem Relation Age of Onset    Heart failure Mother     Kidney disease Mother     Hypertension Mother     Heart disease Mother     Alzheimer's disease Father     Thyroid disease Sister     Hypertension Sister     Hypertension Brother     Colon polyps Maternal Uncle     Irritable bowel syndrome Cousin     Malig Hyperthermia Neg Hx     Colon cancer Neg Hx     Crohn's disease Neg Hx     Ulcerative colitis Neg Hx        Social History     Socioeconomic History    Marital status:    Tobacco Use     Smoking status: Former     Current packs/day: 0.00     Average packs/day: 0.5 packs/day for 14.0 years (7.0 ttl pk-yrs)     Types: Cigarettes     Start date:      Quit date:      Years since quittin.7     Passive exposure: Past    Smokeless tobacco: Never    Tobacco comments:     QUIT AGE 23   Vaping Use    Vaping status: Never Used   Substance and Sexual Activity    Alcohol use: Yes     Comment: 1-2 PER WEEK    Drug use: No    Sexual activity: Defer       Past Surgical History:   Procedure Laterality Date    ADENOIDECTOMY      AORTIC VALVE REPAIR/REPLACEMENT  10/05/2016    mechanical valve    ASCENDING ARCH/HEMIARCH REPLACEMENT N/A 10/05/2016    Procedure: MIDLINE STERNOTOMY, AORTIC VALVE REPLACEMENT, ASCENDING AND HEMIARCH REPLACEMENT, REPAIR OF PERIVALVULAR LEAK, WITH NEURO MONITOIRING, INTRAOPERATIVE WARREN;  Surgeon: Sukhi Wilkinson MD;  Location: [facility] MAIN OR;  Service:     CARDIAC CATHETERIZATION N/A 2016    Procedure: Coronary angiography;  Surgeon: Lio Roman MD;  Location[facility] CATH INVASIVE LOC[facility];  Service:     CARDIAC CATHETERIZATION N/A 2016    Procedure: Left Heart Cath;  Surgeon: Lio Roman MD;  Location: [facility] CATH INVASIVE LOCATION;  Service:     CHOLECYSTECTOMY      COLONOSCOPY      Dr. Palomares    COLONOSCOPY W/ POLYPECTOMY N/A 2022    Procedure: COLONOSCOPY WITH POLYPECTOMY;  Surgeon: Kana Chowdary MD;  Location: [facility] OR;  Service: Gastroenterology;  Laterality: N/A;  hemorrhoids  ascending colon polyp (cold snare)  transverse colon polyp (cold snare)    ENDOSCOPY WITH POLYPECTOMY N/A 2022    Procedure: ESOPHAGOGASTRODUODENOSCOPY WITH POLYPECTOMY;  Surgeon: Kana Chowdary MD;  Location:  [facility] OR;  Service: Gastroenterology;  Laterality: N/A;  gastric polyps    KNEE ARTHROSCOPY Right 2021    Procedure: RIGHT KNEE ARTHROSCOPY WITH PARTIAL MEDIAL AND LATERAL MENISECTOMY WITH DEBRIDEMENT OF ARTHRITIS ;   "Surgeon: Ella Muller MD;  [place] [facility] [place] [facility] [place]  Service: Orthopedics;  Laterality: Right;    UPPER GASTROINTESTINAL ENDOSCOPY           The following portions of the patient's history were reviewed and updated as appropriate: problem list, allergies, current medications, past medical history, past family history, past social history, and past surgical history.    ROS    See HPI    Immunization History   Administered Date(s) Administered    COVID-19 (MODERNA) 1st,2nd,3rd Dose Monovalent 03/22/2021, 04/19/2021    COVID-19 (MODERNA) Monovalent Original Booster 01/20/2022    Fluzone (or Fluarix & Flulaval for VFC) >6mos 10/19/2019, 10/10/2020, 09/27/2022, 10/02/2023    Hepatitis A 09/16/2019, 06/18/2020    Pneumococcal Polysaccharide (PPSV23) 12/18/2020    Tdap 09/16/2019       Objective   Vitals:    09/03/24 0815   BP: 152/98   Temp: 98.1 °F (36.7 °C)   Weight: 103 kg (226 lb)   Height: 182.9 cm (72.01\")     Body mass index is 30.64 kg/m².  Physical Exam  Vitals reviewed.   Constitutional:       Appearance: He is well-developed.   HENT:      Head: Normocephalic and atraumatic.   Cardiovascular:      Rate and Rhythm: Normal rate and regular rhythm.      Heart sounds: Normal heart sounds, S1 normal and S2 normal.   Pulmonary:      Effort: Pulmonary effort is normal.      Breath sounds: Normal breath sounds.   Skin:     General: Skin is warm.   Neurological:      Mental Status: He is alert.   Psychiatric:         Behavior: Behavior normal.           Assessment & Plan   Diagnoses and all orders for this visit:    1. DDD (degenerative disc disease), lumbosacral (Primary)  -     Ambulatory Referral to Pain Management    2. Benign essential HTN  -     Basic metabolic panel  -     Microalbumin / Creatinine Urine Ratio - Urine, Clean Catch    3. KVNG (obstructive sleep apnea)  -     Ambulatory Referral to ENT (Otolaryngology)    Other orders  -     sildenafil (Viagra) 25 MG tablet; Take 1 tablet by mouth Daily As " Needed for Erectile Dysfunction.  Dispense: 30 tablet; Refill: 0     BP is significantly elevated, will need utd renal numbers today before adjusting his medication. Suspect his fluctuating/difficult to control BP is due to untreated apnea. Referral to Dr. Valenzuela for new sleep study and evaluation for Inspire. Reviewed MRI in detail, would benefit from injections-referral to pain management today.     Return in about 1 month (around 10/3/2024) for Lab Today.

## 2024-09-04 DIAGNOSIS — I10 BENIGN ESSENTIAL HTN: Primary | ICD-10-CM

## 2024-09-04 DIAGNOSIS — I10 BENIGN ESSENTIAL HTN: ICD-10-CM

## 2024-09-04 LAB
ALBUMIN/CREAT UR: 16 MG/G CREAT (ref 0–29)
BUN SERPL-MCNC: 20 MG/DL (ref 6–20)
BUN/CREAT SERPL: 13.9 (ref 7–25)
CALCIUM SERPL-MCNC: 9.7 MG/DL (ref 8.6–10.5)
CHLORIDE SERPL-SCNC: 104 MMOL/L (ref 98–107)
CO2 SERPL-SCNC: 27.4 MMOL/L (ref 22–29)
CREAT SERPL-MCNC: 1.44 MG/DL (ref 0.76–1.27)
CREAT UR-MCNC: 102 MG/DL
EGFRCR SERPLBLD CKD-EPI 2021: 56 ML/MIN/1.73
GLUCOSE SERPL-MCNC: 105 MG/DL (ref 65–99)
MICROALBUMIN UR-MCNC: 16 UG/ML
POTASSIUM SERPL-SCNC: 4.3 MMOL/L (ref 3.5–5.2)
SODIUM SERPL-SCNC: 141 MMOL/L (ref 136–145)

## 2024-09-04 RX ORDER — TELMISARTAN 20 MG/1
20 TABLET ORAL DAILY
Qty: 90 TABLET | Refills: 0 | Status: SHIPPED | OUTPATIENT
Start: 2024-09-04

## 2024-09-04 RX ORDER — TELMISARTAN 20 MG/1
20 TABLET ORAL DAILY
Qty: 90 TABLET | Refills: 0 | Status: SHIPPED | OUTPATIENT
Start: 2024-09-04 | End: 2024-09-04 | Stop reason: SDUPTHER

## 2024-09-09 LAB — INR PPP: 4

## 2024-09-12 ENCOUNTER — ANTICOAGULATION VISIT (OUTPATIENT)
Dept: CARDIOLOGY | Facility: CLINIC | Age: 59
End: 2024-09-12
Payer: COMMERCIAL

## 2024-09-12 ENCOUNTER — TELEPHONE (OUTPATIENT)
Dept: PAIN MEDICINE | Facility: CLINIC | Age: 59
End: 2024-09-12
Payer: COMMERCIAL

## 2024-09-13 ENCOUNTER — OFFICE VISIT (OUTPATIENT)
Dept: PAIN MEDICINE | Facility: CLINIC | Age: 59
End: 2024-09-13
Payer: COMMERCIAL

## 2024-09-13 VITALS
BODY MASS INDEX: 30.48 KG/M2 | HEART RATE: 78 BPM | OXYGEN SATURATION: 96 % | TEMPERATURE: 96.9 F | WEIGHT: 225 LBS | HEIGHT: 72 IN | SYSTOLIC BLOOD PRESSURE: 154 MMHG | RESPIRATION RATE: 18 BRPM | DIASTOLIC BLOOD PRESSURE: 100 MMHG

## 2024-09-13 DIAGNOSIS — M51.36 DDD (DEGENERATIVE DISC DISEASE), LUMBAR: Primary | ICD-10-CM

## 2024-09-13 DIAGNOSIS — M54.16 LUMBAR RADICULOPATHY: ICD-10-CM

## 2024-09-13 PROCEDURE — 99204 OFFICE O/P NEW MOD 45 MIN: CPT | Performed by: NURSE PRACTITIONER

## 2024-09-13 RX ORDER — GABAPENTIN 300 MG/1
CAPSULE ORAL
Qty: 90 CAPSULE | Refills: 1 | Status: SHIPPED | OUTPATIENT
Start: 2024-09-13

## 2024-09-13 RX ORDER — FLUOROURACIL 50 MG/G
CREAM TOPICAL
COMMUNITY
Start: 2024-08-15

## 2024-09-13 NOTE — PROGRESS NOTES
CHIEF COMPLAINT  Back pain    Subjective   Hussein Nino is a 59 y.o. male.   He presents to the office for evaluation of back pain. He was referred here by Margarita VALDEZ.     Patient complains of chronic low back pain for the past 4 years.  Progressive worsening over the past 4 months.  He has completed physical therapy, chiropractic treatment, massage therapy.  He does report benefit with the PT in the past, HEP performed over the past couple of months.  No history of lumbar spine surgery.  No previous interventional pain management.      Pain today 3/10 VAS.  The most severe pain is in the right lumbosacral area.  The pain radiates down the right lateral leg with associated numbness/tingling.  There is a also a pain in the heel of the right foot and toes. The pain is aggravated by prolonged sitting, standing, walking.  The pain improves with nothing in particular.  He takes Advil, Acetaminophen PRN without much benefit.     Warfarin - aortic valve replacement.  Mechanical Valve.  Has to transition to Lovenox for procedure.  Dr. Britton is the cardiologist.    History of Present Illness     PEG Assessment   What number best describes your pain on average in the past week?5  What number best describes how, during the past week, pain has interfered with your enjoyment of life?6  What number best describes how, during the past week, pain has interfered with your general activity?  5      Current Outpatient Medications:     albuterol sulfate  (90 Base) MCG/ACT inhaler, Inhale 2 puffs Every 4 (Four) Hours As Needed for Wheezing., Disp: 18 g, Rfl: 1    fenofibrate (TRICOR) 145 MG tablet, TAKE 1 TABLET DAILY, Disp: 90 tablet, Rfl: 3    fluorouracil (EFUDEX) 5 % cream, Please see attached for detailed directions, Disp: , Rfl:     omeprazole (priLOSEC) 40 MG capsule, Take 1 capsule by mouth Daily., Disp: 90 capsule, Rfl: 3    pravastatin (PRAVACHOL) 40 MG tablet, TAKE 1 TABLET DAILY, Disp: 90 tablet, Rfl: 1     "sildenafil (Viagra) 25 MG tablet, Take 1 tablet by mouth Daily As Needed for Erectile Dysfunction., Disp: 30 tablet, Rfl: 0    telmisartan (MICARDIS) 20 MG tablet, Take 1 tablet by mouth Daily., Disp: 90 tablet, Rfl: 0    warfarin (Jantoven) 5 MG tablet, TAKE 1 TABLET DAILY OR AS  DIRECTED TO MAINTAIN       INTERNATIONAL NORMALIZED   RATIO 2.5-3.5, Disp: 90 tablet, Rfl: 1    gabapentin (NEURONTIN) 300 MG capsule, 300 mg HS x 1 week, then bid x 1 week, then tid thereafter as tolerated, Disp: 90 capsule, Rfl: 1    The following portions of the patient's history were reviewed and updated as appropriate: allergies, current medications, past family history, past medical history, past social history, past surgical history, and problem list.    REVIEW OF PERTINENT MEDICAL DATA        Review of Systems   Gastrointestinal:  Negative for constipation and diarrhea.   Genitourinary:  Negative for difficulty urinating.   Musculoskeletal:  Positive for back pain.   Neurological:  Positive for weakness (right leg), numbness (right leg) and headaches.   Psychiatric/Behavioral:  Negative for sleep disturbance and suicidal ideas. The patient is not nervous/anxious.      I have reviewed and confirmed the accuracy of the ROS as documented by the MA/LPN/RN YOLY Heaton    Vitals:    09/13/24 0853   BP: 154/100   Pulse: 78   Resp: 18   Temp: 96.9 °F (36.1 °C)   SpO2: 96%   Weight: 102 kg (225 lb)   Height: 182.9 cm (72.01\")   PainSc:   3   PainLoc: Back         Objective       Physical Exam  Vitals and nursing note reviewed.   Constitutional:       General: He is not in acute distress.     Appearance: Normal appearance. He is not ill-appearing.   Pulmonary:      Effort: Pulmonary effort is normal. No respiratory distress.   Musculoskeletal:      Lumbar back: Tenderness and bony tenderness present. Positive right straight leg raise test. Negative left straight leg raise test.   Neurological:      Mental Status: He is alert and " oriented to person, place, and time.      Motor: Motor function is intact. No weakness.      Gait: Gait is intact. Gait normal.   Psychiatric:         Mood and Affect: Mood normal.         Behavior: Behavior normal.           Assessment & Plan   Diagnoses and all orders for this visit:    1. DDD (degenerative disc disease), lumbar (Primary)    2. Lumbar radiculopathy  -     gabapentin (NEURONTIN) 300 MG capsule; 300 mg HS x 1 week, then bid x 1 week, then tid thereafter as tolerated  Dispense: 90 capsule; Refill: 1      --- Right L5 and S1 LTFESI - schedule pending clearance from cardiology and Dr. Dominguez      ---  Indications for epidural injection:  Plan is to proceed with epidural at the appropriate level.  If the patient receives significant pain reduction and improvement in function and the plan will be to repeat the epidural when the pain worsens.  If a second epidural provides at least 6 weeks of sustained improvement that includes both pain reduction and improvement in function then an epidural injection could be repeated once again at the same level.  This is a mutual decision between the clinician and the patient that includes discussions including risks and benefits in detail as well as alternative therapies.  Patient's questions were answered to their satisfaction and to their understanding.  ---      --- The patient will be started on a trial of gabapentin. he will be started on gabapentin 300 mg once nightly for one week. Will then increase to twice daily for one week. Patient will then increase to three times daily as tolerated. Reviewed potential side effects, including somnolence and dizziness.   --- Compliance UDS next visit if continued     --- Will need clearance from cardiology to hold Coumadin and bridge to Lovenox appropriately for the procedure.     --------    Anticoagulation risks for procedures....   - there are risks to discontinuation of anticoagulants for neuraxial procedures and  surgery.  Risks include but are not limited to deep vein thromboses, pulmonary emboli, myocardial infarction, and stroke    - Neuraxial access with a needle is relatively contraindicated while anticoagulated because of the risk of hemorrhage and/or epidural hematoma, which can be a neurosurgical emergency to evacuate bleeding.  Left unchecked, bleeding can cause nerve damage leading to paralysis and/or death.  --------    --- Hussein Nino reports a pain score of 3.  Given his pain assessment as noted, treatment options were discussed and the following options were decided upon as a follow-up plan to address the patient's pain: prescription for non-opiod analgesics and steroid injections.    --- Follow-up for procedure            JENIFFER REPORT      As the clinician, I personally reviewed the JENIFFER from 9/13/2024 while the patient was in the office today.    History and physical exam exhibit continued safe and appropriate use of controlled substances.     Dictated utilizing Dragon dictation.

## 2024-09-16 LAB — INR PPP: 2.8

## 2024-09-17 ENCOUNTER — LAB (OUTPATIENT)
Facility: HOSPITAL | Age: 59
End: 2024-09-17
Payer: COMMERCIAL

## 2024-09-17 ENCOUNTER — ANTICOAGULATION VISIT (OUTPATIENT)
Dept: CARDIOLOGY | Facility: CLINIC | Age: 59
End: 2024-09-17
Payer: COMMERCIAL

## 2024-09-17 ENCOUNTER — TELEPHONE (OUTPATIENT)
Dept: PAIN MEDICINE | Facility: CLINIC | Age: 59
End: 2024-09-17
Payer: COMMERCIAL

## 2024-09-17 DIAGNOSIS — I10 BENIGN ESSENTIAL HTN: Primary | ICD-10-CM

## 2024-09-17 LAB
ANION GAP SERPL CALCULATED.3IONS-SCNC: 8.2 MMOL/L (ref 5–15)
BUN SERPL-MCNC: 20 MG/DL (ref 8–23)
BUN/CREAT SERPL: 14.1 (ref 7–25)
CALCIUM SPEC-SCNC: 9.7 MG/DL (ref 8.6–10.5)
CHLORIDE SERPL-SCNC: 101 MMOL/L (ref 98–107)
CO2 SERPL-SCNC: 26.8 MMOL/L (ref 22–29)
CREAT SERPL-MCNC: 1.42 MG/DL (ref 0.76–1.27)
EGFRCR SERPLBLD CKD-EPI 2021: 56.6 ML/MIN/1.73
GLUCOSE SERPL-MCNC: 157 MG/DL (ref 65–99)
POTASSIUM SERPL-SCNC: 4.1 MMOL/L (ref 3.5–5.2)
SODIUM SERPL-SCNC: 136 MMOL/L (ref 136–145)

## 2024-09-17 PROCEDURE — 80048 BASIC METABOLIC PNL TOTAL CA: CPT | Performed by: PHYSICIAN ASSISTANT

## 2024-09-24 ENCOUNTER — TELEPHONE (OUTPATIENT)
Dept: PAIN MEDICINE | Facility: CLINIC | Age: 59
End: 2024-09-24
Payer: COMMERCIAL

## 2024-09-24 ENCOUNTER — PREP FOR SURGERY (OUTPATIENT)
Dept: SURGERY | Facility: SURGERY CENTER | Age: 59
End: 2024-09-24
Payer: COMMERCIAL

## 2024-09-24 DIAGNOSIS — M54.16 LUMBAR RADICULOPATHY: Primary | ICD-10-CM

## 2024-09-24 RX ORDER — ENOXAPARIN SODIUM 100 MG/ML
100 INJECTION SUBCUTANEOUS EVERY 12 HOURS SCHEDULED
Qty: 14 ML | Refills: 1 | Status: SHIPPED | OUTPATIENT
Start: 2024-09-24

## 2024-09-25 ENCOUNTER — TRANSCRIBE ORDERS (OUTPATIENT)
Dept: SURGERY | Facility: SURGERY CENTER | Age: 59
End: 2024-09-25
Payer: COMMERCIAL

## 2024-09-25 DIAGNOSIS — Z41.9 SURGERY, ELECTIVE: Primary | ICD-10-CM

## 2024-09-25 DIAGNOSIS — G47.33 OSA (OBSTRUCTIVE SLEEP APNEA): Primary | ICD-10-CM

## 2024-09-25 PROBLEM — M54.16 LUMBAR RADICULOPATHY: Status: ACTIVE | Noted: 2024-09-24

## 2024-09-26 ENCOUNTER — LAB (OUTPATIENT)
Facility: HOSPITAL | Age: 59
End: 2024-09-26
Payer: COMMERCIAL

## 2024-09-26 LAB
ANION GAP SERPL CALCULATED.3IONS-SCNC: 6.3 MMOL/L (ref 5–15)
BUN SERPL-MCNC: 13 MG/DL (ref 8–23)
BUN/CREAT SERPL: 9.7 (ref 7–25)
CALCIUM SPEC-SCNC: 9.9 MG/DL (ref 8.6–10.5)
CHLORIDE SERPL-SCNC: 107 MMOL/L (ref 98–107)
CO2 SERPL-SCNC: 26.7 MMOL/L (ref 22–29)
CREAT SERPL-MCNC: 1.34 MG/DL (ref 0.76–1.27)
EGFRCR SERPLBLD CKD-EPI 2021: 60.6 ML/MIN/1.73
GLUCOSE SERPL-MCNC: 124 MG/DL (ref 65–99)
POTASSIUM SERPL-SCNC: 4.1 MMOL/L (ref 3.5–5.2)
SODIUM SERPL-SCNC: 140 MMOL/L (ref 136–145)

## 2024-09-26 PROCEDURE — 80048 BASIC METABOLIC PNL TOTAL CA: CPT | Performed by: PHYSICIAN ASSISTANT

## 2024-09-30 DIAGNOSIS — I10 BENIGN ESSENTIAL HTN: Primary | ICD-10-CM

## 2024-09-30 RX ORDER — TELMISARTAN 80 MG/1
80 TABLET ORAL DAILY
Qty: 90 TABLET | Refills: 1 | Status: SHIPPED | OUTPATIENT
Start: 2024-09-30

## 2024-10-04 ENCOUNTER — OFFICE VISIT (OUTPATIENT)
Dept: INTERNAL MEDICINE | Facility: CLINIC | Age: 59
End: 2024-10-04
Payer: COMMERCIAL

## 2024-10-04 VITALS
DIASTOLIC BLOOD PRESSURE: 80 MMHG | WEIGHT: 229 LBS | HEIGHT: 72 IN | TEMPERATURE: 98 F | SYSTOLIC BLOOD PRESSURE: 150 MMHG | BODY MASS INDEX: 31.02 KG/M2

## 2024-10-04 DIAGNOSIS — I10 BENIGN ESSENTIAL HTN: Primary | ICD-10-CM

## 2024-10-04 LAB
BUN SERPL-MCNC: 19 MG/DL (ref 8–23)
BUN/CREAT SERPL: 14.7 (ref 7–25)
CALCIUM SERPL-MCNC: 9.4 MG/DL (ref 8.6–10.5)
CHLORIDE SERPL-SCNC: 105 MMOL/L (ref 98–107)
CO2 SERPL-SCNC: 25.8 MMOL/L (ref 22–29)
CREAT SERPL-MCNC: 1.29 MG/DL (ref 0.76–1.27)
EGFRCR SERPLBLD CKD-EPI 2021: 63.5 ML/MIN/1.73
GLUCOSE SERPL-MCNC: 117 MG/DL (ref 65–99)
POTASSIUM SERPL-SCNC: 4.1 MMOL/L (ref 3.5–5.2)
SODIUM SERPL-SCNC: 142 MMOL/L (ref 136–145)

## 2024-10-04 PROCEDURE — 99213 OFFICE O/P EST LOW 20 MIN: CPT | Performed by: PHYSICIAN ASSISTANT

## 2024-10-04 RX ORDER — HYDRALAZINE HYDROCHLORIDE 50 MG/1
50 TABLET, FILM COATED ORAL 3 TIMES DAILY
Start: 2024-10-04

## 2024-10-04 NOTE — PROGRESS NOTES
Subjective   Chief Complaint   Patient presents with    Hypertension       History of Present Illness     Pt is here today for fup on HTN. He is tolerating the Telmisartan, tried the clonidine again became constipated. Seeing pain management next week for an injection. Is set up for consultation regarding inspire.      Patient Active Problem List   Diagnosis    Allergic rhinitis    Asthma    Gastroesophageal reflux disease    Hyperlipidemia    Irritable bowel syndrome    Raynaud's phenomenon    Essential hypertriglyceridemia    S/P AVR (aortic valve replacement)    Mechanical heart valve present    Chronic anticoagulation    Benign essential HTN    Benign prostatic hyperplasia with nocturia    KVNG (obstructive sleep apnea)    Abnormal EKG    Venous insufficiency    Tear of medial meniscus of right knee, current    Tear of lateral meniscus of right knee, current    Encounter for screening for malignant neoplasm of colon    History of colon polyps    RUTLEDGE (nonalcoholic steatohepatitis)    Stage 3a chronic kidney disease    Occipital neuralgia of right side    Abnormal brain MRI    SOB (shortness of breath)    Lumbar radiculopathy       Allergies   Allergen Reactions    Fish-Derived Products Anaphylaxis     Can eat shell fish without problem, but can't eat fish sandwich, cod, tuna or salmon. , is unable to take fish oil supplements.     Amlodipine Hives    Clonidine Derivatives GI Intolerance    Ondansetron Rash    Penicillins Rash       Current Outpatient Medications on File Prior to Visit   Medication Sig Dispense Refill    albuterol sulfate  (90 Base) MCG/ACT inhaler Inhale 2 puffs Every 4 (Four) Hours As Needed for Wheezing. 18 g 1    Enoxaparin Sodium (LOVENOX) 100 MG/ML solution prefilled syringe syringe Inject 1 mL under the skin into the appropriate area as directed Every 12 (Twelve) Hours. 14 mL 1    fenofibrate (TRICOR) 145 MG tablet TAKE 1 TABLET DAILY 90 tablet 3    fluorouracil (EFUDEX) 5 % cream  Please see attached for detailed directions      gabapentin (NEURONTIN) 300 MG capsule 300 mg HS x 1 week, then bid x 1 week, then tid thereafter as tolerated 90 capsule 1    omeprazole (priLOSEC) 40 MG capsule Take 1 capsule by mouth Daily. 90 capsule 3    pravastatin (PRAVACHOL) 40 MG tablet TAKE 1 TABLET DAILY 90 tablet 1    sildenafil (Viagra) 25 MG tablet Take 1 tablet by mouth Daily As Needed for Erectile Dysfunction. 30 tablet 0    telmisartan (MICARDIS) 80 MG tablet Take 1 tablet by mouth Daily. 90 tablet 1    warfarin (Jantoven) 5 MG tablet TAKE 1 TABLET DAILY OR AS  DIRECTED TO MAINTAIN       INTERNATIONAL NORMALIZED   RATIO 2.5-3.5 90 tablet 1     No current facility-administered medications on file prior to visit.       Past Medical History:   Diagnosis Date    Allergies     Aortic stenosis     SEVERE    Bicuspid aortic valve     replaced 2016    Chronic pain disorder 2020    back, knee, foot    Epistaxis     Fractures 1969, 2008    left leg, right leg    GERD (gastroesophageal reflux disease)     Gout     Headache for years    History of COVID-19     NOV 2020    Hyperlipidemia     IBS (irritable bowel syndrome)     Joint pain     Low back pain     Migraine     Right knee meniscal tear     Shingles     Sleep apnea     CPAP, DOES NOT USE       Family History   Problem Relation Age of Onset    Heart failure Mother     Kidney disease Mother     Hypertension Mother     Heart disease Mother     Arthritis Mother     Migraines Mother     Alzheimer's disease Father     Thyroid disease Sister     Hypertension Sister     Hypertension Brother     Colon polyps Maternal Uncle     Irritable bowel syndrome Cousin     Arthritis Maternal Grandmother     Migraines Daughter     Malig Hyperthermia Neg Hx     Colon cancer Neg Hx     Crohn's disease Neg Hx     Ulcerative colitis Neg Hx        Social History     Socioeconomic History    Marital status:    Tobacco Use    Smoking status: Former     Current packs/day: 0.00      Average packs/day: 0.6 packs/day for 17.0 years (10.0 ttl pk-yrs)     Types: Cigarettes     Start date:      Quit date:      Years since quittin.7     Passive exposure: Past    Smokeless tobacco: Never    Tobacco comments:     QUIT AGE 23   Vaping Use    Vaping status: Never Used   Substance and Sexual Activity    Alcohol use: Yes     Alcohol/week: 3.0 standard drinks of alcohol     Types: 3 Cans of beer per week     Comment: 1-2 PER WEEK    Drug use: No    Sexual activity: Yes     Partners: Female       Past Surgical History:   Procedure Laterality Date    ADENOIDECTOMY      AORTIC VALVE REPAIR/REPLACEMENT  10/05/2016    mechanical valve    ASCENDING ARCH/HEMIARCH REPLACEMENT N/A 10/05/2016    Procedure: MIDLINE STERNOTOMY, AORTIC VALVE REPLACEMENT, ASCENDING AND HEMIARCH REPLACEMENT, REPAIR OF PERIVALVULAR LEAK, WITH NEURO MONITOIRING, INTRAOPERATIVE WARREN;  Surgeon: Sukhi Wilkinson MD;  Location: [facility] MAIN OR;  Service:     CARDIAC CATHETERIZATION N/A 2016    Procedure: Coronary angiography;  Surgeon: Lio Roman MD;  Location:  [facility] CATH INVASIVE LOCATION;  Service:     CARDIAC CATHETERIZATION N/A 2016    Procedure: Left Heart Cath;  Surgeon: Lio Roman MD;  Location:  [facility] CATH INVASIVE LOCATION;  Service:     CHOLECYSTECTOMY      COLONOSCOPY      Dr. Palomares    COLONOSCOPY W/ POLYPECTOMY N/A 2022    Procedure: COLONOSCOPY WITH POLYPECTOMY;  Surgeon: Kana Chowdary MD;  Location:  [facility] OR;  Service: Gastroenterology;  Laterality: N/A;  hemorrhoids  ascending colon polyp (cold snare)  transverse colon polyp (cold snare)    ENDOSCOPY WITH POLYPECTOMY N/A 2022    Procedure: ESOPHAGOGASTRODUODENOSCOPY WITH POLYPECTOMY;  Surgeon: Kana Chowdary MD;  Location:  [facility] OR;  Service: Gastroenterology;  Laterality: N/A;  gastric polyps    KNEE ARTHROSCOPY Right 2021    Procedure: RIGHT KNEE ARTHROSCOPY WITH PARTIAL MEDIAL AND  "LATERAL MENISECTOMY WITH DEBRIDEMENT OF ARTHRITIS ;  Surgeon: Ella Muller MD;  Location[facility] [place];  Service: Orthopedics;  Laterality: Right;    UPPER GASTROINTESTINAL ENDOSCOPY           The following portions of the patient's history were reviewed and updated as appropriate: problem list, allergies, current medications, past medical history, past family history, past social history, and past surgical history.    ROS    See HPI    Immunization History   Administered Date(s) Administered    COVID-19 (MODERNA) 1st,2nd,3rd Dose Monovalent 03/22/2021, 04/19/2021    COVID-19 (MODERNA) Monovalent Original Booster 01/20/2022    Fluzone (or Fluarix & Flulaval for VFC) >6mos 10/19/2019, 10/10/2020, 09/27/2022, 10/02/2023    Hepatitis A 09/16/2019, 06/18/2020    Pneumococcal Polysaccharide (PPSV23) 12/18/2020    Tdap 09/16/2019       Objective   Vitals:    10/04/24 0814   BP: 150/80   Temp: 98 °F (36.7 °C)   Weight: 104 kg (229 lb)   Height: 182.9 cm (72.01\")     Body mass index is 31.05 kg/m².  Physical Exam  Vitals reviewed.   Constitutional:       Appearance: He is well-developed.   HENT:      Head: Normocephalic and atraumatic.   Cardiovascular:      Rate and Rhythm: Normal rate and regular rhythm.      Heart sounds: S1 normal and S2 normal.   Pulmonary:      Effort: Pulmonary effort is normal.      Breath sounds: Normal breath sounds.   Skin:     General: Skin is warm.   Neurological:      Mental Status: He is alert.   Psychiatric:         Behavior: Behavior normal.           Assessment & Plan   Diagnoses and all orders for this visit:    1. Benign essential HTN (Primary)  -     Basic Metabolic Panel  -     hydrALAZINE (APRESOLINE) 50 MG tablet; Take 1 tablet by mouth 3 (Three) Times a Day.     Add Hydralazine back see if he has any constipation sx with it. Diastolic is improved, but systolic is still not goal.     Return in about 3 weeks (around 10/25/2024) for Lab Today.           "

## 2024-10-08 NOTE — DISCHARGE INSTRUCTIONS
[facility] [place] [facility] - Post-procedure Instructions          --  While there are no absolute restrictions, it is recommended that you do not perform strenuous activity today. In the morning, you may resume your level of activity as before your block.    --  If you have a band-aid at your injection site, please remove it later today. Observe the area for any redness, swelling, pus-like drainage, or a temperature over 101°. If any of these symptoms occur, please call your doctor at [phone]. If after office hours, leave a message and the on-call provider will return your call.    --  Ice may be applied to your injection site. It is recommended you avoid direct heat (heating pad; hot tub) for 1-2 days.    --  Call [facility] [place] at [phone] if you experience persistent headache, persistent bleeding from the injection site, or severe pain not relieved by heat or oral medication.    --  Do not make important decisions today.    --  Due to the effects of the block and/or the I.V. Sedation, DO NOT drive or operate hazardous machinery for 12 hours.  Local anesthetics may cause numbness after procedure and precautions must be taken with regards to operating equipment as well as with walking, even if ambulating with assistance of another person or with an assistive device.    --  Do not drink alcohol for 12 hours.    -- You may return to work tomorrow, or as directed by your referring doctor.    --  Occasionally you may notice a slight increase in your pain after the procedure. This should start to improve within the next 24-48 hours. Radiofrequency ablation procedure pain may last 3-4 weeks.    --  It may take as long as 3-4 days before you notice a gradual improvement in your pain and/or other symptoms.    -- You may continue to take your prescribed pain medication as needed.    --  Some normal possible side effects of steroid use could include fluid retention, increased blood sugar, dull headache,  increased sweating, increased appetite, mood swings and flushing.    --  Diabetics are recommended to watch their blood glucose level closely for 24-48 hours after the injection.    --  Must stay in PACU for 20 min upon arrival and prove no leg weakness before being discharged.    --  IN THE EVENT OF A LIFE THREATENING EMERGENCY, (CHEST PAIN, BREATHING DIFFICULTIES, PARALYSIS…) YOU SHOULD GO TO YOUR NEAREST EMERGENCY ROOM.    --  You should be contacted by our office within 2-3 days to schedule follow up or next appointment date.  If not contacted within 7 days, please call the office at [phone]     Resume your blood thinner in 24 hours.

## 2024-10-09 ENCOUNTER — HOSPITAL ENCOUNTER (OUTPATIENT)
Facility: SURGERY CENTER | Age: 59
Setting detail: HOSPITAL OUTPATIENT SURGERY
Discharge: HOME | End: 2024-10-09
Attending: ANESTHESIOLOGY | Admitting: ANESTHESIOLOGY
Payer: COMMERCIAL

## 2024-10-09 ENCOUNTER — TRANSCRIBE ORDERS (OUTPATIENT)
Dept: ADMINISTRATIVE | Facility: HOSPITAL | Age: 59
End: 2024-10-09
Payer: COMMERCIAL

## 2024-10-09 ENCOUNTER — HOSPITAL ENCOUNTER (OUTPATIENT)
Dept: GENERAL RADIOLOGY | Facility: SURGERY CENTER | Age: 59
Setting detail: HOSPITAL OUTPATIENT SURGERY
End: 2024-10-09
Payer: COMMERCIAL

## 2024-10-09 ENCOUNTER — TELEPHONE (OUTPATIENT)
Dept: CARDIOLOGY | Facility: CLINIC | Age: 59
End: 2024-10-09
Payer: COMMERCIAL

## 2024-10-09 ENCOUNTER — LAB (OUTPATIENT)
Dept: LAB | Facility: HOSPITAL | Age: 59
End: 2024-10-09
Payer: COMMERCIAL

## 2024-10-09 VITALS
WEIGHT: 228 LBS | RESPIRATION RATE: 16 BRPM | HEART RATE: 71 BPM | HEIGHT: 71 IN | SYSTOLIC BLOOD PRESSURE: 147 MMHG | OXYGEN SATURATION: 93 % | BODY MASS INDEX: 31.92 KG/M2 | DIASTOLIC BLOOD PRESSURE: 91 MMHG | TEMPERATURE: 98.4 F

## 2024-10-09 DIAGNOSIS — M54.16 LUMBAR RADICULOPATHY: Primary | ICD-10-CM

## 2024-10-09 DIAGNOSIS — Z01.818 PRE-OP TESTING: ICD-10-CM

## 2024-10-09 DIAGNOSIS — M54.16 LUMBAR RADICULOPATHY: ICD-10-CM

## 2024-10-09 DIAGNOSIS — Z41.9 SURGERY, ELECTIVE: ICD-10-CM

## 2024-10-09 LAB
INR PPP: 1.1 (ref 0.8–1.2)
PROTHROMBIN TIME: 11.3 SECONDS (ref 12.8–15.2)

## 2024-10-09 PROCEDURE — 77002 NEEDLE LOCALIZATION BY XRAY: CPT

## 2024-10-09 PROCEDURE — 85610 PROTHROMBIN TIME: CPT

## 2024-10-09 PROCEDURE — 62323 NJX INTERLAMINAR LMBR/SAC: CPT | Performed by: ANESTHESIOLOGY

## 2024-10-09 RX ORDER — ENOXAPARIN SODIUM 100 MG/ML
100 INJECTION SUBCUTANEOUS EVERY 12 HOURS SCHEDULED
Qty: 14 ML | Refills: 1 | Status: SHIPPED | OUTPATIENT
Start: 2024-10-09

## 2024-10-09 NOTE — TELEPHONE ENCOUNTER
Noted back injections cancelled today as his lovenox will need to be held for 24hours. I spoke with Mr Trung, there is a slightly increased risk holding for 24hours, but still remains in the relatively safe range. He verbalized understanding.

## 2024-10-09 NOTE — INTERVAL H&P NOTE
H&P reviewed. The patient was examined and there are no changes to the H&P.  Unfortunately, guidelines state that therapeutic Lovenox doses must be held for 24 hours prior to his injection.  We will touch base with his cardiology team to arrange this hold and his bridge.  I have advised him to resume his warfarin and continue lovenox until his INR is 2.5 or greater per the cardiology note.

## 2024-10-10 ENCOUNTER — TRANSCRIBE ORDERS (OUTPATIENT)
Dept: SURGERY | Facility: SURGERY CENTER | Age: 59
End: 2024-10-10
Payer: COMMERCIAL

## 2024-10-10 DIAGNOSIS — Z41.9 SURGERY, ELECTIVE: Primary | ICD-10-CM

## 2024-10-11 NOTE — DISCHARGE INSTRUCTIONS
[facility] [place] [facility] - Post-procedure Instructions          --  While there are no absolute restrictions, it is recommended that you do not perform strenuous activity today. In the morning, you may resume your level of activity as before your block.    --  If you have a band-aid at your injection site, please remove it later today. Observe the area for any redness, swelling, pus-like drainage, or a temperature over 101°. If any of these symptoms occur, please call your doctor at [phone]. If after office hours, leave a message and the on-call provider will return your call.    --  Ice may be applied to your injection site. It is recommended you avoid direct heat (heating pad; hot tub) for 1-2 days.    --  Call [facility] [place] at [phone] if you experience persistent headache, persistent bleeding from the injection site, or severe pain not relieved by heat or oral medication.    --  Do not make important decisions today.    --  Due to the effects of the block and/or the I.V. Sedation, DO NOT drive or operate hazardous machinery for 12 hours.  Local anesthetics may cause numbness after procedure and precautions must be taken with regards to operating equipment as well as with walking, even if ambulating with assistance of another person or with an assistive device.    --  Do not drink alcohol for 12 hours.    -- You may return to work tomorrow, or as directed by your referring doctor.    --  Occasionally you may notice a slight increase in your pain after the procedure. This should start to improve within the next 24-48 hours. Radiofrequency ablation procedure pain may last 3-4 weeks.    --  It may take as long as 3-4 days before you notice a gradual improvement in your pain and/or other symptoms.    -- You may continue to take your prescribed pain medication as needed.    --  Some normal possible side effects of steroid use could include fluid retention, increased blood sugar, dull headache,  increased sweating, increased appetite, mood swings and flushing.    --  Diabetics are recommended to watch their blood glucose level closely for 24-48 hours after the injection.    --  Must stay in PACU for 20 min upon arrival and prove no leg weakness before being discharged.    --  IN THE EVENT OF A LIFE THREATENING EMERGENCY, (CHEST PAIN, BREATHING DIFFICULTIES, PARALYSIS…) YOU SHOULD GO TO YOUR NEAREST EMERGENCY ROOM.    --  You should be contacted by our office within 2-3 days to schedule follow up or next appointment date.  If not contacted within 7 days, please call the office at [phone]     Resume your blood thinners in 24 hours.

## 2024-10-11 NOTE — H&P
[facility]   HISTORY AND PHYSICAL    Patient Name: Hussein Nino  : 1965  MRN: [id number]  Primary Care Physician:  Margarita Dickinson PA-C  Date of admission: 10/14/2024    Subjective   Subjective     Chief Complaint: Low back pain    History of Present Illness  Chronic low back pain with radiation into his right lower extremity.      Review of Systems   Constitutional:  Negative for chills and fever.   Respiratory:  Negative for cough and shortness of breath.    Musculoskeletal:  Positive for back pain.        Personal History     Past Medical History:   Diagnosis Date    Allergies     Aortic stenosis     SEVERE    Bicuspid aortic valve     replaced     Chronic pain disorder     back, knee, foot    Epistaxis     Fractures ,     left leg, right leg    GERD (gastroesophageal reflux disease)     Gout     Headache for years    History of COVID-2020    Hyperlipidemia     IBS (irritable bowel syndrome)     Joint pain     Low back pain     Migraine     Right knee meniscal tear     Sleep apnea     CPAP, DOES NOT USE       Past Surgical History:   Procedure Laterality Date    ADENOIDECTOMY      AORTIC VALVE REPAIR/REPLACEMENT  10/05/2016    mechanical valve    ASCENDING ARCH/HEMIARCH REPLACEMENT N/A 10/05/2016    Procedure: MIDLINE STERNOTOMY, AORTIC VALVE REPLACEMENT, ASCENDING AND HEMIARCH REPLACEMENT, REPAIR OF PERIVALVULAR LEAK, WITH NEURO MONITOIRING, INTRAOPERATIVE WARREN;  Surgeon: Sukhi Wilkinson MD;  Location: [facility] MAIN OR;  Service:     CARDIAC CATHETERIZATION N/A 2016    Procedure: Coronary angiography;  Surgeon: Lio Roman MD;  Location: [facility] CATH INVASIVE LOCATION;  Service:     CARDIAC CATHETERIZATION N/A 2016    Procedure: Left Heart Cath;  Surgeon: Lio Roman MD;  Location: [facility] CATH INVASIVE LOCATION;  Service:     CHOLECYSTECTOMY      COLONOSCOPY      Dr. Palomares    COLONOSCOPY W/ POLYPECTOMY N/A 2022    Procedure:  COLONOSCOPY WITH POLYPECTOMY;  Surgeon: Kana Chowdary MD;  Location: [facility];  Service: Gastroenterology;  Laterality: N/A;  hemorrhoids  ascending colon polyp (cold snare)  transverse colon polyp (cold snare)    ENDOSCOPY WITH POLYPECTOMY N/A 06/08/2022    Procedure: ESOPHAGOGASTRODUODENOSCOPY WITH POLYPECTOMY;  Surgeon: Kana Chowdary MD;  Location:  [facility] OR;  Service: Gastroenterology;  Laterality: N/A;  gastric polyps    KNEE ARTHROSCOPY Right 09/07/2021    Procedure: RIGHT KNEE ARTHROSCOPY WITH PARTIAL MEDIAL AND LATERAL MENISECTOMY WITH DEBRIDEMENT OF ARTHRITIS ;  Surgeon: Ella Muller MD;  [place]  [facility]  Service: Orthopedics;  Laterality: Right;    UPPER GASTROINTESTINAL ENDOSCOPY         Family History: family history includes Alzheimer's disease in his father; Arthritis in his maternal grandmother and mother; Colon polyps in his maternal uncle; Heart disease in his mother; Heart failure in his mother; Hypertension in his brother, mother, and sister; Irritable bowel syndrome in his cousin; Kidney disease in his mother; Migraines in his daughter and mother; Thyroid disease in his sister. Otherwise pertinent FHx was reviewed and not pertinent to current issue.    Social History:  reports that he quit smoking about 41 years ago. His smoking use included cigarettes. He started smoking about 55 years ago. He has a 10 pack-year smoking history. He has been exposed to tobacco smoke. He has never used smokeless tobacco. He reports current alcohol use of about 3.0 standard drinks of alcohol per week. He reports that he does not use drugs.    Home Medications:  Enoxaparin Sodium, NON FORMULARY, albuterol sulfate HFA, fenofibrate, fluorouracil, gabapentin, hydrALAZINE, omeprazole, pravastatin, sildenafil, telmisartan, and warfarin    Allergies:  Allergies   Allergen Reactions    Fish-Derived Products Anaphylaxis     Can eat shell fish without problem, but can't eat fish sandwich, cod,  tuna or salmon. , is unable to take fish oil supplements.     Amlodipine Hives    Clonidine Derivatives GI Intolerance    Ondansetron Rash    Penicillins Rash       Objective    Objective     Vitals:        Physical Exam  Constitutional:       General: He is not in acute distress.  Pulmonary:      Effort: Pulmonary effort is normal. No respiratory distress.   Skin:     General: Skin is warm and dry.   Neurological:      Mental Status: He is alert.   Psychiatric:         Mood and Affect: Mood normal.         Thought Content: Thought content normal.         Result Review    Result Review:  I have personally reviewed the results from the time of this admission to 10/14/2024 08:25 EDT and agree with these findings:  []  Laboratory list / accordion  []  Microbiology  []  Radiology  []  EKG/Telemetry   []  Cardiology/Vascular   []  Pathology  []  Old records  []  Other:  Most notable findings include: No new      Assessment & Plan   Assessment / Plan     Brief Patient Summary:  Hussein Nino is a 59 y.o. male who has chronic low back pain that radiates into the right lower extremity    Active Hospital Problems:  There are no active hospital problems to display for this patient.    Plan: Right L5-S1 and right S1 transforaminal epidural steroid injections      VTE Prophylaxis:  No VTE prophylaxis order currently exists.      Madyson Dominguez MD

## 2024-10-14 ENCOUNTER — HOSPITAL ENCOUNTER (OUTPATIENT)
Facility: SURGERY CENTER | Age: 59
Setting detail: HOSPITAL OUTPATIENT SURGERY
Discharge: HOME | End: 2024-10-14
Attending: ANESTHESIOLOGY | Admitting: ANESTHESIOLOGY
Payer: COMMERCIAL

## 2024-10-14 ENCOUNTER — HOSPITAL ENCOUNTER (OUTPATIENT)
Dept: GENERAL RADIOLOGY | Facility: SURGERY CENTER | Age: 59
Setting detail: HOSPITAL OUTPATIENT SURGERY
End: 2024-10-14
Payer: COMMERCIAL

## 2024-10-14 VITALS
OXYGEN SATURATION: 96 % | HEART RATE: 70 BPM | TEMPERATURE: 98.4 F | DIASTOLIC BLOOD PRESSURE: 89 MMHG | SYSTOLIC BLOOD PRESSURE: 134 MMHG | RESPIRATION RATE: 16 BRPM

## 2024-10-14 DIAGNOSIS — Z41.9 SURGERY, ELECTIVE: ICD-10-CM

## 2024-10-14 PROCEDURE — 25010000002 LIDOCAINE PF 1% 1 % SOLUTION 5 ML VIAL: Performed by: ANESTHESIOLOGY

## 2024-10-14 PROCEDURE — 76000 FLUOROSCOPY <1 HR PHYS/QHP: CPT

## 2024-10-14 PROCEDURE — 25510000001 IOPAMIDOL 61 % SOLUTION 30 ML VIAL: Performed by: ANESTHESIOLOGY

## 2024-10-14 PROCEDURE — 64483 NJX AA&/STRD TFRM EPI L/S 1: CPT | Performed by: ANESTHESIOLOGY

## 2024-10-14 PROCEDURE — 77002 NEEDLE LOCALIZATION BY XRAY: CPT

## 2024-10-14 PROCEDURE — 64484 NJX AA&/STRD TFRM EPI L/S EA: CPT | Performed by: ANESTHESIOLOGY

## 2024-10-14 PROCEDURE — 25010000002 DEXAMETHASONE SODIUM PHOSPHATE 100 MG/10ML SOLUTION 10 ML VIAL: Performed by: ANESTHESIOLOGY

## 2024-10-14 PROCEDURE — 25010000002 BUPIVACAINE (PF) 0.25 % SOLUTION 10 ML VIAL: Performed by: ANESTHESIOLOGY

## 2024-10-14 NOTE — OP NOTE
Lumbar Transforaminal Epidural Steroid Injection Right L5-S1 and Right S1 Levels  [facility]    PREOPERATIVE DIAGNOSIS:  Lumbar radicular pain,  Lumbosacral radiculopathy    POSTOPERATIVE DIAGNOSIS:  Same as preop diagnosis    PROCEDURE:    1. CPT 14830 --  Diagnostic & Therapeutic Transforaminal Epidural Steroid Injection at the L5 level, on the right   2. CPT 30380 --  Diagnostic & Therapeutic Transforaminal Epidural Steroid Injection at the S1 level, on the right     PRE-PROCEDURE DISCUSSION WITH PATIENT:    Risks and complications were discussed with the patient prior to starting the procedure and informed consent was obtained.  We discussed various topics including but not limited to bleeding, infection, injury, nerve injury, paralysis, coma, death, postprocedural painful flare-up, postprocedural site soreness, and a lack of pain relief.  We discussed the diagnostic aspect of transforaminal epidural / selective nerve root blockade.    SURGEON:  Madyson Dominguez MD    SEDATION:  No sedation was used for this procedure  ANESTHETIC:  0.25% bupivacaine  STEROID:  10mg dexamethasone    DESCRIPTON OF PROCEDURE:  After obtaining informed consent, an I.V. was not started in the preoperative area. The patient taken to the operating room and was placed in the prone position with a pillow under the abdomen.  All pressure points were well padded.  Heart rate, blood pressure, and pulse oximeter were monitored.  The lumbar area was prepped with Chloraprep and draped in a sterile fashion.     The AP fluoroscopic image was used to visualize the L5 vertebral body.  The superior endplate was squared off radiographically.  The image was then obliqued towards the patient's right side to maximize visualization of the pedicle. The skin and subcutaneous tissue at the 6 o'clock position of the pedicle was anesthetized with 1% lidocaine. A 22-gauge spinal needle was then advanced percutaneously through the  anesthetized skin tract under fluoroscopic guidance in AP and lateral views until the needle tip lie in the moe-lateral aspect of the epidural space.  After negative aspiration of the needle for blood or CSF, a volume of 1 mL of Isovue was injected producing good epidural spread with no evidence of loculation, vascular run-off, or intrathecal spread. Subsequently, a volume of 3 mL of injectate was administered without resistance.  The needle was removed intact.  Vital signs were stable throughout.      The same procedure was then performed at the right S1 foramen in the exact same fashion.      The total volume injected consisted of 10 mg of dexamethasone with 1 mL of 0.25% bupivacaine and preservative-free normal saline.       ESTIMATED BLOOD LOSS:  <5 mL  SPECIMENS:  none    COMPLICATIONS:   No complications were noted., There was no indication of vascular uptake on live injection of contrast dye., and There was no indication of intrathecal uptake on live injection of contrast dye.    TOLERANCE & DISCHARGE CONDITION:    The patient tolerated the procedure well.  The patient was transported to the recovery area without difficulties.  The patient was discharged to home under the care of family in stable and satisfactory condition.    PLAN OF CARE:  The patient was given our standard instruction sheet.  The patient will Return to clinic 4-6 wks.  The patient will resume all medications as per the medication reconciliation sheet.

## 2024-10-16 ENCOUNTER — TELEPHONE (OUTPATIENT)
Dept: CARDIOLOGY | Facility: CLINIC | Age: 59
End: 2024-10-16
Payer: COMMERCIAL

## 2024-10-16 NOTE — TELEPHONE ENCOUNTER
CLEAR FOR INSPIRE PROCEDURE WITH [facility]   BRIDGE WARFARIN WITH LOVENOX.   HOLD WARFARIN 5 DAYS PRIOR TO PROCEDURE. START LOVENOX WHEN INR IS LESS THAN 2.5  LAST DOSE OF LOVENOX 12 HOURS PRIOR TO PROCEDURE.  RESUME LOVENOX AND WARFARIN   CONTINUE LOVENOX WITH WARFARIN UNTIL INR IS 2.5 OR GREATER.

## 2024-10-20 LAB — INR PPP: 1.6

## 2024-10-21 ENCOUNTER — ANTICOAGULATION VISIT (OUTPATIENT)
Dept: CARDIOLOGY | Facility: CLINIC | Age: 59
End: 2024-10-21
Payer: COMMERCIAL

## 2024-10-23 LAB — INR PPP: 1.8

## 2024-10-26 DIAGNOSIS — E78.2 MIXED HYPERLIPIDEMIA: ICD-10-CM

## 2024-10-27 LAB — INR PPP: 2.6

## 2024-10-28 RX ORDER — PRAVASTATIN SODIUM 40 MG
40 TABLET ORAL DAILY
Qty: 90 TABLET | Refills: 1 | Status: SHIPPED | OUTPATIENT
Start: 2024-10-28

## 2024-10-29 ENCOUNTER — ANTICOAGULATION VISIT (OUTPATIENT)
Dept: CARDIOLOGY | Facility: CLINIC | Age: 59
End: 2024-10-29
Payer: COMMERCIAL

## 2024-11-11 ENCOUNTER — OFFICE VISIT (OUTPATIENT)
Dept: INTERNAL MEDICINE | Facility: CLINIC | Age: 59
End: 2024-11-11
Payer: COMMERCIAL

## 2024-11-11 VITALS
WEIGHT: 237 LBS | HEIGHT: 71 IN | TEMPERATURE: 98.2 F | BODY MASS INDEX: 33.18 KG/M2 | SYSTOLIC BLOOD PRESSURE: 132 MMHG | DIASTOLIC BLOOD PRESSURE: 76 MMHG

## 2024-11-11 DIAGNOSIS — Z00.00 ANNUAL PHYSICAL EXAM: ICD-10-CM

## 2024-11-11 DIAGNOSIS — M54.16 LUMBAR RADICULOPATHY: ICD-10-CM

## 2024-11-11 DIAGNOSIS — I10 BENIGN ESSENTIAL HTN: Primary | ICD-10-CM

## 2024-11-11 DIAGNOSIS — E78.2 MIXED HYPERLIPIDEMIA: ICD-10-CM

## 2024-11-11 DIAGNOSIS — Z12.5 SCREENING PSA (PROSTATE SPECIFIC ANTIGEN): ICD-10-CM

## 2024-11-11 LAB
BUN SERPL-MCNC: 19 MG/DL (ref 8–23)
BUN/CREAT SERPL: 14 (ref 7–25)
CALCIUM SERPL-MCNC: 9.1 MG/DL (ref 8.6–10.5)
CHLORIDE SERPL-SCNC: 105 MMOL/L (ref 98–107)
CO2 SERPL-SCNC: 26.7 MMOL/L (ref 22–29)
CREAT SERPL-MCNC: 1.36 MG/DL (ref 0.76–1.27)
EGFRCR SERPLBLD CKD-EPI 2021: 59.6 ML/MIN/1.73
GLUCOSE SERPL-MCNC: 141 MG/DL (ref 65–99)
POTASSIUM SERPL-SCNC: 4 MMOL/L (ref 3.5–5.2)
SODIUM SERPL-SCNC: 140 MMOL/L (ref 136–145)

## 2024-11-11 PROCEDURE — 99213 OFFICE O/P EST LOW 20 MIN: CPT | Performed by: PHYSICIAN ASSISTANT

## 2024-11-11 NOTE — PROGRESS NOTES
Subjective   Chief Complaint   Patient presents with    Hypertension       History of Present Illness     Pt is here today for fup on his HTN. Tolerating the Telmisartan. Unfortunately the epidural was not helpful. Has not tried the Gabapentin yet.      Patient Active Problem List   Diagnosis    Allergic rhinitis    Asthma    Gastroesophageal reflux disease    Hyperlipidemia    Irritable bowel syndrome    Raynaud's phenomenon    Essential hypertriglyceridemia    S/P AVR (aortic valve replacement)    Mechanical heart valve present    Chronic anticoagulation    Benign essential HTN    Benign prostatic hyperplasia with nocturia    KVNG (obstructive sleep apnea)    Abnormal EKG    Venous insufficiency    Tear of medial meniscus of right knee, current    Tear of lateral meniscus of right knee, current    Encounter for screening for malignant neoplasm of colon    History of colon polyps    RUTLEDGE (nonalcoholic steatohepatitis)    Stage 3a chronic kidney disease    Occipital neuralgia of right side    Abnormal brain MRI    SOB (shortness of breath)    Lumbar radiculopathy       Allergies   Allergen Reactions    Fish-Derived Products Anaphylaxis     Can eat shell fish without problem, but can't eat fish sandwich, cod, tuna or salmon. , is unable to take fish oil supplements.     Amlodipine Hives    Clonidine Derivatives GI Intolerance    Ondansetron Rash    Penicillins Rash       Current Outpatient Medications on File Prior to Visit   Medication Sig Dispense Refill    albuterol sulfate  (90 Base) MCG/ACT inhaler Inhale 2 puffs Every 4 (Four) Hours As Needed for Wheezing. 18 g 1    fenofibrate (TRICOR) 145 MG tablet TAKE 1 TABLET DAILY 90 tablet 3    hydrALAZINE (APRESOLINE) 50 MG tablet Take 1 tablet by mouth 3 (Three) Times a Day.      omeprazole (priLOSEC) 40 MG capsule Take 1 capsule by mouth Daily. 90 capsule 3    pravastatin (PRAVACHOL) 40 MG tablet TAKE 1 TABLET DAILY 90 tablet 1    sildenafil (Viagra) 25 MG  tablet Take 1 tablet by mouth Daily As Needed for Erectile Dysfunction. 30 tablet 0    telmisartan (MICARDIS) 80 MG tablet Take 1 tablet by mouth Daily. 90 tablet 1    warfarin (Jantoven) 5 MG tablet TAKE 1 TABLET DAILY OR AS  DIRECTED TO MAINTAIN       INTERNATIONAL NORMALIZED   RATIO 2.5-3.5 90 tablet 1    gabapentin (NEURONTIN) 300 MG capsule 300 mg HS x 1 week, then bid x 1 week, then tid thereafter as tolerated (Patient not taking: Reported on 11/11/2024) 90 capsule 1    [DISCONTINUED] Enoxaparin Sodium (LOVENOX) 100 MG/ML solution prefilled syringe syringe Inject 1 mL under the skin into the appropriate area as directed Every 12 (Twelve) Hours. (Patient not taking: Reported on 11/11/2024) 14 mL 1    [DISCONTINUED] fluorouracil (EFUDEX) 5 % cream Please see attached for detailed directions (Patient not taking: Reported on 11/11/2024)      [DISCONTINUED] NON FORMULARY Lovenox bid  Pt not sure of dosage  Will stop 10/13/24 before 0800 am (Patient not taking: Reported on 11/11/2024)       No current facility-administered medications on file prior to visit.       Past Medical History:   Diagnosis Date    Allergies     Aortic stenosis     SEVERE    Bicuspid aortic valve     replaced 2016    Chronic pain disorder 2020    back, knee, foot    Epistaxis     Fractures 1969, 2008    left leg, right leg    GERD (gastroesophageal reflux disease)     Gout     Headache for years    History of COVID-19 NOV 2020    Hyperlipidemia     IBS (irritable bowel syndrome)     Joint pain     Low back pain     Migraine     Right knee meniscal tear     Sleep apnea     CPAP, DOES NOT USE       Family History   Problem Relation Age of Onset    Heart failure Mother     Kidney disease Mother     Hypertension Mother     Heart disease Mother     Arthritis Mother     Migraines Mother     Alzheimer's disease Father     Thyroid disease Sister     Hypertension Sister     Hypertension Brother     Colon polyps Maternal Uncle     Irritable bowel  syndrome Cousin     Arthritis Maternal Grandmother     Migraines Daughter     Malig Hyperthermia Neg Hx     Colon cancer Neg Hx     Crohn's disease Neg Hx     Ulcerative colitis Neg Hx        Social History     Socioeconomic History    Marital status:    Tobacco Use    Smoking status: Former     Current packs/day: 0.00     Average packs/day: 0.6 packs/day for 17.0 years (10.0 ttl pk-yrs)     Types: Cigarettes     Start date:      Quit date:      Years since quittin.8     Passive exposure: Past    Smokeless tobacco: Never    Tobacco comments:     QUIT AGE 23   Vaping Use    Vaping status: Never Used   Substance and Sexual Activity    Alcohol use: Yes     Alcohol/week: 3.0 standard drinks of alcohol     Types: 3 Cans of beer per week     Comment: 1-2 PER WEEK    Drug use: No    Sexual activity: Yes     Partners: Female       Past Surgical History:   Procedure Laterality Date    ADENOIDECTOMY      AORTIC VALVE REPAIR/REPLACEMENT  10/05/2016    mechanical valve    ASCENDING ARCH/HEMIARCH REPLACEMENT N/A 10/05/2016    Procedure: MIDLINE STERNOTOMY, AORTIC VALVE REPLACEMENT, ASCENDING AND HEMIARCH REPLACEMENT, REPAIR OF PERIVALVULAR LEAK, WITH NEURO MONITOIRING, INTRAOPERATIVE WARREN;  Surgeon: Sukhi Wilkinson MD;  Location: [facility] MAIN OR;  Service:     CARDIAC CATHETERIZATION N/A 2016    Procedure: Coronary angiography;  Surgeon: Lio Roman MD;  [facility] CATH INVASIVE LOCATION;  Service:     CARDIAC CATHETERIZATION N/A 2016    Procedure: Left Heart Cath;  Surgeon: Lio Roman MD;  Location: [facility] CATH INVASIVE LOCATION;  Service:     CHOLECYSTECTOMY      COLONOSCOPY      Dr. Palomares    COLONOSCOPY W/ POLYPECTOMY N/A 2022    Procedure: COLONOSCOPY WITH POLYPECTOMY;  Surgeon: Kana Chowdary MD;  Location: [facility] OR;  Service: Gastroenterology;  Laterality: N/A;  hemorrhoids  ascending colon polyp (cold snare)  transverse colon polyp (cold  "snare)    ENDOSCOPY WITH POLYPECTOMY N/A 06/08/2022    Procedure: ESOPHAGOGASTRODUODENOSCOPY WITH POLYPECTOMY;  Surgeon: Kana Chowdary MD;  [facility] OR;  Service: Gastroenterology;  Laterality: N/A;  gastric polyps    EPIDURAL Right 10/14/2024    Procedure: LUMBAR/SACRAL TRANSFORAMINAL EPIDURAL (right L5 and S1). 51914, 35577;  Surgeon: Madyson Dominguez MD;  Location: [facility] MAIN OR;  Service: Pain Management;  Laterality: Right;    KNEE ARTHROSCOPY Right 09/07/2021    Procedure: RIGHT KNEE ARTHROSCOPY WITH PARTIAL MEDIAL AND LATERAL MENISECTOMY WITH DEBRIDEMENT OF ARTHRITIS ;  Surgeon: Ella Muller MD;  [place]  [facility];  Service: Orthopedics;  Laterality: Right;    UPPER GASTROINTESTINAL ENDOSCOPY       The following portions of the patient's history were reviewed and updated as appropriate: problem list, allergies, current medications, past medical history, past family history, past social history, and past surgical history.    ROS    See HPI    Immunization History   Administered Date(s) Administered    COVID-19 (MODERNA) 1st,2nd,3rd Dose Monovalent 03/22/2021, 04/19/2021    COVID-19 (MODERNA) Monovalent Original Booster 01/20/2022    Flublock Quad =>18yrs 10/16/2024    Fluzone (or Fluarix & Flulaval for VFC) >6mos 11/25/2017, 10/19/2019, 10/10/2020, 09/27/2022, 10/02/2023    Hepatitis A 09/16/2019, 06/18/2020    Pneumococcal Polysaccharide (PPSV23) 12/18/2020    Tdap 09/16/2019       Objective   Vitals:    11/11/24 0743   BP: 132/76   Temp: 98.2 °F (36.8 °C)   Weight: 108 kg (237 lb)   Height: 180.3 cm (70.98\")     Body mass index is 33.07 kg/m².  Physical Exam  Vitals reviewed.   Constitutional:       Appearance: He is well-developed.   HENT:      Head: Normocephalic and atraumatic.   Cardiovascular:      Rate and Rhythm: Normal rate and regular rhythm.      Heart sounds: S1 normal and S2 normal.      Comments: Audible click  Pulmonary:      Effort: Pulmonary effort is normal.      " Breath sounds: Normal breath sounds.   Skin:     General: Skin is warm.   Neurological:      Mental Status: He is alert.   Psychiatric:         Behavior: Behavior normal.           Assessment & Plan   Diagnoses and all orders for this visit:    1. Benign essential HTN (Primary)  -     Basic Metabolic Panel  -     Comprehensive Metabolic Panel; Future    2. Screening PSA (prostate specific antigen)  -     PSA Screen; Future    3. Annual physical exam  -     Comprehensive Metabolic Panel; Future  -     Lipid Panel With / Chol / HDL Ratio; Future    4. Mixed hyperlipidemia  -     Lipid Panel With / Chol / HDL Ratio; Future    BP is finally controlled well. Recommended trying the Gabapentin starting at night. Recheck BMP today. FUP in 6 mo with labs.      Return in about 6 months (around 5/11/2025) for Lab Today, Lab Appt Before FUP.

## 2024-11-12 LAB — INR PPP: 2

## 2024-11-12 NOTE — TELEPHONE ENCOUNTER
Still not showing up.  Could you call the pharmacy to determine refill dates and why not reporting to [facility]? Thanks.

## 2024-11-13 RX ORDER — GABAPENTIN 300 MG/1
CAPSULE ORAL
Qty: 90 CAPSULE | Refills: 1 | OUTPATIENT
Start: 2024-11-13

## 2024-11-13 NOTE — TELEPHONE ENCOUNTER
He assumed that since it has been a couple of months they wouldn't have the prescription anymore. I told him to call them and have them fill it.

## 2024-11-19 ENCOUNTER — ANTICOAGULATION VISIT (OUTPATIENT)
Dept: CARDIOLOGY | Facility: CLINIC | Age: 59
End: 2024-11-19
Payer: COMMERCIAL

## 2024-11-19 LAB — INR PPP: 2.6

## 2024-11-20 ENCOUNTER — OFFICE VISIT (OUTPATIENT)
Dept: PAIN MEDICINE | Facility: CLINIC | Age: 59
End: 2024-11-20
Payer: COMMERCIAL

## 2024-11-20 VITALS
OXYGEN SATURATION: 96 % | TEMPERATURE: 97.8 F | WEIGHT: 234 LBS | DIASTOLIC BLOOD PRESSURE: 87 MMHG | HEART RATE: 82 BPM | SYSTOLIC BLOOD PRESSURE: 133 MMHG | HEIGHT: 71 IN | RESPIRATION RATE: 18 BRPM | BODY MASS INDEX: 32.76 KG/M2

## 2024-11-20 DIAGNOSIS — M51.369 BULGE OF LUMBAR DISC WITHOUT MYELOPATHY: ICD-10-CM

## 2024-11-20 DIAGNOSIS — M54.16 LUMBAR RADICULOPATHY: Primary | ICD-10-CM

## 2024-11-20 LAB
POC AMPHETAMINES: NEGATIVE
POC BARBITURATES: NEGATIVE
POC BENZODIAZEPHINES: NEGATIVE
POC COCAINE: NEGATIVE
POC METHADONE: NEGATIVE
POC METHAMPHETAMINE SCREEN URINE: NEGATIVE
POC OPIATES: NEGATIVE
POC OXYCODONE: NEGATIVE
POC PHENCYCLIDINE: NEGATIVE
POC PROPOXYPHENE: NEGATIVE
POC THC: NEGATIVE
POC TRICYCLIC ANTIDEPRESSANTS: NEGATIVE

## 2024-11-20 PROCEDURE — 80305 DRUG TEST PRSMV DIR OPT OBS: CPT | Performed by: NURSE PRACTITIONER

## 2024-11-20 PROCEDURE — 99214 OFFICE O/P EST MOD 30 MIN: CPT | Performed by: NURSE PRACTITIONER

## 2024-11-20 RX ORDER — HYDRALAZINE HYDROCHLORIDE 100 MG/1
100 TABLET, FILM COATED ORAL 3 TIMES DAILY
COMMUNITY
Start: 2024-10-26

## 2024-11-20 NOTE — PROGRESS NOTES
CHIEF COMPLAINT  Follow-up for back pain. UDS neg      Subjective   Hussein Nino is a 59 y.o. male  who presents to the office for follow-up of procedure.  He completed a Lumbar Transforaminal Epidural Steroid Injection Right L5 and Right S1 Levels   on  10/14/2024 performed by Dr. Dominguez for management of back pain. Patient reports no relief from the procedure. He does admit that there may have been some immediate diagnostic relief, but nothing that lasted beyond a couple of days.      Pain is averaging 5/10 VAS.  The pain is worsening and is interfering significantly with functional activity.  The most severe pain is in the right lumbosacral area.  The pain radiates down the right lateral leg with associated numbness/tingling.  There is a also a pain in the heel of the right foot and toes. The pain is aggravated by prolonged sitting, standing, walking.  The pain improves with nothing in particular.  He takes Advil, Acetaminophen PRN without much benefit.  Gabapentin was started 1 week ago. Currently taking 300 mg HS.       Warfarin - aortic valve replacement.  Mechanical Valve.  Has to transition to Lovenox for procedure.  Dr. Britton is the cardiologist.    Back Pain  This is a chronic problem. The problem occurs daily. The problem has been worse since onset. The pain is present in the lumbar spine. The pain radiates to the right thigh and right foot. The pain is at a severity of 5/10. The pain is moderate. The symptoms are aggravated by standing and position (walking). Associated symptoms include numbness (bilateral legs). Pertinent negatives include no weakness. He has tried home exercises for the symptoms. The treatment provided mild relief.        PEG Assessment   What number best describes your pain on average in the past week?5  What number best describes how, during the past week, pain has interfered with your enjoyment of life?10  What number best describes how, during the past week, pain has  "interfered with your general activity?  3    Review of Pertinent Medical Data ---  MRI LUMBAR      The following portions of the patient's history were reviewed and updated as appropriate: allergies, current medications, past family history, past medical history, past social history, past surgical history, and problem list.    Review of Systems   Gastrointestinal:  Negative for constipation and diarrhea.   Genitourinary:  Negative for difficulty urinating.   Musculoskeletal:  Positive for back pain.   Neurological:  Positive for numbness (bilateral legs). Negative for weakness.   Psychiatric/Behavioral:  Negative for sleep disturbance and suicidal ideas. The patient is not nervous/anxious.      I have reviewed and confirmed the accuracy of the ROS as documented by the MA/LPN/RN YOLY Heaton     Vitals:    11/20/24 0813   BP: 133/87   Pulse: 82   Resp: 18   Temp: 97.8 °F (36.6 °C)   SpO2: 96%   Weight: 106 kg (234 lb)   Height: 180.3 cm (70.98\")   PainSc:   2   PainLoc: Back         Objective   Physical Exam  Vitals and nursing note reviewed.   Constitutional:       General: He is not in acute distress.     Appearance: Normal appearance. He is not ill-appearing.   Pulmonary:      Effort: Pulmonary effort is normal. No respiratory distress.   Musculoskeletal:      Lumbar back: Bony tenderness present. Positive right straight leg raise test. Negative left straight leg raise test.   Neurological:      Mental Status: He is alert and oriented to person, place, and time.      Motor: Motor function is intact. No weakness.      Gait: Gait is intact. Gait normal.   Psychiatric:         Mood and Affect: Mood normal.         Behavior: Behavior normal.           Assessment & Plan   Diagnoses and all orders for this visit:    1. Lumbar radiculopathy (Primary)  -     Urine Drug Screen Confirmation - Urine, Clean Catch; Future  -     POC Urine Drug Screen, Triage  -     Ambulatory Referral to Neurosurgery    2. Bulge of " lumbar disc without myelopathy        --- Continue Gabapentin - he will work to gradually increase dose as tolerated  --- Making referral to neurosurgery.  We did discuss consideration for another injection.  Unfortunately each neuraxial procedure we performed requires the patient to bridged to Lovenox.  Because of this the patient would be willing to consider surgical intervention if it is possible to provide more of a definitive solution that would not require ongoing injection therapy.  We will have him evaluated by neurosurgery to determine if surgery could be an option.  --- Routine UDS in office today as part of monitoring requirements for controlled substances.  The specimen was viewed and the immunoassay result reviewed and is NEG.  This specimen will be sent to [facility] laboratory for confirmation.         Hussein Nino reports a pain score of 2.  Given his pain assessment as noted, treatment options were discussed and the following options were decided upon as a follow-up plan to address the patient's pain: continuation of current treatment plan for pain.      --- Follow-up after neurosurgery evaluation                      JENIFFER REPORT    As the clinician, I personally reviewed the JENIFFER from 11/20/2024 while the patient was in the office today.    History and physical exam exhibit continued safe and appropriate use of controlled substances.         Dictated utilizing Dragon dictation.

## 2024-12-08 DIAGNOSIS — K21.9 GASTROESOPHAGEAL REFLUX DISEASE WITHOUT ESOPHAGITIS: ICD-10-CM

## 2024-12-09 RX ORDER — OMEPRAZOLE 40 MG/1
40 CAPSULE, DELAYED RELEASE ORAL DAILY
Qty: 90 CAPSULE | Refills: 3 | Status: SHIPPED | OUTPATIENT
Start: 2024-12-09

## 2024-12-12 DIAGNOSIS — M54.16 LUMBAR RADICULOPATHY: ICD-10-CM

## 2024-12-13 RX ORDER — HYDRALAZINE HYDROCHLORIDE 100 MG/1
100 TABLET, FILM COATED ORAL 3 TIMES DAILY
Qty: 270 TABLET | Refills: 1 | Status: SHIPPED | OUTPATIENT
Start: 2024-12-13

## 2024-12-13 RX ORDER — TELMISARTAN 80 MG/1
80 TABLET ORAL DAILY
Qty: 90 TABLET | Refills: 1 | Status: SHIPPED | OUTPATIENT
Start: 2024-12-13

## 2024-12-13 RX ORDER — GABAPENTIN 300 MG/1
300 CAPSULE ORAL 3 TIMES DAILY
Qty: 90 CAPSULE | Refills: 1 | Status: SHIPPED | OUTPATIENT
Start: 2024-12-13

## 2024-12-20 ENCOUNTER — ANTICOAGULATION VISIT (OUTPATIENT)
Dept: CARDIOLOGY | Facility: CLINIC | Age: 59
End: 2024-12-20
Payer: COMMERCIAL

## 2024-12-20 LAB — INR PPP: 2.3

## 2024-12-21 DIAGNOSIS — I38 HEART VALVE DISEASE: ICD-10-CM

## 2024-12-23 RX ORDER — WARFARIN SODIUM 5 MG/1
TABLET ORAL
Qty: 90 TABLET | Refills: 1 | Status: SHIPPED | OUTPATIENT
Start: 2024-12-23

## 2025-01-02 LAB — INR PPP: 2.5

## 2025-01-03 ENCOUNTER — ANTICOAGULATION VISIT (OUTPATIENT)
Dept: CARDIOLOGY | Facility: CLINIC | Age: 60
End: 2025-01-03
Payer: COMMERCIAL

## 2025-01-07 ENCOUNTER — TELEMEDICINE (OUTPATIENT)
Dept: NEUROLOGY | Facility: CLINIC | Age: 60
End: 2025-01-07
Payer: COMMERCIAL

## 2025-01-07 DIAGNOSIS — M54.81 OCCIPITAL NEURALGIA OF RIGHT SIDE: Primary | ICD-10-CM

## 2025-01-07 DIAGNOSIS — R90.89 ABNORMAL BRAIN MRI: ICD-10-CM

## 2025-01-07 PROCEDURE — 99213 OFFICE O/P EST LOW 20 MIN: CPT | Performed by: PSYCHIATRY & NEUROLOGY
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
## 2025-01-07 NOTE — ASSESSMENT & PLAN NOTE
He had a brain MRI scan on 3/7/2024 which demonstrates multiple small foci of signal loss both supratentorially and infratentorially on the susceptibility weighted sequence consistent with hemosiderin deposition which may be secondary to small areas of microhemorrhage or amyloid angiopathy and we are planning on doing a follow up MRI to evaluate for progression.  Unfortunately again he has to be on life long anti-coagulation treatment due to having mechanical valve in his heart.  He has been on Warfarin since 10/2016 which I informed him that chronic Warfarin use can present with similarly findings on brain MRI.  He tells me one of his cousins had a brain bleed.  He tells me his blood pressure is up to 170s systolic but more recently it has normalized.  I will order a repeat brain MRI scan for further evaluation today.    No

## 2025-01-07 NOTE — ASSESSMENT & PLAN NOTE
59 year old right handed man who is being evaluated via telehealth with audio and video for follow up evaluation and treatment of occipital neuralgia headaches and abnormal brain MRI findings with multiple hemosiderin depositions.  He reports a history of headaches involving the back of his head starting over a year ago.  The headaches start in the back of his head and slowly move up the back of his head to the top of his head in the occipital nerve distribution.  The headaches are always right sided and they can get as severe as 9-10/10 on pain scale 1-10 when most severe without any associated light or sound sensitivity.  The pain is constant and pressure like sensation.  He has a mechanical valve in his heart so he is on life long Warfarin treatment.  He has also had some dizziness and numbness at times as well.  He has some numbness and tingling involving his mouth and tongue and jaw which has improved since last visit and occasionally he will have this and started over 1.5 year ago. He does see his dentist regularly.  He had a brain MRI scan on 3/7/2024 which demonstrates multiple small foci of signal loss both supratentorially and infratentorially on the susceptibility weighted sequence consistent with hemosiderin deposition which may be secondary to small areas of microhemorrhage or amyloid angiopathy.  Unfortunately again he has to be on life long anti-coagulation treatment due to having mechanical valve in his heart.  He has been on Warfarin since 10/2016 which I informed him that chronic Warfarin use can present with similarly findings on brain MRI.  He tells me one of his cousins had a brain bleed.  He tells me his blood pressure is up to 170s systolic but more recently it has normalized.  He does have significant DDD of cervical spine and lumbar spine with some neuro-foraminal narrowing was noted as well and he had physical therapy for his neck as well and is going to be following up with [facility] as well and  he has had some injections which were not helpful.  We performed right sided GREATER and LESSER ONBs for patient which has helped significantly with the posterior headaches and he reports continuing to do well with the nerve blocks from 4/2024.  Dizziness has resolved over the past 3 months.  Will hold off on repeating the occipital nerve blocks at this time as he is doing well but we can consider repeating these again in the future if needed.

## 2025-01-10 ENCOUNTER — HOSPITAL ENCOUNTER (OUTPATIENT)
Dept: CARDIOLOGY | Facility: HOSPITAL | Age: 60
Discharge: HOME | End: 2025-01-10
Admitting: OTOLARYNGOLOGY
Payer: COMMERCIAL

## 2025-01-10 ENCOUNTER — TRANSCRIBE ORDERS (OUTPATIENT)
Dept: LAB | Facility: HOSPITAL | Age: 60
End: 2025-01-10
Payer: COMMERCIAL

## 2025-01-10 DIAGNOSIS — Z01.811 PRE-OP CHEST EXAM: Primary | ICD-10-CM

## 2025-01-10 LAB
QT INTERVAL: 403 MS
QTC INTERVAL: 435 MS

## 2025-01-10 PROCEDURE — 93005 ELECTROCARDIOGRAM TRACING: CPT | Performed by: OTOLARYNGOLOGY

## 2025-01-15 LAB — INR PPP: 2.8

## 2025-01-27 ENCOUNTER — TELEPHONE (OUTPATIENT)
Dept: NEUROSURGERY | Facility: CLINIC | Age: 60
End: 2025-01-27
Payer: COMMERCIAL

## 2025-01-27 LAB — INR PPP: 2.3

## 2025-01-27 NOTE — TELEPHONE ENCOUNTER
Rosy used to notify patient of cancellation with Dr. Puente on 1/27 due to an emergent surgery added. Will contact with new date and time.

## 2025-01-27 NOTE — TELEPHONE ENCOUNTER
Hub staff attempted to follow warm transfer process and was unsuccessful     Caller: MATT    Best call back number: [phone]        Patient is needing: IF THIS APPT ( 01/31/25 @ 1:00) NEEDS TO BE RESCHEDULED, PLEASE CALL THE PATIENT

## 2025-02-05 ENCOUNTER — OFFICE VISIT (OUTPATIENT)
Dept: NEUROSURGERY | Facility: CLINIC | Age: 60
End: 2025-02-05
Payer: COMMERCIAL

## 2025-02-05 VITALS
HEIGHT: 71 IN | WEIGHT: 241.7 LBS | OXYGEN SATURATION: 92 % | BODY MASS INDEX: 33.84 KG/M2 | DIASTOLIC BLOOD PRESSURE: 66 MMHG | SYSTOLIC BLOOD PRESSURE: 130 MMHG | TEMPERATURE: 97.5 F | HEART RATE: 84 BPM

## 2025-02-05 DIAGNOSIS — M79.604 RIGHT LEG PAIN: ICD-10-CM

## 2025-02-05 DIAGNOSIS — M53.3 SI (SACROILIAC) JOINT DYSFUNCTION: ICD-10-CM

## 2025-02-05 DIAGNOSIS — M51.371 DEGENERATION OF INTERVERTEBRAL DISC OF LUMBOSACRAL REGION WITH LOWER EXTREMITY PAIN: Primary | ICD-10-CM

## 2025-02-05 DIAGNOSIS — M47.817 FACET ARTHRITIS, DEGENERATIVE, L5-S1 LEVEL, LUMBOSACRAL SPINE: ICD-10-CM

## 2025-02-05 PROCEDURE — 99203 OFFICE O/P NEW LOW 30 MIN: CPT | Performed by: NEUROLOGICAL SURGERY

## 2025-02-05 NOTE — PROGRESS NOTES
Subjective   History of Present Illness: Hussein Nino is a 59 y.o. male is here today for follow-up.    History of Present Illness    {Common H&P Review Areas:00034}    Past Medical History:   Diagnosis Date    Allergies     Aortic stenosis     SEVERE    Bicuspid aortic valve     replaced 2016    Chronic pain disorder 2020    back, knee, foot    Epistaxis     Fractures 1969, 2008    left leg, right leg    GERD (gastroesophageal reflux disease)     Gout     Headache for years    History of COVID-19     NOV 2020    Hyperlipidemia     IBS (irritable bowel syndrome)     Joint pain     Low back pain     Migraine     Right knee meniscal tear     Sleep apnea     CPAP, DOES NOT USE        Past Surgical History:   Procedure Laterality Date    ADENOIDECTOMY      AORTIC VALVE REPAIR/REPLACEMENT  10/05/2016    mechanical valve    ASCENDING ARCH/HEMIARCH REPLACEMENT N/A 10/05/2016    Procedure: MIDLINE STERNOTOMY, AORTIC VALVE REPLACEMENT, ASCENDING AND HEMIARCH REPLACEMENT, REPAIR OF PERIVALVULAR LEAK, WITH NEURO MONITOIRING, INTRAOPERATIVE WARREN;  Surgeon: Sukhi Wilkinson MD;  Location: [facility] OR;  Service:     CARDIAC CATHETERIZATION N/A 09/27/2016    Procedure: Coronary angiography;  Surgeon: Lio Roman MD;  Location: [facility] CATH INVASIVE LOCATION;  Service:     CARDIAC CATHETERIZATION N/A 09/27/2016    Procedure: Left Heart Cath;  Surgeon: Lio Roman MD;  Location: [facility] CATH INVASIVE LOCATION;  Service:     CHOLECYSTECTOMY      COLONOSCOPY  12/212/2016    Dr. Palomares    COLONOSCOPY W/ POLYPECTOMY N/A 06/08/2022    Procedure: COLONOSCOPY WITH POLYPECTOMY;  Surgeon: Kana Chowdary MD;  Location: [facility] OR;  Service: Gastroenterology;  Laterality: N/A;  hemorrhoids  ascending colon polyp (cold snare)  transverse colon polyp (cold snare)    ENDOSCOPY WITH POLYPECTOMY N/A 06/08/2022    Procedure: ESOPHAGOGASTRODUODENOSCOPY WITH POLYPECTOMY;  Surgeon: Kana Chowdary MD;  Location: [facility]  OR;  Service: Gastroenterology;  Laterality: N/A;  gastric polyps    EPIDURAL Right 10/14/2024    Procedure: LUMBAR/SACRAL TRANSFORAMINAL EPIDURAL (right L5 and S1). 52897, [id number];  Surgeon: Madyson Dominguez MD;  Location: [facility] MAIN OR;  Service: Pain Management;  Laterality: Right;    KNEE ARTHROSCOPY Right 09/07/2021    Procedure: RIGHT KNEE ARTHROSCOPY WITH PARTIAL MEDIAL AND LATERAL MENISECTOMY WITH DEBRIDEMENT OF ARTHRITIS ;  Surgeon: Ella Muller MD;  [place] [place] [facility]  Service: Orthopedics;  Laterality: Right;    UPPER GASTROINTESTINAL ENDOSCOPY            Current Outpatient Medications:     albuterol sulfate  (90 Base) MCG/ACT inhaler, Inhale 2 puffs Every 4 (Four) Hours As Needed for Wheezing., Disp: 18 g, Rfl: 1    fenofibrate (TRICOR) 145 MG tablet, TAKE 1 TABLET DAILY, Disp: 90 tablet, Rfl: 3    gabapentin (NEURONTIN) 300 MG capsule, Take 1 capsule by mouth 3 (Three) Times a Day., Disp: 90 capsule, Rfl: 1    hydrALAZINE (APRESOLINE) 100 MG tablet, Take 1 tablet by mouth 3 (Three) Times a Day., Disp: 270 tablet, Rfl: 1    omeprazole (priLOSEC) 40 MG capsule, TAKE 1 CAPSULE DAILY, Disp: 90 capsule, Rfl: 3    pravastatin (PRAVACHOL) 40 MG tablet, TAKE 1 TABLET DAILY, Disp: 90 tablet, Rfl: 1    sildenafil (Viagra) 25 MG tablet, Take 1 tablet by mouth Daily As Needed for Erectile Dysfunction., Disp: 30 tablet, Rfl: 0    telmisartan (MICARDIS) 80 MG tablet, Take 1 tablet by mouth Daily., Disp: 90 tablet, Rfl: 1    warfarin (Jantoven) 5 MG tablet, TAKE 1 TABLET DAILY OR AS  DIRECTED TO MAINTAIN       INTERNATIONAL NORMALIZED   RATIO 2.5-3.5, Disp: 90 tablet, Rfl: 1     Allergies   Allergen Reactions    Fish-Derived Products Anaphylaxis     Can eat shell fish without problem, but can't eat fish sandwich, cod, tuna or salmon. , is unable to take fish oil supplements.     Amlodipine Hives    Clonidine Derivatives GI Intolerance    Ondansetron Rash    Penicillins Rash        Social History      Socioeconomic History    Marital status:    Tobacco Use    Smoking status: Former     Current packs/day: 0.00     Average packs/day: 0.6 packs/day for 17.0 years (10.0 ttl pk-yrs)     Types: Cigarettes     Start date:      Quit date:      Years since quittin.1     Passive exposure: Past    Smokeless tobacco: Never    Tobacco comments:     QUIT AGE 23   Vaping Use    Vaping status: Never Used   Substance and Sexual Activity    Alcohol use: Yes     Alcohol/week: 3.0 standard drinks of alcohol     Types: 3 Cans of beer per week     Comment: 1-2 PER WEEK    Drug use: No    Sexual activity: Yes     Partners: Female        Family History   Problem Relation Age of Onset    Heart failure Mother     Kidney disease Mother     Hypertension Mother     Heart disease Mother     Arthritis Mother     Migraines Mother     Alzheimer's disease Father     Thyroid disease Sister     Hypertension Sister     Hypertension Brother     Colon polyps Maternal Uncle     Irritable bowel syndrome Cousin     Arthritis Maternal Grandmother     Migraines Daughter     Malig Hyperthermia Neg Hx     Colon cancer Neg Hx     Crohn's disease Neg Hx     Ulcerative colitis Neg Hx         Review of Systems    Objective     There were no vitals filed for this visit.  There is no height or weight on file to calculate BMI.      Physical Exam  [unfilled]        Assessment & Plan   Independent Review of Radiographic Studies:      I personally reviewed the images from the following studies.    ***    Medical Decision Making:      ***  There are no diagnoses linked to this encounter.  No follow-ups on file.

## 2025-02-05 NOTE — PROGRESS NOTES
Subjective   History of Present Illness: Hussein Nino is a 59 y.o. male is being seen for consultation today at the request of YOLY Heaton for lower back pain and right lower extremity pain. MRI lumbar 8/26/24.  He reports that he has had lower back pain and intermittent right lower extremity pain since 2021.  He reports that initially with physical therapy he had resolution of his symptoms.  He reports that they returned over the past year.  He underwent physical therapy over the summer without any relief for the symptoms.  He has had 1 right sided epidural steroid injection with temporary relief in his symptoms.  He reports that most of the pain is localized to the SI joint.  Sometimes the pain radiates down his right lower extremity to his ankle.    History of Present Illness    The following portions of the patient's history were reviewed and updated as appropriate: allergies, current medications, past family history, past medical history, past social history, past surgical history, and problem list.    Past Medical History:   Diagnosis Date    Allergies     Aortic stenosis     SEVERE    Bicuspid aortic valve     replaced 2016    Chronic pain disorder 2020    back, knee, foot    Epistaxis     Fractures 1969, 2008    left leg, right leg    GERD (gastroesophageal reflux disease)     Gout     Headache for years    History of COVID-19     NOV 2020    Hyperlipidemia     IBS (irritable bowel syndrome)     Joint pain     Low back pain     Migraine     Right knee meniscal tear     Sleep apnea     CPAP, DOES NOT USE        Past Surgical History:   Procedure Laterality Date    ADENOIDECTOMY      AORTIC VALVE REPAIR/REPLACEMENT  10/05/2016    mechanical valve    ASCENDING ARCH/HEMIARCH REPLACEMENT N/A 10/05/2016    Procedure: MIDLINE STERNOTOMY, AORTIC VALVE REPLACEMENT, ASCENDING AND HEMIARCH REPLACEMENT, REPAIR OF PERIVALVULAR LEAK, WITH NEURO MONITOIRING, INTRAOPERATIVE WARREN;  Surgeon: Sukhi Wilkinson MD;   Location[facility] MA[facility];  Service:     CARDIAC CATHETERIZATION N/A 09/27/2016    Procedure: Coronary angiography;  Surgeon: Lio Roman MD;  Location: [facility] CATH [facility]ASIVE LOC[facility];  Service:     CARDIAC CATHETERIZATION N/A 09/27/2016    Procedure: Left Heart Cath;  Surgeon: Lio Roman MD;  Location:  [facility] CATH INVASIVE LOCATION;  Service:     CHOLECYSTECTOMY      COLONOSCOPY  12/212/2016    Dr. Palomares    COLONOSCOPY W/ POLYPECTOMY N/A 06/08/2022    Procedure: COLONOSCOPY WITH POLYPECTOMY;  Surgeon: Kana Chowdary MD;  Location: [facility] OR;  Service: Gastroenterology;  Laterality: N/A;  hemorrhoids  ascending colon polyp (cold snare)  transverse colon polyp (cold snare)    ENDOSCOPY WITH POLYPECTOMY N/A 06/08/2022    Procedure: ESOPHAGOGASTRODUODENOSCOPY WITH POLYPECTOMY;  Surgeon: Kana Chowdary MD;  Location: [facility] OR;  Service: Gastroenterology;  Laterality: N/A;  gastric polyps    EPIDURAL Right 10/14/2024    Procedure: LUMBAR/SACRAL TRANSFORAMINAL EPIDURAL (right L5 and S1). 38561, [id number];  Surgeon: Madyson Dominguez MD;  [place] [facility] [place]  Service: Pain Management;  Laterality: Right;    KNEE ARTHROSCOPY Right 09/07/2021    Procedure: RIGHT KNEE ARTHROSCOPY WITH PARTIAL MEDIAL AND LATERAL MENISECTOMY WITH DEBRIDEMENT OF ARTHRITIS ;  Surgeon: Ella Muller MD;  [facility];  Service: Orthopedics;  Laterality: Right;    UPPER GASTROINTESTINAL ENDOSCOPY            Current Outpatient Medications:     albuterol sulfate  (90 Base) MCG/ACT inhaler, Inhale 2 puffs Every 4 (Four) Hours As Needed for Wheezing., Disp: 18 g, Rfl: 1    fenofibrate (TRICOR) 145 MG tablet, TAKE 1 TABLET DAILY, Disp: 90 tablet, Rfl: 3    gabapentin (NEURONTIN) 300 MG capsule, Take 1 capsule by mouth 3 (Three) Times a Day., Disp: 90 capsule, Rfl: 1    hydrALAZINE (APRESOLINE) 100 MG tablet, Take 1 tablet by mouth 3 (Three) Times a Day., Disp: 270 tablet, Rfl: 1    omeprazole  (priLOSEC) 40 MG capsule, TAKE 1 CAPSULE DAILY, Disp: 90 capsule, Rfl: 3    pravastatin (PRAVACHOL) 40 MG tablet, TAKE 1 TABLET DAILY, Disp: 90 tablet, Rfl: 1    telmisartan (MICARDIS) 80 MG tablet, Take 1 tablet by mouth Daily., Disp: 90 tablet, Rfl: 1    warfarin (Jantoven) 5 MG tablet, TAKE 1 TABLET DAILY OR AS  DIRECTED TO MAINTAIN       INTERNATIONAL NORMALIZED   RATIO 2.5-3.5, Disp: 90 tablet, Rfl: 1    sildenafil (Viagra) 25 MG tablet, Take 1 tablet by mouth Daily As Needed for Erectile Dysfunction., Disp: 30 tablet, Rfl: 0     Allergies   Allergen Reactions    Fish-Derived Products Anaphylaxis     Can eat shell fish without problem, but can't eat fish sandwich, cod, tuna or salmon. , is unable to take fish oil supplements.     Amlodipine Hives    Clonidine Derivatives GI Intolerance    Ondansetron Rash    Penicillins Rash        Social History     Socioeconomic History    Marital status:    Tobacco Use    Smoking status: Former     Current packs/day: 0.00     Average packs/day: 0.6 packs/day for 17.0 years (10.0 ttl pk-yrs)     Types: Cigarettes     Start date:      Quit date:      Years since quittin.1     Passive exposure: Past    Smokeless tobacco: Never    Tobacco comments:     QUIT AGE 23   Vaping Use    Vaping status: Never Used   Substance and Sexual Activity    Alcohol use: Yes     Alcohol/week: 3.0 standard drinks of alcohol     Types: 3 Cans of beer per week     Comment: 1-2 PER WEEK    Drug use: No    Sexual activity: Yes     Partners: Female        Family History   Problem Relation Age of Onset    Heart failure Mother     Kidney disease Mother     Hypertension Mother     Heart disease Mother     Arthritis Mother     Migraines Mother     Alzheimer's disease Father     Thyroid disease Sister     Hypertension Sister     Hypertension Brother     Colon polyps Maternal Uncle     Irritable bowel syndrome Cousin     Arthritis Maternal Grandmother     Migraines Daughter     Agustinmaryuri  "Hyperthermia Neg Hx     Colon cancer Neg Hx     Crohn's disease Neg Hx     Ulcerative colitis Neg Hx         Review of Systems   Constitutional:  Negative for chills.   Cardiovascular:  Negative for leg swelling.   Gastrointestinal:  Negative for diarrhea, nausea, rectal pain and vomiting.   Genitourinary:  Negative for frequency and urgency.   Musculoskeletal:  Positive for back pain (R>L leg pain), gait problem and neck pain.   Neurological:  Positive for numbness.   All other systems reviewed and are negative.      Objective     Vitals:    25 1535   BP: 130/66   Pulse: 84   Temp: 97.5 °F (36.4 °C)   SpO2: 92%   Weight: 110 kg (241 lb 11.2 oz)   Height: 180.3 cm (71\")     Body mass index is 33.71 kg/m².      Physical Exam  Awake, alert, oriented x3  Face symmetric  Speech is fluent and clear  Motor exam  Bilateral deltoids 5/5, bilateral biceps 5/5, bilateral triceps 5/5, bilateral wrist extension 5/5 bilateral hand  5/5  Bilateral hip flexion 5/5, bilateral knee extension 5/5, bilateral DF/PF 5/5  No clonus  No Soo's reflex  Steady normal gait  Able to detect  light touch in all 4 extremities  Negative straight leg raise      Assessment & Plan   Independent Review of Radiographic Studies:      I personally reviewed the images from the following studies.  MRI of the lumbar spine from 2024  The MRI images were reviewed and demonstrate mild degenerative changes throughout the lumbar spine.  Most notably at L5-S1 on the right there is mild to moderate neuroforaminal stenosis.    Medical Decision Makin-year-old male with severe lower back pain that radiates into his right lower extremity.  -He reports most of the pain is localized to the right SI joint.  Sometimes with increased activity the pain will radiate down his right lower extremity.  He has tried physical therapy over the summer without any relief from his symptoms.  He had 1 steroid epidural injection with temporary relief.  " After evaluating his location of pain I am concerned that some of his pain is coming from the SI joint.  I have encouraged him to follow-up with pain management for 1 SI joint injection to help localize the pain.  If his pain in this location resolves we will also try an SI joint belt for stabilization and if the symptoms persist consider an SI joint fusion.   -I have ordered an EMG as well as x-ray with flexion/extension views to evaluate for subluxation or instability, a CT of the lumbar spine to evaluate his neuroforaminal stenosis and further evaluate the neuroforaminal narrowing as the cause for his right lower extremity pain    Diagnoses and all orders for this visit:    1. Degeneration of intervertebral disc of lumbosacral region with lower extremity pain (Primary)  -     EMG & Nerve Conduction Test; Future  -     CT Lumbar Spine Without Contrast; Future  -     XR Spine Lumbar Complete With Flex & Ext; Future    2. SI (sacroiliac) joint dysfunction  -     EMG & Nerve Conduction Test; Future  -     CT Lumbar Spine Without Contrast; Future  -     XR Spine Lumbar Complete With Flex & Ext; Future    3. Right leg pain  -     EMG & Nerve Conduction Test; Future  -     CT Lumbar Spine Without Contrast; Future  -     XR Spine Lumbar Complete With Flex & Ext; Future    4. Facet arthritis, degenerative, L5-S1 level, lumbosacral spine  -     EMG & Nerve Conduction Test; Future  -     CT Lumbar Spine Without Contrast; Future  -     XR Spine Lumbar Complete With Flex & Ext; Future      Return in about 4 weeks (around 3/5/2025).    I spent 35 minutes reviewing the medical record, reviewing the MRI images, discussing the causes of lower back pain and right lower extremity pain, discussing the management of lower back pain, discussing the risks and benefits of an SI joint fusion, discussing the risks and benefits of a right sided L5-S1 laminotomy and foraminotomies

## 2025-02-10 ENCOUNTER — HOSPITAL ENCOUNTER (OUTPATIENT)
Dept: MRI IMAGING | Facility: HOSPITAL | Age: 60
Discharge: HOME | End: 2025-02-10
Admitting: PSYCHIATRY & NEUROLOGY
Payer: COMMERCIAL

## 2025-02-10 DIAGNOSIS — R90.89 ABNORMAL BRAIN MRI: ICD-10-CM

## 2025-02-10 PROCEDURE — 25510000002 GADOBENATE DIMEGLUMINE 529 MG/ML SOLUTION: Performed by: PSYCHIATRY & NEUROLOGY

## 2025-02-10 PROCEDURE — A9577 INJ MULTIHANCE: HCPCS | Performed by: PSYCHIATRY & NEUROLOGY

## 2025-02-10 PROCEDURE — 70553 MRI BRAIN STEM W/O & W/DYE: CPT

## 2025-02-10 RX ADMIN — GADOBENATE DIMEGLUMINE 20 ML: 529 INJECTION, SOLUTION INTRAVENOUS at 17:07

## 2025-02-11 ENCOUNTER — PREP FOR SURGERY (OUTPATIENT)
Dept: SURGERY | Facility: SURGERY CENTER | Age: 60
End: 2025-02-11
Payer: COMMERCIAL

## 2025-02-11 ENCOUNTER — ANTICOAGULATION VISIT (OUTPATIENT)
Dept: CARDIOLOGY | Facility: CLINIC | Age: 60
End: 2025-02-11
Payer: COMMERCIAL

## 2025-02-11 DIAGNOSIS — M53.3 SACROILIAC JOINT DYSFUNCTION OF RIGHT SIDE: Primary | ICD-10-CM

## 2025-02-11 LAB — INR PPP: 3.2

## 2025-02-12 ENCOUNTER — OFFICE VISIT (OUTPATIENT)
Dept: PAIN MEDICINE | Facility: CLINIC | Age: 60
End: 2025-02-12
Payer: COMMERCIAL

## 2025-02-12 ENCOUNTER — TRANSCRIBE ORDERS (OUTPATIENT)
Dept: SURGERY | Facility: SURGERY CENTER | Age: 60
End: 2025-02-12
Payer: COMMERCIAL

## 2025-02-12 VITALS
HEIGHT: 71 IN | WEIGHT: 239.4 LBS | HEART RATE: 76 BPM | TEMPERATURE: 97.7 F | DIASTOLIC BLOOD PRESSURE: 81 MMHG | RESPIRATION RATE: 18 BRPM | BODY MASS INDEX: 33.52 KG/M2 | OXYGEN SATURATION: 100 % | SYSTOLIC BLOOD PRESSURE: 129 MMHG

## 2025-02-12 DIAGNOSIS — M54.16 LUMBAR RADICULOPATHY: ICD-10-CM

## 2025-02-12 DIAGNOSIS — M53.3 SACROILIAC JOINT DYSFUNCTION: ICD-10-CM

## 2025-02-12 DIAGNOSIS — M51.369 BULGE OF LUMBAR DISC WITHOUT MYELOPATHY: Primary | ICD-10-CM

## 2025-02-12 DIAGNOSIS — M51.362 DEGENERATION OF INTERVERTEBRAL DISC OF LUMBAR REGION WITH DISCOGENIC BACK PAIN AND LOWER EXTREMITY PAIN: ICD-10-CM

## 2025-02-12 DIAGNOSIS — Z41.9 SURGERY, ELECTIVE: Primary | ICD-10-CM

## 2025-02-12 PROCEDURE — 99214 OFFICE O/P EST MOD 30 MIN: CPT | Performed by: NURSE PRACTITIONER

## 2025-02-12 RX ORDER — GABAPENTIN 600 MG/1
600 TABLET ORAL 3 TIMES DAILY
Qty: 90 TABLET | Refills: 1 | Status: SHIPPED | OUTPATIENT
Start: 2025-02-12

## 2025-02-12 RX ORDER — BUDESONIDE AND FORMOTEROL FUMARATE 160; 4.5 UG/1; UG/1
AEROSOL, METERED RESPIRATORY (INHALATION)
COMMUNITY
Start: 2025-01-10

## 2025-02-12 NOTE — H&P (VIEW-ONLY)
CHIEF COMPLAINT  Follow-up for back pain.    Subjective   Hussein Nino is a 59 y.o. male  who presents for follow-up.  He has a history of chronic back pain.  At the previous office visit I recommended referral to neurosurgery to determine if surgical options were available.  Due to his pain symptomology I felt that this was indicated, rather than continuing additional neuraxial procedures due to the need to transition from coumadin to Lovenox.  He has since that time been evaluated by Dr. Mitchell (neurosurgery) who has suggested a right SI joint injection as well as additional imaging of the spine and an EMG.      Pain is averaging 5/10 VAS.  The most severe pain is in the right lumbosacral area, consistent with SI joint.  There is also pain that radiates down the right lateral leg with associated numbness/tingling.  There is a also a pain in the heel of the right foot and toes. The pain is aggravated by prolonged sitting, standing, walking.  The pain improves with nothing in particular.  He takes Advil, Acetaminophen PRN without much benefit.  Gabapentin 900 mg AM. No adverse reactions, unsure if there is benefit.         Procedures ---  Right L5 and S1 LTFESI on 10/14/2024 - ?? Diagnostic relief, no therapeutic benefit.      Warfarin - aortic valve replacement.  Mechanical Valve.  Has to transition to Lovenox for procedure.  Dr. Britton is the cardiologist.      History of Present Illness     PEG Assessment   What number best describes your pain on average in the past week?7  What number best describes how, during the past week, pain has interfered with your enjoyment of life?9  What number best describes how, during the past week, pain has interfered with your general activity?  7    Review of Pertinent Medical Data ---  MRI LUMBAR      The following portions of the patient's history were reviewed and updated as appropriate: allergies, current medications, past family history, past medical history, past  "social history, past surgical history, and problem list.    Review of Systems   Gastrointestinal:  Negative for constipation and diarrhea.   Genitourinary:  Negative for difficulty urinating.   Musculoskeletal:  Positive for back pain.   Neurological:  Positive for weakness (right leg). Negative for numbness.   Psychiatric/Behavioral:  Negative for sleep disturbance and suicidal ideas. The patient is not nervous/anxious.      I have reviewed and confirmed the accuracy of the ROS as documented by the MA/LPN/RN YOLY Heaton    Vitals:    02/12/25 1336   BP: 129/81   Pulse: 76   Resp: 18   Temp: 97.7 °F (36.5 °C)   SpO2: 100%   Weight: 109 kg (239 lb 6.4 oz)   Height: 180.3 cm (71\")   PainSc:   5   PainLoc: Back     Objective   Physical Exam  Vitals and nursing note reviewed.   Constitutional:       General: He is not in acute distress.     Appearance: Normal appearance. He is not ill-appearing.   Pulmonary:      Effort: Pulmonary effort is normal. No respiratory distress.   Musculoskeletal:      Lumbar back: Bony tenderness present. Positive right straight leg raise test. Negative left straight leg raise test.      Comments: +Right SI joint tenderness.     Neurological:      Mental Status: He is alert and oriented to person, place, and time.      Motor: Motor function is intact. No weakness.      Gait: Gait is intact. Gait normal.   Psychiatric:         Mood and Affect: Mood normal.         Behavior: Behavior normal.         Assessment & Plan   Diagnoses and all orders for this visit:    1. Bulge of lumbar disc without myelopathy (Primary)    2. Degeneration of intervertebral disc of lumbar region with discogenic back pain and lower extremity pain    3. Lumbar radiculopathy  -     gabapentin (NEURONTIN) 600 MG tablet; Take 1 tablet by mouth 3 (Three) Times a Day.  Dispense: 90 tablet; Refill: 1    4. Sacroiliac joint dysfunction      --- Right SI joint injection   --- No need to hold Coumadin/transition to " Lovenox for SI joint injection    ----------  Education about Sacroiliac joint injections:  This Sacroiliac joint injection (blockade) we have suggested is intended for diagnostic purposes, with the intent of offering the patient Radiofrequency thermal rhizotomy (RF) of the sensory branches to the joint if the block is diagnostically effective.  The diagnostic blockade is necessary to determine the likelihood that RF therapy could be efficacious in providing long term relief to the patient.    In this procedure, the sacroiliac joint is aligned with imaging, and under image guidance a needle is placed with the needle tip into the joint.  The needle position is confirmed to be appropriately in the joint before injection of medication into the joint.  When xray fluoroscopy is used, contrast dye is used to confirm a proper arthrogram (i.e., outline of the joint).  When ultrasound is used, IV fluid (normal saline) is injected to see the flow of the fluid into the joint.  Once confirmed, then the medication can be injected into the joint.  Oftentimes this medication is a combination of local anesthetics (for diagnostic purposes) and also a steroid (to decrease irritation & inflammation in the joint, also known as sacroilitis).      Medically, a successful RF procedure should provide a patient with 50% pain relief or more for at least 6 months.  Clinical experience suggests that successful patients receive relief more in the range of 12 months on average.  We also discussed that many patients receive therapeutic success from the intraarticular joint injection, and may not require RF ablation.  If a patient receives more than 8 weeks of relief from joint injection, then occasional repeat joint blocks for therapeutic purposes is a very reasonable alternative therapy.  This course of therapy is consistent with our LCDs according to our CMS  in the area, and therefore other insurance providers should follow  accordingly.  We will monitor our patients to screen for these therapeutic responders and will offer RF therapy only when necessary.      We discussed that joint injections & also RF procedures are not without risks.  Best practices regarding anticoagulant use & neuraxial procedures will be respected.  Oftentimes a patient on an anticoagulant can be offered a joint injection safely, but again this is not risk-free, and such patients give consent with regards to this increased bleeding risk, which could cause problems including but not limited to worsening of pain, nerve damage, or muscle damage.  Patients that are ill or otherwise may be at risk for sepsis will not have their spines accessed by neuraxial injections of any type.  This patient will not be offered these therapies if there is an increased risk.   We discussed that there is a risk of postprocedural pain and also a risk of worsening of clinical picture with these procedures as with any neuraxial procedure.    ----------    --- Gabapentin - increase to 600 mg TID  --- The urine drug screen confirmation from 11/20/2024 has been reviewed and the result is appropriate based on patient history and JENIFFER report       Hussein Nino reports a pain score of 5.  Given his pain assessment as noted, treatment options were discussed and the following options were decided upon as a follow-up plan to address the patient's pain: continuation of current treatment plan for pain.      --- Follow-up for injection                  JENIFFER REPORT    As the clinician, I personally reviewed the JENIFFER from 2/12/2025 while the patient was in the office today.    History and physical exam exhibit continued safe and appropriate use of controlled substances.       Dictated utilizing Dragon dictation.

## 2025-02-14 NOTE — DISCHARGE INSTRUCTIONS
[facility] - Post-procedure Instructions          --  While there are no absolute restrictions, it is recommended that you do not perform strenuous activity today. In the morning, you may resume your level of activity as before your block.    --  If you have a band-aid at your injection site, please remove it later today. Observe the area for any redness, swelling, pus-like drainage, or a temperature over 101°. If any of these symptoms occur, please call your doctor at [phone]. If after office hours, leave a message and the on-call provider will return your call.    --  Ice may be applied to your injection site. It is recommended you avoid direct heat (heating pad; hot tub) for 1-2 days.    --  Call [facility] at [phone] if you experience persistent headache, persistent bleeding from the injection site, or severe pain not relieved by heat or oral medication.    --  Do not make important decisions today.    --  Due to the effects of the block and/or the I.V. Sedation, DO NOT drive or operate hazardous machinery for 12 hours.  Local anesthetics may cause numbness after procedure and precautions must be taken with regards to operating equipment as well as with walking, even if ambulating with assistance of another person or with an assistive device.    --  Do not drink alcohol for 12 hours.    -- You may return to work tomorrow, or as directed by your referring doctor.    --  Occasionally you may notice a slight increase in your pain after the procedure. This should start to improve within the next 24-48 hours. Radiofrequency ablation procedure pain may last 3-4 weeks.    --  It may take as long as 3-4 days before you notice a gradual improvement in your pain and/or other symptoms.    -- You may continue to take your prescribed pain medication as needed.    --  Some normal possible side effects of steroid use could include fluid retention, increased blood sugar, dull headache,  increased sweating, increased appetite, mood swings and flushing.    --  Diabetics are recommended to watch their blood glucose level closely for 24-48 hours after the injection.    --  Must stay in PACU for 20 min upon arrival and prove no leg weakness before being discharged.    --  IN THE EVENT OF A LIFE THREATENING EMERGENCY, (CHEST PAIN, BREATHING DIFFICULTIES, PARALYSIS…) YOU SHOULD GO TO YOUR NEAREST EMERGENCY ROOM.    --  You should be contacted by our office within 2-3 days to schedule follow up or next appointment date.  If not contacted within 7 days, please call the office at [phone]

## 2025-02-17 ENCOUNTER — HOSPITAL ENCOUNTER (OUTPATIENT)
Facility: SURGERY CENTER | Age: 60
Setting detail: HOSPITAL OUTPATIENT SURGERY
Discharge: HOME | End: 2025-02-17
Attending: ANESTHESIOLOGY | Admitting: ANESTHESIOLOGY
Payer: COMMERCIAL

## 2025-02-17 ENCOUNTER — HOSPITAL ENCOUNTER (OUTPATIENT)
Dept: GENERAL RADIOLOGY | Facility: SURGERY CENTER | Age: 60
End: 2025-02-17
Payer: COMMERCIAL

## 2025-02-17 VITALS
WEIGHT: 240 LBS | OXYGEN SATURATION: 96 % | DIASTOLIC BLOOD PRESSURE: 92 MMHG | SYSTOLIC BLOOD PRESSURE: 146 MMHG | BODY MASS INDEX: 33.47 KG/M2 | TEMPERATURE: 97.8 F | HEART RATE: 76 BPM | RESPIRATION RATE: 16 BRPM

## 2025-02-17 DIAGNOSIS — Z41.9 SURGERY, ELECTIVE: ICD-10-CM

## 2025-02-17 PROCEDURE — G0260 INJ FOR SACROILIAC JT ANESTH: HCPCS | Performed by: ANESTHESIOLOGY

## 2025-02-17 PROCEDURE — 76000 FLUOROSCOPY <1 HR PHYS/QHP: CPT

## 2025-02-17 PROCEDURE — 25010000002 DEXAMETHASONE SODIUM PHOSPHATE 100 MG/10ML SOLUTION 10 ML VIAL: Performed by: ANESTHESIOLOGY

## 2025-02-17 PROCEDURE — 77002 NEEDLE LOCALIZATION BY XRAY: CPT

## 2025-02-17 PROCEDURE — 25010000002 LIDOCAINE PF 1% 1 % SOLUTION 5 ML VIAL: Performed by: ANESTHESIOLOGY

## 2025-02-17 PROCEDURE — 25510000001 IOPAMIDOL 61 % SOLUTION 30 ML VIAL: Performed by: ANESTHESIOLOGY

## 2025-02-17 PROCEDURE — 27096 INJECT SACROILIAC JOINT: CPT | Performed by: ANESTHESIOLOGY

## 2025-02-17 NOTE — OP NOTE
Right Sacroiliac Joint Injection  [facility]      PREOPERATIVE DIAGNOSIS:   Sacroiliac joint dysfunction    POSTOPERATIVE DIAGNOSIS:  Same    PROCEDURE:  Sacroiliac Joint Injection, with fluoroscopic guidance CPT 91633 -RT    PRE-PROCEDURE DISCUSSION WITH PATIENT:    Risks and complications were discussed with the patient prior to starting the procedure and informed consent was obtained.  We discussed various topics including but not limited to bleeding, infection, injury, postprocedural site soreness, painful flareup, worsening of clinical picture, paralysis, coma, and death.     SURGEON:  Madyson Dominguez MD    REASON FOR PROCEDURE:    Patient has pain consistent with SI pathology on history and physical exam.    SEDATION:  No sedation was used for this procedure  ANESTHETIC AGENT:  1% Lidocaine  STEROID AGENT:  10mg Dexamethasone    DESCRIPTON OF PROCEDURE:  After obtaining informed consent, IV access was not obtained in the preoperative area.  The patient was transported to the operative suite and placed in the prone position. Heart rate, blood pressure, and pulse oximeter were monitored. The lumbosacral area was prepped with Chloraprep and draped in a sterile fashion. Under fluoroscopic guidance the inferior most portion of the right SI joint was identified. The overlying skin and subcutaneous tissue was anesthetized with 1% lidocaine. A 22-gauge spinal needle was then advanced under fluoroscopic guidance in a coaxial view into the joint space.  After negative aspiration, 1 mL of Isovue 61% was injected, and after seeing appropriate spread into the joint a volume of 3ml of the above medication was injected.    Needle was removed intact.      Vital signs remained stable.  The onset of analgesia was noted.    Pre-procedure pain score: 2/10 at rest, 9/10 with activity  Post-procedure pain score: 3/10      ESTIMATED BLOOD LOSS:  minimal  SPECIMENS:  None  COMPLICATIONS:  No complications were  noted.    TOLERANCE & DISCHARGE CONDITION:    The patient tolerated the procedure well.  The patient was transported to the recovery area without difficulties.  The patient was discharged to home under the care of family in stable and satisfactory condition.    PLAN OF CARE:  The patient was given our standard instruction sheet and will resume all medications as per the medication reconciliation sheet.  The patient will Return to clinic 4-6 wks.  The patient is instructed to keep an account of pain relief after the procedure.

## 2025-02-19 LAB — INR PPP: 3.6

## 2025-02-21 ENCOUNTER — ANTICOAGULATION VISIT (OUTPATIENT)
Dept: CARDIOLOGY | Facility: CLINIC | Age: 60
End: 2025-02-21
Payer: COMMERCIAL

## 2025-02-21 ENCOUNTER — HOSPITAL ENCOUNTER (OUTPATIENT)
Dept: GENERAL RADIOLOGY | Facility: HOSPITAL | Age: 60
Discharge: HOME | End: 2025-02-21
Payer: COMMERCIAL

## 2025-02-21 ENCOUNTER — HOSPITAL ENCOUNTER (OUTPATIENT)
Dept: CT IMAGING | Facility: HOSPITAL | Age: 60
Discharge: HOME | End: 2025-02-21
Payer: COMMERCIAL

## 2025-02-21 DIAGNOSIS — M53.3 SI (SACROILIAC) JOINT DYSFUNCTION: ICD-10-CM

## 2025-02-21 DIAGNOSIS — M79.604 RIGHT LEG PAIN: ICD-10-CM

## 2025-02-21 DIAGNOSIS — M47.817 FACET ARTHRITIS, DEGENERATIVE, L5-S1 LEVEL, LUMBOSACRAL SPINE: ICD-10-CM

## 2025-02-21 DIAGNOSIS — M51.371 DEGENERATION OF INTERVERTEBRAL DISC OF LUMBOSACRAL REGION WITH LOWER EXTREMITY PAIN: ICD-10-CM

## 2025-02-21 PROCEDURE — 72131 CT LUMBAR SPINE W/O DYE: CPT

## 2025-02-21 PROCEDURE — 72114 X-RAY EXAM L-S SPINE BENDING: CPT

## 2025-02-24 ENCOUNTER — TELEPHONE (OUTPATIENT)
Dept: PAIN MEDICINE | Facility: CLINIC | Age: 60
End: 2025-02-24
Payer: COMMERCIAL

## 2025-02-24 NOTE — TELEPHONE ENCOUNTER
LVM FOR PATIENT TO CALL BACK AND HAVE 4-6 WK F/UP PER DR. FIELD FROM HIS PROCEDURE ON 2/17/25.  PLEASE SCHEDULE WITH YOLY GO FOR 4-6 WK F/UP

## 2025-02-25 LAB — INR PPP: 1.6

## 2025-02-26 ENCOUNTER — TELEPHONE (OUTPATIENT)
Dept: PAIN MEDICINE | Facility: CLINIC | Age: 60
End: 2025-02-26
Payer: COMMERCIAL

## 2025-02-26 ENCOUNTER — OFFICE VISIT (OUTPATIENT)
Dept: NEUROLOGY | Facility: CLINIC | Age: 60
End: 2025-02-26
Payer: COMMERCIAL

## 2025-02-26 VITALS
SYSTOLIC BLOOD PRESSURE: 132 MMHG | DIASTOLIC BLOOD PRESSURE: 70 MMHG | BODY MASS INDEX: 33.04 KG/M2 | HEIGHT: 71 IN | WEIGHT: 236 LBS

## 2025-02-26 DIAGNOSIS — R90.89 ABNORMAL BRAIN MRI: Primary | ICD-10-CM

## 2025-02-26 DIAGNOSIS — M54.81 OCCIPITAL NEURALGIA OF RIGHT SIDE: ICD-10-CM

## 2025-02-26 PROCEDURE — 99214 OFFICE O/P EST MOD 30 MIN: CPT | Performed by: PSYCHIATRY & NEUROLOGY

## 2025-02-26 NOTE — TELEPHONE ENCOUNTER
LVM FOR PATIENT TO CALL BACK AND GET SCHEDULED FOR 4-6 WK F/UP FROM PROCEDURE ON 2/17/25 WITH DR. FIELD. PLEASE SCHEDULE F/UP WITH YOLY GO.

## 2025-02-26 NOTE — ASSESSMENT & PLAN NOTE
He has a mechanical valve in his heart so he is on life long Warfarin treatment.  He had a brain MRI scan on 3/7/2024 which demonstrates multiple small foci of signal loss both supratentorially and infratentorially on the susceptibility weighted sequence consistent with hemosiderin deposition which may be secondary to small areas of microhemorrhage or amyloid angiopathy and he had a follow up MRI to evaluate for progression on 2/10/2025 which I reviewed and spoke independently with the neuro-radiologist and no significant progression/change was noted and I informed him of these findings on visit today and discussed extensively.  Unfortunately again he has to be on life long anti-coagulation treatment due to having mechanical valve in his heart.  He has been on Warfarin since 10/2016 which I informed him that chronic Warfarin use can present with similarly findings on brain MRI.  I spoke with him again today about making sure his blood pressure is well controlled as high blood pressure can increase his risk for intracerebral hemorrhage and stroke.  I also informed him if he were to fall he needs to get evaluated for possible internal bleeding as he is at an increased risk for bleeding given these findings and being on Warfarin.

## 2025-02-26 NOTE — PROGRESS NOTES
Chief Complaint  Occipital neuralgia of right side and MRI follow up    Subjective          Hussein Nino presents to [facility] for   HISTORY OF PRESENT ILLNESS:    Hussein Nino is a 59 year old right handed man who returns to neurology clinic for follow up evaluation and treatment of occipital neuralgia headaches and abnormal brain MRI findings with multiple hemosiderin depositions which has not progressed on most recent brain MRI.  He reports a history of headaches involving the back of his head starting longer than a year.  The headaches start in the back of his head and slowly move up the back of his head to the top of his head in the occipital nerve distribution.  The headaches are always right sided and they can get as severe as 9-10/10 on pain scale 1-10 when most severe without any associated light or sound sensitivity.  The pain is constant and pressure like sensation.  He has a mechanical valve in his heart so he is on life long Warfarin treatment.  He had a brain MRI scan on 3/7/2024 which demonstrates multiple small foci of signal loss both supratentorially and infratentorially on the susceptibility weighted sequence consistent with hemosiderin deposition which may be secondary to small areas of microhemorrhage or amyloid angiopathy and he had a follow up MRI to evaluate for progression on 2/10/2025 which I reviewed with the neuro-radiologist and no significant progression/change was noted and I informed him of these findings on visit today.  Unfortunately again he has to be on life long anti-coagulation treatment due to having mechanical valve in his heart.  He has been on Warfarin since 10/2016 which I informed him that chronic Warfarin use can present with similarly findings on brain MRI.  He tells me one of his cousins had a brain bleed.  His blood pressure has normalized and today it is at 132/70 which is much improved from prior high blood pressure.  He has severe  KVNG and is going to have the INSPIRE placed in 4/2025.  He does have significant DDD of cervical spine and lumbar spine with some neuro-foraminal narrowing was noted as well and he had physical therapy for his neck as well and is going to be following up with [facility] as well and he has had some injections which were not helpful.  We performed right sided GREATER and LESSER ONBs for patient which had helped significantly with the posterior headaches following the nerve blocks from 4/10/2024 but he returns today and tells me the headaches are slowly coming back and he would like to have these ONBs repeated.      Past Medical History:   Diagnosis Date    Allergies     Aortic stenosis     SEVERE    Bicuspid aortic valve     replaced 2016    Chronic pain disorder 2020    back, knee, foot    Epistaxis     Fractures 1969, 2008    left leg, right leg    GERD (gastroesophageal reflux disease)     Gout     Headache for years    History of COVID-19     NOV 2020    Hyperlipidemia     IBS (irritable bowel syndrome)     Joint pain     Low back pain     Migraine     Right knee meniscal tear     Sleep apnea     CPAP, DOES NOT USE        Family History   Problem Relation Age of Onset    Heart failure Mother     Kidney disease Mother     Hypertension Mother     Heart disease Mother     Arthritis Mother     Migraines Mother     Alzheimer's disease Father     Thyroid disease Sister     Hypertension Sister     Hypertension Brother     Colon polyps Maternal Uncle     Irritable bowel syndrome Cousin     Arthritis Maternal Grandmother     Migraines Daughter     Malig Hyperthermia Neg Hx     Colon cancer Neg Hx     Crohn's disease Neg Hx     Ulcerative colitis Neg Hx         Social History     Socioeconomic History    Marital status:    Tobacco Use    Smoking status: Former     Current packs/day: 0.00     Average packs/day: 0.6 packs/day for 17.0 years (10.0 ttl pk-yrs)     Types: Cigarettes     Start date: 1969     Quit date: 1983      "Years since quittin.1     Passive exposure: Past    Smokeless tobacco: Never    Tobacco comments:     QUIT AGE 23   Vaping Use    Vaping status: Never Used   Substance and Sexual Activity    Alcohol use: Yes     Alcohol/week: 3.0 standard drinks of alcohol     Types: 3 Cans of beer per week     Comment: 1-2 PER WEEK    Drug use: No    Sexual activity: Yes     Partners: Female        I have reviewed and confirmed the accuracy of the ROS as documented by the MA/LPN/RN Faye Fair MD   Review of Systems   Neurological:  Positive for headache. Negative for dizziness, tremors, seizures, syncope, facial asymmetry, speech difficulty, weakness, light-headedness, numbness, memory problem and confusion.        Objective   Vital Signs:   /70   Ht 180.3 cm (71\")   Wt 107 kg (236 lb)   BMI 32.92 kg/m²       PHYSICAL EXAM:    General   Mental Status - Alert. General Appearance - Well developed, Well groomed, Oriented and Cooperative. Orientation - Oriented X3.       Head and Neck  Head - normocephalic, atraumatic with no lesions or palpable masses.  Neck    Global Assessment - supple.       Eye   Sclera/Conjunctiva - Bilateral - Normal.    ENMT  Mouth and Throat   Oral Cavity/Oropharynx: Oropharynx - the soft palate,uvula and tongue are normal in appearance.    Chest and Lung Exam   Chest - lung clear to auscultation bilaterally.    Cardiovascular   Cardiovascular examination reveals  - normal heart sounds, regular rate and rhythm.    Neurologic   Mental Status: Speech - Normal. Cognitive function - appropriate fund of knowledge. No impairment of attention, Impairment of concentration, impairment of long term memory or impairment of short term memory.  Cranial Nerves:   II Optic: Visual acuity - Left - Normal. Right - Normal. Visual fields - Normal (to confrontation).  III Oculomotor: Pupillary constriction - Left - Normal. Right - Normal.  VII Facial: - Normal Bilaterally.   IX Glossopharyngeal / X Vagus - " Normal.  XI Accessory: Trapezius - Bilateral - Normal. Sternocleidomastoid - Bilateral - Normal.  XII Hypoglossal - Bilateral - Normal.  Eye Movements: - Normal Bilaterally.  Sensory:   Light Touch: Intact - Globally.  Motor:   Bulk and Contour: - Normal.  Tone: - Normal.  Tremor: Not present.  Strength: 5/5 normal muscle strength - All Muscles.   General Assessment of Reflexes: - deep tendon reflexes are normal. Coordination - No Impairment of finger-to-nose or Impairment of rapid alternating movements. Gait - Normal.       Result Review :                 Assessment and Plan    Problem List Items Addressed This Visit       Occipital neuralgia of right side    Current Assessment & Plan     59 year old right handed man who returns to neurology clinic for follow up evaluation and treatment of occipital neuralgia headaches and abnormal brain MRI findings with multiple hemosiderin depositions which has not progressed on most recent brain MRI.  He reports a history of headaches involving the back of his head starting longer than a year.  The headaches start in the back of his head and slowly move up the back of his head to the top of his head in the occipital nerve distribution.  The headaches are always right sided and they can get as severe as 9-10/10 on pain scale 1-10 when most severe without any associated light or sound sensitivity.  The pain is constant and pressure like sensation.  He has a mechanical valve in his heart so he is on life long Warfarin treatment.  He had a brain MRI scan on 3/7/2024 which demonstrates multiple small foci of signal loss both supratentorially and infratentorially on the susceptibility weighted sequence consistent with hemosiderin deposition which may be secondary to small areas of microhemorrhage or amyloid angiopathy and he had a follow up MRI to evaluate for progression on 2/10/2025 which I reviewed with the neuro-radiologist and no significant progression/change was noted and I  informed him of these findings on visit today.  Unfortunately again he has to be on life long anti-coagulation treatment due to having mechanical valve in his heart.  He has been on Warfarin since 10/2016 which I informed him that chronic Warfarin use can present with similarly findings on brain MRI.  He tells me one of his cousins had a brain bleed.  His blood pressure has normalized and today it is at 132/70 which is much improved from prior high blood pressure.  He has severe KVNG and is going to have the INSPIRE placed in 4/2025.  He does have significant DDD of cervical spine and lumbar spine with some neuro-foraminal narrowing was noted as well and he had physical therapy for his neck as well and is going to be following up with [facility] as well and he has had some injections which were not helpful.  We performed right sided GREATER and LESSER ONBs for patient which had helped significantly with the posterior headaches following the nerve blocks from 4/10/2024 but he returns today and tells me the headaches are slowly coming back and he would like to have these ONBs repeated. I will set him up for repeat right sided GREATER and LESSER ONBs.             Abnormal brain MRI - Primary    Current Assessment & Plan     He has a mechanical valve in his heart so he is on life long Warfarin treatment.  He had a brain MRI scan on 3/7/2024 which demonstrates multiple small foci of signal loss both supratentorially and infratentorially on the susceptibility weighted sequence consistent with hemosiderin deposition which may be secondary to small areas of microhemorrhage or amyloid angiopathy and he had a follow up MRI to evaluate for progression on 2/10/2025 which I reviewed and spoke independently with the neuro-radiologist and no significant progression/change was noted and I informed him of these findings on visit today and discussed extensively.  Unfortunately again he has to be on life long anti-coagulation treatment due to  having mechanical valve in his heart.  He has been on Warfarin since 10/2016 which I informed him that chronic Warfarin use can present with similarly findings on brain MRI.  I spoke with him again today about making sure his blood pressure is well controlled as high blood pressure can increase his risk for intracerebral hemorrhage and stroke.  I also informed him if he were to fall he needs to get evaluated for possible internal bleeding as he is at an increased risk for bleeding given these findings and being on Warfarin.              I spent 34 minutes caring for Hussein on this date of service. This time includes time spent by me in the following activities:preparing for the visit, reviewing tests, obtaining and/or reviewing a separately obtained history, performing a medically appropriate examination and/or evaluation , counseling and educating the patient/family/caregiver, ordering medications, tests, or procedures, documenting information in the medical record, and care coordination    Follow Up   Return if symptoms worsen or fail to improve.  Patient was given instructions and counseling regarding his condition or for health maintenance advice. Please see specific information pulled into the AVS if appropriate.

## 2025-02-26 NOTE — ASSESSMENT & PLAN NOTE
59 year old right handed man who returns to neurology clinic for follow up evaluation and treatment of occipital neuralgia headaches and abnormal brain MRI findings with multiple hemosiderin depositions which has not progressed on most recent brain MRI.  He reports a history of headaches involving the back of his head starting longer than a year.  The headaches start in the back of his head and slowly move up the back of his head to the top of his head in the occipital nerve distribution.  The headaches are always right sided and they can get as severe as 9-10/10 on pain scale 1-10 when most severe without any associated light or sound sensitivity.  The pain is constant and pressure like sensation.  He has a mechanical valve in his heart so he is on life long Warfarin treatment.  He had a brain MRI scan on 3/7/2024 which demonstrates multiple small foci of signal loss both supratentorially and infratentorially on the susceptibility weighted sequence consistent with hemosiderin deposition which may be secondary to small areas of microhemorrhage or amyloid angiopathy and he had a follow up MRI to evaluate for progression on 2/10/2025 which I reviewed with the neuro-radiologist and no significant progression/change was noted and I informed him of these findings on visit today.  Unfortunately again he has to be on life long anti-coagulation treatment due to having mechanical valve in his heart.  He has been on Warfarin since 10/2016 which I informed him that chronic Warfarin use can present with similarly findings on brain MRI.  He tells me one of his cousins had a brain bleed.  His blood pressure has normalized and today it is at 132/70 which is much improved from prior high blood pressure.  He has severe KVNG and is going to have the INSPIRE placed in 4/2025.  He does have significant DDD of cervical spine and lumbar spine with some neuro-foraminal narrowing was noted as well and he had physical therapy for his neck as  well and is going to be following up with [facility] as well and he has had some injections which were not helpful.  We performed right sided GREATER and LESSER ONBs for patient which had helped significantly with the posterior headaches following the nerve blocks from 4/10/2024 but he returns today and tells me the headaches are slowly coming back and he would like to have these ONBs repeated. I will set him up for repeat right sided GREATER and LESSER ONBs.

## 2025-03-04 LAB — INR PPP: 3.1

## 2025-03-11 ENCOUNTER — HOSPITAL ENCOUNTER (OUTPATIENT)
Dept: NEUROLOGY | Facility: HOSPITAL | Age: 60
Discharge: HOME | End: 2025-03-11
Admitting: NEUROLOGICAL SURGERY
Payer: COMMERCIAL

## 2025-03-11 DIAGNOSIS — M51.371 DEGENERATION OF INTERVERTEBRAL DISC OF LUMBOSACRAL REGION WITH LOWER EXTREMITY PAIN: ICD-10-CM

## 2025-03-11 DIAGNOSIS — M53.3 SI (SACROILIAC) JOINT DYSFUNCTION: ICD-10-CM

## 2025-03-11 DIAGNOSIS — M79.604 RIGHT LEG PAIN: ICD-10-CM

## 2025-03-11 DIAGNOSIS — M47.817 FACET ARTHRITIS, DEGENERATIVE, L5-S1 LEVEL, LUMBOSACRAL SPINE: ICD-10-CM

## 2025-03-11 LAB — INR PPP: 1.7

## 2025-03-11 PROCEDURE — 95909 NRV CNDJ TST 5-6 STUDIES: CPT

## 2025-03-11 PROCEDURE — 95886 MUSC TEST DONE W/N TEST COMP: CPT

## 2025-03-11 NOTE — PROCEDURES
"EMG and Nerve Conduction Studies    I.      Instrument used: Task [employer] [place]  II.     Please see data sheets for tabular summary of NCS and details on methods, temperatures and lab standards.   III.    EMG muscles tested for upper extremity studies include the deltoid, biceps, triceps, pronator teres, extensor digitorum communis, first dorsal interosseous and abductor pollicis brevis.    IV.   EMG muscles tested for lower extremity studies include the vastus lateralis, tibialis anterior, peroneus longus, medial gastrocnemius and extensor digitorum brevis.    V.    Additional muscles tested as needed.  Paraspinal muscles tested as needed.   VI.   Please see data sheets for tabular summary of EMG findings.   VII. The complete report includes the data sheets.      Indication: right leg pain  History: Pain and numbness down his leg into the bottom of his foot. Ongoing 4-5 years previously responsive to PT. He did not get benefit to L4/L5 injections. He got some benefit from epidural lower (S1?). No radicular symptoms down left leg. No back surgery. He is on Coumadin      Ht: 71\"  Wt: 236 lb  HbA1C:   Lab Results   Component Value Date    HGBA1C 5.30 09/20/2021     TSH:   Lab Results   Component Value Date    TSH 2.400 09/20/2021       Technical summary: Nerve conduction studies were obtained of the right lower extremity with one comparison of left. Skin temperatures were normal and temperature correction was used where indicated. Needle examination was obtained on selected muscles in the bilateral lower extremities.    Results:  Normal right sural sensory distal latency and amplitude.  Normal right superficial peroneal sensory distal latency and amplitude.  Normal right peroneal motor conduction velocities with a normal distal latency and amplitudes.  Abnormal right tibial motor study with low amplitudes but normal borderline velocity  Normal left tibial motor conduction velocity with a normal distal latency and " amplitudes.  Needle examination of selected muscles in the bilateral legs noted for increased insertional activity in the right peroneus longus, gastrocnemius medial head, and extensor digitorum brevis.  Large motor unit action potentials with polyphasia and reduced recruitment pattern noted of the right peroneus longus, gastrocnemius medial head, and extensor digitorum brevis.  Vastus lateralis noted for reduced recruitment pattern of the right although otherwise normal appearing motor unit action potentials without increased insertional activity.  Otherwise, muscles tested showed normal insertional activities throughout with normal motor units and recruitment patterns.  Lumbar paraspinals and pelvic muscles deferred due to Coumadin use    Impression: Nerve conduction studies supportive of subacute to chronic S1 radiculopathy of the right leg with low amplitude tibial motor studies.  Needle study supports the above.  There was reduced recruitment of the vastus lateralis in isolation without other L4 involvement that is not clearly suggestive of an L4 radiculopathy.  Study results discussed with the patient    Rafat Mcconnell M.D.          Dictated utilizing Dragon dictation.

## 2025-03-12 NOTE — PROGRESS NOTES
Subjective   History of Present Illness: Hussein Nino is a 59 y.o. male is here today for follow-up back and right leg pain. CT lumbar and XR was done on 2/21/25.  He reports that after his last SI joint injection he had significant relief from his symptoms.  He still has some pain in the SI joint region.  He reports that the pain radiates across his back.  He has not yet tried an SI joint belt.  He has tried physical therapy in the past.  He has also had steroid epidural injections without any significant relief.    History of Present Illness    The following portions of the patient's history were reviewed and updated as appropriate: allergies, current medications, past family history, past medical history, past social history, past surgical history, and problem list.    Past Medical History:   Diagnosis Date    Allergies     Aortic stenosis     SEVERE    Bicuspid aortic valve     replaced 2016    Chronic pain disorder 2020    back, knee, foot    Epistaxis     Fractures 1969, 2008    left leg, right leg    GERD (gastroesophageal reflux disease)     Gout     Headache for years    History of COVID-19     NOV 2020    Hyperlipidemia     IBS (irritable bowel syndrome)     Joint pain     Low back pain     Migraine     Right knee meniscal tear     Sleep apnea     CPAP, DOES NOT USE        Past Surgical History:   Procedure Laterality Date    ADENOIDECTOMY      AORTIC VALVE REPAIR/REPLACEMENT  10/05/2016    mechanical valve    ASCENDING ARCH/HEMIARCH REPLACEMENT N/A 10/05/2016    Procedure: MIDLINE STERNOTOMY, AORTIC VALVE REPLACEMENT, ASCENDING AND HEMIARCH REPLACEMENT, REPAIR OF PERIVALVULAR LEAK, WITH NEURO MONITOIRING, INTRAOPERATIVE WARREN;  Surgeon: Sukhi Wilkinson MD;  Location: [facility] OR;  Service:     CARDIAC CATHETERIZATION N/A 09/27/2016    Procedure: Coronary angiography;  Surgeon: Lio Roman MD;  Location: [facility] INVASIVE LOCATION;  Service:     CARDIAC CATHETERIZATION N/A 09/27/2016     Procedure: Left Heart Cath;  Surgeon: Lio Roman MD;  Location: [facility] CATH INVASIVE LOCATION;  Service:     CHOLECYSTECTOMY      COLONOSCOPY  12/212/2016    Dr. Palomares    COLONOSCOPY W/ POLYPECTOMY N/A 06/08/2022    Procedure: COLONOSCOPY WITH POLYPECTOMY;  Surgeon: Kana Chowdary MD;  Location: [facility] OR;  Service: Gastroenterology;  Laterality: N/A;  hemorrhoids  ascending colon polyp (cold snare)  transverse colon polyp (cold snare)    ENDOSCOPY WITH POLYPECTOMY N/A 06/08/2022    Procedure: ESOPHAGOGASTRODUODENOSCOPY WITH POLYPECTOMY;  Surgeon: Kana Chowdary MD;  Location: [facility] OR;  Service: Gastroenterology;  Laterality: N/A;  gastric polyps    EPIDURAL Right 10/14/2024    Procedure: LUMBAR/SACRAL TRANSFORAMINAL EPIDURAL (right L5 and S1). 55610, 67442;  Surgeon: Madyson Dominguez MD;  Location: [facility] MAIN OR;  Service: Pain Management;  Laterality: Right;    KNEE ARTHROSCOPY Right 09/07/2021    Procedure: RIGHT KNEE ARTHROSCOPY WITH PARTIAL MEDIAL AND LATERAL MENISECTOMY WITH DEBRIDEMENT OF ARTHRITIS ;  Surgeon: Ella Muller MD;  Location: [facility] OR OSC;  Service: Orthopedics;  Laterality: Right;    SACROILIAC JOINT INJECTION Right 2/17/2025    Procedure: SACROILIAC INJECTION (Right) 92415;  Surgeon: Madyson Dominguez MD;  Location: [facility] MAIN OR;  Service: Pain Management;  Laterality: Right;    UPPER GASTROINTESTINAL ENDOSCOPY            Current Outpatient Medications:     albuterol sulfate  (90 Base) MCG/ACT inhaler, Inhale 2 puffs Every 4 (Four) Hours As Needed for Wheezing., Disp: 18 g, Rfl: 1    Breyna 160-4.5 MCG/ACT inhaler, USE 2 INHALATIONS ORALLY   EVERY 12 HOURS. USE WITH   SPACER. RINSE MOUTH AFTER  EACH USE, Disp: , Rfl:     fenofibrate (TRICOR) 145 MG tablet, TAKE 1 TABLET DAILY, Disp: 90 tablet, Rfl: 3    hydrALAZINE (APRESOLINE) 100 MG tablet, Take 1 tablet by mouth 3 (Three) Times a Day., Disp: 270 tablet, Rfl: 1    omeprazole (priLOSEC) 40 MG capsule, TAKE  1 CAPSULE DAILY, Disp: 90 capsule, Rfl: 3    pravastatin (PRAVACHOL) 40 MG tablet, TAKE 1 TABLET DAILY, Disp: 90 tablet, Rfl: 1    telmisartan (MICARDIS) 80 MG tablet, Take 1 tablet by mouth Daily., Disp: 90 tablet, Rfl: 1    warfarin (Jantoven) 5 MG tablet, TAKE 1 TABLET DAILY OR AS  DIRECTED TO MAINTAIN       INTERNATIONAL NORMALIZED   RATIO 2.5-3.5, Disp: 90 tablet, Rfl: 1    gabapentin (NEURONTIN) 600 MG tablet, Take 1 tablet by mouth 3 (Three) Times a Day. (Patient not taking: Reported on 3/19/2025), Disp: 90 tablet, Rfl: 1     Allergies   Allergen Reactions    Fish-Derived Products Anaphylaxis     Can eat shell fish without problem, but can't eat fish sandwich, cod, tuna or salmon. , is unable to take fish oil supplements.     Amlodipine Hives    Clonidine Derivatives GI Intolerance    Ondansetron Rash    Penicillins Rash        Social History     Socioeconomic History    Marital status:    Tobacco Use    Smoking status: Former     Current packs/day: 0.00     Average packs/day: 0.6 packs/day for 17.0 years (10.0 ttl pk-yrs)     Types: Cigarettes     Start date:      Quit date:      Years since quittin.2     Passive exposure: Past    Smokeless tobacco: Never    Tobacco comments:     QUIT AGE 23   Vaping Use    Vaping status: Never Used   Substance and Sexual Activity    Alcohol use: Yes     Alcohol/week: 3.0 standard drinks of alcohol     Types: 3 Cans of beer per week     Comment: 1-2 PER WEEK    Drug use: No    Sexual activity: Yes     Partners: Female        Family History   Problem Relation Age of Onset    Heart failure Mother     Kidney disease Mother     Hypertension Mother     Heart disease Mother     Arthritis Mother     Migraines Mother     Alzheimer's disease Father     Thyroid disease Sister     Hypertension Sister     Hypertension Brother     Colon polyps Maternal Uncle     Irritable bowel syndrome Cousin     Arthritis Maternal Grandmother     Migraines Daughter     Du  "Hyperthermia Neg Hx     Colon cancer Neg Hx     Crohn's disease Neg Hx     Ulcerative colitis Neg Hx         Review of Systems   Musculoskeletal:  Positive for back pain.   Neurological:  Positive for light-headedness and numbness (RIGHT LEG).   All other systems reviewed and are negative.      Objective     Vitals:    25 0917   BP: 120/70   BP Location: Left arm   Patient Position: Sitting   Cuff Size: Adult   Pulse: 97   Resp: 16   Temp: 98 °F (36.7 °C)   TempSrc: Temporal   SpO2: 95%   Weight: 108 kg (238 lb)   Height: 180.3 cm (70.98\")   PainSc: 4    PainLoc: Back     Body mass index is 33.21 kg/m².      Physical Exam  Awake, alert, oriented x3  Pupils equal round reactive to light  Extraocular muscles intact  Face symmetric  Speech is fluent and clear  No pronator drift  Motor exam  Bilateral deltoids 5/5, bilateral biceps 5/5, bilateral triceps 5/5, bilateral wrist extension 5/5 bilateral hand  5/5  Bilateral hip flexion 5/5, bilateral knee extension 5/5, bilateral DF/PF 5/5  No clonus  No Soo's reflex  Steady normal gait  Able to detect  light touch in all 4 extremities          Assessment & Plan   Independent Review of Radiographic Studies:      I personally reviewed the images from the following studies.      The lumbar spine with flexion and extension from 2025, CT of the lumbar spine from 2025, MRI of the lumbar spine from 2024  The x-ray, MRI, CT images were reviewed and demonstrate no evidence of severe canal or neuroforaminal stenosis.  There is no subluxation with flexion and extension.  There is a grade 1 spondylolisthesis at L5-S1    Medical Decision Makin-year-old male with severe lower back pain that radiates into his right lower extremity.  -Most of his pain is localized to the right SI joint.  He has had some relief after a SI joint injection which helps to localize the pain to the SI joint.  He has tried physical therapy in the past " without relief.  He reports that the SI joint injection helps more than the steroid epidural injections.  The MRI, x-ray, CT images of the lumbar spine show no evidence of instability or severe canal stenosis throughout the lumbar spine  -We discussed the risks and benefits of an SI joint fusion.  I will plan to have him wear an SI joint belt over the next few weeks to continue to evaluate for SI joint instability and improvement.  If he has some improvement with SI joint brace but continues to have symptoms we will again discuss surgery at the next follow-up  Diagnoses and all orders for this visit:    1. SI (sacroiliac) joint dysfunction (Primary)  -     Back Brace    2. Right leg pain  -     Back Brace      Return in about 3 weeks (around 4/9/2025).    I spent 30 minutes reviewing the medical record, reviewing the MRI images, reviewing the CT images, reviewing the x-ray images, reviewing the EMG report, discussing the causes of back pain and leg pain, discussing the signs and symptoms of SI joint instability and dysfunction, discussing the risks and benefits of an SI joint fusion

## 2025-03-19 ENCOUNTER — OFFICE VISIT (OUTPATIENT)
Dept: NEUROSURGERY | Facility: CLINIC | Age: 60
End: 2025-03-19
Payer: COMMERCIAL

## 2025-03-19 VITALS
DIASTOLIC BLOOD PRESSURE: 70 MMHG | HEIGHT: 71 IN | TEMPERATURE: 98 F | HEART RATE: 97 BPM | OXYGEN SATURATION: 95 % | WEIGHT: 238 LBS | RESPIRATION RATE: 16 BRPM | SYSTOLIC BLOOD PRESSURE: 120 MMHG | BODY MASS INDEX: 33.32 KG/M2

## 2025-03-19 DIAGNOSIS — M53.3 SI (SACROILIAC) JOINT DYSFUNCTION: Primary | ICD-10-CM

## 2025-03-19 DIAGNOSIS — M79.604 RIGHT LEG PAIN: ICD-10-CM

## 2025-03-19 LAB — INR PPP: 1.8

## 2025-03-19 PROCEDURE — 99214 OFFICE O/P EST MOD 30 MIN: CPT | Performed by: NEUROLOGICAL SURGERY

## 2025-03-25 LAB — INR PPP: 2.2

## 2025-03-26 ENCOUNTER — PROCEDURE VISIT (OUTPATIENT)
Dept: NEUROLOGY | Facility: CLINIC | Age: 60
End: 2025-03-26
Payer: COMMERCIAL

## 2025-03-26 DIAGNOSIS — M54.81 OCCIPITAL NEURALGIA OF RIGHT SIDE: Primary | ICD-10-CM

## 2025-03-26 RX ORDER — METHYLPREDNISOLONE ACETATE 40 MG/ML
40 INJECTION, SUSPENSION INTRA-ARTICULAR; INTRALESIONAL; INTRAMUSCULAR; SOFT TISSUE ONCE
Status: COMPLETED | OUTPATIENT
Start: 2025-03-26 | End: 2025-03-26

## 2025-03-26 RX ORDER — LIDOCAINE HYDROCHLORIDE 20 MG/ML
5 INJECTION, SOLUTION EPIDURAL; INFILTRATION; INTRACAUDAL; PERINEURAL ONCE
Status: COMPLETED | OUTPATIENT
Start: 2025-03-26 | End: 2025-03-26

## 2025-03-26 RX ADMIN — LIDOCAINE HYDROCHLORIDE 5 ML: 20 INJECTION, SOLUTION EPIDURAL; INFILTRATION; INTRACAUDAL; PERINEURAL at 08:29

## 2025-03-26 RX ADMIN — METHYLPREDNISOLONE ACETATE 40 MG: 40 INJECTION, SUSPENSION INTRA-ARTICULAR; INTRALESIONAL; INTRAMUSCULAR; SOFT TISSUE at 08:30

## 2025-03-26 NOTE — PROGRESS NOTES
A time out was performed to confirm I had the right patient and was completing the right procedure.     Occipital Nerve Block Procedure Note:    PROCEDURE IN DETAIL:  The patient was injected in the Right Sided LESSER occipital nerves with 2% lidocaine, a total of 2.5 mL times one, and Depomedrol total of 0.5 mL or 20 mg times one after obtaining written consent. Sterile technique was used including sterile gloves and needles. Injection sites identified using known anatomical landmarks.  The area to be injected was prepped with sterilizing agent. The patient tolerated the procedure without any complications. She will be returning for injection as indicated and clinic for follow up as previously scheduled.     Right sided LESSER ONB

## 2025-04-01 ENCOUNTER — OFFICE VISIT (OUTPATIENT)
Dept: PAIN MEDICINE | Facility: CLINIC | Age: 60
End: 2025-04-01
Payer: COMMERCIAL

## 2025-04-01 VITALS
HEART RATE: 73 BPM | SYSTOLIC BLOOD PRESSURE: 136 MMHG | HEIGHT: 71 IN | OXYGEN SATURATION: 94 % | TEMPERATURE: 95.9 F | DIASTOLIC BLOOD PRESSURE: 75 MMHG | BODY MASS INDEX: 33.26 KG/M2 | RESPIRATION RATE: 20 BRPM | WEIGHT: 237.6 LBS

## 2025-04-01 DIAGNOSIS — M53.3 SACROILIAC JOINT DYSFUNCTION: ICD-10-CM

## 2025-04-01 DIAGNOSIS — M54.16 LUMBAR RADICULOPATHY: ICD-10-CM

## 2025-04-01 DIAGNOSIS — M51.362 DEGENERATION OF INTERVERTEBRAL DISC OF LUMBAR REGION WITH DISCOGENIC BACK PAIN AND LOWER EXTREMITY PAIN: Primary | ICD-10-CM

## 2025-04-01 PROCEDURE — 99212 OFFICE O/P EST SF 10 MIN: CPT | Performed by: NURSE PRACTITIONER

## 2025-04-01 NOTE — PROGRESS NOTES
CHIEF COMPLAINT  SACROILIAC INJECTION (Right)   Pt reports 50% pain relief.He states he is having a hard time walking,radiates down right leg.    Subjective   Hussein Nino is a 59 y.o. male  who presents to the office for follow-up of procedure.  He completed a right SI joint injection on  2/17/2025 performed by Dr. Dominguez for management of SI joint dysfunction. Patient reports 75% immediate diagnostic relief from the procedure with about 50% ongoing benefit.     Pain is 3/10 VAS today but increases to 9/10 VAS.  The most severe pain is in the right lumbosacral area, consistent with SI joint.  The pain is aggravated by prolonged sitting, standing, walking.  The pain improves with nothing in particular.  He takes Advil, Acetaminophen PRN without much benefit.  Gabapentin was poorly tolerated due to side effects (drowsiness).       Procedures ---  Right L5 and S1 LTFESI on 10/14/2024 - ?? Diagnostic relief, no therapeutic benefit.      Warfarin - aortic valve replacement.  Mechanical Valve.  Has to transition to Lovenox for procedure.  Dr. Britton is the cardiologist.    Back Pain  This is a chronic problem. The problem occurs daily. The problem has been worse since onset. The pain is present in the lumbar spine. The pain radiates to the right thigh and right foot. The pain is at a severity of 3/10. The pain is moderate. The symptoms are aggravated by standing and position (walking). Pertinent negatives include no abdominal pain, chest pain, fever, headaches, numbness or weakness. He has tried home exercises for the symptoms. The treatment provided mild relief.      PEG Assessment   What number best describes your pain on average in the past week?9  What number best describes how, during the past week, pain has interfered with your enjoyment of life?9  What number best describes how, during the past week, pain has interfered with your general activity?  9    Review of Pertinent Medical Data ---    The following  "portions of the patient's history were reviewed and updated as appropriate: allergies, current medications, past family history, past medical history, past social history, past surgical history, and problem list.    Review of Systems   Constitutional:  Negative for activity change (less), fatigue and fever.   Respiratory:  Negative for cough and chest tightness.    Cardiovascular:  Negative for chest pain.   Gastrointestinal:  Negative for abdominal pain, constipation and diarrhea.   Musculoskeletal:  Positive for back pain.   Neurological:  Negative for dizziness, weakness, light-headedness, numbness and headaches.   Psychiatric/Behavioral:  Negative for agitation, sleep disturbance and suicidal ideas. The patient is not nervous/anxious.      I have reviewed and confirmed the accuracy of the ROS as documented by the MA/LPN/RN YOLY Heaton     Vitals:    04/01/25 0751   BP: 136/75   BP Location: Left arm   Patient Position: Sitting   Cuff Size: Adult   Pulse: 73   Resp: 20   Temp: 95.9 °F (35.5 °C)   TempSrc: Temporal   SpO2: 94%   Weight: 108 kg (237 lb 9.6 oz)   Height: 180.3 cm (70.98\")   PainSc: 3          Objective   Physical Exam  Vitals and nursing note reviewed.   Constitutional:       General: He is not in acute distress.     Appearance: Normal appearance.   Neurological:      Mental Status: He is alert.         Assessment & Plan   Diagnoses and all orders for this visit:    1. Degeneration of intervertebral disc of lumbar region with discogenic back pain and lower extremity pain (Primary)    2. Lumbar radiculopathy    3. Sacroiliac joint dysfunction      --- SI joint injection can be repeated PRN  --- He is not interested in any further medication management     Hussein Nino reports a pain score of 3.  Given his pain assessment as noted, treatment options were discussed and the following options were decided upon as a follow-up plan to address the patient's pain: continuation of current " treatment plan for pain.      --- Follow-up PRN

## 2025-04-04 LAB — INR PPP: 2.3

## 2025-04-09 RX ORDER — ENOXAPARIN SODIUM 100 MG/ML
1 INJECTION SUBCUTANEOUS EVERY 12 HOURS SCHEDULED
Qty: 10 ML | Refills: 1 | Status: SHIPPED | OUTPATIENT
Start: 2025-04-09

## 2025-04-09 RX ORDER — ENOXAPARIN SODIUM 100 MG/ML
1 INJECTION SUBCUTANEOUS EVERY 12 HOURS SCHEDULED
COMMUNITY
End: 2025-04-09 | Stop reason: SDUPTHER

## 2025-04-10 ENCOUNTER — TELEPHONE (OUTPATIENT)
Dept: NEUROSURGERY | Facility: CLINIC | Age: 60
End: 2025-04-10
Payer: COMMERCIAL

## 2025-04-10 NOTE — TELEPHONE ENCOUNTER
Rosy used to notify patient of cancellation with Dr. Puente on 4/11 due to an emergent surgery added. Will contact with new date and time.

## 2025-04-11 ENCOUNTER — OFFICE VISIT (OUTPATIENT)
Dept: NEUROSURGERY | Facility: CLINIC | Age: 60
End: 2025-04-11
Payer: COMMERCIAL

## 2025-04-11 ENCOUNTER — HOSPITAL ENCOUNTER (OUTPATIENT)
Facility: HOSPITAL | Age: 60
Setting detail: HOSPITAL OUTPATIENT SURGERY
End: 2025-04-11
Attending: NEUROLOGICAL SURGERY | Admitting: NEUROLOGICAL SURGERY
Payer: COMMERCIAL

## 2025-04-11 ENCOUNTER — PREP FOR SURGERY (OUTPATIENT)
Dept: OTHER | Facility: HOSPITAL | Age: 60
End: 2025-04-11
Payer: COMMERCIAL

## 2025-04-11 VITALS
TEMPERATURE: 97.8 F | OXYGEN SATURATION: 96 % | SYSTOLIC BLOOD PRESSURE: 130 MMHG | HEART RATE: 84 BPM | HEIGHT: 72 IN | BODY MASS INDEX: 32.13 KG/M2 | WEIGHT: 237.2 LBS | DIASTOLIC BLOOD PRESSURE: 60 MMHG

## 2025-04-11 DIAGNOSIS — R94.39 ABNORMAL NUCLEAR STRESS TEST: ICD-10-CM

## 2025-04-11 DIAGNOSIS — M53.3 SI (SACROILIAC) JOINT DYSFUNCTION: ICD-10-CM

## 2025-04-11 DIAGNOSIS — M79.604 RIGHT LEG PAIN: Primary | ICD-10-CM

## 2025-04-11 DIAGNOSIS — Z95.2 S/P AVR (AORTIC VALVE REPLACEMENT): ICD-10-CM

## 2025-04-11 DIAGNOSIS — M53.3 SI (SACROILIAC) JOINT DYSFUNCTION: Primary | ICD-10-CM

## 2025-04-11 DIAGNOSIS — Z95.2 MECHANICAL HEART VALVE PRESENT: ICD-10-CM

## 2025-04-11 PROBLEM — M53.2X8 INSTABILITY OF RIGHT SI JOINT: Status: ACTIVE | Noted: 2025-04-11

## 2025-04-11 PROCEDURE — 99214 OFFICE O/P EST MOD 30 MIN: CPT | Performed by: NEUROLOGICAL SURGERY

## 2025-04-11 NOTE — PROGRESS NOTES
Subjective   History of Present Illness: Hussein Nino is a 59 y.o. male is here today for follow-up on right sided SI joint pain.  He reports that he has had severe right sided SI joint pain over the past year.  He has had no improvement despite conservative management with physical therapy and medical management.  He reports that the pain is daily and severe.  He reports that the pain affects all activities of daily living and the quality of his life.  He is requesting to proceed with surgery.  He had temporary relief from a prior SI joint injection.    History of Present Illness    The following portions of the patient's history were reviewed and updated as appropriate: allergies, current medications, past family history, past medical history, past social history, past surgical history, and problem list.    Past Medical History:   Diagnosis Date    Allergies     Aortic stenosis     SEVERE    Bicuspid aortic valve     replaced 2016    Chronic pain disorder 2020    back, knee, foot    Epistaxis     Fractures 1969, 2008    left leg, right leg    GERD (gastroesophageal reflux disease)     Gout     Headache for years    History of COVID-19     NOV 2020    Hyperlipidemia     IBS (irritable bowel syndrome)     Joint pain     Low back pain     Migraine     Right knee meniscal tear     Sleep apnea     CPAP, DOES NOT USE        Past Surgical History:   Procedure Laterality Date    ADENOIDECTOMY      AORTIC VALVE REPAIR/REPLACEMENT  10/05/2016    mechanical valve    ASCENDING ARCH/HEMIARCH REPLACEMENT N/A 10/05/2016    Procedure: MIDLINE STERNOTOMY, AORTIC VALVE REPLACEMENT, ASCENDING AND HEMIARCH REPLACEMENT, REPAIR OF PERIVALVULAR LEAK, WITH NEURO MONITOIRING, INTRAOPERATIVE WARREN;  Surgeon: Sukhi Wilkinson MD;  Location: [facility] OR;  Service:     CARDIAC CATHETERIZATION N/A 09/27/2016    Procedure: Coronary angiography;  Surgeon: Lio Roman MD;  Location: [facility] CATH INVASIVE LOCATION;  Service:      CARDIAC CATHETERIZATION N/A 09/27/2016    Procedure: Left Heart Cath;  Surgeon: Lio Roman MD;  Location: [facility] CATH INVASIVE LOCATION;  Service:     CHOLECYSTECTOMY      COLONOSCOPY  12/212/2016    Dr. Palomares    COLONOSCOPY W/ POLYPECTOMY N/A 06/08/2022    Procedure: COLONOSCOPY WITH POLYPECTOMY;  Surgeon: Kana Chowdary MD;  Location: [facility] OR;  Service: Gastroenterology;  Laterality: N/A;  hemorrhoids  ascending colon polyp (cold snare)  transverse colon polyp (cold snare)    ENDOSCOPY WITH POLYPECTOMY N/A 06/08/2022    Procedure: ESOPHAGOGASTRODUODENOSCOPY WITH POLYPECTOMY;  Surgeon: Kana Chowdary MD;  Location:  [facility] OR;  Service: Gastroenterology;  Laterality: N/A;  gastric polyps    EPIDURAL Right 10/14/2024    Procedure: LUMBAR/SACRAL TRANSFORAMINAL EPIDURAL (right L5 and S1). 52756, 59732;  Surgeon: Madyson Dominguez MD;  Location: [facility] MAIN OR;  Service: Pain Management;  Laterality: Right;    KNEE ARTHROSCOPY Right 09/07/2021    Procedure: RIGHT KNEE ARTHROSCOPY WITH PARTIAL MEDIAL AND LATERAL MENISECTOMY WITH DEBRIDEMENT OF ARTHRITIS ;  Surgeon: Ella Muller MD;  Location: [facility] OR [facility];  Service: Orthopedics;  Laterality: Right;    SACROILIAC JOINT INJECTION Right 2/17/2025    Procedure: SACROILIAC INJECTION (Right) 02371;  Surgeon: Madyson Dominguez MD;  Location: [facility] MAIN OR;  Service: Pain Management;  Laterality: Right;    UPPER GASTROINTESTINAL ENDOSCOPY            Current Outpatient Medications:     albuterol sulfate  (90 Base) MCG/ACT inhaler, Inhale 2 puffs Every 4 (Four) Hours As Needed for Wheezing., Disp: 18 g, Rfl: 1    Breyna 160-4.5 MCG/ACT inhaler, USE 2 INHALATIONS ORALLY   EVERY 12 HOURS. USE WITH   SPACER. RINSE MOUTH AFTER  EACH USE, Disp: , Rfl:     fenofibrate (TRICOR) 145 MG tablet, TAKE 1 TABLET DAILY, Disp: 90 tablet, Rfl: 3    hydrALAZINE (APRESOLINE) 100 MG tablet, Take 1 tablet by mouth 3 (Three) Times a Day., Disp: 270 tablet, Rfl: 1     omeprazole (priLOSEC) 40 MG capsule, TAKE 1 CAPSULE DAILY, Disp: 90 capsule, Rfl: 3    pravastatin (PRAVACHOL) 40 MG tablet, TAKE 1 TABLET DAILY, Disp: 90 tablet, Rfl: 1    telmisartan (MICARDIS) 80 MG tablet, Take 1 tablet by mouth Daily., Disp: 90 tablet, Rfl: 1    warfarin (Jantoven) 5 MG tablet, TAKE 1 TABLET DAILY OR AS  DIRECTED TO MAINTAIN       INTERNATIONAL NORMALIZED   RATIO 2.5-3.5, Disp: 90 tablet, Rfl: 1    enoxaparin sodium (LOVENOX) 100 MG/ML solution prefilled syringe syringe, Inject 1.1 mL under the skin into the appropriate area as directed Every 12 (Twelve) Hours. (Patient not taking: Reported on 2025), Disp: 10 mL, Rfl: 1    gabapentin (NEURONTIN) 600 MG tablet, Take 1 tablet by mouth 3 (Three) Times a Day. (Patient not taking: Reported on 2025), Disp: 90 tablet, Rfl: 1     Allergies   Allergen Reactions    Fish-Derived Products Anaphylaxis     Can eat shell fish without problem, but can't eat fish sandwich, cod, tuna or salmon. , is unable to take fish oil supplements.     Amlodipine Hives    Clonidine Derivatives GI Intolerance    Ondansetron Rash    Penicillins Rash        Social History     Socioeconomic History    Marital status:    Tobacco Use    Smoking status: Former     Current packs/day: 0.00     Average packs/day: 0.6 packs/day for 17.0 years (10.0 ttl pk-yrs)     Types: Cigarettes     Start date:      Quit date:      Years since quittin.3     Passive exposure: Past    Smokeless tobacco: Never    Tobacco comments:     QUIT AGE 23   Vaping Use    Vaping status: Never Used   Substance and Sexual Activity    Alcohol use: Yes     Alcohol/week: 3.0 standard drinks of alcohol     Types: 3 Cans of beer per week     Comment: 1-2 PER WEEK    Drug use: No    Sexual activity: Yes     Partners: Female        Family History   Problem Relation Age of Onset    Heart failure Mother     Kidney disease Mother     Hypertension Mother     Heart disease Mother     Arthritis  "Mother     Migraines Mother     Alzheimer's disease Father     Thyroid disease Sister     Hypertension Sister     Hypertension Brother     Colon polyps Maternal Uncle     Irritable bowel syndrome Cousin     Arthritis Maternal Grandmother     Migraines Daughter     Malmaryuri Hyperthermia Neg Hx     Colon cancer Neg Hx     Crohn's disease Neg Hx     Ulcerative colitis Neg Hx         Review of Systems   Constitutional:  Negative for chills, fatigue and fever.   Cardiovascular:  Negative for leg swelling.   Genitourinary:  Negative for difficulty urinating, flank pain, testicular pain and urgency.   Musculoskeletal:  Positive for back pain (right leg pain) and gait problem.   Neurological:  Positive for weakness and numbness.   All other systems reviewed and are negative.      Objective     Vitals:    25 0811   BP: 130/60   Pulse: 84   Temp: 97.8 °F (36.6 °C)   SpO2: 96%   Weight: 108 kg (237 lb 3.2 oz)   Height: 182.9 cm (72\")     Body mass index is 32.17 kg/m².      Physical Exam  Awake, alert, oriented x3  Pupils equal round reactive to light  Extraocular muscles intact  Face symmetric  Speech is fluent and clear  No pronator drift  Motor exam  Bilateral deltoids 5/5, bilateral biceps 5/5, bilateral triceps 5/5, bilateral wrist extension 5/5 bilateral hand  5/5  Bilateral hip flexion 5/5, bilateral knee extension 5/5, bilateral DF/PF 5/5  No clonus  No Soo's reflex  Steady normal gait  Able to detect  light touch in all 4 extremities        Assessment & Plan     Medical Decision Makin-year-old male with right sided back pain in the region of the SI joint  -He has reproducible pain with provocative maneuvers and had temporary relief from an SI joint injection.  He has had this pain for greater than 1 year and reports that it is severe and disabling.  He would like to proceed with surgery at the next available date.  We discussed the risks and benefits of an SI joint fusion including, but not limited " to the risk of infection, hemorrhage, injury to surrounding structures.  He expressed understanding of the risks and benefits and elected to proceed at the next available date.  Of note he has an aortic valve replacement and is on Coumadin and will have to follow-up with his cardiologist and stop his anticoagulation prior to the procedure  Diagnoses and all orders for this visit:    1. Right leg pain (Primary)    2. SI (sacroiliac) joint dysfunction      Return for a right sided SI joint fusion.  I spent 30 minutes reviewing the medical record, reviewing the MRI images, reviewing the CT images, discussing SI joint dysfunction, discussing the management of SI joint pain, discussing the risks and benefits of surgery

## 2025-04-15 LAB — INR PPP: 1.9

## 2025-04-19 LAB — INR PPP: 2.5

## 2025-04-20 LAB — INR PPP: 1.9

## 2025-04-23 ENCOUNTER — OFFICE VISIT (OUTPATIENT)
Dept: CARDIOLOGY | Facility: CLINIC | Age: 60
End: 2025-04-23
Payer: COMMERCIAL

## 2025-04-23 VITALS
BODY MASS INDEX: 32.1 KG/M2 | HEART RATE: 76 BPM | HEIGHT: 72 IN | SYSTOLIC BLOOD PRESSURE: 125 MMHG | WEIGHT: 237 LBS | DIASTOLIC BLOOD PRESSURE: 76 MMHG

## 2025-04-23 DIAGNOSIS — I38 HEART VALVE DISEASE: ICD-10-CM

## 2025-04-23 DIAGNOSIS — Z79.01 CHRONIC ANTICOAGULATION: ICD-10-CM

## 2025-04-23 DIAGNOSIS — Z95.2 MECHANICAL HEART VALVE PRESENT: ICD-10-CM

## 2025-04-23 DIAGNOSIS — Z95.2 S/P AVR (AORTIC VALVE REPLACEMENT): ICD-10-CM

## 2025-04-23 DIAGNOSIS — Z01.818 PRE-OP EVALUATION: Primary | ICD-10-CM

## 2025-04-23 NOTE — PROGRESS NOTES
Subjective:        Hussein Nino is a 59 y.o. male who here for follow up    No chief complaint on file.      HPI    Hussein Nino is a 59-year-old male who is here today for cardiac clearance for surgery back fusion.  He has a history of heart valvular disease with mechanical prosthetic aortic valve (2016), gout, hyperlipidemia, sleep apnea with implantable device, arctic aneurysm repair (2016) and IBS.    6/4/2024 echo: EF 61 to 65%.  LV diastolic function is consistent with grade 1 impaired relaxation.  Aortic valve area is 1.4 cm 2.  Mechanical aortic valve prosthetic present.  RVSP is normal    6/4/2024 stress test: Normal.      2016 cardiac cath with no significant CAD.    The following portions of the patient's history were reviewed and updated as appropriate: allergies, current medications, past family history, past medical history, past social history, past surgical history and problem list.    Past Medical History:   Diagnosis Date    Allergies     Aortic stenosis     SEVERE    Bicuspid aortic valve     replaced 2016    Chronic pain disorder 2020    back, knee, foot    Epistaxis     Fractures 1969, 2008    left leg, right leg    GERD (gastroesophageal reflux disease)     Gout     Headache for years    History of COVID-19     NOV 2020    Hyperlipidemia     IBS (irritable bowel syndrome)     Joint pain     Low back pain     Migraine     Right knee meniscal tear     Sleep apnea     CPAP, DOES NOT USE         reports that he quit smoking about 42 years ago. His smoking use included cigarettes. He started smoking about 56 years ago. He has a 10 pack-year smoking history. He has been exposed to tobacco smoke. He has never used smokeless tobacco. He reports current alcohol use of about 3.0 standard drinks of alcohol per week. He reports that he does not use drugs.     Family History   Problem Relation Age of Onset    Heart failure Mother     Kidney disease Mother     Hypertension Mother     Heart disease  Mother     Arthritis Mother     Migraines Mother     Alzheimer's disease Father     Thyroid disease Sister     Hypertension Sister     Hypertension Brother     Colon polyps Maternal Uncle     Irritable bowel syndrome Cousin     Arthritis Maternal Grandmother     Migraines Daughter     Malmaryuri Hyperthermia Neg Hx     Colon cancer Neg Hx     Crohn's disease Neg Hx     Ulcerative colitis Neg Hx             Objective:           Vitals and nursing note reviewed.   Constitutional:       Appearance: Well-developed.   HENT:      Head: Normocephalic.      Right Ear: External ear normal.      Left Ear: External ear normal.   Neck:      Vascular: No JVD.   Pulmonary:      Effort: Pulmonary effort is normal. No respiratory distress.      Breath sounds: Normal breath sounds. No stridor. No rales.   Cardiovascular:      Normal rate. Regular rhythm.      No gallop.       Comments: +click  Pulses:     Intact distal pulses.   Edema:     Peripheral edema absent.   Abdominal:      General: Bowel sounds are normal. There is no distension.      Palpations: Abdomen is soft.      Tenderness: There is no abdominal tenderness. There is no guarding.   Musculoskeletal: Normal range of motion.         General: No tenderness.      Cervical back: Normal range of motion. Skin:     General: Skin is warm.   Neurological:      Mental Status: Alert and oriented to person, place, and time.      Deep Tendon Reflexes: Reflexes are normal and symmetric.   Psychiatric:         Judgment: Judgment normal.           ECG 12 Lead    Date/Time: 4/23/2025 8:49 AM  Performed by: Jacki Gold APRN    Authorized by: Richmond Bernard MD  Comparison: compared with previous ECG from 1/10/2025  Similar to previous ECG  Comparison to previous ECG: Nonspecific t waves  Rhythm: sinus rhythm  Rate: normal    Clinical impression: non-specific ECG             6/4/2024  Interpretation Summary         Left ventricular ejection fraction appears to be 61 - 65%.    Left  ventricular diastolic function is consistent with (grade I) impaired relaxation.    Aortic valve area is 1.4 cm2.    Peak velocity of the flow distal to the aortic valve is 197.8 cm/s. Aortic valve maximum pressure gradient is 16 mmHg. Aortic valve mean pressure gradient is 9 mmHg. Aortic valve dimensionless index is 0.5 .    There is a mechanical aortic valve prosthesis present.    Estimated right ventricular systolic pressure from tricuspid regurgitation is normal (<35 mmHg).     6/4/2024  Interpretation Summary         Findings consistent with an equivocal ECG stress test.    Myocardial perfusion imaging indicates a normal myocardial perfusion study with no evidence of ischemia. Impressions are consistent with a low risk study.    Left ventricular ejection fraction is normal (Calculated EF = 53%).     Symptomatic for chest pain.  Initial recovery, at end of Stage II, c/o mid-sternal burning sensation, unable to rate. Resolved 2 minutes into recovery.  Baseline 1 mm concave ST elevation  Lead I, however, Morphology changed Stage II, 1.75 mm ST  convex elevation Lead I, resolved 1 minute into recovery.  Baseline diffuse non-specific ST T wave abnormality, EKG is suggestive of ischemia. Study is equivocal.   BP response: Baseline 126/73, Peak: 181/64, Recovery: 179//79  Ectopy: none  Exercise Tolerance: Fair, Reached 17 of 20 Ursula Scale Stage II.   Duke treadmill score -7.5  Estimates  5-year survival of 95 % . Using the Duke score there is  a moderate risk  of angiographic coronary disease     Supervised by:  Taylor FREDERICK    2016  Procedure:     Left heart cath, selcective coronary angiography for preop before the aortic valve surgery for severe AS     Procedure:     Access was obtained with a 5 fr sheath in the right radial artery and selective  Coronary angiography of the left coronary was done with JL 3.5 diagnostic, right coronary with the same JL 3.5 diagnostic.  Aortic valve is not crossed due to  the known severe AS        Findings: Aortic BP is 110/70     Left main coronary is a large caliber vessel with no significant stenosis. It bifurcates into LAD and LCX in standarad fashion.      Left circumflex is a dominant vessel and is normal. The first OM is normal     Proximal LAD is normal, mid to distal LAD has only mild luminal irregularities.      The  Right coronary is a small non-dominant vessel         Complications: None     Impression:  No significant CAD        Lio Roman MD      Date: 9/27/2016  Time: 9:19 AM    2020    Interpretation Summary    Proximal right internal carotid artery is normal.  Proximal left internal carotid artery is normal.      Current Outpatient Medications:     albuterol sulfate  (90 Base) MCG/ACT inhaler, Inhale 2 puffs Every 4 (Four) Hours As Needed for Wheezing., Disp: 18 g, Rfl: 1    Breyna 160-4.5 MCG/ACT inhaler, USE 2 INHALATIONS ORALLY   EVERY 12 HOURS. USE WITH   SPACER. RINSE MOUTH AFTER  EACH USE, Disp: , Rfl:     enoxaparin sodium (LOVENOX) 100 MG/ML solution prefilled syringe syringe, Inject 1.1 mL under the skin into the appropriate area as directed Every 12 (Twelve) Hours. (Patient not taking: Reported on 4/11/2025), Disp: 10 mL, Rfl: 1    fenofibrate (TRICOR) 145 MG tablet, TAKE 1 TABLET DAILY, Disp: 90 tablet, Rfl: 3    gabapentin (NEURONTIN) 600 MG tablet, Take 1 tablet by mouth 3 (Three) Times a Day. (Patient not taking: Reported on 4/11/2025), Disp: 90 tablet, Rfl: 1    hydrALAZINE (APRESOLINE) 100 MG tablet, Take 1 tablet by mouth 3 (Three) Times a Day., Disp: 270 tablet, Rfl: 1    omeprazole (priLOSEC) 40 MG capsule, TAKE 1 CAPSULE DAILY, Disp: 90 capsule, Rfl: 3    pravastatin (PRAVACHOL) 40 MG tablet, TAKE 1 TABLET DAILY, Disp: 90 tablet, Rfl: 1    telmisartan (MICARDIS) 80 MG tablet, Take 1 tablet by mouth Daily., Disp: 90 tablet, Rfl: 1    warfarin (Jantoven) 5 MG tablet, TAKE 1 TABLET DAILY OR AS  DIRECTED TO MAINTAIN        INTERNATIONAL NORMALIZED   RATIO 2.5-3.5, Disp: 90 tablet, Rfl: 1     Assessment:        Patient Active Problem List   Diagnosis    Allergic rhinitis    Asthma    Gastroesophageal reflux disease    Hyperlipidemia    Irritable bowel syndrome    Raynaud's phenomenon    Essential hypertriglyceridemia    S/P AVR (aortic valve replacement)    Mechanical heart valve present    Chronic anticoagulation    Benign essential HTN    Benign prostatic hyperplasia with nocturia    KVNG (obstructive sleep apnea)    Abnormal EKG    Venous insufficiency    Tear of medial meniscus of right knee, current    Tear of lateral meniscus of right knee, current    Encounter for screening for malignant neoplasm of colon    History of colon polyps    RUTLEDGE (nonalcoholic steatohepatitis)    Stage 3a chronic kidney disease    Occipital neuralgia of right side    Abnormal brain MRI    SOB (shortness of breath)    Lumbar radiculopathy    Sacroiliac joint dysfunction of right side    SI (sacroiliac) joint dysfunction    Instability of right SI joint               Plan:   1.  Cardiac clearance: He will need to have a stress test and echo.    It was explained to the patient that stress testing carries 85% specificity/sensitivity, and does not rule out future cardiac event.  Risks of the procedure were explained to the patient including shortness of breath, induction of myocardial infarction, and dizziness.  Patient is agreeable to proceeding with stress testing.     2.  Hyperlipidemia: His PCP manages his labs and cholesterol.    Risk of the hyperlipidemia, importance of the treatment has been explained. Pros and cons of the statins has been explained. Regular blood workup as well as side effects including the liver failure, myelopathy death has been explained.    3.  Status post mechanical aortic valve on warfarin: His INRs are done by our office.     He had a MRI which had old brain bleeds approx. a year ago and he follows neurology. He discussed with  Dr. Eddy regarding changing valve however he decided not to follow up with CT surgery for possible porcine valve at this time due to recent MRI did not show new bleeds. He will continue to follow with neuro and let us know if he changes his mind.              No diagnosis found.    There are no diagnoses linked to this encounter.    COUNSELING: michael GreenwoodgueCounseling was given to patient for the following topics: diagnostic results, risk factor reductions, impressions, risks and benefits of treatment options and importance of treatment compliance .       SMOKING COUNSELING: denies    -Back fusion cardiac clearance: lexiscan and echo.  - If testing is okay then he will be cleared for surgery and follow-up in 6 months.    Sincerely,   YOLY Lawrence  [facility]  04/23/25  08:23 EDT    EMR Dragon/Transcription disclaimer: Dictated utilizing Dragon Dictation

## 2025-04-25 LAB — INR PPP: 1

## 2025-04-28 LAB — INR PPP: 1.6

## 2025-04-29 ENCOUNTER — TELEPHONE (OUTPATIENT)
Dept: INTERNAL MEDICINE | Facility: CLINIC | Age: 60
End: 2025-04-29
Payer: COMMERCIAL

## 2025-04-29 NOTE — TELEPHONE ENCOUNTER
"    Caller: Hussein Nino \"MATT\"    Relationship to patient: Self    Best call back number: [phone]    Type of visit: LAB    Requested date: WEEK OF MAY 5TH PATIENT NOT AVAILABLE ON MAY 6TH    Additional notes: PATIENT REQUESTED TO HAVE LABS DRAWN PRIOR TO APPT ON MAY 12        "

## 2025-05-01 ENCOUNTER — TELEPHONE (OUTPATIENT)
Dept: NEUROSURGERY | Facility: CLINIC | Age: 60
End: 2025-05-01
Payer: COMMERCIAL

## 2025-05-01 NOTE — TELEPHONE ENCOUNTER
Patient called regarding the bilateral sacroiliac fusion that we are scheduling him with Dr. Mitchell. He is asking if he has the surgery on a Thursday, can he return to work on Monday? He states he is a  and although he walks around, he can avoid any lifting. Please advise, thanks!

## 2025-05-01 NOTE — TELEPHONE ENCOUNTER
Patient states he is tentatively scheduled for 5/22/25 a Thursday for procedure. He would like to know if he would be able to return to [employer] that following Tuesday.

## 2025-05-05 NOTE — TELEPHONE ENCOUNTER
Patient notified per Dr. Mitchell: I am not sure when he will be able to go back to work he will need to be off work at least 1 week and may be longer depending on his specific job. Patient voiced understanding.

## 2025-05-06 ENCOUNTER — HOSPITAL ENCOUNTER (OUTPATIENT)
Dept: CARDIOLOGY | Facility: HOSPITAL | Age: 60
Discharge: HOME | End: 2025-05-06
Payer: COMMERCIAL

## 2025-05-06 ENCOUNTER — HOSPITAL ENCOUNTER (OUTPATIENT)
Dept: CARDIOLOGY | Facility: HOSPITAL | Age: 60
Discharge: HOME | End: 2025-05-06
Admitting: NURSE PRACTITIONER
Payer: COMMERCIAL

## 2025-05-06 VITALS
DIASTOLIC BLOOD PRESSURE: 65 MMHG | HEART RATE: 68 BPM | HEIGHT: 72 IN | OXYGEN SATURATION: 98 % | BODY MASS INDEX: 32.23 KG/M2 | WEIGHT: 238 LBS | SYSTOLIC BLOOD PRESSURE: 129 MMHG

## 2025-05-06 VITALS
HEART RATE: 65 BPM | DIASTOLIC BLOOD PRESSURE: 78 MMHG | HEIGHT: 72 IN | OXYGEN SATURATION: 95 % | BODY MASS INDEX: 32.25 KG/M2 | WEIGHT: 238.1 LBS | SYSTOLIC BLOOD PRESSURE: 125 MMHG

## 2025-05-06 DIAGNOSIS — Z79.01 CHRONIC ANTICOAGULATION: ICD-10-CM

## 2025-05-06 DIAGNOSIS — Z95.2 S/P AVR (AORTIC VALVE REPLACEMENT): ICD-10-CM

## 2025-05-06 DIAGNOSIS — I38 HEART VALVE DISEASE: ICD-10-CM

## 2025-05-06 DIAGNOSIS — Z95.2 MECHANICAL HEART VALVE PRESENT: ICD-10-CM

## 2025-05-06 DIAGNOSIS — Z01.818 PRE-OP EVALUATION: ICD-10-CM

## 2025-05-06 PROCEDURE — 78452 HT MUSCLE IMAGE SPECT MULT: CPT

## 2025-05-06 PROCEDURE — 34310000005 TECHNETIUM SESTAMIBI: Performed by: INTERNAL MEDICINE

## 2025-05-06 PROCEDURE — A9500 TC99M SESTAMIBI: HCPCS | Performed by: INTERNAL MEDICINE

## 2025-05-06 PROCEDURE — 93306 TTE W/DOPPLER COMPLETE: CPT

## 2025-05-06 PROCEDURE — 93017 CV STRESS TEST TRACING ONLY: CPT

## 2025-05-06 PROCEDURE — 25510000001 PERFLUTREN 6.52 MG/ML SUSPENSION 2 ML VIAL: Performed by: NURSE PRACTITIONER

## 2025-05-06 PROCEDURE — 25010000002 REGADENOSON 0.4 MG/5ML SOLUTION: Performed by: INTERNAL MEDICINE

## 2025-05-06 PROCEDURE — 93306 TTE W/DOPPLER COMPLETE: CPT | Performed by: INTERNAL MEDICINE

## 2025-05-06 RX ORDER — REGADENOSON 0.08 MG/ML
0.4 INJECTION, SOLUTION INTRAVENOUS
Status: COMPLETED | OUTPATIENT
Start: 2025-05-06 | End: 2025-05-06

## 2025-05-06 RX ADMIN — REGADENOSON 0.4 MG: 0.08 INJECTION, SOLUTION INTRAVENOUS at 08:56

## 2025-05-06 RX ADMIN — TECHNETIUM TC 99M SESTAMIBI 1 DOSE: 1 INJECTION INTRAVENOUS at 07:00

## 2025-05-06 RX ADMIN — SODIUM CHLORIDE 6 ML: 9 INJECTION INTRAMUSCULAR; INTRAVENOUS; SUBCUTANEOUS at 08:24

## 2025-05-06 RX ADMIN — TECHNETIUM TC 99M SESTAMIBI 1 DOSE: 1 INJECTION INTRAVENOUS at 08:56

## 2025-05-07 ENCOUNTER — ANTICOAGULATION VISIT (OUTPATIENT)
Dept: CARDIOLOGY | Facility: CLINIC | Age: 60
End: 2025-05-07
Payer: COMMERCIAL

## 2025-05-07 DIAGNOSIS — E78.2 MIXED HYPERLIPIDEMIA: ICD-10-CM

## 2025-05-07 DIAGNOSIS — Z00.00 ANNUAL PHYSICAL EXAM: ICD-10-CM

## 2025-05-07 DIAGNOSIS — I10 BENIGN ESSENTIAL HTN: ICD-10-CM

## 2025-05-07 DIAGNOSIS — Z12.5 SCREENING PSA (PROSTATE SPECIFIC ANTIGEN): ICD-10-CM

## 2025-05-07 LAB
ALBUMIN SERPL-MCNC: 4.4 G/DL (ref 3.5–5.2)
ALBUMIN/GLOB SERPL: 1.9 G/DL
ALP SERPL-CCNC: 50 U/L (ref 39–117)
ALT SERPL-CCNC: 28 U/L (ref 1–41)
AORTIC DIMENSIONLESS INDEX: 0.51 (DI)
ASCENDING AORTA: 3 CM
AST SERPL-CCNC: 32 U/L (ref 1–40)
AV MEAN PRESS GRAD SYS DOP V1V2: 6.4 MMHG
AV VMAX SYS DOP: 156.9 CM/SEC
BH CV ECHO MEAS - AO MAX PG: 9.8 MMHG
BH CV ECHO MEAS - AO V2 VTI: 34.2 CM
BH CV ECHO MEAS - AVA(I,D): 2.24 CM2
BH CV ECHO MEAS - EDV(CUBED): 72.2 ML
BH CV ECHO MEAS - EDV(MOD-SP2): 138 ML
BH CV ECHO MEAS - EDV(MOD-SP4): 216 ML
BH CV ECHO MEAS - EF(MOD-SP2): 60.1 %
BH CV ECHO MEAS - EF(MOD-SP4): 55.1 %
BH CV ECHO MEAS - ESV(CUBED): 16.9 ML
BH CV ECHO MEAS - ESV(MOD-SP2): 55 ML
BH CV ECHO MEAS - ESV(MOD-SP4): 97 ML
BH CV ECHO MEAS - FS: 38.4 %
BH CV ECHO MEAS - IVS/LVPW: 0.85 CM
BH CV ECHO MEAS - IVSD: 1.23 CM
BH CV ECHO MEAS - LAT PEAK E' VEL: 8.2 CM/SEC
BH CV ECHO MEAS - LV DIASTOLIC VOL/BSA (35-75): 94.3 CM2
BH CV ECHO MEAS - LV MASS(C)D: 206.2 GRAMS
BH CV ECHO MEAS - LV MAX PG: 2.2 MMHG
BH CV ECHO MEAS - LV MEAN PG: 1.27 MMHG
BH CV ECHO MEAS - LV SYSTOLIC VOL/BSA (12-30): 42.4 CM2
BH CV ECHO MEAS - LV V1 MAX: 74.1 CM/SEC
BH CV ECHO MEAS - LV V1 VTI: 17.3 CM
BH CV ECHO MEAS - LVIDD: 4.2 CM
BH CV ECHO MEAS - LVIDS: 2.6 CM
BH CV ECHO MEAS - LVOT AREA: 4.4 CM2
BH CV ECHO MEAS - LVOT DIAM: 2.37 CM
BH CV ECHO MEAS - LVPWD: 1.44 CM
BH CV ECHO MEAS - MED PEAK E' VEL: 6.4 CM/SEC
BH CV ECHO MEAS - MV A DUR: 0.15 SEC
BH CV ECHO MEAS - MV A MAX VEL: 66 CM/SEC
BH CV ECHO MEAS - MV DEC SLOPE: 300 CM/SEC2
BH CV ECHO MEAS - MV DEC TIME: 0.2 SEC
BH CV ECHO MEAS - MV E MAX VEL: 75 CM/SEC
BH CV ECHO MEAS - MV E/A: 1.14
BH CV ECHO MEAS - MV MAX PG: 2.5 MMHG
BH CV ECHO MEAS - MV MEAN PG: 1.18 MMHG
BH CV ECHO MEAS - MV P1/2T: 74.8 MSEC
BH CV ECHO MEAS - MV V2 VTI: 20.1 CM
BH CV ECHO MEAS - MVA(P1/2T): 2.9 CM2
BH CV ECHO MEAS - MVA(VTI): 3.8 CM2
BH CV ECHO MEAS - PA ACC TIME: 0.12 SEC
BH CV ECHO MEAS - PA V2 MAX: 88.2 CM/SEC
BH CV ECHO MEAS - QP/QS: 0.85
BH CV ECHO MEAS - RAP SYSTOLE: 3 MMHG
BH CV ECHO MEAS - RV MAX PG: 1.44 MMHG
BH CV ECHO MEAS - RV V1 MAX: 60 CM/SEC
BH CV ECHO MEAS - RV V1 VTI: 13.9 CM
BH CV ECHO MEAS - RVOT DIAM: 2.44 CM
BH CV ECHO MEAS - RVSP: 25 MMHG
BH CV ECHO MEAS - SV(LVOT): 76.5 ML
BH CV ECHO MEAS - SV(MOD-SP2): 83 ML
BH CV ECHO MEAS - SV(MOD-SP4): 119 ML
BH CV ECHO MEAS - SV(RVOT): 65 ML
BH CV ECHO MEAS - SVI(LVOT): 33.4 ML/M2
BH CV ECHO MEAS - SVI(MOD-SP2): 36.2 ML/M2
BH CV ECHO MEAS - SVI(MOD-SP4): 52 ML/M2
BH CV ECHO MEAS - TAPSE (>1.6): 1.73 CM
BH CV ECHO MEAS - TR MAX PG: 21.7 MMHG
BH CV ECHO MEAS - TR MAX VEL: 232.9 CM/SEC
BH CV ECHO MEASUREMENTS AVERAGE E/E' RATIO: 10.27
BH CV XLRA - RV BASE: 3.9 CM
BH CV XLRA - RV LENGTH: 6.6 CM
BH CV XLRA - RV MID: 3.2 CM
BH CV XLRA - TDI S': 8.1 CM/SEC
BILIRUB SERPL-MCNC: 0.3 MG/DL (ref 0–1.2)
BUN SERPL-MCNC: 16 MG/DL (ref 8–23)
BUN/CREAT SERPL: 10.6 (ref 7–25)
CALCIUM SERPL-MCNC: 8.9 MG/DL (ref 8.6–10.5)
CHLORIDE SERPL-SCNC: 107 MMOL/L (ref 98–107)
CHOLEST SERPL-MCNC: 162 MG/DL (ref 0–200)
CHOLEST/HDLC SERPL: 6.48 {RATIO}
CO2 SERPL-SCNC: 25.9 MMOL/L (ref 22–29)
CREAT SERPL-MCNC: 1.51 MG/DL (ref 0.76–1.27)
EGFRCR SERPLBLD CKD-EPI 2021: 52.5 ML/MIN/1.73
GLOBULIN SER CALC-MCNC: 2.3 GM/DL
GLUCOSE SERPL-MCNC: 120 MG/DL (ref 65–99)
HDLC SERPL-MCNC: 25 MG/DL (ref 40–60)
LDLC SERPL CALC-MCNC: 82 MG/DL (ref 0–100)
LEFT ATRIUM VOLUME INDEX: 30.9 ML/M2
LV EF BIPLANE MOD: 59.2 %
POTASSIUM SERPL-SCNC: 4.1 MMOL/L (ref 3.5–5.2)
PROT SERPL-MCNC: 6.7 G/DL (ref 6–8.5)
PSA SERPL-MCNC: 0.33 NG/ML (ref 0–4)
SINUS: 3.4 CM
SODIUM SERPL-SCNC: 140 MMOL/L (ref 136–145)
STJ: 2.8 CM
TRIGL SERPL-MCNC: 337 MG/DL (ref 0–150)
VLDLC SERPL CALC-MCNC: 55 MG/DL (ref 5–40)

## 2025-05-08 LAB
BH CV IMMEDIATE POST RECOVERY TECH DATA SYMPTOMS: NORMAL
BH CV IMMEDIATE POST TECH DATA BLOOD PRESSURE: NORMAL MMHG
BH CV IMMEDIATE POST TECH DATA HEART RATE: 101 BPM
BH CV IMMEDIATE POST TECH DATA OXYGEN SATS: 98 %
BH CV REST NUCLEAR ISOTOPE DOSE: 10.9 MCI
BH CV STRESS BP STAGE 1: NORMAL
BH CV STRESS COMMENTS STAGE 1: NORMAL
BH CV STRESS DOSE REGADENOSON STAGE 1: 0.4
BH CV STRESS DURATION MIN STAGE 1: 3
BH CV STRESS DURATION SEC STAGE 1: 0
BH CV STRESS GRADE STAGE 1: 0
BH CV STRESS HR STAGE 1: 108
BH CV STRESS METS STAGE 1: 1.8
BH CV STRESS NUCLEAR ISOTOPE DOSE: 32.9 MCI
BH CV STRESS O2 STAGE 1: 98
BH CV STRESS PROTOCOL 1: NORMAL
BH CV STRESS RECOVERY BP: NORMAL MMHG
BH CV STRESS RECOVERY HR: 85 BPM
BH CV STRESS RECOVERY O2: 98 %
BH CV STRESS SPEED STAGE 1: 1
BH CV STRESS STAGE 1: 1
BH CV THREE MINUTE POST TECH DATA BLOOD PRESSURE: NORMAL MMHG
BH CV THREE MINUTE POST TECH DATA HEART RATE: 89 BPM
BH CV THREE MINUTE POST TECH DATA OXYGEN SATURATION: 98 %
BH CV THREE MINUTE RECOVERY TECH DATA SYMPTOM: NORMAL
MAXIMAL PREDICTED HEART RATE: 160 BPM
PERCENT MAX PREDICTED HR: 67.5 %
SPECT HRT GATED+EF W RNC IV: 60 %
STRESS BASELINE BP: NORMAL MMHG
STRESS BASELINE HR: 68 BPM
STRESS O2 SAT REST: 98 %
STRESS PERCENT HR: 79 %
STRESS POST ESTIMATED WORKLOAD: 1.8 METS
STRESS POST EXERCISE DUR MIN: 3 MIN
STRESS POST EXERCISE DUR SEC: 0 SEC
STRESS POST O2 SAT PEAK: 98 %
STRESS POST PEAK BP: NORMAL MMHG
STRESS POST PEAK HR: 108 BPM
STRESS TARGET HR: 136 BPM

## 2025-05-09 DIAGNOSIS — Z95.2 S/P AVR (AORTIC VALVE REPLACEMENT): ICD-10-CM

## 2025-05-09 DIAGNOSIS — Z95.2 MECHANICAL HEART VALVE PRESENT: ICD-10-CM

## 2025-05-09 DIAGNOSIS — R94.39 ABNORMAL NUCLEAR STRESS TEST: Primary | ICD-10-CM

## 2025-05-09 RX ORDER — PREDNISONE 50 MG/1
50 TABLET ORAL DAILY
Qty: 3 TABLET | Refills: 0 | Status: SHIPPED | OUTPATIENT
Start: 2025-05-09 | End: 2025-05-12

## 2025-05-09 RX ORDER — PREDNISONE 50 MG/1
TABLET ORAL
Qty: 3 TABLET | Refills: 0 | Status: SHIPPED | OUTPATIENT
Start: 2025-05-09 | End: 2025-05-09

## 2025-05-09 RX ORDER — ENOXAPARIN SODIUM 100 MG/ML
1 INJECTION SUBCUTANEOUS EVERY 12 HOURS SCHEDULED
Qty: 10 ML | Refills: 1 | Status: SHIPPED | OUTPATIENT
Start: 2025-05-09

## 2025-05-09 RX ORDER — DIPHENHYDRAMINE HCL 25 MG
TABLET ORAL
Qty: 3 TABLET | Refills: 0 | Status: SHIPPED | OUTPATIENT
Start: 2025-05-09

## 2025-05-12 ENCOUNTER — OFFICE VISIT (OUTPATIENT)
Dept: INTERNAL MEDICINE | Facility: CLINIC | Age: 60
End: 2025-05-12
Payer: COMMERCIAL

## 2025-05-12 VITALS
WEIGHT: 238 LBS | TEMPERATURE: 98.1 F | HEIGHT: 72 IN | SYSTOLIC BLOOD PRESSURE: 128 MMHG | BODY MASS INDEX: 32.23 KG/M2 | DIASTOLIC BLOOD PRESSURE: 72 MMHG

## 2025-05-12 DIAGNOSIS — E78.2 MIXED HYPERLIPIDEMIA: Primary | ICD-10-CM

## 2025-05-12 DIAGNOSIS — I25.10 CORONARY ARTERY DISEASE DUE TO LIPID RICH PLAQUE: ICD-10-CM

## 2025-05-12 DIAGNOSIS — I25.83 CORONARY ARTERY DISEASE DUE TO LIPID RICH PLAQUE: ICD-10-CM

## 2025-05-12 NOTE — PROGRESS NOTES
Subjective   Chief Complaint   Patient presents with    Hypertension       History of Present Illness     He is scheduled for a heart cath on 5/20/15. He had inspire placed will get turned on in a few weeks. Not having any CP or SOA. BP has been great.      Patient Active Problem List   Diagnosis    Allergic rhinitis    Asthma    Gastroesophageal reflux disease    Hyperlipidemia    Irritable bowel syndrome    Raynaud's phenomenon    Essential hypertriglyceridemia    S/P AVR (aortic valve replacement)    Mechanical heart valve present    Chronic anticoagulation    Benign essential HTN    Benign prostatic hyperplasia with nocturia    KVNG (obstructive sleep apnea)    Abnormal EKG    Venous insufficiency    Tear of medial meniscus of right knee, current    Tear of lateral meniscus of right knee, current    Encounter for screening for malignant neoplasm of colon    History of colon polyps    RUTLEDGE (nonalcoholic steatohepatitis)    Stage 3a chronic kidney disease    Occipital neuralgia of right side    Abnormal brain MRI    SOB (shortness of breath)    Lumbar radiculopathy    Sacroiliac joint dysfunction of right side    SI (sacroiliac) joint dysfunction    Instability of right SI joint    Abnormal nuclear stress test       Allergies   Allergen Reactions    Fish-Derived Products Anaphylaxis     Can eat shell fish without problem, but can't eat fish sandwich, cod, tuna or salmon. , is unable to take fish oil supplements.     Amlodipine Hives    Clonidine Derivatives GI Intolerance    Ondansetron Rash    Penicillins Rash       Current Outpatient Medications on File Prior to Visit   Medication Sig Dispense Refill    albuterol sulfate  (90 Base) MCG/ACT inhaler Inhale 2 puffs Every 4 (Four) Hours As Needed for Wheezing. 18 g 1    diphenhydrAMINE (Benadryl Allergy) 25 MG tablet TAKE 1 TAB 6PM NIGHT BEFORE PROCEDURE 1 TAB 11PM NIGHT BEFORE PROCEDURE 1 TAB 6AM MORNING OF PROCEDURE 3 tablet 0    enoxaparin sodium (LOVENOX)  100 MG/ML solution prefilled syringe syringe Inject 1.1 mL under the skin into the appropriate area as directed Every 12 (Twelve) Hours. 10 mL 1    fenofibrate (TRICOR) 145 MG tablet TAKE 1 TABLET DAILY 90 tablet 3    hydrALAZINE (APRESOLINE) 100 MG tablet Take 1 tablet by mouth 3 (Three) Times a Day. 270 tablet 1    omeprazole (priLOSEC) 40 MG capsule TAKE 1 CAPSULE DAILY 90 capsule 3    pravastatin (PRAVACHOL) 40 MG tablet TAKE 1 TABLET DAILY 90 tablet 1    telmisartan (MICARDIS) 80 MG tablet Take 1 tablet by mouth Daily. 90 tablet 1    warfarin (Jantoven) 5 MG tablet TAKE 1 TABLET DAILY OR AS  DIRECTED TO MAINTAIN       INTERNATIONAL NORMALIZED   RATIO 2.5-3.5 90 tablet 1    [DISCONTINUED] predniSONE (DELTASONE) 50 MG tablet Take 1 tablet by mouth Daily. (Patient not taking: Reported on 5/12/2025) 3 tablet 0     No current facility-administered medications on file prior to visit.       Past Medical History:   Diagnosis Date    Allergies     Aortic stenosis     SEVERE    Bicuspid aortic valve     replaced 2016    Chronic pain disorder 2020    back, knee, foot    Epistaxis     Fractures 1969, 2008    left leg, right leg    GERD (gastroesophageal reflux disease)     Gout     Headache for years    History of COVID-19     NOV 2020    Hyperlipidemia     IBS (irritable bowel syndrome)     Joint pain     Low back pain     Migraine     Right knee meniscal tear     Sleep apnea     CPAP, DOES NOT USE       Family History   Problem Relation Age of Onset    Heart failure Mother     Kidney disease Mother     Hypertension Mother     Heart disease Mother     Arthritis Mother     Migraines Mother     Alzheimer's disease Father     Thyroid disease Sister     Hypertension Sister     Hypertension Brother     Colon polyps Maternal Uncle     Irritable bowel syndrome Cousin     Arthritis Maternal Grandmother     Migraines Daughter     Malig Hyperthermia Neg Hx     Colon cancer Neg Hx     Crohn's disease Neg Hx     Ulcerative colitis  Neg Hx        Social History     Socioeconomic History    Marital status:    Tobacco Use    Smoking status: Former     Current packs/day: 0.00     Average packs/day: 0.6 packs/day for 17.0 years (10.0 ttl pk-yrs)     Types: Cigarettes     Start date:      Quit date:      Years since quittin.3     Passive exposure: Past    Smokeless tobacco: Never    Tobacco comments:     QUIT AGE 23   Vaping Use    Vaping status: Never Used   Substance and Sexual Activity    Alcohol use: Yes     Alcohol/week: 3.0 standard drinks of alcohol     Types: 3 Cans of beer per week     Comment: 1-2 PER WEEK    Drug use: No    Sexual activity: Yes     Partners: Female       Past Surgical History:   Procedure Laterality Date    ADENOIDECTOMY      AORTIC VALVE REPAIR/REPLACEMENT  10/05/2016    mechanical valve    ASCENDING ARCH/HEMIARCH REPLACEMENT N/A 10/05/2016    Procedure: MIDLINE STERNOTOMY, AORTIC VALVE REPLACEMENT, ASCENDING AND HEMIARCH REPLACEMENT, REPAIR OF PERIVALVULAR LEAK, WITH NEURO MONITOIRING, INTRAOPERATIVE WARREN;  Surgeon: Sukhi Wilkinson MD;  Location: [facility] OR;  Service:     CARDIAC CATHETERIZATION N/A 2016    Procedure: Coronary angiography;  Surgeon: Lio Roman MD;  Location: [facility] CATH INVASIVE LOCATION;  Service:     CARDIAC CATHETERIZATION N/A 2016    Procedure: Left Heart Cath;  Surgeon: Lio Roman MD;  Location: [facility] CATH INVASIVE LOCATION;  Service:     CHOLECYSTECTOMY      COLONOSCOPY      Dr. Palomares    COLONOSCOPY W/ POLYPECTOMY N/A 2022    Procedure: COLONOSCOPY WITH POLYPECTOMY;  Surgeon: Kana Chowdary MD;  Location: [facility] OR;  Service: Gastroenterology;  Laterality: N/A;  hemorrhoids  ascending colon polyp (cold snare)  transverse colon polyp (cold snare)    ENDOSCOPY WITH POLYPECTOMY N/A 2022    Procedure: ESOPHAGOGASTRODUODENOSCOPY WITH POLYPECTOMY;  Surgeon: Kana Chowdary MD;  Location: [facility] OR;  Service:  "Gastroenterology;  Laterality: N/A;  gastric polyps    EPIDURAL Right 10/14/2024    Procedure: LUMBAR/SACRAL TRANSFORAMINAL EPIDURAL (right L5 and S1). 75485, 42060;  Surgeon: Madyson Dominguez MD;  Location: [facility] MAIN OR;  Service: Pain Management;  Laterality: Right;    KNEE ARTHROSCOPY Right 09/07/2021    Procedure: RIGHT KNEE ARTHROSCOPY WITH PARTIAL MEDIAL AND LATERAL MENISECTOMY WITH DEBRIDEMENT OF ARTHRITIS ;  Surgeon: Ella Muller MD;  [facility] [place] [facility];  Service: Orthopedics;  Laterality: Right;    SACROILIAC JOINT INJECTION Right 2/17/2025    Procedure: SACROILIAC INJECTION (Right) 85810;  Surgeon: Madyson Dominguez MD;  Location: [facility] MAIN OR;  Service: Pain Management;  Laterality: Right;    UPPER GASTROINTESTINAL ENDOSCOPY       The following portions of the patient's history were reviewed and updated as appropriate: problem list, allergies, current medications, past medical history, past family history, past social history, and past surgical history.    ROS    See HPI    Immunization History   Administered Date(s) Administered    COVID-19 (MODERNA) 1st,2nd,3rd Dose Monovalent 03/22/2021, 04/19/2021    COVID-19 (MODERNA) Monovalent Original Booster 01/20/2022    Flublock Quad =>18yrs 10/16/2024    Fluzone (or Fluarix & Flulaval for VFC) >6mos 11/25/2017, 10/19/2019, 10/10/2020, 09/27/2022, 10/02/2023    Hepatitis A 09/16/2019, 06/18/2020    Pneumococcal Polysaccharide (PPSV23) 12/18/2020    Tdap 09/16/2019       Objective   Vitals:    05/12/25 0820   BP: 128/72   Temp: 98.1 °F (36.7 °C)   Weight: 108 kg (238 lb)   Height: 182.9 cm (72.01\")     Body mass index is 32.27 kg/m².  Physical Exam  Vitals reviewed.   Constitutional:       Appearance: He is well-developed.   HENT:      Head: Normocephalic and atraumatic.   Cardiovascular:      Rate and Rhythm: Normal rate and regular rhythm.      Heart sounds: S1 normal and S2 normal.      Comments: Mechanical click  Pulmonary:      Effort: Pulmonary " effort is normal.      Breath sounds: Normal breath sounds.   Skin:     General: Skin is warm.   Neurological:      Mental Status: He is alert.   Psychiatric:         Behavior: Behavior normal.       Assessment & Plan   Diagnoses and all orders for this visit:    1. Mixed hyperlipidemia (Primary)    2. Coronary artery disease due to lipid rich plaque  -     Comprehensive Metabolic Panel; Future  -     Lipid Panel With / Chol / HDL Ratio; Future    Other orders  -     Evolocumab (REPATHA) solution prefilled syringe injection; Inject 1 mL under the skin into the appropriate area as directed Every 14 (Fourteen) Days.  Dispense: 6 mL; Refill: 3       Bp is great. Will add Repatha LDL goal of <55. BP is excellent. Recheck in 3 mo.     Return in about 3 months (around 8/12/2025) for Lab Appt Before FUP.

## 2025-05-14 RX ORDER — ENOXAPARIN SODIUM 100 MG/ML
1 INJECTION SUBCUTANEOUS EVERY 12 HOURS SCHEDULED
Qty: 10 ML | Refills: 1 | Status: SHIPPED | OUTPATIENT
Start: 2025-05-14

## 2025-05-16 ENCOUNTER — APPOINTMENT (OUTPATIENT)
Dept: PREADMISSION TESTING | Facility: HOSPITAL | Age: 60
End: 2025-05-16
Payer: COMMERCIAL

## 2025-05-18 LAB — INR PPP: 2.8

## 2025-05-19 ENCOUNTER — ANTICOAGULATION VISIT (OUTPATIENT)
Dept: CARDIOLOGY | Facility: CLINIC | Age: 60
End: 2025-05-19
Payer: COMMERCIAL

## 2025-05-19 ENCOUNTER — PRE-ADMISSION TESTING (OUTPATIENT)
Dept: PREADMISSION TESTING | Facility: HOSPITAL | Age: 60
End: 2025-05-19
Payer: COMMERCIAL

## 2025-05-19 ENCOUNTER — TELEPHONE (OUTPATIENT)
Dept: CARDIOLOGY | Facility: CLINIC | Age: 60
End: 2025-05-19
Payer: COMMERCIAL

## 2025-05-19 VITALS
BODY MASS INDEX: 32.26 KG/M2 | OXYGEN SATURATION: 96 % | SYSTOLIC BLOOD PRESSURE: 110 MMHG | RESPIRATION RATE: 18 BRPM | WEIGHT: 238.2 LBS | TEMPERATURE: 97.1 F | HEIGHT: 72 IN | HEART RATE: 71 BPM | DIASTOLIC BLOOD PRESSURE: 69 MMHG

## 2025-05-19 LAB
ANION GAP SERPL CALCULATED.3IONS-SCNC: 8.5 MMOL/L (ref 5–15)
APTT PPP: 41.6 SECONDS (ref 22.7–35.4)
BASOPHILS # BLD AUTO: 0.04 10*3/MM3 (ref 0–0.2)
BASOPHILS NFR BLD AUTO: 0.8 % (ref 0–1.5)
BUN SERPL-MCNC: 21 MG/DL (ref 8–23)
BUN/CREAT SERPL: 15.3 (ref 7–25)
CALCIUM SPEC-SCNC: 9.3 MG/DL (ref 8.6–10.5)
CHLORIDE SERPL-SCNC: 104 MMOL/L (ref 98–107)
CO2 SERPL-SCNC: 25.5 MMOL/L (ref 22–29)
CREAT SERPL-MCNC: 1.37 MG/DL (ref 0.76–1.27)
DEPRECATED RDW RBC AUTO: 41.7 FL (ref 37–54)
EGFRCR SERPLBLD CKD-EPI 2021: 59.1 ML/MIN/1.73
EOSINOPHIL # BLD AUTO: 0.23 10*3/MM3 (ref 0–0.4)
EOSINOPHIL NFR BLD AUTO: 4.8 % (ref 0.3–6.2)
ERYTHROCYTE [DISTWIDTH] IN BLOOD BY AUTOMATED COUNT: 13.3 % (ref 12.3–15.4)
GLUCOSE SERPL-MCNC: 113 MG/DL (ref 65–99)
HCT VFR BLD AUTO: 37.6 % (ref 37.5–51)
HGB BLD-MCNC: 12.7 G/DL (ref 13–17.7)
IMM GRANULOCYTES # BLD AUTO: 0.04 10*3/MM3 (ref 0–0.05)
IMM GRANULOCYTES NFR BLD AUTO: 0.8 % (ref 0–0.5)
INR PPP: 2.27 (ref 0.9–1.1)
LYMPHOCYTES # BLD AUTO: 1.01 10*3/MM3 (ref 0.7–3.1)
LYMPHOCYTES NFR BLD AUTO: 20.9 % (ref 19.6–45.3)
MCH RBC QN AUTO: 29.1 PG (ref 26.6–33)
MCHC RBC AUTO-ENTMCNC: 33.8 G/DL (ref 31.5–35.7)
MCV RBC AUTO: 86 FL (ref 79–97)
MONOCYTES # BLD AUTO: 0.35 10*3/MM3 (ref 0.1–0.9)
MONOCYTES NFR BLD AUTO: 7.2 % (ref 5–12)
NEUTROPHILS NFR BLD AUTO: 3.17 10*3/MM3 (ref 1.7–7)
NEUTROPHILS NFR BLD AUTO: 65.5 % (ref 42.7–76)
NRBC BLD AUTO-RTO: 0 /100 WBC (ref 0–0.2)
PLATELET # BLD AUTO: 175 10*3/MM3 (ref 140–450)
PMV BLD AUTO: 8.9 FL (ref 6–12)
POTASSIUM SERPL-SCNC: 4.2 MMOL/L (ref 3.5–5.2)
PROTHROMBIN TIME: 25.3 SECONDS (ref 11.7–14.2)
RBC # BLD AUTO: 4.37 10*6/MM3 (ref 4.14–5.8)
SODIUM SERPL-SCNC: 138 MMOL/L (ref 136–145)
WBC NRBC COR # BLD AUTO: 4.84 10*3/MM3 (ref 3.4–10.8)

## 2025-05-19 PROCEDURE — 80048 BASIC METABOLIC PNL TOTAL CA: CPT | Performed by: INTERNAL MEDICINE

## 2025-05-19 PROCEDURE — 85025 COMPLETE CBC W/AUTO DIFF WBC: CPT | Performed by: INTERNAL MEDICINE

## 2025-05-19 PROCEDURE — 85610 PROTHROMBIN TIME: CPT | Performed by: INTERNAL MEDICINE

## 2025-05-19 PROCEDURE — 85730 THROMBOPLASTIN TIME PARTIAL: CPT | Performed by: INTERNAL MEDICINE

## 2025-05-19 NOTE — TELEPHONE ENCOUNTER
TTPT INR TODAY IS 2.7 PATIENT UNDERSTOOD AND AGREED   PATIENT WAITING TO START LOVENOX WHEN INR GETS LOW ENOUGH

## 2025-05-19 NOTE — DISCHARGE INSTRUCTIONS
Take the following medications the morning of surgery with a small sip of water:  HYDRALAZINE    BRING RESCUE INHALER WITH YOU    If you are on prescription narcotic pain medication to control your pain you may also take that medication the morning of surgery.    General Instructions:  Do not eat or drink anything after midnight the night before surgery.  Infants may have breast milk up to four hours before surgery.  Infants drinking formula may drink formula up to six hours before surgery.   Patients who avoid smoking, chewing tobacco and alcohol for 4 weeks prior to surgery have a reduced risk of post-operative complications.  Quit smoking as many days before surgery as you can.  Do not smoke, use chewing tobacco or drink alcohol the day of surgery.   If applicable bring your C-PAP/ BI-PAP machine in with you to preop day of surgery.  Bring any papers given to you in the doctor’s office.  Wear clean comfortable clothes.  Do not wear contact lenses, false eyelashes or make-up.  Bring a case for your glasses.   Bring crutches or walker if applicable.  Remove all piercings.  Leave jewelry and any other valuables at home.  Hair extensions with metal clips must be removed prior to surgery.  The Pre-Admission Testing nurse will instruct you to bring medications if unable to obtain an accurate list in Pre-Admission Testing.    Day of surgery you will need to let the preoperative nurse know the last time you took each of your medications.  To ensure a safe environment for patients and staff, we kindly ask that children under the age of 16 not accompany patients.  If you must bring a dependent child or dependent adult please ensure a responsible adult, other than yourself, is present to supervise them.      If you were given a blood bank ID arm band remember to bring it with you the day of surgery.    Preventing a Surgical Site Infection:  For 2 to 3 days before surgery, avoid shaving with a razor because the razor can  irritate skin and make it easier to develop an infection.    Any areas of open skin can increase the risk of a post-operative wound infection by allowing bacteria to enter and travel throughout the body.  Notify your surgeon if you have any skin wounds / rashes even if it is not near the expected surgical site.  The area will need assessed to determine if surgery should be delayed until it is healed.  The night prior to surgery shower using a fresh bar of anti-bacterial soap (such as Dial) and clean washcloth.  Sleep in a clean bed with clean clothing.  Do not allow pets to sleep with you.  Shower on the morning of surgery using a fresh bar of anti-bacterial soap (such as Dial) and clean washcloth.  Dry with a clean towel and dress in clean clothing.  Ask your surgeon if you will be receiving antibiotics prior to surgery.  Make sure you, your family, and all healthcare providers clean their hands with soap and water or an alcohol based hand  before caring for you or your wound.    Day of surgery:  Your arrival time is approximately two hours before your scheduled surgery time.  Please note if you have an early arrival time the surgery doors do not open before 5:00 AM.  Upon arrival, a Pre-op nurse and Anesthesiologist will review your health history, obtain vital signs, and answer questions you may have.  The only belongings needed at this time will be your home medications and if applicable your C-PAP/BI-PAP machine.  A Pre-op nurse will start an IV and you may receive medication in preparation for surgery, including something to help you relax.      Please be aware that surgery does come with discomfort.  We want to make every effort to control your discomfort so please discuss any uncontrolled symptoms with your nurse.   Your doctor will most likely have prescribed pain medications.      If you are going home after surgery you will receive individualized written care instructions before being discharged.   A responsible adult must drive you to and from the hospital on the day of your surgery and ideally stay with you through the night.  Discharge prescriptions can be filled by the hospital pharmacy during regular pharmacy hours.  If you are having surgery late in the day/evening your prescription may be e-prescribed to your pharmacy.  Please verify your pharmacy hours or chose a 24 hour pharmacy to avoid not having access to your prescription because your pharmacy has closed for the day.    If you are staying overnight following surgery, you will be transported to your hospital room following the recovery period.  [facility] has all private rooms.    If you have any questions please call Pre-Admission Testing at [phone].  Deductibles and co-payments are collected on the day of service. Please be prepared to pay the required co-pay, deductible or deposit on the day of service as defined by your plan.    Call your surgeon immediately if you experience any of the following symptoms:  Sore Throat  Shortness of Breath or difficulty breathing  Cough  Chills  Body soreness or muscle pain  Headache  Fever  New loss of taste or smell  Do not arrive for your surgery ill.  Your procedure will need to be rescheduled to another time.  You will need to call your physician before the day of surgery to avoid any unnecessary exposure to hospital staff as well as other patients.      CHLORHEXIDINE CLOTH INSTRUCTIONS  The morning of surgery follow these instructions using the Chlorhexidine cloths you've been given.  These steps reduce bacteria on the body.  Do not use the cloths near your eyes, ears mouth, genitalia or on open wounds.  Throw the cloths away after use but do not try to flush them down a toilet.      Open and remove one cloth at a time from the package.    Leave the cloth unfolded and begin the bathing.  Massage the skin with the cloths using gentle pressure to remove bacteria.  Do not scrub harshly.    Follow the steps below with one 2% CHG cloth per area (6 total cloths).  One cloth for neck, shoulders and chest.  One cloth for both arms, hands, fingers and underarms (do underarms last).  One cloth for the abdomen followed by groin.  One cloth for right leg and foot including between the toes.  One cloth for left leg and foot including between the toes.  The last cloth is to be used for the back of the neck, back and buttocks.    Allow the CHG to air dry 3 minutes on the skin which will give it time to work and decrease the chance of irritation.  The skin may feel sticky until it is dry.  Do not rinse with water or any other liquid or you will lose the beneficial effects of the CHG.  If mild skin irritation occurs, do rinse the skin to remove the CHG.  Report this to the nurse at time of admission.  Do not apply lotions, creams, ointments, deodorants or perfumes after using the clothes. Dress in clean clothes before coming to the hospital.

## 2025-05-22 ENCOUNTER — TELEPHONE (OUTPATIENT)
Dept: NEUROSURGERY | Facility: CLINIC | Age: 60
End: 2025-05-22
Payer: COMMERCIAL

## 2025-05-22 NOTE — TELEPHONE ENCOUNTER
Patient's insurance did not approve his sacroiliac fusion so we had to cancel surgery. Advised patient to discuss what he needs to do with returning to his warfarin from his bridge medication with his cardiologist since he is not having surgery.

## 2025-05-23 RX ORDER — ENOXAPARIN SODIUM 100 MG/ML
1 INJECTION SUBCUTANEOUS EVERY 12 HOURS SCHEDULED
Qty: 10 ML | Refills: 1 | Status: SHIPPED | OUTPATIENT
Start: 2025-05-23

## 2025-05-23 NOTE — TELEPHONE ENCOUNTER
Patient is rescheduled for his surgery on May 29 pending insurance approval. Peer to peer is scheduled on May 27 as long as Dr. Mitchell is not in the OR and able to do it. Patient is aware of instructions related to his warfarin and is aware that Dr. Mitchell may not be able to complete the peer to peer if he gets called into an emergency at hospital.

## 2025-05-28 ENCOUNTER — TELEPHONE (OUTPATIENT)
Dept: PAIN MEDICINE | Facility: CLINIC | Age: 60
End: 2025-05-28

## 2025-05-28 NOTE — TELEPHONE ENCOUNTER
I am not really sure how to help with this since this is not a surgery that we perform or have any experience with in regards to the authorization process.  I would suggest that he reach out directly to his insurance company to get more information on the denial details.

## 2025-05-28 NOTE — TELEPHONE ENCOUNTER
Patient called stating his back sx got cancelled due to his insurance not covering for the material hardware  uses which is [employer]. He wants to know where else he can go to,asking for guidance?

## 2025-05-30 ENCOUNTER — HOSPITAL ENCOUNTER (OUTPATIENT)
Facility: HOSPITAL | Age: 60
Setting detail: HOSPITAL OUTPATIENT SURGERY
Discharge: HOME | End: 2025-05-30
Attending: INTERNAL MEDICINE | Admitting: INTERNAL MEDICINE
Payer: COMMERCIAL

## 2025-05-30 VITALS
HEART RATE: 88 BPM | OXYGEN SATURATION: 96 % | BODY MASS INDEX: 31.41 KG/M2 | WEIGHT: 237 LBS | DIASTOLIC BLOOD PRESSURE: 69 MMHG | SYSTOLIC BLOOD PRESSURE: 128 MMHG | RESPIRATION RATE: 16 BRPM | HEIGHT: 73 IN | TEMPERATURE: 97.4 F

## 2025-05-30 DIAGNOSIS — R94.39 ABNORMAL NUCLEAR STRESS TEST: ICD-10-CM

## 2025-05-30 DIAGNOSIS — Z95.2 S/P AVR (AORTIC VALVE REPLACEMENT): ICD-10-CM

## 2025-05-30 DIAGNOSIS — R94.31 ABNORMAL EKG: Primary | ICD-10-CM

## 2025-05-30 DIAGNOSIS — Z95.2 MECHANICAL HEART VALVE PRESENT: ICD-10-CM

## 2025-05-30 LAB
INR PPP: 1.3 (ref 0.8–1.2)
PROTHROMBIN TIME: 13 SECONDS (ref 12.8–15.2)

## 2025-05-30 PROCEDURE — 25010000002 MIDAZOLAM PER 1 MG: Performed by: INTERNAL MEDICINE

## 2025-05-30 PROCEDURE — 25010000002 HEPARIN (PORCINE) PER 1000 UNITS: Performed by: INTERNAL MEDICINE

## 2025-05-30 PROCEDURE — C1769 GUIDE WIRE: HCPCS | Performed by: INTERNAL MEDICINE

## 2025-05-30 PROCEDURE — 25810000003 SODIUM CHLORIDE 0.9 % SOLUTION: Performed by: INTERNAL MEDICINE

## 2025-05-30 PROCEDURE — 25010000002 FENTANYL CITRATE (PF) 50 MCG/ML SOLUTION: Performed by: INTERNAL MEDICINE

## 2025-05-30 PROCEDURE — 76000 FLUOROSCOPY <1 HR PHYS/QHP: CPT | Performed by: INTERNAL MEDICINE

## 2025-05-30 PROCEDURE — 25010000002 LIDOCAINE 2% SOLUTION: Performed by: INTERNAL MEDICINE

## 2025-05-30 PROCEDURE — C1894 INTRO/SHEATH, NON-LASER: HCPCS | Performed by: INTERNAL MEDICINE

## 2025-05-30 PROCEDURE — 93454 CORONARY ARTERY ANGIO S&I: CPT | Performed by: INTERNAL MEDICINE

## 2025-05-30 PROCEDURE — 85610 PROTHROMBIN TIME: CPT | Performed by: INTERNAL MEDICINE

## 2025-05-30 PROCEDURE — 25510000001 IOPAMIDOL PER 1 ML: Performed by: INTERNAL MEDICINE

## 2025-05-30 RX ORDER — FENTANYL CITRATE 50 UG/ML
INJECTION, SOLUTION INTRAMUSCULAR; INTRAVENOUS
Status: DISCONTINUED | OUTPATIENT
Start: 2025-05-30 | End: 2025-05-30 | Stop reason: HOSPADM

## 2025-05-30 RX ORDER — MIDAZOLAM HYDROCHLORIDE 1 MG/ML
INJECTION, SOLUTION INTRAMUSCULAR; INTRAVENOUS
Status: DISCONTINUED | OUTPATIENT
Start: 2025-05-30 | End: 2025-05-30 | Stop reason: HOSPADM

## 2025-05-30 RX ORDER — SODIUM CHLORIDE 0.9 % (FLUSH) 0.9 %
10 SYRINGE (ML) INJECTION AS NEEDED
Status: DISCONTINUED | OUTPATIENT
Start: 2025-05-30 | End: 2025-05-30 | Stop reason: HOSPADM

## 2025-05-30 RX ORDER — HEPARIN SODIUM 1000 [USP'U]/ML
INJECTION, SOLUTION INTRAVENOUS; SUBCUTANEOUS
Status: DISCONTINUED | OUTPATIENT
Start: 2025-05-30 | End: 2025-05-30 | Stop reason: HOSPADM

## 2025-05-30 RX ORDER — PREDNISONE 50 MG/1
50 TABLET ORAL DAILY
COMMUNITY

## 2025-05-30 RX ORDER — SODIUM CHLORIDE 9 MG/ML
75 INJECTION, SOLUTION INTRAVENOUS CONTINUOUS
Status: DISCONTINUED | OUTPATIENT
Start: 2025-05-30 | End: 2025-05-30 | Stop reason: HOSPADM

## 2025-05-30 RX ORDER — SODIUM CHLORIDE 0.9 % (FLUSH) 0.9 %
10 SYRINGE (ML) INJECTION EVERY 12 HOURS SCHEDULED
Status: DISCONTINUED | OUTPATIENT
Start: 2025-05-30 | End: 2025-05-30 | Stop reason: HOSPADM

## 2025-05-30 RX ORDER — NITROGLYCERIN 0.4 MG/1
0.4 TABLET SUBLINGUAL
Status: DISCONTINUED | OUTPATIENT
Start: 2025-05-30 | End: 2025-05-30 | Stop reason: HOSPADM

## 2025-05-30 RX ORDER — IOPAMIDOL 755 MG/ML
INJECTION, SOLUTION INTRAVASCULAR
Status: DISCONTINUED | OUTPATIENT
Start: 2025-05-30 | End: 2025-05-30 | Stop reason: HOSPADM

## 2025-05-30 RX ORDER — ACETAMINOPHEN 325 MG/1
650 TABLET ORAL EVERY 4 HOURS PRN
Status: DISCONTINUED | OUTPATIENT
Start: 2025-05-30 | End: 2025-05-30 | Stop reason: HOSPADM

## 2025-05-30 RX ORDER — VERAPAMIL HYDROCHLORIDE 2.5 MG/ML
INJECTION INTRAVENOUS
Status: DISCONTINUED | OUTPATIENT
Start: 2025-05-30 | End: 2025-05-30 | Stop reason: HOSPADM

## 2025-05-30 RX ORDER — LIDOCAINE HYDROCHLORIDE 20 MG/ML
INJECTION, SOLUTION INFILTRATION; PERINEURAL
Status: DISCONTINUED | OUTPATIENT
Start: 2025-05-30 | End: 2025-05-30 | Stop reason: HOSPADM

## 2025-05-30 RX ADMIN — SODIUM CHLORIDE 75 ML/HR: 9 INJECTION, SOLUTION INTRAVENOUS at 08:04

## 2025-05-30 NOTE — DISCHARGE INSTRUCTIONS
[facility]  [address] [place] [address]    Coronary Angiogram (Radial/Ulnar Approach) After Care    Refer to this sheet in the next few weeks. These instructions provide you with information on caring for yourself after your procedure. Your caregiver may also give you more specific instructions. Your treatment has been planned according to current medical practices, but problems sometimes occur. Call your caregiver if you have any problems or questions after your procedure.    Home Care Instructions:  You may shower the day after the procedure. Remove the bandage (dressing) and gently wash the site with plain soap and water. Gently pat the site dry. You may apply a band aid daily for 2 days if desired.    Do not apply powder or lotion to the site.  Do not submerge the affected site in water for 3 to 5 days or until the site is completely healed.   Do not lift, push or pull anything over 5 pounds for 5 days after your procedure or as directed by your physician.  As a reference, a gallon of milk weighs 8 pounds.   Inspect the site at least twice daily. You may notice some bruising at the site and it may be tender for 1 to 2 weeks.     Increase your fluid intake for the next 2 days.    Keep arm elevated for 24 hours. For the remainder of the day, keep your arm in “Pledge of Allegiance” position when up and about.     You may drive 24 hours after the procedure unless otherwise instructed by your caregiver.  Do not operate machinery or power tools for 24 hours.  A responsible adult should be with you for the first 24 hours after you arrive home. Do not make any important legal decisions or sign legal papers for 24 hours.  Do not drink alcohol for 24 hours.    Metformin or any medications containing Metformin should not be taken for 48 hours after your procedure.      Call Your Doctor if:   You have unusual pain at the radial/ulnar (wrist) site.  You have redness, warmth, swelling, or pain at the  radial/ulnar (wrist) site.  You have drainage (other than a small amount of blood on the dressing).  `You have chills or a fever > 101.  Your arm becomes pale or dark, cool, tingly, or numb.  You develop chest pain, shortness of breath, feel faint or pass out.    You have heavy bleeding from the site, hold pressure on the site for 20 minutes.  If the bleeding stops, apply a fresh bandage and call your cardiologist.  However, if you        continue to have bleeding, call 911 and continue to apply pressure to the site.   You have any symptoms of a stroke.  Remember BE FAST  B-balance. Sudden trouble walking or loss of balance.  E-eyes.  Sudden changes in how you see or a sudden onset of a very bad headache.   F-face. Sudden weakness or loss of feeling of the face or facial droop on one side.   A-arms Sudden weakness or numbness in one arm.  One arm drifts down if they are both held out in front of you. This happens suddenly and usually on one side of the body.   S-speech.  Sudden trouble speaking, slurred speech or trouble understanding what are saying.   T-time  Time to call emergency services.  Write down the symptoms and the time they started.

## 2025-06-03 ENCOUNTER — TELEPHONE (OUTPATIENT)
Dept: NEUROLOGY | Facility: CLINIC | Age: 60
End: 2025-06-03
Payer: COMMERCIAL

## 2025-06-03 DIAGNOSIS — M51.371 DEGENERATION OF INTERVERTEBRAL DISC OF LUMBOSACRAL REGION WITH LOWER EXTREMITY PAIN: Primary | ICD-10-CM

## 2025-06-03 RX ORDER — ACETAMINOPHEN AND CODEINE PHOSPHATE 300; 30 MG/1; MG/1
1 TABLET ORAL EVERY 6 HOURS PRN
Qty: 30 TABLET | Refills: 0 | Status: SHIPPED | OUTPATIENT
Start: 2025-06-03

## 2025-06-03 NOTE — TELEPHONE ENCOUNTER
Patient seen last in office 2/2025, Patient had Right sided LESSER ONB 3/2025 patient cancelled follow up appointment last month. Patient requesting next onb. Ok to put on next available in July for nerve block and follow up after? I will add pt to cancellation list. He wanted an appt this week but you are booked. Next available for a follow up would be in August and we would have to push nerve block out until after then if not in July?

## 2025-06-04 ENCOUNTER — TELEPHONE (OUTPATIENT)
Dept: CARDIAC REHAB | Facility: HOSPITAL | Age: 60
End: 2025-06-04
Payer: COMMERCIAL

## 2025-06-04 NOTE — TELEPHONE ENCOUNTER
Scheduled pt for next avail appt on 7/9 check auth last onb was 3/2025 patient states has had issues approving. I did advise him we will need to check with his insurance

## 2025-06-04 NOTE — TELEPHONE ENCOUNTER
We have received the recent referral to cardiac rehab.  Upon MR review, we do not see a coverable diagnosis for cardiac rehab at this time.    If you feel we have missed something, please let us know or reorder cardiac rehab as appropriate.    Thank you!

## 2025-06-05 DIAGNOSIS — E78.2 MIXED HYPERLIPIDEMIA: ICD-10-CM

## 2025-06-05 RX ORDER — FENOFIBRATE 145 MG/1
145 TABLET, FILM COATED ORAL DAILY
Qty: 90 TABLET | Refills: 3 | Status: SHIPPED | OUTPATIENT
Start: 2025-06-05

## 2025-06-05 RX ORDER — PRAVASTATIN SODIUM 40 MG
40 TABLET ORAL DAILY
Qty: 90 TABLET | Refills: 1 | Status: SHIPPED | OUTPATIENT
Start: 2025-06-05

## 2025-06-06 NOTE — H&P
Subjective  Hussein Nino is a 59 y.o. male who here for follow up     No chief complaint on file.        HPI     Hussein Nino is a 59-year-old male who is here today for cardiac clearance for surgery back fusion.  He has a history of heart valvular disease with mechanical prosthetic aortic valve (2016), gout, hyperlipidemia, sleep apnea with implantable device, arctic aneurysm repair (2016) and IBS.     6/4/2024 echo: EF 61 to 65%.  LV diastolic function is consistent with grade 1 impaired relaxation.  Aortic valve area is 1.4 cm 2.  Mechanical aortic valve prosthetic present.  RVSP is normal     6/4/2024 stress test: Normal.       2016 cardiac cath with no significant CAD.     The following portions of the patient's history were reviewed and updated as appropriate: allergies, current medications, past family history, past medical history, past social history, past surgical history and problem list.     Medical History        Past Medical History:   Diagnosis Date    Allergies      Aortic stenosis       SEVERE    Bicuspid aortic valve       replaced 2016    Chronic pain disorder 2020     back, knee, foot    Epistaxis      Fractures 1969, 2008     left leg, right leg    GERD (gastroesophageal reflux disease)      Gout      Headache for years    History of COVID-19       NOV 2020    Hyperlipidemia      IBS (irritable bowel syndrome)      Joint pain      Low back pain      Migraine      Right knee meniscal tear      Sleep apnea       CPAP, DOES NOT USE             reports that he quit smoking about 42 years ago. His smoking use included cigarettes. He started smoking about 56 years ago. He has a 10 pack-year smoking history. He has been exposed to tobacco smoke. He has never used smokeless tobacco. He reports current alcohol use of about 3.0 standard drinks of alcohol per week. He reports that he does not use drugs.            Family History   Problem Relation Age of Onset    Heart failure Mother      Kidney  disease Mother      Hypertension Mother      Heart disease Mother      Arthritis Mother      Migraines Mother      Alzheimer's disease Father      Thyroid disease Sister      Hypertension Sister      Hypertension Brother      Colon polyps Maternal Uncle      Irritable bowel syndrome Cousin      Arthritis Maternal Grandmother      Migraines Daughter      Du Hyperthermia Neg Hx      Colon cancer Neg Hx      Crohn's disease Neg Hx      Ulcerative colitis Neg Hx                 Objective:      Objective  Vitals and nursing note reviewed.   Constitutional:       Appearance: Well-developed.   HENT:      Head: Normocephalic.      Right Ear: External ear normal.      Left Ear: External ear normal.   Neck:      Vascular: No JVD.   Pulmonary:      Effort: Pulmonary effort is normal. No respiratory distress.      Breath sounds: Normal breath sounds. No stridor. No rales.   Cardiovascular:      Normal rate. Regular rhythm.      No gallop.       Comments: +click  Pulses:     Intact distal pulses.   Edema:     Peripheral edema absent.   Abdominal:      General: Bowel sounds are normal. There is no distension.      Palpations: Abdomen is soft.      Tenderness: There is no abdominal tenderness. There is no guarding.   Musculoskeletal: Normal range of motion.         General: No tenderness.      Cervical back: Normal range of motion. Skin:     General: Skin is warm.   Neurological:      Mental Status: Alert and oriented to person, place, and time.      Deep Tendon Reflexes: Reflexes are normal and symmetric.   Psychiatric:         Judgment: Judgment normal.               ECG 12 Lead     Date/Time: 4/23/2025 8:49 AM  Performed by: Jacki Gold APRN     Authorized by: Richmond Bernard MD  Comparison: compared with previous ECG from 1/10/2025  Similar to previous ECG  Comparison to previous ECG: Nonspecific t waves  Rhythm: sinus rhythm  Rate: normal     Clinical impression: non-specific ECG                  6/4/2024  Interpretation Summary          Left ventricular ejection fraction appears to be 61 - 65%.    Left ventricular diastolic function is consistent with (grade I) impaired relaxation.    Aortic valve area is 1.4 cm2.    Peak velocity of the flow distal to the aortic valve is 197.8 cm/s. Aortic valve maximum pressure gradient is 16 mmHg. Aortic valve mean pressure gradient is 9 mmHg. Aortic valve dimensionless index is 0.5 .    There is a mechanical aortic valve prosthesis present.    Estimated right ventricular systolic pressure from tricuspid regurgitation is normal (<35 mmHg).     6/4/2024  Interpretation Summary          Findings consistent with an equivocal ECG stress test.    Myocardial perfusion imaging indicates a normal myocardial perfusion study with no evidence of ischemia. Impressions are consistent with a low risk study.    Left ventricular ejection fraction is normal (Calculated EF = 53%).     Symptomatic for chest pain.  Initial recovery, at end of Stage II, c/o mid-sternal burning sensation, unable to rate. Resolved 2 minutes into recovery.  Baseline 1 mm concave ST elevation  Lead I, however, Morphology changed Stage II, 1.75 mm ST  convex elevation Lead I, resolved 1 minute into recovery.  Baseline diffuse non-specific ST T wave abnormality, EKG is suggestive of ischemia. Study is equivocal.   BP response: Baseline 126/73, Peak: 181/64, Recovery: 179//79  Ectopy: none  Exercise Tolerance: Fair, Reached 17 of 20 Ursula Scale Stage II.   Duke treadmill score -7.5  Estimates  5-year survival of 95 % . Using the Duke score there is  a moderate risk  of angiographic coronary disease     Supervised by:  Taylor FREDERICK     2016  Procedure:     Left heart cath, selcective coronary angiography for preop before the aortic valve surgery for severe AS     Procedure:     Access was obtained with a 5 fr sheath in the right radial artery and selective  Coronary angiography of the left coronary was  done with JL 3.5 diagnostic, right coronary with the same JL 3.5 diagnostic.  Aortic valve is not crossed due to the known severe AS        Findings: Aortic BP is 110/70     Left main coronary is a large caliber vessel with no significant stenosis. It bifurcates into LAD and LCX in standarad fashion.      Left circumflex is a dominant vessel and is normal. The first OM is normal     Proximal LAD is normal, mid to distal LAD has only mild luminal irregularities.      The  Right coronary is a small non-dominant vessel         Complications: None     Impression:  No significant CAD        Lio Roman MD      Date: 9/27/2016  Time: 9:19 AM     2020    Interpretation Summary     Proximal right internal carotid artery is normal.  Proximal left internal carotid artery is normal.       Current Medications      Current Outpatient Medications:     albuterol sulfate  (90 Base) MCG/ACT inhaler, Inhale 2 puffs Every 4 (Four) Hours As Needed for Wheezing., Disp: 18 g, Rfl: 1    Breyna 160-4.5 MCG/ACT inhaler, USE 2 INHALATIONS ORALLY   EVERY 12 HOURS. USE WITH   SPACER. RINSE MOUTH AFTER  EACH USE, Disp: , Rfl:     enoxaparin sodium (LOVENOX) 100 MG/ML solution prefilled syringe syringe, Inject 1.1 mL under the skin into the appropriate area as directed Every 12 (Twelve) Hours. (Patient not taking: Reported on 4/11/2025), Disp: 10 mL, Rfl: 1    fenofibrate (TRICOR) 145 MG tablet, TAKE 1 TABLET DAILY, Disp: 90 tablet, Rfl: 3    gabapentin (NEURONTIN) 600 MG tablet, Take 1 tablet by mouth 3 (Three) Times a Day. (Patient not taking: Reported on 4/11/2025), Disp: 90 tablet, Rfl: 1    hydrALAZINE (APRESOLINE) 100 MG tablet, Take 1 tablet by mouth 3 (Three) Times a Day., Disp: 270 tablet, Rfl: 1    omeprazole (priLOSEC) 40 MG capsule, TAKE 1 CAPSULE DAILY, Disp: 90 capsule, Rfl: 3    pravastatin (PRAVACHOL) 40 MG tablet, TAKE 1 TABLET DAILY, Disp: 90 tablet, Rfl: 1    telmisartan (MICARDIS) 80 MG tablet, Take 1 tablet  by mouth Daily., Disp: 90 tablet, Rfl: 1    warfarin (Jantoven) 5 MG tablet, TAKE 1 TABLET DAILY OR AS  DIRECTED TO MAINTAIN       INTERNATIONAL NORMALIZED   RATIO 2.5-3.5, Disp: 90 tablet, Rfl: 1         Assessment:         Problem List       Patient Active Problem List   Diagnosis    Allergic rhinitis    Asthma    Gastroesophageal reflux disease    Hyperlipidemia    Irritable bowel syndrome    Raynaud's phenomenon    Essential hypertriglyceridemia    S/P AVR (aortic valve replacement)    Mechanical heart valve present    Chronic anticoagulation    Benign essential HTN    Benign prostatic hyperplasia with nocturia    KVNG (obstructive sleep apnea)    Abnormal EKG    Venous insufficiency    Tear of medial meniscus of right knee, current    Tear of lateral meniscus of right knee, current    Encounter for screening for malignant neoplasm of colon    History of colon polyps    RUTLEDGE (nonalcoholic steatohepatitis)    Stage 3a chronic kidney disease    Occipital neuralgia of right side    Abnormal brain MRI    SOB (shortness of breath)    Lumbar radiculopathy    Sacroiliac joint dysfunction of right side    SI (sacroiliac) joint dysfunction    Instability of right SI joint                      Plan:   1.  Cardiac clearance: He will need to have a stress test and echo.     It was explained to the patient that stress testing carries 85% specificity/sensitivity, and does not rule out future cardiac event.  Risks of the procedure were explained to the patient including shortness of breath, induction of myocardial infarction, and dizziness.  Patient is agreeable to proceeding with stress testing.      2.  Hyperlipidemia: His PCP manages his labs and cholesterol.     Risk of the hyperlipidemia, importance of the treatment has been explained. Pros and cons of the statins has been explained. Regular blood workup as well as side effects including the liver failure, myelopathy death has been explained.     3.  Status post mechanical  aortic valve on warfarin: His INRs are done by our office.      He had a MRI which had old brain bleeds approx. a year ago and he follows neurology. He discussed with Dr. Eddy regarding changing valve however he decided not to follow up with CT surgery for possible porcine valve at this time due to recent MRI did not show new bleeds. He will continue to follow with neuro and let us know if he changes his mind.   Plan        Visit Diagnosis   No diagnosis found.        There are no diagnoses linked to this encounter.     COUNSELING: michael Millerounseling was given to patient for the following topics: diagnostic results, risk factor reductions, impressions, risks and benefits of treatment options and importance of treatment compliance .         SMOKING COUNSELING: denies     -Back fusion cardiac clearance: lexiscan and echo.  - If testing is okay then he will be cleared for surgery and follow-up in 6 months.        Addendum: Discussed with Dr. ERNST and he will be cleared for his back surgery with higher than normal risk. After his back surgery he will be scheduled be scheduled to discuss his cardiac cath.      Patient has been advised to take cardiac meds with sip of water on the day of surgery.     Please use beta blocker for tachycardia preoperatively. Anticoagulation  To be managed appropriately.       Watch for chest pain, shortness of breath, palpitations, arrhythmias, and significant change in the blood pressure preoperatively. Please check EKG pre-op and postop if any questions notify us if any change in patient's cardiovascular conditions.        Procedure, risks and options of cardiac cath explained to pt INCLUDING BUT NOT LIMITED TO MI, STROKE, DEATH, INFECTION HAEMORRHAGE, . Pt understands well and agrees with no further questions.     Sincerely,   YOLY Lawrence  [facility]  04/23/25  08:23 EDT     EMR Dragon/Transcription disclaimer: Dictated utilizing Dragon  "Dictation               Office Visit on 4/23/2025            Revision History            Detailed Report            ROS Info          Note shared with patient  Additional Documentation    Vitals: /76     Pulse 76     Ht 182.9 cm (72\")     Wt 108 kg (237 lb)     BMI 32.14 kg/m²     BSA 2.29 m²          More Vitals   Encounter Info: Billing Info,     History,     Allergies,     Detailed Report     Encounter Information    Encounter Information   Provider Department Encounter #   4/23/2025 8:30 AM [facility] [id number]     "

## 2025-06-10 ENCOUNTER — TELEPHONE (OUTPATIENT)
Dept: NEUROLOGY | Facility: CLINIC | Age: 60
End: 2025-06-10

## 2025-06-10 NOTE — TELEPHONE ENCOUNTER
Provider: DR JAY    Caller: GARRISON WITH [employer]    Phone Number: [phone]    Reason for Call: STATES THERE IS AN ACTIVE DENIAL UNDER PROCEDURE CODE #[id number]. STATES THEY CAN'T PROCESS THIS NOW, ASKED THAT OFFICE CALL APPEALS DEPT # [phone] [employer] [facility].

## 2025-06-12 ENCOUNTER — OFFICE VISIT (OUTPATIENT)
Dept: CARDIOLOGY | Facility: CLINIC | Age: 60
End: 2025-06-12
Payer: COMMERCIAL

## 2025-06-12 VITALS
HEIGHT: 73 IN | BODY MASS INDEX: 31.12 KG/M2 | WEIGHT: 234.8 LBS | HEART RATE: 84 BPM | SYSTOLIC BLOOD PRESSURE: 125 MMHG | DIASTOLIC BLOOD PRESSURE: 73 MMHG | RESPIRATION RATE: 16 BRPM

## 2025-06-12 DIAGNOSIS — R06.02 SOB (SHORTNESS OF BREATH): ICD-10-CM

## 2025-06-12 DIAGNOSIS — Z79.01 CHRONIC ANTICOAGULATION: ICD-10-CM

## 2025-06-12 DIAGNOSIS — Z95.2 S/P AVR (AORTIC VALVE REPLACEMENT): Primary | ICD-10-CM

## 2025-06-12 NOTE — PROGRESS NOTES
Pt here for hosp fup s/p cath htn cp soa hyperlipdemia   Subjective:        Hussein Nino is a 59 y.o. male who here for follow up    CC  Heart cath follow-up  HPI  59-year-old male with status post AVR chronic anticoagulation and shortness of breath here for the follow-up denies any chest pains or tightness in the chest     Problems Addressed this Visit          Cardiac and Vasculature    S/P AVR (aortic valve replacement) - Primary       Coag and Thromboembolic    Chronic anticoagulation       Pulmonary and Pneumonias    SOB (shortness of breath)     Diagnoses         Codes Comments      S/P AVR (aortic valve replacement)    -  Primary ICD-10-CM: Z95.2  ICD-9-CM: V43.3       Chronic anticoagulation     ICD-10-CM: Z79.01  ICD-9-CM: V58.61       SOB (shortness of breath)     ICD-10-CM: R06.02  ICD-9-CM: 786.05           .    The following portions of the patient's history were reviewed and updated as appropriate: allergies, current medications, past family history, past medical history, past social history, past surgical history and problem list.    Past Medical History:   Diagnosis Date    Allergies     Aortic stenosis     SEVERE    Arthritis     Bicuspid aortic valve     replaced 2016    Chronic pain disorder 2020    back, knee, foot    Epistaxis     Fractures 1969, 2008    left leg, right leg    GERD (gastroesophageal reflux disease)     Gout     History of COVID-19 NOV 2020    Hyperlipidemia     IBS (irritable bowel syndrome)     Joint pain     Low back pain     RIGHT LOWER    Migraine     Numbness and tingling     RIGHT LEG OCCASIONALLY    Scratch     ON NECK INSTRUCTED PT NO SHAVING BEFORE SURGERY    Sleep apnea     HAS INSPIRE NOT TURNED ON YET     reports that he quit smoking about 42 years ago. His smoking use included cigarettes. He started smoking about 56 years ago. He has a 10 pack-year smoking history. He has been exposed to tobacco smoke. He has never used smokeless tobacco. He reports current  "alcohol use of about 3.0 standard drinks of alcohol per week. He reports that he does not use drugs.   Family History   Problem Relation Age of Onset    Heart failure Mother     Kidney disease Mother     Hypertension Mother     Heart disease Mother     Arthritis Mother     Migraines Mother     Alzheimer's disease Father     Thyroid disease Sister     Hypertension Sister     Hypertension Brother     Colon polyps Maternal Uncle     Irritable bowel syndrome Cousin     Arthritis Maternal Grandmother     Migraines Daughter     Malig Hyperthermia Neg Hx     Colon cancer Neg Hx     Crohn's disease Neg Hx     Ulcerative colitis Neg Hx        Review of Systems  Constitutional: No wt loss, fever, fatigue  Gastrointestinal: No nausea, abdominal pain  Behavioral/Psych: No insomnia or anxiety   Cardiovascular no chest pains or tightness in the chest  Objective:       Physical Exam  /73   Pulse 84   Resp 16   Ht 185.4 cm (73\")   Wt 107 kg (234 lb 12.8 oz)   BMI 30.98 kg/m²   General appearance: No acute changes   Neck: Trachea midline; NECK, supple, no thyromegaly or lymphadenopathy   Lungs: Normal size and shape, normal breath sounds, equal distribution of air, no rales and rhonchi   CV: S1-S2 regular, sys murmurs, no rub, no gallop   Abdomen: Soft, nontender; no masses , no abnormal abdominal sounds   Extremities: No deformity , normal color , no peripheral edema   Skin: Normal temperature, turgor and texture; no rash, ulcers          Procedures      Echocardiogram:    Results for orders placed during the hospital encounter of 05/06/25    Adult Transthoracic Echo Complete W/ Cont if Necessary Per Protocol    Interpretation Summary    Left ventricular ejection fraction appears to be 51 - 55%.    Left ventricular diastolic function was indeterminate.    There is a mechanical aortic valve prosthesis present.    Estimated right ventricular systolic pressure from tricuspid regurgitation is normal (<35 " mmHg).          Current Outpatient Medications:     acetaminophen-codeine (TYLENOL with CODEINE #3) 300-30 MG per tablet, Take 1 tablet by mouth Every 6 (Six) Hours As Needed for Moderate Pain., Disp: 30 tablet, Rfl: 0    albuterol sulfate  (90 Base) MCG/ACT inhaler, Inhale 2 puffs Every 4 (Four) Hours As Needed for Wheezing., Disp: 18 g, Rfl: 1    diphenhydrAMINE (Benadryl Allergy) 25 MG tablet, TAKE 1 TAB 6PM NIGHT BEFORE PROCEDURE 1 TAB 11PM NIGHT BEFORE PROCEDURE 1 TAB 6AM MORNING OF PROCEDURE, Disp: 3 tablet, Rfl: 0    fenofibrate (TRICOR) 145 MG tablet, TAKE 1 TABLET DAILY, Disp: 90 tablet, Rfl: 3    hydrALAZINE (APRESOLINE) 100 MG tablet, Take 1 tablet by mouth 3 (Three) Times a Day., Disp: 270 tablet, Rfl: 1    omeprazole (priLOSEC) 40 MG capsule, TAKE 1 CAPSULE DAILY (Patient taking differently: Take 1 capsule by mouth Every Night.), Disp: 90 capsule, Rfl: 3    pravastatin (PRAVACHOL) 40 MG tablet, TAKE 1 TABLET DAILY, Disp: 90 tablet, Rfl: 1    telmisartan (MICARDIS) 80 MG tablet, Take 1 tablet by mouth Daily., Disp: 90 tablet, Rfl: 1    warfarin (Jantoven) 5 MG tablet, TAKE 1 TABLET DAILY OR AS  DIRECTED TO MAINTAIN       INTERNATIONAL NORMALIZED   RATIO 2.5-3.5 (Patient taking differently: TAKE 1 TABLET DAILY OR AS  DIRECTED TO MAINTAIN       INTERNATIONAL NORMALIZED   RATIO 2.5-3.5/HOLDING FOR SURGERY LAST DOSE 5/16/2025 PER MD ORDER), Disp: 90 tablet, Rfl: 1   Assessment:                Plan:          ICD-10-CM ICD-9-CM   1. S/P AVR (aortic valve replacement)  Z95.2 V43.3   2. Chronic anticoagulation  Z79.01 V58.61   3. SOB (shortness of breath)  R06.02 786.05     1. S/P AVR (aortic valve replacement)  Functioning normally    2. Chronic anticoagulation  Continue current treatment    3. SOB (shortness of breath)  Under control      Hussein Nino here for the follow-up post heart catheter, being treated medically.Hussein Nino has no complications.  Access site has been examined shows no  complications.  Patient has been advised to contact us with any further issues and questions related to the heart catheter    1 yr  COUNSELING:    Hussein Groveseling was given to patient for the following topics: diagnostic results, risk factor reductions, impressions, risks and benefits of treatment options and importance of treatment compliance .       SMOKING COUNSELING:        Dictated using Dragon dictation

## 2025-06-16 DIAGNOSIS — M79.604 RIGHT LEG PAIN: Primary | ICD-10-CM

## 2025-06-16 DIAGNOSIS — M53.3 SI (SACROILIAC) JOINT DYSFUNCTION: ICD-10-CM

## 2025-06-26 ENCOUNTER — TELEPHONE (OUTPATIENT)
Dept: NEUROLOGY | Facility: CLINIC | Age: 60
End: 2025-06-26

## 2025-06-26 NOTE — TELEPHONE ENCOUNTER
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     HE WANTED TO CALL AND DOCUMENT THAT HE GIVES PERMISSION TO OFFICE TO RELEASE  HIPAA TO HER.     ANKIT VELAZQUEZ  [phone]  [facility] NURSE [employer]OCATE

## 2025-06-30 ENCOUNTER — TELEPHONE (OUTPATIENT)
Dept: NEUROLOGY | Facility: CLINIC | Age: 60
End: 2025-06-30

## 2025-06-30 NOTE — TELEPHONE ENCOUNTER
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  She called here Friday I gave  her Thien's number to call and speak with you, well she called back a few minutes later said whoever she spoke with said you wasn't available and told her to call back over here and leave a message.  Please reach out to her at [phone].    ThanksShannan

## 2025-07-14 ENCOUNTER — HOSPITAL ENCOUNTER (OUTPATIENT)
Dept: CT IMAGING | Facility: HOSPITAL | Age: 60
Discharge: HOME | End: 2025-07-14
Admitting: NEUROLOGICAL SURGERY
Payer: COMMERCIAL

## 2025-07-14 DIAGNOSIS — M53.3 SI (SACROILIAC) JOINT DYSFUNCTION: ICD-10-CM

## 2025-07-14 DIAGNOSIS — M79.604 RIGHT LEG PAIN: ICD-10-CM

## 2025-07-14 PROCEDURE — 72131 CT LUMBAR SPINE W/O DYE: CPT

## 2025-07-16 ENCOUNTER — PROCEDURE VISIT (OUTPATIENT)
Dept: NEUROLOGY | Facility: CLINIC | Age: 60
End: 2025-07-16
Payer: COMMERCIAL

## 2025-07-16 DIAGNOSIS — M54.81 OCCIPITAL NEURALGIA OF RIGHT SIDE: Primary | ICD-10-CM

## 2025-07-16 PROCEDURE — 64450 NJX AA&/STRD OTHER PN/BRANCH: CPT | Performed by: PSYCHIATRY & NEUROLOGY

## 2025-07-16 RX ORDER — LIDOCAINE HYDROCHLORIDE 20 MG/ML
2.5 INJECTION, SOLUTION INFILTRATION; PERINEURAL ONCE
Status: COMPLETED | OUTPATIENT
Start: 2025-07-16 | End: 2025-07-16

## 2025-07-16 RX ORDER — METHYLPREDNISOLONE ACETATE 40 MG/ML
20 INJECTION, SUSPENSION INTRA-ARTICULAR; INTRALESIONAL; INTRAMUSCULAR; SOFT TISSUE ONCE
Status: COMPLETED | OUTPATIENT
Start: 2025-07-16 | End: 2025-07-16

## 2025-07-16 RX ADMIN — METHYLPREDNISOLONE ACETATE 20 MG: 40 INJECTION, SUSPENSION INTRA-ARTICULAR; INTRALESIONAL; INTRAMUSCULAR; SOFT TISSUE at 08:24

## 2025-07-16 RX ADMIN — LIDOCAINE HYDROCHLORIDE 2.5 ML: 20 INJECTION, SOLUTION INFILTRATION; PERINEURAL at 08:23

## 2025-07-16 NOTE — PROGRESS NOTES
A time out was performed to confirm I had the right patient and was completing the right procedure.     Occipital Nerve Block Procedure Note:    PROCEDURE IN DETAIL:  The patient was injected in the right sided LESSER occipital nerves with 2% lidocaine, a total of 2.5 mL times one, and Depomedrol total of 0.5 mL or 20 mg times one after obtaining written consent. Sterile technique was used including sterile gloves and needles. Injection sites identified using known anatomical landmarks.  The area to be injected was prepped with sterilizing agent. The patient tolerated the procedure without any complications. She will be returning for injection as indicated and clinic for follow up as previously scheduled.     Right sided LESSER ONB    Diagnosis M54.81 (occipital neuralgia right side)    Right sided LESSER ONB was performed as per patient GREATER have been denied by insurance.  Office is working on getting GREATER approved as well.

## 2025-07-18 RX ORDER — HYDRALAZINE HYDROCHLORIDE 100 MG/1
100 TABLET, FILM COATED ORAL 3 TIMES DAILY
Qty: 270 TABLET | Refills: 1 | Status: SHIPPED | OUTPATIENT
Start: 2025-07-18

## 2025-07-25 NOTE — PROGRESS NOTES
Patient ID: Hussein Nino is a 59 y.o. male is here today for follow-up for Right leg pain and SI (sacroiliac) joint dysfunction.    Imaging    - CT Lumbar Spine Without Contrast completed 07/14/2025.    Subjective     The patient is here in regards to   Chief Complaint   Patient presents with    Leg Pain     Right     Back Pain       History of Present Illness  Has complicated history of back pain that is been going on for 1 year.  He previously had a lumbar epidural steroid injection at L4-5 which gave him no relief.  He then saw Dr. Mitchell who diagnosed him with right sided SI joint inflammation and he had an SI joint injection there which gave him 50% relief for several days.  He was able to go on vacation and walk around and hike around without major issues but his pain did return soon afterwards.  He now complains of bilateral low back pain in a bandlike distribution along his back.  It is less severe than his original pain since his SI joint injection.  He does not take over-the-counter medications and he does not use heat or ice.  He has been getting dry needling/acupuncture which has been helping with his back pain.      While in the room and during my examination of the patient, appropriate PPE was worn by both the patient and the physician.    The following portions of the patient's history were reviewed and updated as appropriate: allergies, current medications, past family history, past medical history, past social history, past surgical history and problem list.    Review of Systems   Constitutional:  Negative for chills and fever.   HENT:  Negative for congestion.    Genitourinary:  Negative for difficulty urinating and dysuria.   Musculoskeletal:  Positive for back pain and myalgias.        Right leg pain    Neurological:  Negative for weakness and numbness.        Past Medical History:   Diagnosis Date    Allergies     Aortic stenosis     SEVERE    Arthritis     Bicuspid aortic valve     replaced      Chronic pain disorder     back, knee, foot    Epistaxis     Fractures ,     left leg, right leg    GERD (gastroesophageal reflux disease)     Gout     History of COVID-19     2020    Hyperlipidemia     IBS (irritable bowel syndrome)     Joint pain     Low back pain     RIGHT LOWER    Migraine     Numbness and tingling     RIGHT LEG OCCASIONALLY    Scratch     ON NECK INSTRUCTED PT NO SHAVING BEFORE SURGERY    Sleep apnea     HAS INSPIRE NOT TURNED ON YET       Allergies   Allergen Reactions    Fish-Derived Products Anaphylaxis     Can eat shell fish without problem, but can't eat fish sandwich, cod, tuna or salmon. , is unable to take fish oil supplements.     Keflex [Cephalexin] Rash    Amlodipine Hives    Clonidine Derivatives GI Intolerance    Ondansetron Rash    Penicillins Rash       Family History   Problem Relation Age of Onset    Heart failure Mother     Kidney disease Mother     Hypertension Mother     Heart disease Mother     Arthritis Mother     Migraines Mother     Alzheimer's disease Father     Thyroid disease Sister     Hypertension Sister     Hypertension Brother     Colon polyps Maternal Uncle     Irritable bowel syndrome Cousin     Arthritis Maternal Grandmother     Migraines Daughter     Malig Hyperthermia Neg Hx     Colon cancer Neg Hx     Crohn's disease Neg Hx     Ulcerative colitis Neg Hx        Social History     Socioeconomic History    Marital status:    Tobacco Use    Smoking status: Former     Current packs/day: 0.00     Average packs/day: 0.6 packs/day for 17.0 years (10.0 ttl pk-yrs)     Types: Cigarettes     Start date:      Quit date:      Years since quittin.6     Passive exposure: Past    Smokeless tobacco: Never    Tobacco comments:     QUIT AGE 23   Vaping Use    Vaping status: Never Used   Substance and Sexual Activity    Alcohol use: Yes     Alcohol/week: 3.0 standard drinks of alcohol     Types: 3 Cans of beer per week     Comment: 1-2  PER WEEK    Drug use: No    Sexual activity: Yes     Partners: Female       Past Surgical History:   Procedure Laterality Date    ADENOIDECTOMY      AORTIC VALVE REPAIR/REPLACEMENT  10/05/2016    mechanical valve    ASCENDING ARCH/HEMIARCH REPLACEMENT N/A 10/05/2016    Procedure: MIDLINE STERNOTOMY, AORTIC VALVE REPLACEMENT, ASCENDING AND HEMIARCH REPLACEMENT, REPAIR OF PERIVALVULAR LEAK, WITH NEURO MONITOIRING, INTRAOPERATIVE WARREN;  Surgeon: Suhki Wilkinson MD;  Location: [facility] MAIN OR;  Service:     CARDIAC CATHETERIZATION N/A 09/27/2016    Procedure: Coronary angiography;  Surgeon: Lio Roman MD;  Location: [facility] CATH INVASIVE LOCATION;  Service:     CARDIAC CATHETERIZATION N/A 09/27/2016    Procedure: Left Heart Cath;  Surgeon: Lio Roman MD;  Location: [facility] CATH INVASIVE LOCATION;  Service:     CARDIAC CATHETERIZATION N/A 5/30/2025    Procedure: Coronary angiography;  Surgeon: Sofya Eddy MD;  Location: [facility] CATH INVASIVE LOCATION;  Service: Cardiovascular;  Laterality: N/A;    CHOLECYSTECTOMY      COLONOSCOPY  12/212/2016    Dr. Palomares    COLONOSCOPY W/ POLYPECTOMY N/A 06/08/2022    Procedure: COLONOSCOPY WITH POLYPECTOMY;  Surgeon: Kana Chowdary MD;  Location: [facility] OR;  Service: Gastroenterology;  Laterality: N/A;  hemorrhoids  ascending colon polyp (cold snare)  transverse colon polyp (cold snare)    ENDOSCOPY WITH POLYPECTOMY N/A 06/08/2022    Procedure: ESOPHAGOGASTRODUODENOSCOPY WITH POLYPECTOMY;  Surgeon: Kana Chowdary MD;  Location: [facility] OR;  Service: Gastroenterology;  Laterality: N/A;  gastric polyps    EPIDURAL Right 10/14/2024    Procedure: LUMBAR/SACRAL TRANSFORAMINAL EPIDURAL (right L5 and S1). 09238, 78593;  Surgeon: Madyson Dominguez MD;  [place] [facility] [place];  Service: Pain Management;  Laterality: Right;    KNEE ARTHROSCOPY Right 09/07/2021    Procedure: RIGHT KNEE ARTHROSCOPY WITH PARTIAL MEDIAL AND LATERAL MENISECTOMY WITH DEBRIDEMENT  OF ARTHRITIS ;  Surgeon: Ella Muller MD;  Location:  [facility];  Service: Orthopedics;  Laterality: Right;    SACROILIAC JOINT INJECTION Right 2/17/2025    Procedure: SACROILIAC INJECTION (Right) 33120;  Surgeon: Madyson Dominguez MD;  [place] [facility] [place];  Service: Pain Management;  Laterality: Right;    UPPER GASTROINTESTINAL ENDOSCOPY           Objective     Vitals:    07/30/25 0911   BP: 140/68   Pulse: 80   SpO2: 96%     Body mass index is 30.52 kg/m².    Physical Exam  Constitutional:       General: He is awake.   HENT:      Head: Normocephalic and atraumatic.   Eyes:      General: Lids are normal.      Extraocular Movements: Extraocular movements intact.      Conjunctiva/sclera: Conjunctivae normal.      Pupils: Pupils are equal, round, and reactive to light.   Pulmonary:      Breath sounds: Normal breath sounds.   Abdominal:      Palpations: Abdomen is soft.   Musculoskeletal:         General: Normal range of motion.        Back:       Comments: Bilateral SI joint inflammation characterized by:    -Pelvic Distraction test   -Lateral Iliac compression test   -FABERS (Poncho's test)   -Ganslen's Test     Skin:     General: Skin is warm and dry.   Neurological:      Mental Status: He is alert.      Coordination: Coordination is intact.      Deep Tendon Reflexes: Reflexes are normal and symmetric.   Psychiatric:         Behavior: Behavior is cooperative.         Neurological Exam  Mental Status  Awake and alert. Oriented to person, place and time.    Cranial Nerves  CN II: Visual acuity is normal. Visual fields full to confrontation.  CN III, IV, VI: Extraocular movements intact bilaterally. Normal lids and orbits bilaterally. Pupils equal round and reactive to light bilaterally.  CN V: Facial sensation is normal.  CN VII: Full and symmetric facial movement.  CN IX, X: Palate elevates symmetrically. Normal gag reflex.  CN XI: Shoulder shrug strength is normal.  CN XII: Tongue midline without atrophy  or fasciculations.    Motor                                               Right                     Left  Deltoid                                   5                          5   Biceps                                   5                          5   Triceps                                  5                          5   Iliopsoas                               5                          5   Quadriceps                           5                          5   Hamstring                             5                          5   Gastrocnemius                     5                           5   Anterior tibialis                      5                          5    Sensory  Sensation is intact to light touch, pinprick, vibration and proprioception in all four extremities.    Reflexes  Deep tendon reflexes are 2+ and symmetric in all four extremities.    Coordination    Finger-to-nose, rapid alternating movements and heel-to-shin normal bilaterally without dysmetria.    Gait  Normal casual, toe, heel and tandem gait.      Assessment & Plan   Independent Review of Radiographic Studies:      I personally reviewed the images from the following studies.    FINDINGS:    CT: CT of the lumbar spine was reviewed and shows no obvious degenerative, infectious or neoplastic lesions within the SI joint.  Normal alignment of the lumbar spine without any evidence of fracture or dislocation or spondylolisthesis.  MR: MRI of the lumbar spine wo contrast was reviewed and shows relatively healthy appearing lumbar spine with mild degenerative disc disease.  Well-hydrated and tall discs.  Minimal facet arthropathy at most of these levels.  No significant compression of the neural foramen or central canal.    Assessment/Plan: Has very benign CT and MRI imaging of the lumbar spine and SI joints.  He previously had symptoms consistent with SI joint inflammation that benefited from SI joint injections but now his pain is changed and seems to be  bilateral more than just the right side.  He has less leg pain but more back pain.  Overall his pain seems to improved after his SI joint injection but he is still persistent.  He was scheduled to have an SI joint fusion with Dr. Mitchell but insurance issues precluded the [place] [employer] system to be used.  Currently his symptoms have changed so I think that I would like to have him try another round of SI joint injections and if that gives him positive benefit then we can feel confident that an SI joint fusion would be helpful for him.    Medical Decision Making:      Referral to pain management, Dr. Madyson Dominguez  Follow-up in 3 months after SI joint injections and ice baths.         Diagnoses and all orders for this visit:    1. SI (sacroiliac) joint dysfunction (Primary)  -     Ambulatory Referral to Pain Management  -     SI Joint Injection             Patient Instructions/Recommendations:    After your SI joint injection:  -Please schedule your second SI joint injection at the time of your first SI joint injection for 2 weeks later  -Alternate 1 tablet of extra strength Tylenol and 2 tablets of Aleve (or any other NSAID) in a scheduled manner every 4 hours for at least 2 weeks after the injection    -Take an ice bath by submerging the buttocks area in 50 degree water for 30 minutes/day for 5 days consecutively after your injection.   -Try adding ice gradually to decrease the shock of the ice bath   -Try putting a robe in the dryer for 35 minutes so that the patient can have a warm robe to wear after the ice bath    -Continue to use ice packs as much as tolerable when you are not taking an ice bath        French Driver MD  07/30/25  09:40 EDT

## 2025-07-28 ENCOUNTER — HOSPITAL ENCOUNTER (OUTPATIENT)
Facility: HOSPITAL | Age: 60
Setting detail: OBSERVATION
Discharge: HOME | End: 2025-07-29
Attending: EMERGENCY MEDICINE | Admitting: EMERGENCY MEDICINE
Payer: COMMERCIAL

## 2025-07-28 ENCOUNTER — APPOINTMENT (OUTPATIENT)
Dept: GENERAL RADIOLOGY | Facility: HOSPITAL | Age: 60
End: 2025-07-28
Payer: COMMERCIAL

## 2025-07-28 DIAGNOSIS — E86.1 HYPOTENSION DUE TO HYPOVOLEMIA: ICD-10-CM

## 2025-07-28 DIAGNOSIS — L50.9 URTICARIA: ICD-10-CM

## 2025-07-28 DIAGNOSIS — N17.9 AKI (ACUTE KIDNEY INJURY): Primary | ICD-10-CM

## 2025-07-28 DIAGNOSIS — R07.89 ATYPICAL CHEST PAIN: ICD-10-CM

## 2025-07-28 LAB
ANION GAP SERPL CALCULATED.3IONS-SCNC: 12.4 MMOL/L (ref 5–15)
BACTERIA UR QL AUTO: NORMAL /HPF
BASOPHILS # BLD AUTO: 0.01 10*3/MM3 (ref 0–0.2)
BASOPHILS NFR BLD AUTO: 0.1 % (ref 0–1.5)
BILIRUB UR QL STRIP: NEGATIVE
BUN SERPL-MCNC: 30.1 MG/DL (ref 8–23)
BUN/CREAT SERPL: 16.6 (ref 7–25)
CALCIUM SPEC-SCNC: 9.2 MG/DL (ref 8.6–10.5)
CHLORIDE SERPL-SCNC: 103 MMOL/L (ref 98–107)
CLARITY UR: ABNORMAL
CO2 SERPL-SCNC: 17.6 MMOL/L (ref 22–29)
COD CRY URNS QL: PRESENT /HPF
COLOR UR: YELLOW
CREAT SERPL-MCNC: 1.81 MG/DL (ref 0.76–1.27)
D-LACTATE SERPL-SCNC: 1.4 MMOL/L (ref 0.5–2)
DEPRECATED RDW RBC AUTO: 39.7 FL (ref 37–54)
EGFRCR SERPLBLD CKD-EPI 2021: 42.3 ML/MIN/1.73
EOSINOPHIL # BLD AUTO: 0.13 10*3/MM3 (ref 0–0.4)
EOSINOPHIL NFR BLD AUTO: 1.3 % (ref 0.3–6.2)
ERYTHROCYTE [DISTWIDTH] IN BLOOD BY AUTOMATED COUNT: 12.9 % (ref 12.3–15.4)
GEN 5 1HR TROPONIN T REFLEX: 12 NG/L
GLUCOSE SERPL-MCNC: 144 MG/DL (ref 65–99)
GLUCOSE UR STRIP-MCNC: NEGATIVE MG/DL
HCT VFR BLD AUTO: 45.4 % (ref 37.5–51)
HGB BLD-MCNC: 15.7 G/DL (ref 13–17.7)
HGB UR QL STRIP.AUTO: NEGATIVE
HYALINE CASTS UR QL AUTO: NORMAL /LPF
IMM GRANULOCYTES # BLD AUTO: 0.01 10*3/MM3 (ref 0–0.05)
IMM GRANULOCYTES NFR BLD AUTO: 0.1 % (ref 0–0.5)
INR PPP: 2
KETONES UR QL STRIP: NEGATIVE
LEUKOCYTE ESTERASE UR QL STRIP.AUTO: NEGATIVE
LYMPHOCYTES # BLD AUTO: 2.13 10*3/MM3 (ref 0.7–3.1)
LYMPHOCYTES NFR BLD AUTO: 20.6 % (ref 19.6–45.3)
MCH RBC QN AUTO: 28.8 PG (ref 26.6–33)
MCHC RBC AUTO-ENTMCNC: 34.6 G/DL (ref 31.5–35.7)
MCV RBC AUTO: 83.2 FL (ref 79–97)
MONOCYTES # BLD AUTO: 1.14 10*3/MM3 (ref 0.1–0.9)
MONOCYTES NFR BLD AUTO: 11 % (ref 5–12)
NEUTROPHILS NFR BLD AUTO: 6.92 10*3/MM3 (ref 1.7–7)
NEUTROPHILS NFR BLD AUTO: 66.9 % (ref 42.7–76)
NITRITE UR QL STRIP: NEGATIVE
PH UR STRIP.AUTO: 5.5 [PH] (ref 5–8)
PLATELET # BLD AUTO: 304 10*3/MM3 (ref 140–450)
PMV BLD AUTO: 8.9 FL (ref 6–12)
POTASSIUM SERPL-SCNC: 3.8 MMOL/L (ref 3.5–5.2)
PROT UR QL STRIP: ABNORMAL
PROTHROMBIN TIME: 22.4 SECONDS
PROTHROMBIN TIME: 22.4 SECONDS (ref 11–15)
RBC # BLD AUTO: 5.46 10*6/MM3 (ref 4.14–5.8)
RBC # UR STRIP: NORMAL /HPF
REF LAB TEST METHOD: NORMAL
SODIUM SERPL-SCNC: 133 MMOL/L (ref 136–145)
SP GR UR STRIP: 1.02 (ref 1–1.03)
SQUAMOUS #/AREA URNS HPF: NORMAL /HPF
TROPONIN T NUMERIC DELTA: 0 NG/L
TROPONIN T SERPL HS-MCNC: 12 NG/L
UROBILINOGEN UR QL STRIP: ABNORMAL
WBC # UR STRIP: NORMAL /HPF
WBC NRBC COR # BLD AUTO: 10.34 10*3/MM3 (ref 3.4–10.8)

## 2025-07-28 PROCEDURE — 99285 EMERGENCY DEPT VISIT HI MDM: CPT | Performed by: EMERGENCY MEDICINE

## 2025-07-28 PROCEDURE — G0378 HOSPITAL OBSERVATION PER HR: HCPCS

## 2025-07-28 PROCEDURE — 93005 ELECTROCARDIOGRAM TRACING: CPT | Performed by: EMERGENCY MEDICINE

## 2025-07-28 PROCEDURE — 25010000002 ENOXAPARIN PER 10 MG: Performed by: EMERGENCY MEDICINE

## 2025-07-28 PROCEDURE — 25810000003 SODIUM CHLORIDE 0.9 % SOLUTION: Performed by: EMERGENCY MEDICINE

## 2025-07-28 PROCEDURE — 96374 THER/PROPH/DIAG INJ IV PUSH: CPT

## 2025-07-28 PROCEDURE — 71045 X-RAY EXAM CHEST 1 VIEW: CPT

## 2025-07-28 PROCEDURE — 36415 COLL VENOUS BLD VENIPUNCTURE: CPT

## 2025-07-28 PROCEDURE — 81001 URINALYSIS AUTO W/SCOPE: CPT | Performed by: EMERGENCY MEDICINE

## 2025-07-28 PROCEDURE — 84484 ASSAY OF TROPONIN QUANT: CPT | Performed by: EMERGENCY MEDICINE

## 2025-07-28 PROCEDURE — 87040 BLOOD CULTURE FOR BACTERIA: CPT | Performed by: EMERGENCY MEDICINE

## 2025-07-28 PROCEDURE — 25810000003 SEPSIS FLUID NS 0.9 % SOLUTION: Performed by: EMERGENCY MEDICINE

## 2025-07-28 PROCEDURE — 25010000002 PROCHLORPERAZINE 10 MG/2ML SOLUTION: Performed by: EMERGENCY MEDICINE

## 2025-07-28 PROCEDURE — 25010000002 METHYLPREDNISOLONE PER 125 MG: Performed by: EMERGENCY MEDICINE

## 2025-07-28 PROCEDURE — 93010 ELECTROCARDIOGRAM REPORT: CPT | Performed by: INTERNAL MEDICINE

## 2025-07-28 PROCEDURE — 80048 BASIC METABOLIC PNL TOTAL CA: CPT | Performed by: EMERGENCY MEDICINE

## 2025-07-28 PROCEDURE — 83605 ASSAY OF LACTIC ACID: CPT | Performed by: EMERGENCY MEDICINE

## 2025-07-28 PROCEDURE — 96375 TX/PRO/DX INJ NEW DRUG ADDON: CPT

## 2025-07-28 PROCEDURE — 99285 EMERGENCY DEPT VISIT HI MDM: CPT

## 2025-07-28 PROCEDURE — 85025 COMPLETE CBC W/AUTO DIFF WBC: CPT | Performed by: EMERGENCY MEDICINE

## 2025-07-28 PROCEDURE — 85610 PROTHROMBIN TIME: CPT

## 2025-07-28 PROCEDURE — 96361 HYDRATE IV INFUSION ADD-ON: CPT

## 2025-07-28 PROCEDURE — 96372 THER/PROPH/DIAG INJ SC/IM: CPT

## 2025-07-28 PROCEDURE — 25010000002 FAMOTIDINE 10 MG/ML SOLUTION: Performed by: EMERGENCY MEDICINE

## 2025-07-28 RX ORDER — PREDNISONE 20 MG/1
40 TABLET ORAL
Status: DISCONTINUED | OUTPATIENT
Start: 2025-07-29 | End: 2025-07-29 | Stop reason: HOSPADM

## 2025-07-28 RX ORDER — ENOXAPARIN SODIUM 100 MG/ML
1 INJECTION SUBCUTANEOUS ONCE
Status: COMPLETED | OUTPATIENT
Start: 2025-07-28 | End: 2025-07-28

## 2025-07-28 RX ORDER — SODIUM CHLORIDE 0.9 % (FLUSH) 0.9 %
10 SYRINGE (ML) INJECTION AS NEEDED
Status: DISCONTINUED | OUTPATIENT
Start: 2025-07-28 | End: 2025-07-29 | Stop reason: HOSPADM

## 2025-07-28 RX ORDER — SODIUM CHLORIDE 0.9 % (FLUSH) 0.9 %
10 SYRINGE (ML) INJECTION EVERY 12 HOURS SCHEDULED
Status: DISCONTINUED | OUTPATIENT
Start: 2025-07-28 | End: 2025-07-29 | Stop reason: HOSPADM

## 2025-07-28 RX ORDER — PRAVASTATIN SODIUM 40 MG
40 TABLET ORAL NIGHTLY
Status: DISCONTINUED | OUTPATIENT
Start: 2025-07-28 | End: 2025-07-29 | Stop reason: HOSPADM

## 2025-07-28 RX ORDER — PROCHLORPERAZINE EDISYLATE 5 MG/ML
10 INJECTION INTRAMUSCULAR; INTRAVENOUS ONCE
Status: COMPLETED | OUTPATIENT
Start: 2025-07-28 | End: 2025-07-28

## 2025-07-28 RX ORDER — DIPHENHYDRAMINE HCL 25 MG
25 TABLET ORAL EVERY 6 HOURS PRN
Status: DISCONTINUED | OUTPATIENT
Start: 2025-07-28 | End: 2025-07-29 | Stop reason: HOSPADM

## 2025-07-28 RX ORDER — ACETAMINOPHEN 650 MG/1
650 SUPPOSITORY RECTAL EVERY 4 HOURS PRN
Status: DISCONTINUED | OUTPATIENT
Start: 2025-07-28 | End: 2025-07-29 | Stop reason: HOSPADM

## 2025-07-28 RX ORDER — METHYLPREDNISOLONE SODIUM SUCCINATE 125 MG/2ML
125 INJECTION, POWDER, LYOPHILIZED, FOR SOLUTION INTRAMUSCULAR; INTRAVENOUS ONCE
Status: COMPLETED | OUTPATIENT
Start: 2025-07-28 | End: 2025-07-28

## 2025-07-28 RX ORDER — FAMOTIDINE 10 MG/ML
20 INJECTION, SOLUTION INTRAVENOUS ONCE
Status: COMPLETED | OUTPATIENT
Start: 2025-07-28 | End: 2025-07-28

## 2025-07-28 RX ORDER — SODIUM CHLORIDE 9 MG/ML
40 INJECTION, SOLUTION INTRAVENOUS AS NEEDED
Status: DISCONTINUED | OUTPATIENT
Start: 2025-07-28 | End: 2025-07-29 | Stop reason: HOSPADM

## 2025-07-28 RX ORDER — ACETAMINOPHEN 160 MG/5ML
650 SOLUTION ORAL EVERY 4 HOURS PRN
Status: DISCONTINUED | OUTPATIENT
Start: 2025-07-28 | End: 2025-07-29 | Stop reason: HOSPADM

## 2025-07-28 RX ORDER — WARFARIN SODIUM 7.5 MG/1
7.5 TABLET ORAL
Status: COMPLETED | OUTPATIENT
Start: 2025-07-28 | End: 2025-07-28

## 2025-07-28 RX ORDER — HYDROXYZINE HYDROCHLORIDE 25 MG/1
50 TABLET, FILM COATED ORAL ONCE
Status: COMPLETED | OUTPATIENT
Start: 2025-07-28 | End: 2025-07-28

## 2025-07-28 RX ORDER — HYDRALAZINE HYDROCHLORIDE 50 MG/1
100 TABLET, FILM COATED ORAL 3 TIMES DAILY
Status: DISCONTINUED | OUTPATIENT
Start: 2025-07-28 | End: 2025-07-29 | Stop reason: HOSPADM

## 2025-07-28 RX ORDER — ALBUTEROL SULFATE 0.83 MG/ML
2.5 SOLUTION RESPIRATORY (INHALATION) EVERY 6 HOURS PRN
Status: DISCONTINUED | OUTPATIENT
Start: 2025-07-28 | End: 2025-07-29 | Stop reason: HOSPADM

## 2025-07-28 RX ORDER — COLESTIPOL HYDROCHLORIDE 1 G/1
1 TABLET ORAL 2 TIMES DAILY
COMMUNITY

## 2025-07-28 RX ORDER — ACETAMINOPHEN 325 MG/1
650 TABLET ORAL EVERY 4 HOURS PRN
Status: DISCONTINUED | OUTPATIENT
Start: 2025-07-28 | End: 2025-07-29 | Stop reason: HOSPADM

## 2025-07-28 RX ORDER — SODIUM CHLORIDE 9 MG/ML
100 INJECTION, SOLUTION INTRAVENOUS ONCE
Status: COMPLETED | OUTPATIENT
Start: 2025-07-28 | End: 2025-07-28

## 2025-07-28 RX ORDER — FENOFIBRATE 145 MG/1
145 TABLET, FILM COATED ORAL DAILY
Status: DISCONTINUED | OUTPATIENT
Start: 2025-07-28 | End: 2025-07-29 | Stop reason: HOSPADM

## 2025-07-28 RX ORDER — LOSARTAN POTASSIUM 100 MG/1
100 TABLET ORAL
Status: DISCONTINUED | OUTPATIENT
Start: 2025-07-28 | End: 2025-07-29 | Stop reason: HOSPADM

## 2025-07-28 RX ORDER — PANTOPRAZOLE SODIUM 40 MG/1
40 TABLET, DELAYED RELEASE ORAL
Status: DISCONTINUED | OUTPATIENT
Start: 2025-07-29 | End: 2025-07-29 | Stop reason: HOSPADM

## 2025-07-28 RX ADMIN — METHYLPREDNISOLONE SODIUM SUCCINATE 125 MG: 125 INJECTION, POWDER, FOR SOLUTION INTRAMUSCULAR; INTRAVENOUS at 04:56

## 2025-07-28 RX ADMIN — SODIUM CHLORIDE 1000 ML: 9 INJECTION, SOLUTION INTRAVENOUS at 06:40

## 2025-07-28 RX ADMIN — Medication 10 ML: at 10:53

## 2025-07-28 RX ADMIN — FAMOTIDINE 20 MG: 10 INJECTION, SOLUTION INTRAVENOUS at 04:56

## 2025-07-28 RX ADMIN — SODIUM CHLORIDE 100 ML/HR: 9 INJECTION, SOLUTION INTRAVENOUS at 10:53

## 2025-07-28 RX ADMIN — ENOXAPARIN SODIUM 100 MG: 100 INJECTION SUBCUTANEOUS at 15:26

## 2025-07-28 RX ADMIN — HYDRALAZINE HYDROCHLORIDE 100 MG: 50 TABLET ORAL at 20:19

## 2025-07-28 RX ADMIN — FENOFIBRATE 145 MG: 145 TABLET ORAL at 15:27

## 2025-07-28 RX ADMIN — PROCHLORPERAZINE EDISYLATE 10 MG: 5 INJECTION INTRAMUSCULAR; INTRAVENOUS at 05:04

## 2025-07-28 RX ADMIN — PRAVASTATIN SODIUM 40 MG: 40 TABLET ORAL at 20:19

## 2025-07-28 RX ADMIN — HYDROXYZINE HYDROCHLORIDE 50 MG: 25 TABLET, FILM COATED ORAL at 06:22

## 2025-07-28 RX ADMIN — SODIUM CHLORIDE 2682 ML: 9 INJECTION, SOLUTION INTRAVENOUS at 07:20

## 2025-07-28 RX ADMIN — Medication 10 ML: at 20:31

## 2025-07-28 RX ADMIN — WARFARIN SODIUM 7.5 MG: 7.5 TABLET ORAL at 18:55

## 2025-07-28 RX ADMIN — HYDRALAZINE HYDROCHLORIDE 100 MG: 50 TABLET ORAL at 15:27

## 2025-07-28 NOTE — H&P
[facility]   HISTORY AND PHYSICAL    Patient Name: Hussein Nino  : 1965  MRN: [id number]  Primary Care Physician:  Margarita Dickinson PA-C  Date of admission: 2025    Subjective   Subjective     Chief Complaint:   Chief Complaint   Patient presents with    Chest Pain    Rash         HPI:    Hussein Nino is a 59 y.o. male with past medical history of AV replacement on warfarin, IBS with diarrhea, hypertension, GERD, hyperlipidemia, and obesity who presents from an outside ER with reports of urticaria that started on his right wrist and gradually spread throughout the rest of the day and was not improving throughout the weekend. Patient reports that the rash spread to upper and lower extremities bilaterally and trunk; denies any facial or mucosal involvement. Patient describes the rash as being very pruritic. Patient denies nay sore throat, difficulty swallowing or shortness of breath. Patient reports that he had recently completed a course of ABX on Friday morning for a left laceration and had an IBS episode on Friday evening for which he took Colestipol for the first time. Patient denies using anymore of the medicine after the rash started the next morning and denies any other known new exposures. Patient reports that he has an allergy to fish an is suppose to keep an epipen with him, but he has never needed it and reports all other allergic reactions have been milder than this episode.     Review of Systems   All systems were reviewed and negative except for: All pertinent findings listed in the above HPI    Personal History     Past Medical History:   Diagnosis Date    Allergies     Aortic stenosis     SEVERE    Arthritis     Bicuspid aortic valve     replaced     Chronic pain disorder     back, knee, foot    Epistaxis     Fractures ,     left leg, right leg    GERD (gastroesophageal reflux disease)     Gout     History of COVID-2020    Hyperlipidemia     IBS  (irritable bowel syndrome)     Joint pain     Low back pain     RIGHT LOWER    Migraine     Numbness and tingling     RIGHT LEG OCCASIONALLY    Scratch     ON NECK INSTRUCTED PT NO SHAVING BEFORE SURGERY    Sleep apnea     HAS INSPIRE NOT TURNED ON YET       Past Surgical History:   Procedure Laterality Date    ADENOIDECTOMY      AORTIC VALVE REPAIR/REPLACEMENT  10/05/2016    mechanical valve    ASCENDING ARCH/HEMIARCH REPLACEMENT N/A 10/05/2016    Procedure: MIDLINE STERNOTOMY, AORTIC VALVE REPLACEMENT, ASCENDING AND HEMIARCH REPLACEMENT, REPAIR OF PERIVALVULAR LEAK, WITH NEURO MONITOIRING, INTRAOPERATIVE WARREN;  Surgeon: Sukhi Wilkinson MD;  Location: [facility] OR;  Service:     CARDIAC CATHETERIZATION N/A 09/27/2016    Procedure: Coronary angiography;  Surgeon: Lio Roman MD;  Location: [facility] CATH INVASIVE LOCATION;  Service:     CARDIAC CATHETERIZATION N/A 09/27/2016    Procedure: Left Heart Cath;  Surgeon: Lio Roman MD;  Location: [facility] CATH INVASIVE LOCATION;  Service:     CARDIAC CATHETERIZATION N/A 5/30/2025    Procedure: Coronary angiography;  Surgeon: Sofya Eddy MD;  Location: [facility] INVASIVE LOCATION;  Service: Cardiovascular;  Laterality: N/A;    CHOLECYSTECTOMY      COLONOSCOPY  12/212/2016    Dr. Palomares    COLONOSCOPY W/ POLYPECTOMY N/A 06/08/2022    Procedure: COLONOSCOPY WITH POLYPECTOMY;  Surgeon: Kana Chowdary MD;  Location: [facility];  Service: Gastroenterology;  Laterality: N/A;  hemorrhoids  ascending colon polyp (cold snare)  transverse colon polyp (cold snare)    ENDOSCOPY WITH POLYPECTOMY N/A 06/08/2022    Procedure: ESOPHAGOGASTRODUODENOSCOPY WITH POLYPECTOMY;  Surgeon: Kana Chowdary MD;  Location: [facility];  Service: Gastroenterology;  Laterality: N/A;  gastric polyps    EPIDURAL Right 10/14/2024    Procedure: LUMBAR/SACRAL TRANSFORAMINAL EPIDURAL (right L5 and S1). 33635, 43522;  Surgeon: Madyson Dominguez MD;  Location: [facility]  OR;  Service: Pain Management;  Laterality: Right;    KNEE ARTHROSCOPY Right 09/07/2021    Procedure: RIGHT KNEE ARTHROSCOPY WITH PARTIAL MEDIAL AND LATERAL MENISECTOMY WITH DEBRIDEMENT OF ARTHRITIS ;  Surgeon: Ella Muller MD;  Location: [facility];  Service: Orthopedics;  Laterality: Right;    SACROILIAC JOINT INJECTION Right 2/17/2025    Procedure: SACROILIAC INJECTION (Right) 07591;  Surgeon: Madyson Dominguez MD;  Location: [facility] OR;  Service: Pain Management;  Laterality: Right;    UPPER GASTROINTESTINAL ENDOSCOPY         Family History: family history includes Alzheimer's disease in his father; Arthritis in his maternal grandmother and mother; Colon polyps in his maternal uncle; Heart disease in his mother; Heart failure in his mother; Hypertension in his brother, mother, and sister; Irritable bowel syndrome in his cousin; Kidney disease in his mother; Migraines in his daughter and mother; Thyroid disease in his sister. Otherwise pertinent FHx was reviewed and not pertinent to current issue.    Social History:  reports that he quit smoking about 42 years ago. His smoking use included cigarettes. He started smoking about 56 years ago. He has a 10 pack-year smoking history. He has been exposed to tobacco smoke. He has never used smokeless tobacco. He reports current alcohol use of about 3.0 standard drinks of alcohol per week. He reports that he does not use drugs.    Home Medications:  acetaminophen-codeine, albuterol sulfate HFA, diphenhydrAMINE, fenofibrate, hydrALAZINE, omeprazole, pravastatin, telmisartan, and warfarin    Allergies:  Allergies   Allergen Reactions    Fish-Derived Products Anaphylaxis     Can eat shell fish without problem, but can't eat fish sandwich, cod, tuna or salmon. , is unable to take fish oil supplements.     Amlodipine Hives    Clonidine Derivatives GI Intolerance    Ondansetron Rash    Penicillins Rash       Objective   Objective     Vitals:   Temp:  [97.7 °F (36.5  °C)-98.1 °F (36.7 °C)] 97.8 °F (36.6 °C)  Heart Rate:  [] 86  Resp:  [16-20] 18  BP: ()/(50-75) 112/57  Physical Exam    Constitutional: Awake, alert, nontoxic appearing male    Eyes: PERRL, sclerae anicteric, no conjunctival injection, EOMI   HENT: NCAT, mucous membranes moist, normal hearing, airway patent   Neck: Supple, nontender, trachea midline   Respiratory: Clear to auscultation bilaterally, nonlabored respirations on RA   Cardiovascular: RRR, no murmurs, palpable pedal pulses bilaterally   Gastrointestinal: Positive bowel sounds, soft, nontender, nondistended   Musculoskeletal: No bilateral ankle edema, no clubbing or cyanosis to extremities   Psychiatric: Appropriate affect, cooperative   Neurologic: Oriented x 3, strength symmetric in all extremities, Cranial Nerves grossly intact to confrontation, speech clear   Skin: No rashes     Result Review    Result Review:  I have personally reviewed the results from the time of this admission to 7/28/2025 09:48 EDT and agree with these findings:  [x]  Laboratory list / accordion  []  Microbiology  [x]  Radiology  [x]  EKG/Telemetry   []  Cardiology/Vascular   []  Pathology  []  Old records  []  Other:  Most notable findings include: Troponin 12 and 12, sodium 133, serum creatinine 1.81, INR 2, WBC 10.34, hemoglobin 15.7 chest x-ray negative for any acute cardiopulmonary issues.  EKG shows sinus rhythm without acute ST changes      Assessment & Plan   The ASCVD Risk score (Karin SANTIAGO, et al., 2019) failed to calculate for the following reasons:    Risk score cannot be calculated because patient has a medical history suggesting prior/existing ASCVD    Assessment / Plan     Brief Patient Summary:  Hussein Nino is a 59 y.o. male who is admitted for JIM    Active Hospital Problems:  Active Hospital Problems    Diagnosis     **JIM (acute kidney injury)      Plan:     JIM:  - Serum creatinine 1.8 (BL ~1.3-1.5)  - IV fluids ordered  - Avoid nephrotoxic  medications  - Holding ARB  - Recheck labs in the morning    Urticaria, improving:  - Received IV steroids at outside facility; will give 3 days of p.o. steroids  - As needed Benadryl  - Recommends avoiding colestipol  - EpiPen to be prescribed on discharge    Chronic hyperlipidemia:  - Continue home regimen    Chronic hypertension:  - Continue hydralazine but holding ARB    AV valve status post replacement:  - Continue home warfarin; pharmacy consulted for dosing  - Daily INR/PT    Obesity:  - BMI 30.58    KVNG:  - Inspire present    GERD:  - Continue home regimen      VTE Prophylaxis:  Mechanical VTE prophylaxis orders are present.        CODE STATUS:    Code Status (Patient has no pulse and is not breathing): CPR (Attempt to Resuscitate)  Medical Interventions (Patient has pulse or is breathing): Full Support  Level Of Support Discussed With: Patient    Admission Status:  I believe this patient meets observation status.    Electronically signed by Leilani Black PA-C, 07/28/25, 9:48 AM EDT.        75 minutes has been spent by [facility] providers in the care of this patient while under observation status on this date 07/28/25        I have worn appropriate PPE during this patient encounter, sanitized my hands both with entering and exiting patient's room.    I have discussed plan of care with patient including advance care plan and/or surrogate decision maker.  Patient advises that their wife Antonia will be their primary surrogate decision maker

## 2025-07-28 NOTE — PROGRESS NOTES
[facility] Clinical Pharm[facility] Services: Warfarin Dosing/Monitoring Consult    Hussein Nino is a 59 y.o. male, estimated creatinine clearance is 53.7 mL/min (A) (by C-G formula based on SCr of 1.81 mg/dL (H)). weighing 102 kg (225 lb 8 oz).    Results from last 7 days   Lab Units 07/28/25  0458 07/28/25  0457   INR   --  2.00   HEMOGLOBIN g/dL 15.7  --    HEMATOCRIT % 45.4  --    PLATELETS 10*3/mm3 304  --      Prior to admission anticoagulation: warfarin 5 mg 5 days a week (Monday-thurs + Saturday), no warfarin on Friday and Sundays outpatient    Hospital Anticoagulation:  Consulting provider: Leilani Black PA-C  Start date: pta/continued 7/28  Indication: mechanical valve  Target INR: 2.5 - 3.5  Expected duration: lifetime   Bridge Therapy: Yes  with enoxaparin 1 mg/kg sq x 1 dose today    Potential food or drug interactions: will reassess 7/29    Education complete?/Date: N/A; home medication    Assessment/Plan:    --D/w patient today to confirm INR goal and home dose. He reported to have missed last couple of warf doses d/t nausea pta.    Dose: 7.5 mg today, will dose daily while in hospital  Monitor for any signs or symptoms of bleeding  Follow up daily INRs and dose adjustments    Pharmacy will continue to follow until discharge or discontinuation of warfarin.     Onesimo Sykes [facility]  Clinical Pharmacist

## 2025-07-28 NOTE — FSED PROVIDER NOTE
"Subjective   History of Present Illness  58yo male pmh significant htn/hyperlipidemia/ckd/AVR/chantelle/RUTLEDGE presents ED c/o awakening from sleep with diffuse urticaria/pruritus/erythematous rash thorax/upper et lower extremities.  Pt took benadryl prior to ED arrival. Pt then stated that he began to experience \"tightness\" right chest.  Denies allergen exposure, however, states colestipol as new medication recently.  Pt reports 2d hx nausea/vomiting/diarrhea.    History provided by:  Patient  Allergic Reaction  Presenting symptoms: rash        Review of Systems   Constitutional: Negative.    HENT: Negative.     Eyes: Negative.    Respiratory:  Positive for chest tightness.    Cardiovascular: Negative.    Gastrointestinal:  Positive for diarrhea, nausea and vomiting. Negative for abdominal pain.   Skin:  Positive for color change and rash.   All other systems reviewed and are negative.      Past Medical History:   Diagnosis Date    Allergies     Aortic stenosis     SEVERE    Arthritis     Bicuspid aortic valve     replaced 2016    Chronic pain disorder 2020    back, knee, foot    Epistaxis     Fractures 1969, 2008    left leg, right leg    GERD (gastroesophageal reflux disease)     Gout     History of COVID-19     NOV 2020    Hyperlipidemia     IBS (irritable bowel syndrome)     Joint pain     Low back pain     RIGHT LOWER    Migraine     Numbness and tingling     RIGHT LEG OCCASIONALLY    Scratch     ON NECK INSTRUCTED PT NO SHAVING BEFORE SURGERY    Sleep apnea     HAS INSPIRE NOT TURNED ON YET       Allergies   Allergen Reactions    Fish-Derived Products Anaphylaxis     Can eat shell fish without problem, but can't eat fish sandwich, cod, tuna or salmon. , is unable to take fish oil supplements.     Amlodipine Hives    Clonidine Derivatives GI Intolerance    Ondansetron Rash    Penicillins Rash       Past Surgical History:   Procedure Laterality Date    ADENOIDECTOMY      AORTIC VALVE REPAIR/REPLACEMENT  10/05/2016    " mechanical valve    ASCENDING ARCH/HEMIARCH REPLACEMENT N/A 10/05/2016    Procedure: MIDLINE STERNOTOMY, AORTIC VALVE REPLACEMENT, ASCENDING AND HEMIARCH REPLACEMENT, REPAIR OF PERIVALVULAR LEAK, WITH NEURO MONITOIRING, INTRAOPERATIVE WARREN;  Surgeon: Sukhi Wilkinson MD;  Location: [facility] OR;  Service:     CARDIAC CATHETERIZATION N/A 09/27/2016    Procedure: Coronary angiography;  Surgeon: Lio Roman MD;  Location: [facility] CATH INVASIVE LOCATION;  Service:     CARDIAC CATHETERIZATION N/A 09/27/2016    Procedure: Left Heart Cath;  Surgeon: Lio Roman MD;  Location: [facility] CATH INVASIVE LOCATION;  Service:     CARDIAC CATHETERIZATION N/A 5/30/2025    Procedure: Coronary angiography;  Surgeon: Sofya Eddy MD;  Location: [facility] CATH INVASIVE LOCATION;  Service: Cardiovascular;  Laterality: N/A;    CHOLECYSTECTOMY      COLONOSCOPY  12/212/2016    Dr. Palomares    COLONOSCOPY W/ POLYPECTOMY N/A 06/08/2022    Procedure: COLONOSCOPY WITH POLYPECTOMY;  Surgeon: Kana Chowdary MD;  Location: [facility] OR;  Service: Gastroenterology;  Laterality: N/A;  hemorrhoids  ascending colon polyp (cold snare)  transverse colon polyp (cold snare)    ENDOSCOPY WITH POLYPECTOMY N/A 06/08/2022    Procedure: ESOPHAGOGASTRODUODENOSCOPY WITH POLYPECTOMY;  Surgeon: Kana Chowdary MD;  Location: [facility] OR;  Service: Gastroenterology;  Laterality: N/A;  gastric polyps    EPIDURAL Right 10/14/2024    Procedure: LUMBAR/SACRAL TRANSFORAMINAL EPIDURAL (right L5 and S1). 23155, [id number];  Surgeon: Madyson Dominguez MD;  Location: [facility] OR;  Service: Pain Management;  Laterality: Right;    KNEE ARTHROSCOPY Right 09/07/2021    Procedure: RIGHT KNEE ARTHROSCOPY WITH PARTIAL MEDIAL AND LATERAL MENISECTOMY WITH DEBRIDEMENT OF ARTHRITIS ;  Surgeon: Ella Muller MD;  Location: [facility] OSC;  Service: Orthopedics;  Laterality: Right;    SACROILIAC JOINT INJECTION Right 2/17/2025    Procedure: SACROILIAC  INJECTION (Right) [id number];  Surgeon: Madyson Dominguez MD;  Location: [facility] MAIN OR;  Service: Pain Management;  Laterality: Right;    UPPER GASTROINTESTINAL ENDOSCOPY         Family History   Problem Relation Age of Onset    Heart failure Mother     Kidney disease Mother     Hypertension Mother     Heart disease Mother     Arthritis Mother     Migraines Mother     Alzheimer's disease Father     Thyroid disease Sister     Hypertension Sister     Hypertension Brother     Colon polyps Maternal Uncle     Irritable bowel syndrome Cousin     Arthritis Maternal Grandmother     Migraines Daughter     Malig Hyperthermia Neg Hx     Colon cancer Neg Hx     Crohn's disease Neg Hx     Ulcerative colitis Neg Hx        Social History     Socioeconomic History    Marital status:    Tobacco Use    Smoking status: Former     Current packs/day: 0.00     Average packs/day: 0.6 packs/day for 17.0 years (10.0 ttl pk-yrs)     Types: Cigarettes     Start date:      Quit date:      Years since quittin.6     Passive exposure: Past    Smokeless tobacco: Never    Tobacco comments:     QUIT AGE 23   Vaping Use    Vaping status: Never Used   Substance and Sexual Activity    Alcohol use: Yes     Alcohol/week: 3.0 standard drinks of alcohol     Types: 3 Cans of beer per week     Comment: 1-2 PER WEEK    Drug use: No    Sexual activity: Yes     Partners: Female           Objective   Physical Exam  Vitals and nursing note reviewed.   Constitutional:       Appearance: Normal appearance. He is not toxic-appearing or diaphoretic.   HENT:      Head: Normocephalic and atraumatic.      Right Ear: External ear normal.      Left Ear: External ear normal.      Nose: Nose normal.      Mouth/Throat:      Mouth: Mucous membranes are moist.      Pharynx: Oropharynx is clear.   Eyes:      Pupils: Pupils are equal, round, and reactive to light.   Cardiovascular:      Rate and Rhythm: Normal rate and regular rhythm.      Pulses: Normal pulses.       Heart sounds: No murmur heard.     No friction rub. No gallop.      Comments: Mechanical valve click noted  Pulmonary:      Effort: Pulmonary effort is normal. No respiratory distress.      Breath sounds: Normal breath sounds. No wheezing, rhonchi or rales.   Abdominal:      General: Abdomen is flat. Bowel sounds are normal. There is no distension.      Palpations: Abdomen is soft.      Tenderness: There is no abdominal tenderness. There is no guarding or rebound.   Musculoskeletal:         General: No swelling or deformity.      Cervical back: Normal range of motion and neck supple. No rigidity.      Right lower leg: No edema.      Left lower leg: No edema.   Lymphadenopathy:      Cervical: No cervical adenopathy.   Skin:     General: Skin is warm and dry.      Findings: Erythema and rash present. No petechiae. Rash is papular and urticarial. Rash is not purpuric, pustular, scaling or vesicular.             Comments: Diffuse blanching urticarial/papular erythematous lesions anterior cervical/anterior et posterior thorax/bue/ble   Neurological:      General: No focal deficit present.      Mental Status: He is alert and oriented to person, place, and time.      GCS: GCS eye subscore is 4. GCS verbal subscore is 5. GCS motor subscore is 6.         ECG 12 Lead      Date/Time: 7/28/2025 4:45 AM    Performed by: Rene Maradiaga MD  Authorized by: Rene Maradiaga MD  Interpreted by ED physician  Rhythm: sinus rhythm  Rate: normal  BPM: 94  QRS axis: left  Conduction: LAFB  ST Segments: ST segments normal  T Waves: T waves normal  Other findings: PRWP  Clinical impression: abnormal ECG               ED Course  ED Course as of 07/28/25 0707   Mon Jul 28, 2025   0648 A paged for admit [SD]   0705 D/w [facility] ED observation unit [facility] accepting admit for Dr. Sinclair.  Pt stable transport. [SD]      ED Course User Index  [SD] Rene Maradiaga MD        Labs Reviewed   BASIC METABOLIC PANEL - Abnormal; Notable for the following  components:       Result Value    Glucose 144 (*)     BUN 30.1 (*)     Creatinine 1.81 (*)     Sodium 133 (*)     CO2 17.6 (*)     eGFR 42.3 (*)     All other components within normal limits    Narrative:     GFR Categories in Chronic Kidney Disease (CKD)              GFR Category          GFR (mL/min/1.73)    Interpretation  G1                    90 or greater        Normal or high (1)  G2                    60-89                Mild decrease (1)  G3a                   45-59                Mild to moderate decrease  G3b                   30-44                Moderate to severe decrease  G4                    15-29                Severe decrease  G5                    14 or less           Kidney failure    (1)In the absence of evidence of kidney disease, neither GFR category G1 or G2 fulfill the criteria for CKD.    eGFR calculation 2021 CKD-EPI creatinine equation, which does not include race as a factor   CBC WITH AUTO DIFFERENTIAL - Abnormal; Notable for the following components:    Monocytes, Absolute 1.14 (*)     All other components within normal limits   LAB PROTIME-INR, FINGERSTICK - Abnormal; Notable for the following components:    Protime 22.4 (*)     All other components within normal limits   TROPONIN - Normal    Narrative:     High Sensitive Troponin T Reference Range:  <14.0 ng/L- Negative Female for AMI  <22.0 ng/L- Negative Male for AMI  >=14 - Abnormal Female indicating possible myocardial injury.  >=22 - Abnormal Male indicating possible myocardial injury.   Clinicians would have to utilize clinical acumen, EKG, Troponin, and serial changes to determine if it is an Acute Myocardial Infarction or myocardial injury due to an underlying chronic condition.        HIGH SENSITIVITIY TROPONIN T 1HR - Normal    Narrative:     High Sensitive Troponin T Reference Range:  <14.0 ng/L- Negative Female for AMI  <22.0 ng/L- Negative Male for AMI  >=14 - Abnormal Female indicating possible myocardial  injury.  >=22 - Abnormal Male indicating possible myocardial injury.   Clinicians would have to utilize clinical acumen, EKG, Troponin, and serial changes to determine if it is an Acute Myocardial Infarction or myocardial injury due to an underlying chronic condition.        POCT PROTIME - INR - Normal   BLOOD CULTURE   BLOOD CULTURE   LACTIC ACID, PLASMA   CBC AND DIFFERENTIAL    Narrative:     The following orders were created for panel order CBC & Differential.  Procedure                               Abnormality         Status                     ---------                               -----------         ------                     CBC Auto Differential[[id number]]        Abnormal            Final result                 Please view results for these tests on the individual orders.     XR Finger 2+ View Left  Result Date: 7/17/2025  Narrative: XR FINGER 2+ VW LEFT-   HISTORY:   trauma  TECHNIQUE: 3 views of the left long finger.  FINDINGS:  Negative for acute fracture, dislocation, or radiopaque foreign body.      Impression:  No evidence of acute bony abnormality.  This report was finalized on 7/17/2025 12:58 AM by Dr. Zuhair Green M.D on Workstation: [id number]      CT Lumbar Spine Without Contrast  Result Date: 7/15/2025  Narrative: CT LUMBAR SPINE WITHOUT CONTRAST  HISTORY: Evaluate for sacroiliac joint dysfunction.  TECHNIQUE: CT lumbar spine without contrast. Data reconstructed in coronal and sagittal planes.  COMPARISON: CT lumbar spine 02/21/2025.  FINDINGS: Mild scoliotic curvature of the lumbar spine convex to the right at L3-4. Vertebral body heights appear within normal limits.  At L1-2, the central canal and neural foramen are patent.  At L2-3, there is a broad disc bulge indenting the anterior thecal sac though the central canal and neural foramen are patent.  At L3-4, there is 2 mm retrolisthesis of L3 with respect to L4. A broad disc bulge indents the intrathecal sac without evidence for  central canal narrowing. Mild bilateral facet arthritis with mild bilateral neural foraminal narrowing.  At L4-5, there is a broad disc bulge indenting the thecal sac without central canal narrowing. Mild bilateral facet arthritis with mild bilateral neural foraminal narrowing.  At L5-S1, there is 3 mm anterolisthesis of L5 with respect to S1. Uncovered disc bulge is eccentric to the left and there appears to be a central to left posterior disc protrusion with mild narrowing of the left lateral recess. The central canal is patent. Moderate bilateral facet arthritis with moderate bilateral neural foraminal narrowing.  The upper sacroiliac joints demonstrate mild chronic appearing arthritic changes without abnormal widening or evidence to suggest sacroiliitis..      Impression: 1. Degenerative disc disease and facet arthritis. At L5-S1, there is mild anterolisthesis of L5 with respect to S1 and there is a disc bulge with central to left posterior disc protrusion narrowing the left lateral recess. Moderate bilateral facet arthritis with moderate bilateral neural foraminal narrowing. 2. Mild chronic appearing degenerative changes involving the sacroiliac joints without evidence for abnormal widening or findings to suggest sacroiliitis.  Radiation dose reduction techniques were utilized, including automated exposure control and exposure modulation based on body size.   This report was finalized on 7/15/2025 10:12 AM by Chi Costello M.D on Workstation: [id number]                                           Medical Decision Making  Problems Addressed:  JIM (acute kidney injury): complicated acute illness or injury  Atypical chest pain: complicated acute illness or injury  Hypotension due to hypovolemia: complicated acute illness or injury  Urticaria: complicated acute illness or injury    Amount and/or Complexity of Data Reviewed  Labs: ordered.  Radiology: ordered.  ECG/medicine tests: ordered and independent  interpretation performed.    Risk  Prescription drug management.  Decision regarding hospitalization.        Final diagnoses:   JIM (acute kidney injury)   Urticaria   Atypical chest pain   Hypotension due to hypovolemia       ED Disposition  ED Disposition       ED Disposition   Decision to Admit    Condition   --    Comment   --               No follow-up provider specified.       Medication List      No changes were made to your prescriptions during this visit.

## 2025-07-29 ENCOUNTER — READMISSION MANAGEMENT (OUTPATIENT)
Dept: CALL CENTER | Facility: HOSPITAL | Age: 60
End: 2025-07-29
Payer: COMMERCIAL

## 2025-07-29 VITALS
RESPIRATION RATE: 16 BRPM | BODY MASS INDEX: 30.54 KG/M2 | WEIGHT: 225.5 LBS | DIASTOLIC BLOOD PRESSURE: 68 MMHG | TEMPERATURE: 97.9 F | OXYGEN SATURATION: 97 % | HEIGHT: 72 IN | HEART RATE: 81 BPM | SYSTOLIC BLOOD PRESSURE: 111 MMHG

## 2025-07-29 LAB
ALBUMIN SERPL-MCNC: 3.5 G/DL (ref 3.5–5.2)
ALBUMIN/GLOB SERPL: 1.5 G/DL
ALP SERPL-CCNC: 38 U/L (ref 39–117)
ALT SERPL W P-5'-P-CCNC: 22 U/L (ref 1–41)
ANION GAP SERPL CALCULATED.3IONS-SCNC: 10.2 MMOL/L (ref 5–15)
AST SERPL-CCNC: 21 U/L (ref 1–40)
BASOPHILS # BLD AUTO: 0.01 10*3/MM3 (ref 0–0.2)
BASOPHILS NFR BLD AUTO: 0.1 % (ref 0–1.5)
BILIRUB SERPL-MCNC: 0.3 MG/DL (ref 0–1.2)
BUN SERPL-MCNC: 25 MG/DL (ref 8–23)
BUN/CREAT SERPL: 16.7 (ref 7–25)
CALCIUM SPEC-SCNC: 8.6 MG/DL (ref 8.6–10.5)
CHLORIDE SERPL-SCNC: 108 MMOL/L (ref 98–107)
CO2 SERPL-SCNC: 19.8 MMOL/L (ref 22–29)
CREAT SERPL-MCNC: 1.5 MG/DL (ref 0.76–1.27)
DEPRECATED RDW RBC AUTO: 41.8 FL (ref 37–54)
EGFRCR SERPLBLD CKD-EPI 2021: 53 ML/MIN/1.73
EOSINOPHIL # BLD AUTO: 0.03 10*3/MM3 (ref 0–0.4)
EOSINOPHIL NFR BLD AUTO: 0.4 % (ref 0.3–6.2)
ERYTHROCYTE [DISTWIDTH] IN BLOOD BY AUTOMATED COUNT: 13.1 % (ref 12.3–15.4)
GLOBULIN UR ELPH-MCNC: 2.3 GM/DL
GLUCOSE SERPL-MCNC: 104 MG/DL (ref 65–99)
HCT VFR BLD AUTO: 35.3 % (ref 37.5–51)
HGB BLD-MCNC: 11.8 G/DL (ref 13–17.7)
IMM GRANULOCYTES # BLD AUTO: 0.04 10*3/MM3 (ref 0–0.05)
IMM GRANULOCYTES NFR BLD AUTO: 0.5 % (ref 0–0.5)
INR PPP: 2.77 (ref 0.9–1.1)
LYMPHOCYTES # BLD AUTO: 1.17 10*3/MM3 (ref 0.7–3.1)
LYMPHOCYTES NFR BLD AUTO: 15 % (ref 19.6–45.3)
MCH RBC QN AUTO: 29.1 PG (ref 26.6–33)
MCHC RBC AUTO-ENTMCNC: 33.4 G/DL (ref 31.5–35.7)
MCV RBC AUTO: 87.2 FL (ref 79–97)
MONOCYTES # BLD AUTO: 0.64 10*3/MM3 (ref 0.1–0.9)
MONOCYTES NFR BLD AUTO: 8.2 % (ref 5–12)
NEUTROPHILS NFR BLD AUTO: 5.91 10*3/MM3 (ref 1.7–7)
NEUTROPHILS NFR BLD AUTO: 75.8 % (ref 42.7–76)
NRBC BLD AUTO-RTO: 0 /100 WBC (ref 0–0.2)
PLATELET # BLD AUTO: 169 10*3/MM3 (ref 140–450)
PMV BLD AUTO: 9 FL (ref 6–12)
POTASSIUM SERPL-SCNC: 4.4 MMOL/L (ref 3.5–5.2)
PROT SERPL-MCNC: 5.8 G/DL (ref 6–8.5)
PROTHROMBIN TIME: 29.6 SECONDS (ref 11.7–14.2)
QT INTERVAL: 377 MS
QTC INTERVAL: 471 MS
RBC # BLD AUTO: 4.05 10*6/MM3 (ref 4.14–5.8)
SODIUM SERPL-SCNC: 138 MMOL/L (ref 136–145)
TROPONIN T SERPL HS-MCNC: 10 NG/L
WBC NRBC COR # BLD AUTO: 7.8 10*3/MM3 (ref 3.4–10.8)

## 2025-07-29 PROCEDURE — 85025 COMPLETE CBC W/AUTO DIFF WBC: CPT | Performed by: EMERGENCY MEDICINE

## 2025-07-29 PROCEDURE — G0378 HOSPITAL OBSERVATION PER HR: HCPCS

## 2025-07-29 PROCEDURE — 63710000001 PREDNISONE PER 1 MG: Performed by: EMERGENCY MEDICINE

## 2025-07-29 PROCEDURE — 99214 OFFICE O/P EST MOD 30 MIN: CPT | Performed by: INTERNAL MEDICINE

## 2025-07-29 PROCEDURE — 85610 PROTHROMBIN TIME: CPT | Performed by: EMERGENCY MEDICINE

## 2025-07-29 PROCEDURE — 80053 COMPREHEN METABOLIC PANEL: CPT | Performed by: EMERGENCY MEDICINE

## 2025-07-29 PROCEDURE — 84484 ASSAY OF TROPONIN QUANT: CPT | Performed by: PHYSICIAN ASSISTANT

## 2025-07-29 RX ORDER — WARFARIN SODIUM 2.5 MG/1
2.5 TABLET ORAL
Status: DISCONTINUED | OUTPATIENT
Start: 2025-07-29 | End: 2025-07-29 | Stop reason: HOSPADM

## 2025-07-29 RX ORDER — PREDNISONE 20 MG/1
20 TABLET ORAL DAILY
Qty: 3 TABLET | Refills: 0 | Status: SHIPPED | OUTPATIENT
Start: 2025-07-29 | End: 2025-08-01

## 2025-07-29 RX ORDER — EPINEPHRINE 0.3 MG/.3ML
0.3 INJECTION SUBCUTANEOUS ONCE
Qty: 1 EACH | Refills: 0 | Status: SHIPPED | OUTPATIENT
Start: 2025-07-29 | End: 2025-07-29

## 2025-07-29 RX ADMIN — PREDNISONE 40 MG: 20 TABLET ORAL at 11:56

## 2025-07-29 RX ADMIN — HYDRALAZINE HYDROCHLORIDE 100 MG: 50 TABLET ORAL at 11:56

## 2025-07-29 RX ADMIN — Medication 10 ML: at 11:56

## 2025-07-29 RX ADMIN — PANTOPRAZOLE SODIUM 40 MG: 40 TABLET, DELAYED RELEASE ORAL at 05:30

## 2025-07-29 RX ADMIN — FENOFIBRATE 145 MG: 145 TABLET ORAL at 11:56

## 2025-07-29 NOTE — DISCHARGE INSTRUCTIONS
Our team believes the rash you developed is likely secondary to the course of antibiotics you were on for your laceration. Be sure to follow up with either your primary care provider or a kidney specialist to keep an eye on your kidney function levels.

## 2025-07-29 NOTE — DISCHARGE SUMMARY
ED OBSERVATION PROGRESS/DISCHARGE SUMMARY    Date of Admission: 7/28/2025   LOS: 0 days   PCP: Margarita Dickinson PA-C    Final Diagnosis JIM, atypical chest pain.       Subjective     Hospital Outcome:     Hussein Nino is a 59 y.o. male with past medical history of AV replacement on warfarin, IBS with diarrhea, hypertension, GERD, hyperlipidemia, and obesity who presents from an outside ER with reports of urticaria that started on his right wrist and gradually spread throughout the rest of the day and was not improving throughout the weekend. Patient reports that the rash spread to upper and lower extremities bilaterally and trunk; denies any facial or mucosal involvement. Patient describes the rash as being very pruritic. Patient denies nay sore throat, difficulty swallowing or shortness of breath. Patient reports that he had recently completed a course of ABX on Friday morning for a left laceration and had an IBS episode on Friday evening for which he took Colestipol for the first time. Patient denies using anymore of the medicine after the rash started the next morning and denies any other known new exposures. Patient reports that he has an allergy to fish an is suppose to keep an epipen with him, but he has never needed it and reports all other allergic reactions have been milder than this episode.     7/29/25  Patient resting comfortably overnight, no acute complaints.  JIM improved.His high sensitivity troponin has trended from 12 to 10 this morning. He denies any recurrence of chest discomfort this morning, states his rash has remained resolved.     His INR was subtherapeutic last night so he received lovenox. This morning his INR is improved to 2.77.     Seen and evaluated by Dr. Eddy with the cardiology service who has advised patient had a reassuring stress test in may of this year and has cleared him for discharge home.     ROS:  General: no fevers, chills  Respiratory: no cough,  dyspnea  Cardiovascular: no chest pain, palpitations  Abdomen: No abdominal pain, nausea, vomiting, or diarrhea  Neurologic: No focal weakness    Objective   Physical Exam:  I have reviewed the vital signs.  Temp:  [97.3 °F (36.3 °C)-98.1 °F (36.7 °C)] 97.5 °F (36.4 °C)  Heart Rate:  [] 90  Resp:  [16-18] 16  BP: ()/(50-76) 100/61  General Appearance:    Alert, cooperative, no distress  Head:    Normocephalic, atraumatic  Eyes:    Sclerae anicteric  Neck:   Supple, no mass  Lungs: Clear to auscultation bilaterally, respirations unlabored  Heart: Regular rate and rhythm, S1 and S2 normal, no murmur, rub or gallop  Abdomen:  Soft, nontender, bowel sounds active, nondistended  Extremities: No clubbing, cyanosis, or edema to lower extremities  Pulses:  2+ and symmetric in distal lower extremities  Skin: No rashes   Neurologic: Oriented x3, Normal strength to extremities    Results Review:    I have reviewed the labs, radiology results and diagnostic studies.    Results from last 7 days   Lab Units 07/29/25  0431   WBC 10*3/mm3 7.80   HEMOGLOBIN g/dL 11.8*   HEMATOCRIT % 35.3*   PLATELETS 10*3/mm3 169     Results from last 7 days   Lab Units 07/29/25  0431 07/28/25  0458   SODIUM mmol/L 138 133*   POTASSIUM mmol/L 4.4 3.8   CHLORIDE mmol/L 108* 103   CO2 mmol/L 19.8* 17.6*   BUN mg/dL 25.0* 30.1*   CREATININE mg/dL 1.50* 1.81*   CALCIUM mg/dL 8.6 9.2   BILIRUBIN mg/dL 0.3  --    ALK PHOS U/L 38*  --    ALT (SGPT) U/L 22  --    AST (SGOT) U/L 21  --    GLUCOSE mg/dL 104* 144*     Imaging Results (Last 24 Hours)       Procedure Component Value Units Date/Time    XR Chest 1 View [[id number]] Collected: 07/28/25 0715     Updated: 07/28/25 0720    Narrative:         XR CHEST 1 VW-     HISTORY: Chest pain     COMPARISON: Two-view chest 10/20/2016.     FINDINGS: Sternotomy wires, prosthetic aortic valve are present.  Hypoglossal nerve stimulator is present on the right. Lungs are clear  and there is no evidence for  pulmonary edema or pleural effusion or  infiltrate.       Impression:      No evidence for active disease in the chest.        This report was finalized on 7/28/2025 7:17 AM by Chi Costello M.D  on Workstation: [id number]               I have reviewed the medications.     Discharge Medications        ASK your doctor about these medications        Instructions Start Date   acetaminophen-codeine 300-30 MG per tablet  Commonly known as: TYLENOL with CODEINE #3   1 tablet, Oral, Every 6 Hours PRN      albuterol sulfate  (90 Base) MCG/ACT inhaler  Commonly known as: PROVENTIL HFA;VENTOLIN HFA;PROAIR HFA   2 puffs, Inhalation, Every 4 Hours PRN      colestipol 1 g tablet  Commonly known as: COLESTID   1 g, 2 Times Daily      diphenhydrAMINE 25 MG tablet  Commonly known as: Benadryl Allergy   TAKE 1 TAB 6PM NIGHT BEFORE PROCEDURE 1 TAB 11PM NIGHT BEFORE PROCEDURE 1 TAB 6AM MORNING OF PROCEDURE      fenofibrate 145 MG tablet  Commonly known as: TRICOR   145 mg, Oral, Daily      hydrALAZINE 100 MG tablet  Commonly known as: APRESOLINE   100 mg, Oral, 3 Times Daily      omeprazole 40 MG capsule  Commonly known as: priLOSEC   40 mg, Oral, Daily      pravastatin 40 MG tablet  Commonly known as: PRAVACHOL   40 mg, Oral, Daily      telmisartan 80 MG tablet  Commonly known as: MICARDIS   80 mg, Oral, Daily      warfarin 5 MG tablet  Commonly known as: Jantoven   TAKE 1 TABLET DAILY OR AS  DIRECTED TO MAINTAIN       INTERNATIONAL NORMALIZED   RATIO 2.5-3.5              ---------------------------------------------------------------------------------------------  Assessment & Plan   Assessment/Problem List    JIM (acute kidney injury)      Plan:    JIM, improved  - Serum creatinine 1.8 (BL ~1.3-1.5), Scr 1.5  - Avoid nephrotoxic medications     Urticaria, improving:  - Received IV steroids at outside facility; will give 3 days of p.o. steroids at [place]  - As needed Benadryl  - EpiPen at discharge for anaphylaxis      Chronic hyperlipidemia:  - Continue home regimen     Obesity:  - BMI 30.58     KVNG:  - Inspire present     GERD:  - Continue home regimen        VTE Prophylaxis:  Mechanical VTE prophylaxis orders are present.    Disposition: Home    Follow-up after Discharge: PCP    This note will serve as a discharge summary    JOSÉ MIGUEL Mccray 07/29/25 06:09 EDT    I have worn appropriate PPE during this patient encounter, sanitized my hands both with entering and exiting patient's room.      39 minutes has been spent by [facility] providers in the care of this patient while under observation status on this date 07/29/25

## 2025-07-29 NOTE — PROGRESS NOTES
[facility] Clinical Pharmacy Services: Warfarin Dosing/Monitoring Consult    Hussein Nino is a 59 y.o. male, estimated creatinine clearance is 64.7 mL/min (A) (by C-G formula based on SCr of 1.5 mg/dL (H)). weighing 102 kg (225 lb 8 oz).    Results from last 7 days   Lab Units 07/29/25  0431 07/28/25  0458 07/28/25  0457   INR  2.77*  --  2.00   HEMOGLOBIN g/dL 11.8* 15.7  --    HEMATOCRIT % 35.3* 45.4  --    PLATELETS 10*3/mm3 169 304  --      Prior to admission anticoagulation: warfarin 5 mg 5 days a week (Monday-thurs + Saturday), no warfarin on Friday and Sundays outpatient    Hospital Anticoagulation:  Consulting provider: Leilani Black PA-C  Start date: pta/continued 7/28  Indication: mechanical valve  Target INR: 2.5 - 3.5  Expected duration: lifetime   Bridge Therapy: Pt received enoxaparin 1 mg/kg sq x 1 dose on 7/28    Potential food or drug interactions: None at this time    Education complete?/Date: N/A; home medication    Assessment/Plan:    INR increased significant from 2 yesterday to 2.77 today  Reduced dose of warfarin 2.5mg PO today  Monitor for any signs or symptoms of bleeding  Follow up daily INRs and dose adjustments    Pharmacy will continue to follow until discharge or discontinuation of warfarin.     Calli Celis, PharmD, BCPS, [facility]  Clinical Pharmacy Specialist, Emergency Medicine   Phone: [phone]

## 2025-07-29 NOTE — CONSULTS
[facility]  Cardiology Consult Note    Patient Identification:  Name: Hussein Nino  Age: 59 y.o.  Sex: male  :  1965  MRN: [id number]             Requesting Physician:     Reason for Consultation / Chief Complaint: management recommendations chest pain    History of Present Illness:   59-year-old male with known history of AVR recently had allergic reaction to the antibiotics with hives started having right-sided and then retrosternal chest pain had recent cardiac workup done which was negative    Comorbid cardiac risk factors:     Past Medical History:  Past Medical History:   Diagnosis Date    Allergies     Aortic stenosis     SEVERE    Arthritis     Bicuspid aortic valve     replaced     Chronic pain disorder     back, knee, foot    Epistaxis     Fractures ,     left leg, right leg    GERD (gastroesophageal reflux disease)     Gout     History of COVID-2020    Hyperlipidemia     IBS (irritable bowel syndrome)     Joint pain     Low back pain     RIGHT LOWER    Migraine     Numbness and tingling     RIGHT LEG OCCASIONALLY    Scratch     ON NECK INSTRUCTED PT NO SHAVING BEFORE SURGERY    Sleep apnea     HAS INSPIRE NOT TURNED ON YET     Past Surgical History:  Past Surgical History:   Procedure Laterality Date    ADENOIDECTOMY      AORTIC VALVE REPAIR/REPLACEMENT  10/05/2016    mechanical valve    ASCENDING ARCH/HEMIARCH REPLACEMENT N/A 10/05/2016    Procedure: MIDLINE STERNOTOMY, AORTIC VALVE REPLACEMENT, ASCENDING AND HEMIARCH REPLACEMENT, REPAIR OF PERIVALVULAR LEAK, WITH NEURO MONITOIRING, INTRAOPERATIVE WARREN;  Surgeon: Sukhi Wilkinson MD;  Location: [facility] OR;  Service:     CARDIAC CATHETERIZATION N/A 2016    Procedure: Coronary angiography;  Surgeon: Lio Roman MD;  Location: [facility] CATH INVASIVE LOCATION;  Service:     CARDIAC CATHETERIZATION N/A 2016    Procedure: Left Heart Cath;  Surgeon: Lio Roman MD;  [facility];  Service:     CARDIAC CATHETERIZATION N/A 5/30/2025    Procedure: Coronary angiography;  Surgeon: Sofya Eddy MD;  Location: [facility] CATH INVASIVE LOCATION;  Service: Cardiovascular;  Laterality: N/A;    CHOLECYSTECTOMY      COLONOSCOPY  12/212/2016    Dr. Palomares    COLONOSCOPY W/ POLYPECTOMY N/A 06/08/2022    Procedure: COLONOSCOPY WITH POLYPECTOMY;  Surgeon: Kana Chowdary MD;  Location: [facility] OR;  Service: Gastroenterology;  Laterality: N/A;  hemorrhoids  ascending colon polyp (cold snare)  transverse colon polyp (cold snare)    ENDOSCOPY WITH POLYPECTOMY N/A 06/08/2022    Procedure: ESOPHAGOGASTRODUODENOSCOPY WITH POLYPECTOMY;  Surgeon: Kana Chowdary MD;  Location: [facility] OR;  Service: Gastroenterology;  Laterality: N/A;  gastric polyps    EPIDURAL Right 10/14/2024    Procedure: LUMBAR/SACRAL TRANSFORAMINAL EPIDURAL (right L5 and S1). 69845, 95284;  Surgeon: Madyson Dominguez MD;  Location: [facility] MAIN OR;  Service: Pain Management;  Laterality: Right;    KNEE ARTHROSCOPY Right 09/07/2021    Procedure: RIGHT KNEE ARTHROSCOPY WITH PARTIAL MEDIAL AND LATERAL MENISECTOMY WITH DEBRIDEMENT OF ARTHRITIS ;  Surgeon: Ella Muller MD;  Location: [facility] OSC;  Service: Orthopedics;  Laterality: Right;    SACROILIAC JOINT INJECTION Right 2/17/2025    Procedure: SACROILIAC INJECTION (Right) 08157;  Surgeon: Madyson Dominguez MD;  Location: [facility] MAIN OR;  Service: Pain Management;  Laterality: Right;    UPPER GASTROINTESTINAL ENDOSCOPY        Allergies:  Allergies   Allergen Reactions    Fish-Derived Products Anaphylaxis     Can eat shell fish without problem, but can't eat fish sandwich, cod, tuna or salmon. , is unable to take fish oil supplements.     Keflex [Cephalexin] Rash    Amlodipine Hives    Clonidine Derivatives GI Intolerance    Ondansetron Rash    Penicillins Rash     Home Meds:  Medications Prior to Admission   Medication Sig Dispense Refill Last  Dose/Taking    colestipol (COLESTID) 1 g tablet Take 1 tablet by mouth 2 (Two) Times a Day.   2025 Evening    diphenhydrAMINE (Benadryl Allergy) 25 MG tablet TAKE 1 TAB 6PM NIGHT BEFORE PROCEDURE 1 TAB 11PM NIGHT BEFORE PROCEDURE 1 TAB 6AM MORNING OF PROCEDURE 3 tablet 0 2025 Morning    fenofibrate (TRICOR) 145 MG tablet TAKE 1 TABLET DAILY 90 tablet 3 2025 Morning    hydrALAZINE (APRESOLINE) 100 MG tablet TAKE 1 TABLET 3 TIMES A  tablet 1 2025    omeprazole (priLOSEC) 40 MG capsule TAKE 1 CAPSULE DAILY 90 capsule 3 2025    pravastatin (PRAVACHOL) 40 MG tablet TAKE 1 TABLET DAILY 90 tablet 1 2025 Morning    telmisartan (MICARDIS) 80 MG tablet Take 1 tablet by mouth Daily. 90 tablet 1 2025 Morning    warfarin (Jantoven) 5 MG tablet TAKE 1 TABLET DAILY OR AS  DIRECTED TO MAINTAIN       INTERNATIONAL NORMALIZED   RATIO 2.5-3.5 (Patient taking differently: TAKE 1 TABLET DAILY OR AS  DIRECTED TO MAINTAIN       INTERNATIONAL NORMALIZED   RATIO 2.5-3.5/HOLDING FOR SURGERY LAST DOSE 2025 PER MD ORDER) 90 tablet 1 2025 Morning    acetaminophen-codeine (TYLENOL with CODEINE #3) 300-30 MG per tablet Take 1 tablet by mouth Every 6 (Six) Hours As Needed for Moderate Pain. 30 tablet 0 More than a month    albuterol sulfate  (90 Base) MCG/ACT inhaler Inhale 2 puffs Every 4 (Four) Hours As Needed for Wheezing. 18 g 1 More than a month     Current Meds:   [unfilled]  Social History:   Social History     Tobacco Use    Smoking status: Former     Current packs/day: 0.00     Average packs/day: 0.6 packs/day for 17.0 years (10.0 ttl pk-yrs)     Types: Cigarettes     Start date:      Quit date:      Years since quittin.6     Passive exposure: Past    Smokeless tobacco: Never    Tobacco comments:     QUIT AGE 23   Substance Use Topics    Alcohol use: Yes     Alcohol/week: 3.0 standard drinks of alcohol     Types: 3 Cans of beer per week     Comment: 1-2 PER WEEK       Family History:  Family History   Problem Relation Age of Onset    Heart failure Mother     Kidney disease Mother     Hypertension Mother     Heart disease Mother     Arthritis Mother     Migraines Mother     Alzheimer's disease Father     Thyroid disease Sister     Hypertension Sister     Hypertension Brother     Colon polyps Maternal Uncle     Irritable bowel syndrome Cousin     Arthritis Maternal Grandmother     Migraines Daughter     Malig Hyperthermia Neg Hx     Colon cancer Neg Hx     Crohn's disease Neg Hx     Ulcerative colitis Neg Hx         Review of Systems  Review of Systems  Constitutional: No wt loss, fever   Gastrointestinal: No nausea , abdominal pain  Behavioral/Psych: No insomnia or anxiety   Cardiovascular ----positive for chest pain. All other systems reviewed and are negative            Constitutional:  Temp:  [97.5 °F (36.4 °C)-97.9 °F (36.6 °C)] 97.9 °F (36.6 °C)  Heart Rate:  [] 81  Resp:  [16] 16  BP: (100-120)/(61-76) 111/68    Physical Exam   General:  Appears in no acute distress  Eyes: PERTL,  HEENT:  No JVD. Thyroid not visibly enlarged. No mucosal pallor or cyanosis  Respiratory: Respirations regular and unlabored at rest. BBS with good air entry in all fields. No crackles, rubs or wheezes auscultated  Cardiovascular: Mechanical heart sounds  Gastrointestinal: Abdomen soft, flat, non tender. Bowel sounds present. No hepatosplenomegaly. No ascites  Musculoskeletal: MORALES x4. No abnormal movements  Extremities: No digital clubbing or cyanosis  Skin: Color pink. Skin warm and dry to touch. No rashes  No xanthoma  Neuro: AAO x3 CN II-XII grossly intact              Cardiographics  ECG:     Telemetry:    Echocardiogram:     Imaging  Chest X-ray:     Lab Review   Results from last 7 days   Lab Units 07/29/25  0431 07/28/25  0558 07/28/25  0458   HSTROP T ng/L 10 12 12         Results from last 7 days   Lab Units 07/29/25  0431   SODIUM mmol/L 138   POTASSIUM mmol/L 4.4   BUN mg/dL  25.0*   CREATININE mg/dL 1.50*   CALCIUM mg/dL 8.6     @[id number]@  Results from last 7 days   Lab Units 07/29/25  0431 07/28/25  0458   WBC 10*3/mm3 7.80 10.34   HEMOGLOBIN g/dL 11.8* 15.7   HEMATOCRIT % 35.3* 45.4   PLATELETS 10*3/mm3 169 304     Results from last 7 days   Lab Units 07/29/25  0431 07/28/25  0457   INR  2.77* 2.00         Assessment:  Atypical chest pain  Allergic cheek reaction to the most likely Keflex  Status post mechanical AVR  Chronic anticoagulation  Recommendations / Plan:   59-year-old male with known history of mechanical aortic valve chronic anticoagulation recently had allergic reaction to the antibiotics, came in with tightness in the chest appears to be bronchospasms had a recent cardiac workup done was negative EKGs troponins have been negative no further cardiac workup is needed at this stage    Labs/tests ordered for carla Eddy MD  7/29/2025, 12:29 EDT      EMR Dragon/Transcription:   Dictated utilizing Dragon dictation

## 2025-07-29 NOTE — PLAN OF CARE
Goal Outcome Evaluation:    Patient admitted for JIM.  Patient also c/o chest pain, (resolved)  Patient in CONTACT for c-diff rule out.  Cardiology consult.

## 2025-07-29 NOTE — OUTREACH NOTE
Prep Survey      Flowsheet Row Responses   [facility] patient discharged from? [place]   Is LACE score < 7 ? No   Eligibility [facility]   Date of Admission 07/28/25   Date of Discharge 07/29/25   Discharge Disposition Home or Self Care   Discharge diagnosis JIM (acute kidney injury)   Does the patient have one of the following disease processes/diagnoses(primary or secondary)? Other   Does the patient have Home health ordered? No   Is there a DME ordered? No   Prep survey completed? Yes            Ariadna DE JESUS - Registered Nurse

## 2025-07-29 NOTE — PROGRESS NOTES
LIVIER JACOBSON Attestation Note    I supervised care provided by the midlevel provider.    The ELIANA and I have discussed this patient's history, physical exam, and treatment plan. I have reviewed the documentation and personally had a face to face interaction with the patient  I affirm the documentation and agree with the treatment and plan. I provided a substantive portion of the care of this patient.  I personally performed the physical exam, in its entirety.  My personal findings are documented in below:    History:  Patient admitted to observation unit for further management of urticaria and JIM.  This morning he feels much better.  He says that his rash and itching have resolved.  No new complaints overnight.    Physical Exam:  General: No acute distress.  HENT: NCAT  Eyes: no scleral icterus.  Neck: Painless range of motion  CV: Pink warm and well-perfused throughout  Respiratory: No distress or increased work of breathing  Abdomen: soft, no focal tenderness or rigidity  Musculoskeletal: no deformity.  Neuro: Alert, speech fluent and easily intelligible  Skin: warm, dry.    Assessment and Plan:  JIM: Creatinine improved from 1.8-1.5 with IV fluids.  Avoiding nephrotoxic medications.    Urticaria: Much improved with steroids, antihistamines.  This may have been due to recent antibiotic administration from a finger injury.

## 2025-07-29 NOTE — PLAN OF CARE
Goal Outcome Evaluation:  Plan of Care Reviewed With: patient        Progress: improving  Outcome Evaluation: pt cleared for discharge home. follow up with PCP and Cardiology as needed. pt alert and oriented and verbalized understanding of discharge instructions.         Problem: Adult Inpatient Plan of Care  Goal: Plan of Care Review  Outcome: Met  Flowsheets (Taken 7/29/2025 1241)  Progress: improving  Outcome Evaluation: pt cleared for discharge home. follow up with PCP and Cardiology as needed. pt alert and oriented  Plan of Care Reviewed With: patient  Goal: Patient-Specific Goal (Individualized)  Outcome: Met  Goal: Absence of Hospital-Acquired Illness or Injury  Outcome: Met  Intervention: Identify and Manage Fall Risk  Recent Flowsheet Documentation  Taken 7/29/2025 1008 by Merly Dextre RN  Safety Promotion/Fall Prevention:   safety round/check completed   room organization consistent   lighting adjusted   nonskid shoes/slippers when out of bed   activity supervised   clutter free environment maintained  Taken 7/29/2025 0816 by Merly Dexter RN  Safety Promotion/Fall Prevention:   safety round/check completed   room organization consistent   lighting adjusted   nonskid shoes/slippers when out of bed   activity supervised   clutter free environment maintained  Taken 7/29/2025 0739 by Merly Dexter RN  Safety Promotion/Fall Prevention:   safety round/check completed   room organization consistent   lighting adjusted   nonskid shoes/slippers when out of bed   activity supervised   clutter free environment maintained  Intervention: Prevent Skin Injury  Recent Flowsheet Documentation  Taken 7/29/2025 1008 by Merly Dexter RN  Body Position: position changed independently  Taken 7/29/2025 0816 by Merly Dexter RN  Body Position: position changed independently  Taken 7/29/2025 0739 by Merly Dexter RN  Body Position: position changed independently  Intervention: Prevent Infection  Recent Flowsheet  Documentation  Taken 7/29/2025 1008 by Merly Dexter RN  Infection Prevention:   rest/sleep promoted   single patient room provided  Taken 7/29/2025 0816 by Merly Dexter RN  Infection Prevention:   rest/sleep promoted   single patient room provided  Taken 7/29/2025 0739 by Merly Dexter RN  Infection Prevention:   rest/sleep promoted   single patient room provided  Goal: Optimal Comfort and Wellbeing  Outcome: Met  Intervention: Provide Person-Centered Care  Recent Flowsheet Documentation  Taken 7/29/2025 0739 by Merly Dexter RN  Trust Relationship/Rapport:   care explained   questions answered   empathic listening provided   thoughts/feelings acknowledged  Goal: Readiness for Transition of Care  Outcome: Met     Problem: Comorbidity Management  Goal: Maintenance of Asthma Control  Outcome: Met  Intervention: Maintain Asthma Symptom Control  Recent Flowsheet Documentation  Taken 7/29/2025 1008 by Merly Dexter RN  Medication Review/Management: medications reviewed  Taken 7/29/2025 0816 by Merly Dexter RN  Medication Review/Management: medications reviewed  Taken 7/29/2025 0739 by Merly Dexter RN  Medication Review/Management: medications reviewed  Goal: Blood Pressure in Desired Range  Outcome: Met  Intervention: Maintain Blood Pressure Management  Recent Flowsheet Documentation  Taken 7/29/2025 1008 by Merly Dexter RN  Medication Review/Management: medications reviewed  Taken 7/29/2025 0816 by Merly Dexter RN  Medication Review/Management: medications reviewed  Taken 7/29/2025 0739 by Merly Dexter RN  Medication Review/Management: medications reviewed     Problem: Nausea and Vomiting  Goal: Nausea and Vomiting Relief  Outcome: Met  Intervention: Prevent and Manage Nausea and Vomiting  Recent Flowsheet Documentation  Taken 7/29/2025 0739 by Merly Dexter RN  Environmental Support: calm environment promoted     Problem: Pain Acute  Goal: Optimal Pain Control and Function  Outcome: Met  Intervention: Optimize  Psychosocial Wellbeing  Recent Flowsheet Documentation  Taken 7/29/2025 0739 by Merly Dexter RN  Supportive Measures: active listening utilized  Diversional Activities:   smartphone   television  Intervention: Prevent or Manage Pain  Recent Flowsheet Documentation  Taken 7/29/2025 1008 by Merly Dexter, RN  Medication Review/Management: medications reviewed  Taken 7/29/2025 0816 by Merly Dexter RN  Medication Review/Management: medications reviewed  Taken 7/29/2025 0739 by Merly Dexter, JIMENA  Sensory Stimulation Regulation: television on  Bowel Elimination Promotion: adequate fluid intake promoted  Medication Review/Management: medications reviewed

## 2025-07-30 ENCOUNTER — OFFICE VISIT (OUTPATIENT)
Dept: NEUROSURGERY | Facility: CLINIC | Age: 60
End: 2025-07-30
Payer: COMMERCIAL

## 2025-07-30 ENCOUNTER — TRANSITIONAL CARE MANAGEMENT TELEPHONE ENCOUNTER (OUTPATIENT)
Dept: CALL CENTER | Facility: HOSPITAL | Age: 60
End: 2025-07-30
Payer: COMMERCIAL

## 2025-07-30 ENCOUNTER — TELEPHONE (OUTPATIENT)
Dept: NEUROSURGERY | Facility: CLINIC | Age: 60
End: 2025-07-30

## 2025-07-30 VITALS
SYSTOLIC BLOOD PRESSURE: 140 MMHG | BODY MASS INDEX: 30.48 KG/M2 | WEIGHT: 225 LBS | HEART RATE: 80 BPM | DIASTOLIC BLOOD PRESSURE: 68 MMHG | OXYGEN SATURATION: 96 % | HEIGHT: 72 IN

## 2025-07-30 DIAGNOSIS — M53.3 SI (SACROILIAC) JOINT DYSFUNCTION: Primary | ICD-10-CM

## 2025-07-30 PROBLEM — M47.816 FACET ARTHROPATHY, LUMBAR: Status: ACTIVE | Noted: 2025-07-30

## 2025-07-30 NOTE — OUTREACH NOTE
Call Center TCM Note      Flowsheet Row Responses   [facility] patient discharged from? [place]   Does the patient have one of the following disease processes/diagnoses(primary or secondary)? Other   TCM attempt successful? Yes   Call start time 1451   Call end time 1453   Discharge diagnosis JIM (acute kidney injury)   Meds reviewed with patient/caregiver? Yes   Is the patient having any side effects they believe may be caused by any medication additions or changes? No   Does the patient have all medications ordered at discharge? Yes   Is the patient taking all medications as directed (includes completed medication regime)? Yes   Comments Pt has PCP appt on 8/12/25 830 am. The appt does not state HOSP DC FU appt but does meet TCM guidelines. TCM call complete.   Does the patient have an appointment with their PCP within 7-14 days of discharge? Yes   Has home health visited the patient within 72 hours of discharge? N/A   Psychosocial issues? No   Did the patient receive a copy of their discharge instructions? Yes   Nursing interventions Reviewed instructions with patient   What is the patient's perception of their health status since discharge? Improving   Is the patient/caregiver able to teach back signs and symptoms related to disease process for when to call PCP? Yes   Is the patient/caregiver able to teach back signs and symptoms related to disease process for when to call 911? Yes   Is the patient/caregiver able to teach back the hierarchy of who to call/visit for symptoms/problems? PCP, Specialist, Home health nurse, Urgent Care, ED, 911 Yes   TCM call completed? Yes   Wrap up additional comments Pt reports that he is doing better but having hard time sleeping with taking the steroids.   Call end time 1453            HARI MENEDZ - Registered Nurse    7/30/2025, 14:54 EDT

## 2025-07-30 NOTE — TELEPHONE ENCOUNTER
07/30/25 patient presented this day for hospital follow up (121-1).  next follow up appt: 10/30/25 at 09:00 a.m.  Referral for si joint injection routed to Dr Angelica ortiz  [place]  Referral for pain management routed to Dr Angelica ortiz, [facility]  Patient is already established with this pain management group.  Patient was provided appt reminder and all referrals.    Ice bath instruction where provided to the patient as note below..  After your SI joint injection:  -Please schedule your second SI joint injection at the time of your first SI joint injection for 2 weeks later  -Alternate 1 tablet of extra strength Tylenol and 2 tablets of Aleve (or any other NSAID) in a scheduled manner every 4 hours for at least 2 weeks after the injection     -Take an ice bath by submerging the buttocks area in 50 degree water for 30 minutes/day for 5 days consecutively after your injection.              -Try adding ice gradually to decrease the shock of the ice bath              -Try putting a robe in the dryer for 35 minutes so that the patient can have a warm robe to wear after the ice bath     -Continue to use ice packs as much as tolerable when you are not taking an ice bath

## 2025-07-30 NOTE — OUTREACH NOTE
Call Center TCM Note      Flowsheet Row Responses   [facility] patient discharged from? [place]   Does the patient have one of the following disease processes/diagnoses(primary or secondary)? Other   TCM attempt successful? No   Unsuccessful attempts Attempt 1  [pt currently at NS appt]            VIKKI Palacios Registered Nurse    7/30/2025, 08:44 EDT

## 2025-07-30 NOTE — CASE MANAGEMENT/SOCIAL WORK
Lisinopril 10mg      Last Written Prescription Date:  ?  Last Fill Quantity: ?,   # refills: ?  Last Office Visit: 12/22/2017  Future Office visit:       Routing refill request to provider for review/approval because:  Medication is reported/historical    BP Readings from Last 3 Encounters:   01/17/18 133/87   12/22/17 100/70   08/01/17 126/78       Tatum CAMPOS RN, BSN, PHN  [place] Flex RN     Case Management Discharge Note      Final Note: Home via private vehicle.         Selected Continued Care - Discharged on 7/29/2025 Admission date: 7/28/2025 - Discharge disposition: Home or Self Care      Destination    No services have been selected for the patient.                Durable Medical Equipment    No services have been selected for the patient.                Dialysis/Infusion    No services have been selected for the patient.                Home Medical Care    No services have been selected for the patient.                Therapy    No services have been selected for the patient.                Community Resources    No services have been selected for the patient.                Community & DME    No services have been selected for the patient.                    Transportation Services  Transportation: Private Transportation  Private: Car    Final Discharge Disposition Code: 01 - home or self-care

## 2025-07-30 NOTE — PROGRESS NOTES
Please advise? Do you want to see him sooner or change his appointment to a hospital fup? Any suggestions on the trouble sleeping?

## 2025-07-31 ENCOUNTER — OFFICE VISIT (OUTPATIENT)
Dept: PAIN MEDICINE | Facility: CLINIC | Age: 60
End: 2025-07-31
Payer: COMMERCIAL

## 2025-07-31 ENCOUNTER — TRANSCRIBE ORDERS (OUTPATIENT)
Dept: SURGERY | Facility: SURGERY CENTER | Age: 60
End: 2025-07-31
Payer: COMMERCIAL

## 2025-07-31 ENCOUNTER — PREP FOR SURGERY (OUTPATIENT)
Dept: SURGERY | Facility: SURGERY CENTER | Age: 60
End: 2025-07-31
Payer: COMMERCIAL

## 2025-07-31 VITALS
DIASTOLIC BLOOD PRESSURE: 64 MMHG | BODY MASS INDEX: 31.26 KG/M2 | SYSTOLIC BLOOD PRESSURE: 105 MMHG | TEMPERATURE: 96.6 F | OXYGEN SATURATION: 97 % | HEIGHT: 72 IN | WEIGHT: 230.8 LBS | RESPIRATION RATE: 20 BRPM | HEART RATE: 68 BPM

## 2025-07-31 DIAGNOSIS — M53.3 SACROILIAC JOINT DYSFUNCTION: ICD-10-CM

## 2025-07-31 DIAGNOSIS — Z41.9 SURGERY, ELECTIVE: Primary | ICD-10-CM

## 2025-07-31 DIAGNOSIS — M54.16 LUMBAR RADICULOPATHY: ICD-10-CM

## 2025-07-31 DIAGNOSIS — M53.3 SACROILIAC JOINT DYSFUNCTION OF RIGHT SIDE: Primary | ICD-10-CM

## 2025-07-31 DIAGNOSIS — M51.362 DEGENERATION OF INTERVERTEBRAL DISC OF LUMBAR REGION WITH DISCOGENIC BACK PAIN AND LOWER EXTREMITY PAIN: Primary | ICD-10-CM

## 2025-07-31 PROCEDURE — 99214 OFFICE O/P EST MOD 30 MIN: CPT | Performed by: NURSE PRACTITIONER

## 2025-07-31 NOTE — PROGRESS NOTES
CHIEF COMPLAINT  BACK PAIN    Subjective   Hussein Nino is a 59 y.o. male  who presents for follow-up.  He has a history of chronic back pain.  He presents today with ongoing right sided SI joint pain.  Was scheduled for SI joint fusion with Dr. Mitchell 5/29/2025.  Denied due to device not being covered by insurance. He was then referred internally to Dr. Driver who also performs SI joint fusion.  Dr. Driver evaluated the patient yesterday.  He recommends 2 additional diagnostic SI joint injections before proceeding with SI joint fusion.  He has been referred back to our clinic for this.    Pain is 2/10 VAS today but increases to 4-5/10 VAS.  The most severe pain is in the right lumbosacral area, consistent with SI joint.  The pain is aggravated by prolonged sitting, standing, walking.  The pain improves with nothing in particular.  He takes Advil, Acetaminophen PRN without much benefit.  Gabapentin was poorly tolerated due to side effects (drowsiness).       Procedures ---  Right SI joint injection on 2/17/2025 --- 75% diagnostic relief, 50% relief for another few weeks   Right L5 and S1 LTFESI on 10/14/2024 - ?? Diagnostic relief, no therapeutic benefit.       Warfarin - aortic valve replacement.  Mechanical Valve.  Has to transition to Lovenox for procedure.  Dr. Britton is the cardiologist.    Back Pain  Chronicity:  Chronic  Frequency:  Daily  Progression since onset:  Worse  Pain location:  Lumbar spine  Radiates to:  Right thigh and right foot  Pain-numeric:  2/10  Pain severity:  Moderate  Aggravated by:  Standing and position (walking)  Associated symptoms: numbness (legs) and weakness (legs)    Associated symptoms: no abdominal pain, no chest pain, no fever and no headaches    Treatments tried:  Home exercises  Improvement on treatment:  Mild       PEG Assessment   What number best describes your pain on average in the past week?4  What number best describes how, during the past week, pain has interfered  "with your enjoyment of life?8  What number best describes how, during the past week, pain has interfered with your general activity?  8    Review of Pertinent Medical Data ---      The following portions of the patient's history were reviewed and updated as appropriate: allergies, current medications, past family history, past medical history, past social history, past surgical history, and problem list.    Review of Systems   Constitutional:  Negative for activity change and fever.   Cardiovascular:  Negative for chest pain.   Gastrointestinal:  Positive for diarrhea. Negative for abdominal pain and constipation.   Genitourinary:  Negative for difficulty urinating.   Musculoskeletal:  Positive for back pain.   Neurological:  Positive for dizziness, weakness (legs) and numbness (legs). Negative for headaches.   Psychiatric/Behavioral:  Negative for agitation and suicidal ideas. The patient is not nervous/anxious.        I have reviewed and confirmed the accuracy of the ROS as documented by the MA/LPN/RN YOLY Heaton    Vitals:    07/31/25 0758   BP: 105/64   BP Location: Left arm   Patient Position: Sitting   Cuff Size: Adult   Pulse: 68   Resp: 20   Temp: 96.6 °F (35.9 °C)   TempSrc: Temporal   SpO2: 97%   Weight: 105 kg (230 lb 12.8 oz)   Height: 182.9 cm (72\")   PainSc: 2          Objective   Physical Exam  Vitals and nursing note reviewed.   Constitutional:       General: He is not in acute distress.     Appearance: Normal appearance. He is not ill-appearing.   Pulmonary:      Effort: Pulmonary effort is normal. No respiratory distress.   Musculoskeletal:      Lumbar back: Tenderness and bony tenderness present. Negative right straight leg raise test and negative left straight leg raise test.      Comments: +Right SI joint tenderness, +SHAHRAM, +thigh thrust, + joint compression    Neurological:      Mental Status: He is alert and oriented to person, place, and time.      Motor: Motor function is " "intact. No weakness.      Gait: Gait is intact. Gait normal.   Psychiatric:         Mood and Affect: Mood normal.         Behavior: Behavior normal.       Assessment & Plan   Diagnoses and all orders for this visit:    1. Degeneration of intervertebral disc of lumbar region with discogenic back pain and lower extremity pain (Primary)    2. Lumbar radiculopathy    3. Sacroiliac joint dysfunction      --- Diagnostic Right SI joint injection     -----    Sacroiliac Joint -  Diagnostic SIJ injections:  - Must meet the covered indications criteria as well as all the following....  - Must be performed under fluoroscopy or CT guidance with contrast  - exceptions to this imaging requirement could be ultrasound guidance when there is a documented contrast allergy or in the event of pregnancy  - not performed along with other musculoskeletal injection in the lumbrosacral spine  - must document \"direct causal benefit\" from the SIJ injection and not other musculoskeletal injection or treatment    Diagnostic SIJ Injection must demonstrate at least 75% relief  - Positive Diagnostic SIJ Injection is being defined as at least 75% relief sustained for the duration of the local anesthetic & at least 75% sustained and constant pain relief for the duration of the antiinflammatory steroid  - measured by same pain scale at baseline..... \"Measurements of pain\" must be taken pre-injection on the day of injection, post-intervention on the day of injection, and the days following the injection, to corroborate the pain relief for the duration of the local anesthetic and/or steroid used    Limitation: no more than 2 diagnostic joint injection sessions.  Unilateral or Bilateral procedure on one day counts as one session.      -----    -----    Sacroiliac Joint (SIJ) Injection - Indications:   Must have all the following....  - moderate / severe low back pain in the anatomical location of the SIJ (between the upper level of the iliac crests & " the gluteal fold)  - low back pain > 3 months  - clinical &/or imaging findings do not suggest any other obvious cause of lumbrosacral pain)  - at least 3 positive findings with provocation maneuvers (SHAHRAM, [place], Thigh Thrust or Posterior Shear, SI compression, SI distraction, Yeoman)  - low back pain persistent despite at least 4 weeks of conservative therapies    ------    Reviewed the procedure at length with the patient.  Included in the review was expectations, complications, risk and benefits.The procedure was described in detail and the risks, benefits and alternatives were discussed with the patient (including but not limited to: bleeding, infection, nerve damage, worsening of pain, inability to perform injection, no pain relief) who agreed to proceed.  The procedure will plan to be performed at [facility] with fluoroscopic guidance(unless ultrasound is indicated) and could potentially have steroids and contrast dye used. Questions were answered and in a way the patient could understand.  Patient verbalized understanding and wishes to proceed.  This intervention will be ordered.      Hussein Nino reports a pain score of 2.  Given his pain assessment as noted, treatment options were discussed and the following options were decided upon as a follow-up plan to address the patient's pain: continuation of current treatment plan for pain.    --- Patient screened positive for depression based on a PHQ-9 score of  on . Follow-up recommendations include: see plan.    --- Follow-up for procedure

## 2025-08-02 LAB
BACTERIA SPEC AEROBE CULT: NORMAL
BACTERIA SPEC AEROBE CULT: NORMAL

## 2025-08-04 DIAGNOSIS — N18.31 STAGE 3A CHRONIC KIDNEY DISEASE: ICD-10-CM

## 2025-08-04 DIAGNOSIS — E78.2 MIXED HYPERLIPIDEMIA: Primary | ICD-10-CM

## 2025-08-04 DIAGNOSIS — E78.2 MIXED HYPERLIPIDEMIA: ICD-10-CM

## 2025-08-04 LAB
ALBUMIN SERPL-MCNC: 3.8 G/DL (ref 3.5–5.2)
ALBUMIN/GLOB SERPL: 1.8 G/DL
ALP SERPL-CCNC: 45 U/L (ref 39–117)
ALT SERPL-CCNC: 36 U/L (ref 1–41)
AST SERPL-CCNC: 29 U/L (ref 1–40)
BILIRUB SERPL-MCNC: 0.3 MG/DL (ref 0–1.2)
BUN SERPL-MCNC: 17 MG/DL (ref 8–23)
BUN/CREAT SERPL: 10.5 (ref 7–25)
CALCIUM SERPL-MCNC: 8.9 MG/DL (ref 8.6–10.5)
CHLORIDE SERPL-SCNC: 105 MMOL/L (ref 98–107)
CHOLEST SERPL-MCNC: 145 MG/DL (ref 0–200)
CO2 SERPL-SCNC: 26.3 MMOL/L (ref 22–29)
CREAT SERPL-MCNC: 1.62 MG/DL (ref 0.76–1.27)
EGFRCR SERPLBLD CKD-EPI 2021: 48.3 ML/MIN/1.73
GLOBULIN SER CALC-MCNC: 2.1 GM/DL
GLUCOSE SERPL-MCNC: 130 MG/DL (ref 65–99)
HDLC SERPL-MCNC: 25 MG/DL (ref 40–60)
LDLC SERPL CALC-MCNC: 66 MG/DL (ref 0–100)
POTASSIUM SERPL-SCNC: 4.2 MMOL/L (ref 3.5–5.2)
PROT SERPL-MCNC: 5.9 G/DL (ref 6–8.5)
SODIUM SERPL-SCNC: 140 MMOL/L (ref 136–145)
TRIGL SERPL-MCNC: 338 MG/DL (ref 0–150)
VLDLC SERPL CALC-MCNC: 54 MG/DL (ref 5–40)

## 2025-08-12 ENCOUNTER — OFFICE VISIT (OUTPATIENT)
Dept: INTERNAL MEDICINE | Facility: CLINIC | Age: 60
End: 2025-08-12
Payer: COMMERCIAL

## 2025-08-12 VITALS
TEMPERATURE: 98.3 F | HEIGHT: 72 IN | SYSTOLIC BLOOD PRESSURE: 110 MMHG | BODY MASS INDEX: 31.56 KG/M2 | DIASTOLIC BLOOD PRESSURE: 62 MMHG | WEIGHT: 233 LBS

## 2025-08-12 DIAGNOSIS — I10 BENIGN ESSENTIAL HTN: ICD-10-CM

## 2025-08-12 DIAGNOSIS — E78.1 ESSENTIAL HYPERTRIGLYCERIDEMIA: Primary | ICD-10-CM

## 2025-08-12 PROBLEM — Z12.11 ENCOUNTER FOR SCREENING FOR MALIGNANT NEOPLASM OF COLON: Status: RESOLVED | Noted: 2022-03-16 | Resolved: 2025-08-12

## 2025-08-12 PROBLEM — R90.89 ABNORMAL BRAIN MRI: Status: RESOLVED | Noted: 2024-04-01 | Resolved: 2025-08-12

## 2025-08-12 PROBLEM — R94.39 ABNORMAL NUCLEAR STRESS TEST: Status: RESOLVED | Noted: 2025-05-09 | Resolved: 2025-08-12

## 2025-08-12 PROBLEM — N17.9 AKI (ACUTE KIDNEY INJURY): Status: RESOLVED | Noted: 2025-07-28 | Resolved: 2025-08-12

## 2025-08-12 LAB — INR PPP: 2.1

## 2025-08-12 RX ORDER — HYDRALAZINE HYDROCHLORIDE 50 MG/1
50 TABLET, FILM COATED ORAL 3 TIMES DAILY
Start: 2025-08-12

## 2025-08-13 ENCOUNTER — ANTICOAGULATION VISIT (OUTPATIENT)
Dept: CARDIOLOGY | Facility: CLINIC | Age: 60
End: 2025-08-13
Payer: COMMERCIAL

## 2025-08-25 RX ORDER — TELMISARTAN 80 MG/1
80 TABLET ORAL DAILY
Qty: 90 TABLET | Refills: 0 | Status: SHIPPED | OUTPATIENT
Start: 2025-08-25

## (undated) DEVICE — EPIDURAL TRAY: Brand: MEDLINE INDUSTRIES, INC.

## (undated) DEVICE — NEEDLE, QUINCKE, 22GX5": Brand: MEDLINE

## (undated) DEVICE — ADAPT CLN BIOGUARD AIR/H2O DISP

## (undated) DEVICE — GLIDESHEATH SLENDER STAINLESS STEEL KIT: Brand: GLIDESHEATH SLENDER

## (undated) DEVICE — FRCP BX RADJAW4 NDL 2.8 240CM LG OG BX40

## (undated) DEVICE — THE BITE BLOCK MAXI, LATEX FREE STRAP IS USED TO PROTECT THE ENDOSCOPE INSERTION TUBE FROM BEING BITTEN BY THE PATIENT.

## (undated) DEVICE — GLV SURG TRIUMPH PF LTX 7 STRL

## (undated) DEVICE — BLD DISSCT COOL CUT SJ CRVD 4MM 13CM

## (undated) DEVICE — SAFELINER SUCTION CANISTER 1000CC: Brand: DEROYAL

## (undated) DEVICE — GLV SURG BIOGEL LTX PF 6 1/2

## (undated) DEVICE — DGW .035 FC J3MM 260CM TEF: Brand: EMERALD

## (undated) DEVICE — SKIN PREP TRAY W/CHG: Brand: MEDLINE INDUSTRIES, INC.

## (undated) DEVICE — Device

## (undated) DEVICE — VIAL FORMALIN CAP 10P 40ML

## (undated) DEVICE — GLV SURG TRIUMPH PF LTX 7.5 STRL

## (undated) DEVICE — SYR LL 3CC

## (undated) DEVICE — PK CATH CARD 40

## (undated) DEVICE — CATH DIAG IMPULSE FR4 5F 100CM

## (undated) DEVICE — ABL APOLLO RF M/PRT 50D

## (undated) DEVICE — DRSNG WND GZ CURAD OIL EMULSION 3X3IN STRL

## (undated) DEVICE — TR BAND RADIAL ARTERY COMPRESSION DEVICE: Brand: TR BAND

## (undated) DEVICE — BNDG ELAS ELITE V/CLOSE 4IN 5YD LF STRL

## (undated) DEVICE — NDL SPINE 22G 31/2IN BLK

## (undated) DEVICE — DISPOSABLE TOURNIQUET CUFF SINGLE BLADDER, SINGLE PORT AND QUICK CONNECT CONNECTOR: Brand: COLOR CUFF

## (undated) DEVICE — Device: Brand: PORTEX

## (undated) DEVICE — SNAR POLYP CAPTIFLX MICRO OVL 13MM 240CM

## (undated) DEVICE — GOWN ,SIRUS,NONREINFORCED 3XL: Brand: MEDLINE

## (undated) DEVICE — BW-412T DISP COMBO CLEANING BRUSH: Brand: SINGLE USE COMBINATION CLEANING BRUSH

## (undated) DEVICE — PK ARTHSCP 40

## (undated) DEVICE — CATH DIAG IMPULSE FL3.5 5F 100CM

## (undated) DEVICE — NDL SPINE 22G 5IN BLK

## (undated) DEVICE — KT ORCA ORCAPOD DISP STRL

## (undated) DEVICE — SPNG GZ WOVN 4X4IN 12PLY 10/BX STRL

## (undated) DEVICE — KT MANIFLD CARDIAC

## (undated) DEVICE — SUT ETHLN 3/0 PS1 18IN 1663H

## (undated) DEVICE — UNDERCAST PADDING: Brand: DEROYAL